# Patient Record
Sex: FEMALE | Race: BLACK OR AFRICAN AMERICAN | Employment: OTHER | URBAN - METROPOLITAN AREA
[De-identification: names, ages, dates, MRNs, and addresses within clinical notes are randomized per-mention and may not be internally consistent; named-entity substitution may affect disease eponyms.]

---

## 2017-09-18 PROBLEM — E11.42 DIABETIC PERIPHERAL NEUROPATHY (HCC): Status: ACTIVE | Noted: 2017-09-18

## 2018-04-30 ENCOUNTER — ANESTHESIA EVENT (OUTPATIENT)
Dept: OPERATING ROOM | Age: 63
End: 2018-04-30
Payer: MEDICARE

## 2018-05-01 ENCOUNTER — HOSPITAL ENCOUNTER (OUTPATIENT)
Age: 63
Setting detail: OUTPATIENT SURGERY
Discharge: HOME | End: 2018-05-01
Attending: PHYSICAL MEDICINE & REHABILITATION | Admitting: PHYSICAL MEDICINE & REHABILITATION
Payer: MEDICARE

## 2018-05-01 ENCOUNTER — HOSPITAL ENCOUNTER (OUTPATIENT)
Dept: OPERATING ROOM | Age: 63
Discharge: HOME | End: 2018-05-01
Payer: MEDICARE

## 2018-05-01 ENCOUNTER — ANESTHESIA (OUTPATIENT)
Dept: OPERATING ROOM | Age: 63
End: 2018-05-01
Payer: MEDICARE

## 2018-05-01 VITALS
OXYGEN SATURATION: 97 % | HEART RATE: 75 BPM | HEIGHT: 69 IN | SYSTOLIC BLOOD PRESSURE: 135 MMHG | WEIGHT: 293 LBS | TEMPERATURE: 98.6 F | RESPIRATION RATE: 16 BRPM | BODY MASS INDEX: 43.4 KG/M2 | DIASTOLIC BLOOD PRESSURE: 71 MMHG

## 2018-05-01 VITALS
RESPIRATION RATE: 11 BRPM | OXYGEN SATURATION: 98 % | DIASTOLIC BLOOD PRESSURE: 81 MMHG | SYSTOLIC BLOOD PRESSURE: 133 MMHG

## 2018-05-01 DIAGNOSIS — M51.36 DDD (DEGENERATIVE DISC DISEASE), LUMBAR: ICD-10-CM

## 2018-05-01 PROBLEM — M48.061 NEURAL FORAMINAL STENOSIS OF LUMBAR SPINE: Chronic | Status: ACTIVE | Noted: 2018-05-01

## 2018-05-01 LAB — GLUCOSE BLD-MCNC: 167 MG/DL

## 2018-05-01 PROCEDURE — 7100000011 HC PHASE II RECOVERY - ADDTL 15 MIN: Performed by: PHYSICAL MEDICINE & REHABILITATION

## 2018-05-01 PROCEDURE — 7100000010 HC PHASE II RECOVERY - FIRST 15 MIN: Performed by: PHYSICAL MEDICINE & REHABILITATION

## 2018-05-01 PROCEDURE — 3600000005 HC SURGERY LEVEL 5 BASE: Performed by: PHYSICAL MEDICINE & REHABILITATION

## 2018-05-01 PROCEDURE — 82962 GLUCOSE BLOOD TEST: CPT | Performed by: PHYSICAL MEDICINE & REHABILITATION

## 2018-05-01 PROCEDURE — 2580000003 HC RX 258: Performed by: PHYSICAL MEDICINE & REHABILITATION

## 2018-05-01 PROCEDURE — 3700000000 HC ANESTHESIA ATTENDED CARE: Performed by: PHYSICAL MEDICINE & REHABILITATION

## 2018-05-01 PROCEDURE — 3209999900 FLUORO FOR SURGICAL PROCEDURES

## 2018-05-01 PROCEDURE — 2500000003 HC RX 250 WO HCPCS: Performed by: PHYSICAL MEDICINE & REHABILITATION

## 2018-05-01 PROCEDURE — 6360000002 HC RX W HCPCS: Performed by: NURSE ANESTHETIST, CERTIFIED REGISTERED

## 2018-05-01 PROCEDURE — 6360000002 HC RX W HCPCS: Performed by: PHYSICAL MEDICINE & REHABILITATION

## 2018-05-01 PROCEDURE — 2500000003 HC RX 250 WO HCPCS: Performed by: NURSE ANESTHETIST, CERTIFIED REGISTERED

## 2018-05-01 PROCEDURE — 6360000004 HC RX CONTRAST MEDICATION: Performed by: PHYSICAL MEDICINE & REHABILITATION

## 2018-05-01 PROCEDURE — 2580000003 HC RX 258: Performed by: ANESTHESIOLOGY

## 2018-05-01 RX ORDER — ALFENTANIL HYDROCHLORIDE 500 UG/ML
INJECTION INTRAVENOUS PRN
Status: DISCONTINUED | OUTPATIENT
Start: 2018-05-01 | End: 2018-05-01 | Stop reason: SDUPTHER

## 2018-05-01 RX ORDER — HYDRALAZINE HYDROCHLORIDE 20 MG/ML
5 INJECTION INTRAMUSCULAR; INTRAVENOUS EVERY 10 MIN PRN
Status: DISCONTINUED | OUTPATIENT
Start: 2018-05-01 | End: 2018-05-01 | Stop reason: HOSPADM

## 2018-05-01 RX ORDER — LABETALOL HYDROCHLORIDE 5 MG/ML
5 INJECTION, SOLUTION INTRAVENOUS EVERY 10 MIN PRN
Status: DISCONTINUED | OUTPATIENT
Start: 2018-05-01 | End: 2018-05-01 | Stop reason: HOSPADM

## 2018-05-01 RX ORDER — LIDOCAINE HYDROCHLORIDE 10 MG/ML
INJECTION, SOLUTION EPIDURAL; INFILTRATION; INTRACAUDAL; PERINEURAL PRN
Status: DISCONTINUED | OUTPATIENT
Start: 2018-05-01 | End: 2018-05-01 | Stop reason: HOSPADM

## 2018-05-01 RX ORDER — PROPOFOL 10 MG/ML
INJECTION, EMULSION INTRAVENOUS PRN
Status: DISCONTINUED | OUTPATIENT
Start: 2018-05-01 | End: 2018-05-01 | Stop reason: SDUPTHER

## 2018-05-01 RX ORDER — HYDROCODONE BITARTRATE AND ACETAMINOPHEN 5; 325 MG/1; MG/1
2 TABLET ORAL PRN
Status: DISCONTINUED | OUTPATIENT
Start: 2018-05-01 | End: 2018-05-01 | Stop reason: HOSPADM

## 2018-05-01 RX ORDER — MEPERIDINE HYDROCHLORIDE 25 MG/ML
12.5 INJECTION INTRAMUSCULAR; INTRAVENOUS; SUBCUTANEOUS EVERY 5 MIN PRN
Status: DISCONTINUED | OUTPATIENT
Start: 2018-05-01 | End: 2018-05-01 | Stop reason: HOSPADM

## 2018-05-01 RX ORDER — HYDROCODONE BITARTRATE AND ACETAMINOPHEN 5; 325 MG/1; MG/1
1 TABLET ORAL PRN
Status: DISCONTINUED | OUTPATIENT
Start: 2018-05-01 | End: 2018-05-01 | Stop reason: HOSPADM

## 2018-05-01 RX ORDER — FENTANYL CITRATE 50 UG/ML
50 INJECTION, SOLUTION INTRAMUSCULAR; INTRAVENOUS EVERY 5 MIN PRN
Status: DISCONTINUED | OUTPATIENT
Start: 2018-05-01 | End: 2018-05-01 | Stop reason: HOSPADM

## 2018-05-01 RX ORDER — MORPHINE SULFATE 2 MG/ML
1 INJECTION, SOLUTION INTRAMUSCULAR; INTRAVENOUS EVERY 5 MIN PRN
Status: DISCONTINUED | OUTPATIENT
Start: 2018-05-01 | End: 2018-05-01 | Stop reason: HOSPADM

## 2018-05-01 RX ORDER — SODIUM CHLORIDE, SODIUM LACTATE, POTASSIUM CHLORIDE, CALCIUM CHLORIDE 600; 310; 30; 20 MG/100ML; MG/100ML; MG/100ML; MG/100ML
INJECTION, SOLUTION INTRAVENOUS CONTINUOUS
Status: DISCONTINUED | OUTPATIENT
Start: 2018-05-01 | End: 2018-05-01 | Stop reason: HOSPADM

## 2018-05-01 RX ORDER — DIPHENHYDRAMINE HYDROCHLORIDE 50 MG/ML
12.5 INJECTION INTRAMUSCULAR; INTRAVENOUS
Status: DISCONTINUED | OUTPATIENT
Start: 2018-05-01 | End: 2018-05-01 | Stop reason: HOSPADM

## 2018-05-01 RX ORDER — LIDOCAINE HYDROCHLORIDE 20 MG/ML
INJECTION, SOLUTION EPIDURAL; INFILTRATION; INTRACAUDAL; PERINEURAL PRN
Status: DISCONTINUED | OUTPATIENT
Start: 2018-05-01 | End: 2018-05-01 | Stop reason: SDUPTHER

## 2018-05-01 RX ORDER — PROMETHAZINE HYDROCHLORIDE 25 MG/ML
25 INJECTION, SOLUTION INTRAMUSCULAR; INTRAVENOUS
Status: DISCONTINUED | OUTPATIENT
Start: 2018-05-01 | End: 2018-05-01 | Stop reason: HOSPADM

## 2018-05-01 RX ORDER — MIDAZOLAM HYDROCHLORIDE 1 MG/ML
INJECTION INTRAMUSCULAR; INTRAVENOUS PRN
Status: DISCONTINUED | OUTPATIENT
Start: 2018-05-01 | End: 2018-05-01 | Stop reason: SDUPTHER

## 2018-05-01 RX ADMIN — LIDOCAINE HYDROCHLORIDE 50 MG: 20 INJECTION, SOLUTION EPIDURAL; INFILTRATION; INTRACAUDAL; PERINEURAL at 10:17

## 2018-05-01 RX ADMIN — MIDAZOLAM HYDROCHLORIDE 2 MG: 1 INJECTION INTRAMUSCULAR; INTRAVENOUS at 10:17

## 2018-05-01 RX ADMIN — ALFENTANIL HYDROCHLORIDE 250 MCG: 500 INJECTION INTRAVENOUS at 10:17

## 2018-05-01 RX ADMIN — PROPOFOL 20 MG: 10 INJECTION, EMULSION INTRAVENOUS at 10:17

## 2018-05-01 RX ADMIN — ALFENTANIL HYDROCHLORIDE 250 MCG: 500 INJECTION INTRAVENOUS at 10:24

## 2018-05-01 RX ADMIN — SODIUM CHLORIDE, POTASSIUM CHLORIDE, SODIUM LACTATE AND CALCIUM CHLORIDE: 600; 310; 30; 20 INJECTION, SOLUTION INTRAVENOUS at 09:30

## 2018-05-01 RX ADMIN — PROPOFOL 10 MG: 10 INJECTION, EMULSION INTRAVENOUS at 10:23

## 2018-05-01 RX ADMIN — ALFENTANIL HYDROCHLORIDE 250 MCG: 500 INJECTION INTRAVENOUS at 10:20

## 2018-05-01 RX ADMIN — PROPOFOL 10 MG: 10 INJECTION, EMULSION INTRAVENOUS at 10:24

## 2018-05-01 ASSESSMENT — PULMONARY FUNCTION TESTS
PIF_VALUE: 0

## 2018-05-01 ASSESSMENT — PAIN SCALES - GENERAL
PAINLEVEL_OUTOF10: 0

## 2018-05-01 ASSESSMENT — PAIN - FUNCTIONAL ASSESSMENT: PAIN_FUNCTIONAL_ASSESSMENT: 0-10

## 2018-05-02 ENCOUNTER — HOSPITAL ENCOUNTER (OUTPATIENT)
Age: 63
Discharge: HOME | End: 2018-05-02
Payer: MEDICARE

## 2018-05-02 LAB
ANION GAP SERPL CALCULATED.3IONS-SCNC: 13 MMOL/L (ref 7–16)
BASOPHILS ABSOLUTE: 0.03 E9/L (ref 0–0.2)
BASOPHILS RELATIVE PERCENT: 0.2 % (ref 0–2)
BUN BLDV-MCNC: 18 MG/DL (ref 8–23)
CALCIUM SERPL-MCNC: 9.6 MG/DL (ref 8.6–10.2)
CHLORIDE BLD-SCNC: 99 MMOL/L (ref 98–107)
CHOLESTEROL, TOTAL: 125 MG/DL (ref 0–199)
CO2: 29 MMOL/L (ref 22–29)
CREAT SERPL-MCNC: 1.2 MG/DL (ref 0.5–1)
EOSINOPHILS ABSOLUTE: 0.04 E9/L (ref 0.05–0.5)
EOSINOPHILS RELATIVE PERCENT: 0.3 % (ref 0–6)
GFR AFRICAN AMERICAN: 55
GFR NON-AFRICAN AMERICAN: 55 ML/MIN/1.73
GLUCOSE BLD-MCNC: 146 MG/DL (ref 74–109)
HCT VFR BLD CALC: 37.4 % (ref 34–48)
HDLC SERPL-MCNC: 38 MG/DL
HEMOGLOBIN: 11.6 G/DL (ref 11.5–15.5)
IMMATURE GRANULOCYTES #: 0.09 E9/L
IMMATURE GRANULOCYTES %: 0.6 % (ref 0–5)
LDL CHOLESTEROL CALCULATED: 72 MG/DL (ref 0–99)
LYMPHOCYTES ABSOLUTE: 2.79 E9/L (ref 1.5–4)
LYMPHOCYTES RELATIVE PERCENT: 17.8 % (ref 20–42)
MCH RBC QN AUTO: 26.5 PG (ref 26–35)
MCHC RBC AUTO-ENTMCNC: 31 % (ref 32–34.5)
MCV RBC AUTO: 85.4 FL (ref 80–99.9)
MONOCYTES ABSOLUTE: 1.35 E9/L (ref 0.1–0.95)
MONOCYTES RELATIVE PERCENT: 8.6 % (ref 2–12)
NEUTROPHILS ABSOLUTE: 11.35 E9/L (ref 1.8–7.3)
NEUTROPHILS RELATIVE PERCENT: 72.5 % (ref 43–80)
PARATHYROID HORMONE INTACT: 82 PG/ML (ref 15–65)
PDW BLD-RTO: 14.2 FL (ref 11.5–15)
PHOSPHORUS: 1.7 MG/DL (ref 2.5–4.5)
PLATELET # BLD: 357 E9/L (ref 130–450)
PMV BLD AUTO: 10.6 FL (ref 7–12)
POTASSIUM SERPL-SCNC: 3.4 MMOL/L (ref 3.5–5)
RBC # BLD: 4.38 E12/L (ref 3.5–5.5)
SODIUM BLD-SCNC: 141 MMOL/L (ref 132–146)
TRIGL SERPL-MCNC: 75 MG/DL (ref 0–149)
VITAMIN D 25-HYDROXY: 32 NG/ML (ref 30–100)
VLDLC SERPL CALC-MCNC: 15 MG/DL
WBC # BLD: 15.7 E9/L (ref 4.5–11.5)

## 2018-05-02 PROCEDURE — 85025 COMPLETE CBC W/AUTO DIFF WBC: CPT

## 2018-05-02 PROCEDURE — 80048 BASIC METABOLIC PNL TOTAL CA: CPT

## 2018-05-02 PROCEDURE — 84100 ASSAY OF PHOSPHORUS: CPT

## 2018-05-02 PROCEDURE — 83970 ASSAY OF PARATHORMONE: CPT

## 2018-05-02 PROCEDURE — 80061 LIPID PANEL: CPT

## 2018-05-02 PROCEDURE — 82306 VITAMIN D 25 HYDROXY: CPT

## 2018-05-02 PROCEDURE — 36415 COLL VENOUS BLD VENIPUNCTURE: CPT

## 2018-05-07 DIAGNOSIS — M47.26 OSTEOARTHRITIS OF SPINE WITH RADICULOPATHY, LUMBAR REGION: ICD-10-CM

## 2018-05-07 DIAGNOSIS — E11.42 DIABETIC POLYNEUROPATHY ASSOCIATED WITH TYPE 2 DIABETES MELLITUS (HCC): ICD-10-CM

## 2018-05-07 RX ORDER — GLUCOSAMINE HCL/CHONDROITIN SU 500-400 MG
CAPSULE ORAL
Qty: 200 STRIP | Refills: 3 | Status: SHIPPED | OUTPATIENT
Start: 2018-05-07 | End: 2018-11-16

## 2018-05-07 RX ORDER — CLONIDINE HYDROCHLORIDE 0.1 MG/1
0.1 TABLET ORAL 3 TIMES DAILY
Qty: 270 TABLET | Refills: 1 | Status: SHIPPED | OUTPATIENT
Start: 2018-05-07 | End: 2018-08-31 | Stop reason: SDUPTHER

## 2018-05-07 RX ORDER — GABAPENTIN 600 MG/1
600 TABLET ORAL 3 TIMES DAILY
Qty: 90 TABLET | Refills: 3 | Status: SHIPPED | OUTPATIENT
Start: 2018-05-07 | End: 2018-08-31 | Stop reason: SDUPTHER

## 2018-05-10 ENCOUNTER — HOSPITAL ENCOUNTER (OUTPATIENT)
Dept: GENERAL RADIOLOGY | Age: 63
Discharge: HOME | End: 2018-05-12
Payer: MEDICARE

## 2018-05-10 ENCOUNTER — HOSPITAL ENCOUNTER (OUTPATIENT)
Age: 63
Discharge: HOME | End: 2018-05-12
Payer: MEDICARE

## 2018-05-10 ENCOUNTER — OFFICE VISIT (OUTPATIENT)
Dept: FAMILY MEDICINE CLINIC | Age: 63
End: 2018-05-10
Payer: MEDICARE

## 2018-05-10 VITALS
TEMPERATURE: 98.2 F | DIASTOLIC BLOOD PRESSURE: 100 MMHG | OXYGEN SATURATION: 95 % | HEART RATE: 80 BPM | WEIGHT: 293 LBS | BODY MASS INDEX: 43.4 KG/M2 | SYSTOLIC BLOOD PRESSURE: 140 MMHG | HEIGHT: 69 IN | RESPIRATION RATE: 16 BRPM

## 2018-05-10 DIAGNOSIS — M25.512 ACUTE PAIN OF LEFT SHOULDER: ICD-10-CM

## 2018-05-10 DIAGNOSIS — K21.9 GASTROESOPHAGEAL REFLUX DISEASE, ESOPHAGITIS PRESENCE NOT SPECIFIED: ICD-10-CM

## 2018-05-10 DIAGNOSIS — I10 ESSENTIAL HYPERTENSION: Chronic | ICD-10-CM

## 2018-05-10 DIAGNOSIS — M47.26 OSTEOARTHRITIS OF SPINE WITH RADICULOPATHY, LUMBAR REGION: ICD-10-CM

## 2018-05-10 DIAGNOSIS — Z12.11 COLON CANCER SCREENING: ICD-10-CM

## 2018-05-10 LAB
ALBUMIN SERPL-MCNC: 3.8 G/DL (ref 3.5–5.2)
ALP BLD-CCNC: 119 U/L (ref 35–104)
ALT SERPL-CCNC: 15 U/L (ref 0–32)
ANION GAP SERPL CALCULATED.3IONS-SCNC: 14 MMOL/L (ref 7–16)
AST SERPL-CCNC: 17 U/L (ref 0–31)
BASOPHILS ABSOLUTE: 0.05 E9/L (ref 0–0.2)
BASOPHILS RELATIVE PERCENT: 0.4 % (ref 0–2)
BILIRUB SERPL-MCNC: 0.5 MG/DL (ref 0–1.2)
BUN BLDV-MCNC: 15 MG/DL (ref 8–23)
CALCIUM SERPL-MCNC: 9.6 MG/DL (ref 8.6–10.2)
CHLORIDE BLD-SCNC: 104 MMOL/L (ref 98–107)
CO2: 27 MMOL/L (ref 22–29)
CREAT SERPL-MCNC: 1.1 MG/DL (ref 0.5–1)
EOSINOPHILS ABSOLUTE: 0.27 E9/L (ref 0.05–0.5)
EOSINOPHILS RELATIVE PERCENT: 2.3 % (ref 0–6)
GFR AFRICAN AMERICAN: >60
GFR NON-AFRICAN AMERICAN: >60 ML/MIN/1.73
GLUCOSE BLD-MCNC: 145 MG/DL (ref 74–109)
HBA1C MFR BLD: 7.2 %
HCT VFR BLD CALC: 40.6 % (ref 34–48)
HEMOGLOBIN: 12 G/DL (ref 11.5–15.5)
IMMATURE GRANULOCYTES #: 0.03 E9/L
IMMATURE GRANULOCYTES %: 0.3 % (ref 0–5)
LYMPHOCYTES ABSOLUTE: 2.28 E9/L (ref 1.5–4)
LYMPHOCYTES RELATIVE PERCENT: 19.6 % (ref 20–42)
MCH RBC QN AUTO: 25.8 PG (ref 26–35)
MCHC RBC AUTO-ENTMCNC: 29.6 % (ref 32–34.5)
MCV RBC AUTO: 87.3 FL (ref 80–99.9)
MONOCYTES ABSOLUTE: 0.74 E9/L (ref 0.1–0.95)
MONOCYTES RELATIVE PERCENT: 6.4 % (ref 2–12)
NEUTROPHILS ABSOLUTE: 8.26 E9/L (ref 1.8–7.3)
NEUTROPHILS RELATIVE PERCENT: 71 % (ref 43–80)
PDW BLD-RTO: 14.5 FL (ref 11.5–15)
PLATELET # BLD: 370 E9/L (ref 130–450)
PMV BLD AUTO: 11 FL (ref 7–12)
POTASSIUM SERPL-SCNC: 3.4 MMOL/L (ref 3.5–5)
RBC # BLD: 4.65 E12/L (ref 3.5–5.5)
SODIUM BLD-SCNC: 145 MMOL/L (ref 132–146)
TOTAL PROTEIN: 7.1 G/DL (ref 6.4–8.3)
WBC # BLD: 11.6 E9/L (ref 4.5–11.5)

## 2018-05-10 PROCEDURE — 36415 COLL VENOUS BLD VENIPUNCTURE: CPT

## 2018-05-10 PROCEDURE — 83036 HEMOGLOBIN GLYCOSYLATED A1C: CPT | Performed by: FAMILY MEDICINE

## 2018-05-10 PROCEDURE — 80053 COMPREHEN METABOLIC PANEL: CPT

## 2018-05-10 PROCEDURE — 85025 COMPLETE CBC W/AUTO DIFF WBC: CPT

## 2018-05-10 PROCEDURE — 36415 COLL VENOUS BLD VENIPUNCTURE: CPT | Performed by: FAMILY MEDICINE

## 2018-05-10 PROCEDURE — 3017F COLORECTAL CA SCREEN DOC REV: CPT | Performed by: FAMILY MEDICINE

## 2018-05-10 PROCEDURE — 1036F TOBACCO NON-USER: CPT | Performed by: FAMILY MEDICINE

## 2018-05-10 PROCEDURE — G8417 CALC BMI ABV UP PARAM F/U: HCPCS | Performed by: FAMILY MEDICINE

## 2018-05-10 PROCEDURE — 3045F PR MOST RECENT HEMOGLOBIN A1C LEVEL 7.0-9.0%: CPT | Performed by: FAMILY MEDICINE

## 2018-05-10 PROCEDURE — 99214 OFFICE O/P EST MOD 30 MIN: CPT | Performed by: FAMILY MEDICINE

## 2018-05-10 PROCEDURE — 2022F DILAT RTA XM EVC RTNOPTHY: CPT | Performed by: FAMILY MEDICINE

## 2018-05-10 PROCEDURE — 73030 X-RAY EXAM OF SHOULDER: CPT

## 2018-05-10 PROCEDURE — G8427 DOCREV CUR MEDS BY ELIG CLIN: HCPCS | Performed by: FAMILY MEDICINE

## 2018-05-10 PROCEDURE — 99212 OFFICE O/P EST SF 10 MIN: CPT | Performed by: FAMILY MEDICINE

## 2018-05-10 RX ORDER — CELECOXIB 200 MG/1
200 CAPSULE ORAL DAILY
Qty: 90 CAPSULE | Refills: 1 | Status: SHIPPED | OUTPATIENT
Start: 2018-05-10 | End: 2018-08-31 | Stop reason: SDUPTHER

## 2018-05-10 RX ORDER — OMEPRAZOLE 40 MG/1
40 CAPSULE, DELAYED RELEASE ORAL
Qty: 90 CAPSULE | Refills: 3 | Status: SHIPPED | OUTPATIENT
Start: 2018-05-10 | End: 2018-08-31 | Stop reason: SDUPTHER

## 2018-05-10 RX ORDER — TIZANIDINE 4 MG/1
4 TABLET ORAL EVERY 6 HOURS PRN
Qty: 20 TABLET | Refills: 0 | Status: SHIPPED | OUTPATIENT
Start: 2018-05-10 | End: 2018-05-30 | Stop reason: SDUPTHER

## 2018-05-10 RX ORDER — BUSPIRONE HYDROCHLORIDE 5 MG/1
5 TABLET ORAL 3 TIMES DAILY
Qty: 270 TABLET | Refills: 1 | Status: SHIPPED | OUTPATIENT
Start: 2018-05-10 | End: 2018-08-31 | Stop reason: SDUPTHER

## 2018-05-10 RX ORDER — VALSARTAN AND HYDROCHLOROTHIAZIDE 160; 25 MG/1; MG/1
1 TABLET ORAL DAILY
Qty: 90 TABLET | Refills: 1 | Status: SHIPPED | OUTPATIENT
Start: 2018-05-10 | End: 2018-08-31 | Stop reason: SDUPTHER

## 2018-05-10 RX ORDER — DULOXETIN HYDROCHLORIDE 60 MG/1
60 CAPSULE, DELAYED RELEASE ORAL DAILY
Qty: 90 CAPSULE | Refills: 1 | Status: SHIPPED | OUTPATIENT
Start: 2018-05-10 | End: 2018-08-31 | Stop reason: SDUPTHER

## 2018-05-10 RX ORDER — METFORMIN HYDROCHLORIDE 500 MG/1
500 TABLET, EXTENDED RELEASE ORAL
Qty: 90 TABLET | Refills: 1 | Status: SHIPPED | OUTPATIENT
Start: 2018-05-10 | End: 2018-08-31 | Stop reason: SDUPTHER

## 2018-05-10 RX ORDER — ATORVASTATIN CALCIUM 80 MG/1
80 TABLET, FILM COATED ORAL NIGHTLY
Qty: 90 TABLET | Refills: 1 | Status: SHIPPED | OUTPATIENT
Start: 2018-05-10 | End: 2018-08-31 | Stop reason: SDUPTHER

## 2018-05-11 ASSESSMENT — ENCOUNTER SYMPTOMS
SHORTNESS OF BREATH: 0
EYE REDNESS: 0
BLURRED VISION: 0
BACK PAIN: 1
CONSTIPATION: 0
EYE PAIN: 0
DIARRHEA: 0
SORE THROAT: 0
SPUTUM PRODUCTION: 0
BLOOD IN STOOL: 0
NAUSEA: 0
VOMITING: 0
COUGH: 0
ABDOMINAL PAIN: 0

## 2018-05-30 ENCOUNTER — OFFICE VISIT (OUTPATIENT)
Dept: FAMILY MEDICINE CLINIC | Age: 63
End: 2018-05-30
Payer: MEDICARE

## 2018-05-30 VITALS
DIASTOLIC BLOOD PRESSURE: 87 MMHG | WEIGHT: 293 LBS | HEART RATE: 86 BPM | BODY MASS INDEX: 43.4 KG/M2 | RESPIRATION RATE: 16 BRPM | HEIGHT: 69 IN | SYSTOLIC BLOOD PRESSURE: 155 MMHG | TEMPERATURE: 98 F | OXYGEN SATURATION: 98 %

## 2018-05-30 DIAGNOSIS — M75.82 ROTATOR CUFF TENDONITIS, LEFT: Primary | ICD-10-CM

## 2018-05-30 PROCEDURE — 20610 DRAIN/INJ JOINT/BURSA W/O US: CPT | Performed by: FAMILY MEDICINE

## 2018-05-30 PROCEDURE — 6360000002 HC RX W HCPCS

## 2018-05-30 PROCEDURE — 99212 OFFICE O/P EST SF 10 MIN: CPT | Performed by: FAMILY MEDICINE

## 2018-05-30 PROCEDURE — 2500000003 HC RX 250 WO HCPCS

## 2018-05-30 RX ORDER — TRIAMCINOLONE ACETONIDE 40 MG/ML
40 INJECTION, SUSPENSION INTRA-ARTICULAR; INTRAMUSCULAR ONCE
Status: COMPLETED | OUTPATIENT
Start: 2018-05-30 | End: 2018-05-30

## 2018-05-30 RX ORDER — TIZANIDINE 4 MG/1
4 TABLET ORAL EVERY 6 HOURS PRN
Qty: 20 TABLET | Refills: 0 | Status: SHIPPED | OUTPATIENT
Start: 2018-05-30 | End: 2018-07-02 | Stop reason: SDUPTHER

## 2018-05-30 RX ORDER — LIDOCAINE HYDROCHLORIDE 10 MG/ML
9 INJECTION, SOLUTION INFILTRATION; PERINEURAL ONCE
Status: COMPLETED | OUTPATIENT
Start: 2018-05-30 | End: 2018-05-30

## 2018-05-30 RX ADMIN — LIDOCAINE HYDROCHLORIDE 9 ML: 10 INJECTION, SOLUTION INFILTRATION; PERINEURAL at 14:10

## 2018-05-30 RX ADMIN — TRIAMCINOLONE ACETONIDE 40 MG: 40 INJECTION, SUSPENSION INTRA-ARTICULAR; INTRAMUSCULAR at 14:10

## 2018-05-30 ASSESSMENT — ENCOUNTER SYMPTOMS
COUGH: 0
NAUSEA: 0
VOMITING: 0
ABDOMINAL PAIN: 0
SHORTNESS OF BREATH: 0

## 2018-06-09 DIAGNOSIS — R05.9 COUGH: ICD-10-CM

## 2018-06-09 DIAGNOSIS — R09.89 CHEST CONGESTION: Primary | ICD-10-CM

## 2018-06-09 RX ORDER — GUAIFENESIN 600 MG/1
1200 TABLET, EXTENDED RELEASE ORAL 2 TIMES DAILY PRN
Qty: 28 TABLET | Refills: 0 | Status: SHIPPED | OUTPATIENT
Start: 2018-06-09 | End: 2018-06-16

## 2018-06-11 ENCOUNTER — TELEPHONE (OUTPATIENT)
Dept: FAMILY MEDICINE CLINIC | Age: 63
End: 2018-06-11

## 2018-06-11 RX ORDER — GUAIFENESIN DEXTROMETHORPHAN HYDROBROMIDE ORAL SOLUTION 10; 100 MG/5ML; MG/5ML
10 SOLUTION ORAL EVERY 4 HOURS PRN
Qty: 1 BOTTLE | Refills: 1 | Status: SHIPPED | OUTPATIENT
Start: 2018-06-11 | End: 2018-06-13 | Stop reason: SDUPTHER

## 2018-06-13 ENCOUNTER — OFFICE VISIT (OUTPATIENT)
Dept: FAMILY MEDICINE CLINIC | Age: 63
End: 2018-06-13
Payer: MEDICARE

## 2018-06-13 ENCOUNTER — HOSPITAL ENCOUNTER (OUTPATIENT)
Dept: GENERAL RADIOLOGY | Age: 63
Discharge: HOME | End: 2018-06-15
Payer: MEDICARE

## 2018-06-13 ENCOUNTER — HOSPITAL ENCOUNTER (OUTPATIENT)
Age: 63
Discharge: HOME | End: 2018-06-15
Payer: MEDICARE

## 2018-06-13 VITALS
TEMPERATURE: 97.7 F | HEIGHT: 68 IN | OXYGEN SATURATION: 96 % | WEIGHT: 293 LBS | DIASTOLIC BLOOD PRESSURE: 62 MMHG | BODY MASS INDEX: 44.41 KG/M2 | SYSTOLIC BLOOD PRESSURE: 149 MMHG | RESPIRATION RATE: 20 BRPM

## 2018-06-13 DIAGNOSIS — J40 BRONCHITIS: Primary | ICD-10-CM

## 2018-06-13 DIAGNOSIS — R06.2 WHEEZING: ICD-10-CM

## 2018-06-13 PROCEDURE — 99213 OFFICE O/P EST LOW 20 MIN: CPT | Performed by: NURSE PRACTITIONER

## 2018-06-13 PROCEDURE — G8417 CALC BMI ABV UP PARAM F/U: HCPCS | Performed by: NURSE PRACTITIONER

## 2018-06-13 PROCEDURE — G8427 DOCREV CUR MEDS BY ELIG CLIN: HCPCS | Performed by: NURSE PRACTITIONER

## 2018-06-13 PROCEDURE — 99212 OFFICE O/P EST SF 10 MIN: CPT | Performed by: NURSE PRACTITIONER

## 2018-06-13 PROCEDURE — 71046 X-RAY EXAM CHEST 2 VIEWS: CPT

## 2018-06-13 PROCEDURE — 3017F COLORECTAL CA SCREEN DOC REV: CPT | Performed by: NURSE PRACTITIONER

## 2018-06-13 PROCEDURE — 1036F TOBACCO NON-USER: CPT | Performed by: NURSE PRACTITIONER

## 2018-06-13 PROCEDURE — 94640 AIRWAY INHALATION TREATMENT: CPT | Performed by: NURSE PRACTITIONER

## 2018-06-13 RX ORDER — IPRATROPIUM BROMIDE AND ALBUTEROL SULFATE 2.5; .5 MG/3ML; MG/3ML
1 SOLUTION RESPIRATORY (INHALATION) ONCE
Status: COMPLETED | OUTPATIENT
Start: 2018-06-13 | End: 2018-06-13

## 2018-06-13 RX ORDER — ALBUTEROL SULFATE 90 UG/1
2 AEROSOL, METERED RESPIRATORY (INHALATION) EVERY 6 HOURS PRN
Qty: 1 INHALER | Refills: 3 | Status: SHIPPED | OUTPATIENT
Start: 2018-06-13 | End: 2018-08-31

## 2018-06-13 RX ORDER — GUAIFENESIN DEXTROMETHORPHAN HYDROBROMIDE ORAL SOLUTION 10; 100 MG/5ML; MG/5ML
10 SOLUTION ORAL EVERY 4 HOURS PRN
Qty: 1 BOTTLE | Refills: 1 | Status: SHIPPED | OUTPATIENT
Start: 2018-06-13 | End: 2018-08-31

## 2018-06-13 RX ORDER — DOXYCYCLINE 100 MG/1
100 CAPSULE ORAL 2 TIMES DAILY
Qty: 20 CAPSULE | Refills: 0 | Status: SHIPPED | OUTPATIENT
Start: 2018-06-13 | End: 2018-08-31

## 2018-06-13 RX ADMIN — IPRATROPIUM BROMIDE AND ALBUTEROL SULFATE 1 AMPULE: 2.5; .5 SOLUTION RESPIRATORY (INHALATION) at 14:08

## 2018-06-13 ASSESSMENT — ENCOUNTER SYMPTOMS
SORE THROAT: 1
VOMITING: 0
NAUSEA: 0
WHEEZING: 1
SHORTNESS OF BREATH: 1
ORTHOPNEA: 0
HEMOPTYSIS: 0
SPUTUM PRODUCTION: 0
COUGH: 1

## 2018-06-14 ENCOUNTER — TELEPHONE (OUTPATIENT)
Dept: FAMILY MEDICINE CLINIC | Age: 63
End: 2018-06-14

## 2018-06-20 ENCOUNTER — OFFICE VISIT (OUTPATIENT)
Dept: FAMILY MEDICINE CLINIC | Age: 63
End: 2018-06-20
Payer: MEDICARE

## 2018-06-20 VITALS
HEIGHT: 67 IN | BODY MASS INDEX: 45.99 KG/M2 | RESPIRATION RATE: 18 BRPM | TEMPERATURE: 97.8 F | HEART RATE: 69 BPM | SYSTOLIC BLOOD PRESSURE: 158 MMHG | WEIGHT: 293 LBS | DIASTOLIC BLOOD PRESSURE: 90 MMHG

## 2018-06-20 DIAGNOSIS — F41.9 ANXIETY: ICD-10-CM

## 2018-06-20 PROCEDURE — 99212 OFFICE O/P EST SF 10 MIN: CPT | Performed by: FAMILY MEDICINE

## 2018-06-20 PROCEDURE — 3017F COLORECTAL CA SCREEN DOC REV: CPT | Performed by: FAMILY MEDICINE

## 2018-06-20 PROCEDURE — 1036F TOBACCO NON-USER: CPT | Performed by: FAMILY MEDICINE

## 2018-06-20 PROCEDURE — G8427 DOCREV CUR MEDS BY ELIG CLIN: HCPCS | Performed by: FAMILY MEDICINE

## 2018-06-20 PROCEDURE — G8417 CALC BMI ABV UP PARAM F/U: HCPCS | Performed by: FAMILY MEDICINE

## 2018-06-20 PROCEDURE — 99213 OFFICE O/P EST LOW 20 MIN: CPT | Performed by: FAMILY MEDICINE

## 2018-06-24 ASSESSMENT — ENCOUNTER SYMPTOMS
SPUTUM PRODUCTION: 1
SINUS PAIN: 0
WHEEZING: 0
HEMOPTYSIS: 0
VOMITING: 0
SHORTNESS OF BREATH: 0
DIARRHEA: 0
SORE THROAT: 0
ABDOMINAL PAIN: 0
NAUSEA: 0
COUGH: 1
CONSTIPATION: 0

## 2018-07-02 DIAGNOSIS — M75.82 ROTATOR CUFF TENDONITIS, LEFT: ICD-10-CM

## 2018-07-03 RX ORDER — TIZANIDINE 4 MG/1
4 TABLET ORAL EVERY 6 HOURS PRN
Qty: 30 TABLET | Refills: 1 | Status: SHIPPED | OUTPATIENT
Start: 2018-07-03 | End: 2018-08-31 | Stop reason: SDUPTHER

## 2018-08-31 ENCOUNTER — HOSPITAL ENCOUNTER (OUTPATIENT)
Age: 63
Discharge: HOME | End: 2018-09-02
Payer: MEDICARE

## 2018-08-31 ENCOUNTER — OFFICE VISIT (OUTPATIENT)
Dept: FAMILY MEDICINE CLINIC | Age: 63
End: 2018-08-31
Payer: MEDICARE

## 2018-08-31 VITALS
RESPIRATION RATE: 12 BRPM | WEIGHT: 293 LBS | OXYGEN SATURATION: 96 % | HEART RATE: 64 BPM | DIASTOLIC BLOOD PRESSURE: 94 MMHG | TEMPERATURE: 98 F | BODY MASS INDEX: 45.99 KG/M2 | HEIGHT: 67 IN | SYSTOLIC BLOOD PRESSURE: 150 MMHG

## 2018-08-31 DIAGNOSIS — E11.42 DIABETIC POLYNEUROPATHY ASSOCIATED WITH TYPE 2 DIABETES MELLITUS (HCC): ICD-10-CM

## 2018-08-31 DIAGNOSIS — F43.21 GRIEF REACTION: ICD-10-CM

## 2018-08-31 DIAGNOSIS — K21.9 GASTROESOPHAGEAL REFLUX DISEASE, ESOPHAGITIS PRESENCE NOT SPECIFIED: ICD-10-CM

## 2018-08-31 DIAGNOSIS — M47.26 OSTEOARTHRITIS OF SPINE WITH RADICULOPATHY, LUMBAR REGION: ICD-10-CM

## 2018-08-31 DIAGNOSIS — M75.82 ROTATOR CUFF TENDONITIS, LEFT: ICD-10-CM

## 2018-08-31 DIAGNOSIS — I10 ESSENTIAL HYPERTENSION: Chronic | ICD-10-CM

## 2018-08-31 LAB
ANION GAP SERPL CALCULATED.3IONS-SCNC: 14 MMOL/L (ref 7–16)
BASOPHILS ABSOLUTE: 0.05 E9/L (ref 0–0.2)
BASOPHILS RELATIVE PERCENT: 0.4 % (ref 0–2)
BUN BLDV-MCNC: 17 MG/DL (ref 8–23)
CALCIUM SERPL-MCNC: 10.3 MG/DL (ref 8.6–10.2)
CHLORIDE BLD-SCNC: 102 MMOL/L (ref 98–107)
CO2: 30 MMOL/L (ref 22–29)
CREAT SERPL-MCNC: 1.2 MG/DL (ref 0.5–1)
EOSINOPHILS ABSOLUTE: 0.23 E9/L (ref 0.05–0.5)
EOSINOPHILS RELATIVE PERCENT: 1.9 % (ref 0–6)
GFR AFRICAN AMERICAN: 55
GFR NON-AFRICAN AMERICAN: 55 ML/MIN/1.73
GLUCOSE BLD-MCNC: 105 MG/DL (ref 74–109)
HBA1C MFR BLD: 7 %
HCT VFR BLD CALC: 42.6 % (ref 34–48)
HEMOGLOBIN: 12.6 G/DL (ref 11.5–15.5)
IMMATURE GRANULOCYTES #: 0.05 E9/L
IMMATURE GRANULOCYTES %: 0.4 % (ref 0–5)
LYMPHOCYTES ABSOLUTE: 1.9 E9/L (ref 1.5–4)
LYMPHOCYTES RELATIVE PERCENT: 15.8 % (ref 20–42)
MCH RBC QN AUTO: 26.2 PG (ref 26–35)
MCHC RBC AUTO-ENTMCNC: 29.6 % (ref 32–34.5)
MCV RBC AUTO: 88.6 FL (ref 80–99.9)
MONOCYTES ABSOLUTE: 0.91 E9/L (ref 0.1–0.95)
MONOCYTES RELATIVE PERCENT: 7.6 % (ref 2–12)
NEUTROPHILS ABSOLUTE: 8.88 E9/L (ref 1.8–7.3)
NEUTROPHILS RELATIVE PERCENT: 73.9 % (ref 43–80)
PDW BLD-RTO: 14.4 FL (ref 11.5–15)
PLATELET # BLD: 397 E9/L (ref 130–450)
PMV BLD AUTO: 11 FL (ref 7–12)
POTASSIUM SERPL-SCNC: 3.7 MMOL/L (ref 3.5–5)
RBC # BLD: 4.81 E12/L (ref 3.5–5.5)
SODIUM BLD-SCNC: 146 MMOL/L (ref 132–146)
WBC # BLD: 12 E9/L (ref 4.5–11.5)

## 2018-08-31 PROCEDURE — 36415 COLL VENOUS BLD VENIPUNCTURE: CPT

## 2018-08-31 PROCEDURE — 99212 OFFICE O/P EST SF 10 MIN: CPT | Performed by: FAMILY MEDICINE

## 2018-08-31 PROCEDURE — 1036F TOBACCO NON-USER: CPT | Performed by: FAMILY MEDICINE

## 2018-08-31 PROCEDURE — 2022F DILAT RTA XM EVC RTNOPTHY: CPT | Performed by: FAMILY MEDICINE

## 2018-08-31 PROCEDURE — 80048 BASIC METABOLIC PNL TOTAL CA: CPT

## 2018-08-31 PROCEDURE — G8417 CALC BMI ABV UP PARAM F/U: HCPCS | Performed by: FAMILY MEDICINE

## 2018-08-31 PROCEDURE — 3045F PR MOST RECENT HEMOGLOBIN A1C LEVEL 7.0-9.0%: CPT | Performed by: FAMILY MEDICINE

## 2018-08-31 PROCEDURE — 83036 HEMOGLOBIN GLYCOSYLATED A1C: CPT | Performed by: FAMILY MEDICINE

## 2018-08-31 PROCEDURE — 99214 OFFICE O/P EST MOD 30 MIN: CPT | Performed by: FAMILY MEDICINE

## 2018-08-31 PROCEDURE — 3017F COLORECTAL CA SCREEN DOC REV: CPT | Performed by: FAMILY MEDICINE

## 2018-08-31 PROCEDURE — G8428 CUR MEDS NOT DOCUMENT: HCPCS | Performed by: FAMILY MEDICINE

## 2018-08-31 PROCEDURE — 85025 COMPLETE CBC W/AUTO DIFF WBC: CPT

## 2018-08-31 PROCEDURE — 36415 COLL VENOUS BLD VENIPUNCTURE: CPT | Performed by: FAMILY MEDICINE

## 2018-08-31 RX ORDER — BUSPIRONE HYDROCHLORIDE 5 MG/1
5 TABLET ORAL 3 TIMES DAILY
Qty: 270 TABLET | Refills: 1 | Status: SHIPPED | OUTPATIENT
Start: 2018-08-31 | End: 2018-11-16

## 2018-08-31 RX ORDER — OMEPRAZOLE 40 MG/1
40 CAPSULE, DELAYED RELEASE ORAL
Qty: 90 CAPSULE | Refills: 3 | Status: SHIPPED | OUTPATIENT
Start: 2018-08-31 | End: 2018-11-16

## 2018-08-31 RX ORDER — CELECOXIB 200 MG/1
200 CAPSULE ORAL DAILY
Qty: 90 CAPSULE | Refills: 1 | Status: SHIPPED | OUTPATIENT
Start: 2018-08-31 | End: 2018-11-16

## 2018-08-31 RX ORDER — METFORMIN HYDROCHLORIDE 500 MG/1
500 TABLET, EXTENDED RELEASE ORAL
Qty: 90 TABLET | Refills: 1 | Status: SHIPPED | OUTPATIENT
Start: 2018-08-31 | End: 2018-11-16

## 2018-08-31 RX ORDER — GABAPENTIN 600 MG/1
600 TABLET ORAL 3 TIMES DAILY
Qty: 90 TABLET | Refills: 3 | Status: SHIPPED | OUTPATIENT
Start: 2018-08-31 | End: 2018-11-16

## 2018-08-31 RX ORDER — TIZANIDINE 4 MG/1
4 TABLET ORAL EVERY 6 HOURS PRN
Qty: 30 TABLET | Refills: 1 | Status: SHIPPED | OUTPATIENT
Start: 2018-08-31 | End: 2018-11-16

## 2018-08-31 RX ORDER — ATORVASTATIN CALCIUM 80 MG/1
80 TABLET, FILM COATED ORAL NIGHTLY
Qty: 90 TABLET | Refills: 1 | Status: SHIPPED | OUTPATIENT
Start: 2018-08-31 | End: 2018-11-16

## 2018-08-31 RX ORDER — VALSARTAN AND HYDROCHLOROTHIAZIDE 160; 25 MG/1; MG/1
1 TABLET ORAL DAILY
Qty: 90 TABLET | Refills: 1 | Status: SHIPPED | OUTPATIENT
Start: 2018-08-31 | End: 2018-11-16

## 2018-08-31 RX ORDER — CLONIDINE HYDROCHLORIDE 0.1 MG/1
0.1 TABLET ORAL 3 TIMES DAILY
Qty: 270 TABLET | Refills: 1 | Status: SHIPPED | OUTPATIENT
Start: 2018-08-31 | End: 2018-11-16

## 2018-08-31 RX ORDER — DULOXETIN HYDROCHLORIDE 60 MG/1
60 CAPSULE, DELAYED RELEASE ORAL DAILY
Qty: 90 CAPSULE | Refills: 1 | Status: SHIPPED | OUTPATIENT
Start: 2018-08-31 | End: 2018-11-16

## 2018-08-31 NOTE — PATIENT INSTRUCTIONS
Patient Education        Counting Carbohydrates: Care Instructions  Your Care Instructions    You don't have to eat special foods when you have diabetes. You just have to be careful to eat healthy foods. Carbohydrates (carbs) raise blood sugar higher and quicker than any other nutrient. Carbs are found in desserts, breads and cereals, and fruit. They're also in starchy vegetables. These include potatoes, corn, and grains such as rice and pasta. Carbs are also in milk and yogurt. The more carbs you eat at one time, the higher your blood sugar will rise. Spreading carbs all through the day helps keep your blood sugar levels within your target range. Counting carbs is one of the best ways to keep your blood sugar under control. If you use insulin, counting carbs helps you match the right amount of insulin to the number of grams of carbs in a meal. Then you can change your diet and insulin dose as needed. Testing your blood sugar several times a day can help you learn how carbs affect your blood sugar. A registered dietitian or certified diabetes educator can help you plan meals and snacks. Follow-up care is a key part of your treatment and safety. Be sure to make and go to all appointments, and call your doctor if you are having problems. It's also a good idea to know your test results and keep a list of the medicines you take. How can you care for yourself at home? Know your daily amount of carbohydrates  Your daily amount depends on several things, such as your weight, how active you are, which diabetes medicines you take, and what your goals are for your blood sugar levels. A registered dietitian or certified diabetes educator can help you plan how many carbs to include in each meal and snack. For most adults, a guideline for the daily amount of carbs is:  · 45 to 60 grams at each meal. That's about the same as 3 to 4 carbohydrate servings. · 15 to 20 grams at each snack.  That's about the same as 1 alcohol, talk to your doctor. It may not be recommended when you are taking certain diabetes medicines. Where can you learn more? Go to https://chpepiceweb.[email or web address] org and sign in to your Vinspi account. Enter V126 in the [employer] box to learn more about \"Counting Carbohydrates: Care Instructions. \"     If you do not have an account, please click on the \"Sign Up Now\" link. Current as of: December 7, 2017  Content Version: 11.7  © 3176-4616 [employer]. Care instructions adapted under license by [facility] ([facility]). If you have questions about a medical condition or this instruction, always ask your healthcare professional. [address] any warranty or liability for your use of this information.

## 2018-08-31 NOTE — PROGRESS NOTES
Allergies    Family History   Problem Relation Age of Onset    Lung Cancer Mother 71        mesothelioma metastatized to the brain    Diabetes Mother     High Cholesterol Mother     High Blood Pressure Mother     Arthritis Mother     Cancer Mother     Diabetes Father     Arthritis Father     High Blood Pressure Sister     Arthritis Sister     High Cholesterol Sister     High Blood Pressure Brother     Substance Abuse Brother     Breast Cancer Paternal Aunt     Cancer Paternal Aunt     High Blood Pressure Paternal Aunt     Cancer Maternal Grandmother [de-identified]        colorectal cancer    High Blood Pressure Maternal Grandmother     Heart Failure Maternal Grandmother     Arthritis Maternal Grandmother     High Cholesterol Maternal Grandmother     High Blood Pressure Paternal Grandmother     Stroke Paternal Grandmother     Cancer Maternal Aunt     High Blood Pressure Maternal Aunt     High Cholesterol Maternal Aunt     Diabetes Maternal Uncle     High Blood Pressure Maternal Uncle     High Cholesterol Maternal Uncle     Substance Abuse Maternal Uncle     High Blood Pressure Paternal Uncle        Past Surgical History:   Procedure Laterality Date    ECHO COMPLETE  3/19/2012         EYE MUSCLE SURGERY      as a child    HYSTERECTOMY      with salpingoophorectomy    NERVE BLOCK Right 05/01/2018    lumbar transforaminal nerve block #1 with sedation    CA KALIA NOSE/CLEFT LIP/TIP Right 5/1/2018    RIGHT LUMBAR TRANSFORAMINAL NERVE BLOCK #1 L3-4 L4-5 WITH IV SEDATIN performed by Antelmo Montero DO at [facility]         Social History   Substance Use Topics    Smoking status: Never Smoker    Smokeless tobacco: Never Used    Alcohol use 1.2 oz/week     1 Glasses of wine, 1 Cans of beer per week      Comment: 1 time per month       Review of Systems  Review of Systems   Constitutional: Negative for chills, fever and malaise/fatigue.    HENT: Negative for congestion, ear pain and sore throat. Eyes: Negative for blurred vision, pain and redness. Respiratory: Negative for cough, sputum production and shortness of breath. Cardiovascular: Negative for chest pain, palpitations and leg swelling. Gastrointestinal: Negative for abdominal pain, blood in stool, constipation, diarrhea, nausea and vomiting. Genitourinary: Negative for dysuria, frequency and hematuria. Musculoskeletal: Positive for back pain. Negative for joint pain and myalgias. Skin: Negative for itching and rash. Neurological: Negative for dizziness, sensory change and headaches. Endo/Heme/Allergies: Negative for environmental allergies. Does not bruise/bleed easily. Psychiatric/Behavioral: Negative for depression. The patient is not nervous/anxious and does not have insomnia. Physical Exam:  VS:  Blood pressure (!) 150/94, pulse 64, temperature 98 °F (36.7 °C), resp. rate 12, height 5' 7\" (1.702 m), weight (!) 339 lb (153.8 kg), SpO2 96 %, not currently breastfeeding. Physical Exam   Constitutional: She appears well-developed and well-nourished. HENT:   Head: Normocephalic and atraumatic. Cardiovascular: Normal rate and regular rhythm. Exam reveals no gallop and no friction rub. No murmur heard. Pulmonary/Chest: Breath sounds normal. She has no wheezes. She has no rhonchi. She has no rales. Abdominal: Soft. Bowel sounds are normal. She exhibits no mass. There is no tenderness. Musculoskeletal: She exhibits no edema. Skin: No cyanosis. Nails show no clubbing. Assessment/Plan:  1. Uncontrolled type 2 diabetes mellitus with diabetic polyneuropathy, with long-term current use of insulin ([facility])  a1c today is 7.0, improved from previous of 7.2. Continue current regimen. Referred back to dm ed for nutritional  for weight loss. She plans to increase her exercise as well. - POCT glycosylated hemoglobin (Hb A1C)  - metFORMIN (GLUCOPHAGE XR) 500 MG extended release tablet;  Take 1

## 2018-08-31 NOTE — LETTER
[facility]  [address]  Phone: [phone]  Fax: [phone]    Liv Rutledge MD        August 31, 2018     Patient: Maxine Pena   YOB: 1955   Date of Visit: 8/31/2018       To Whom It May Concern: It is my medical opinion that Maxine Pena requires a disability parking placard for the following reasons:  She cannot walk 200 feet without stopping to rest.  She has limited walking ability due to an orthopedic condition. Duration of need: 1 year    If you have any questions or concerns, please don't hesitate to call.     Sincerely,        Liv Rutledge MD

## 2018-09-02 ASSESSMENT — ENCOUNTER SYMPTOMS
EYE PAIN: 0
BACK PAIN: 1
SHORTNESS OF BREATH: 0
BLURRED VISION: 0
COUGH: 0
SORE THROAT: 0
CONSTIPATION: 0
BLOOD IN STOOL: 0
ABDOMINAL PAIN: 0
NAUSEA: 0
VOMITING: 0
DIARRHEA: 0
EYE REDNESS: 0
SPUTUM PRODUCTION: 0

## 2018-09-10 ENCOUNTER — TELEPHONE (OUTPATIENT)
Dept: FAMILY MEDICINE CLINIC | Age: 63
End: 2018-09-10

## 2018-11-13 DIAGNOSIS — Z79.4 TYPE 2 DIABETES MELLITUS WITH BOTH EYES AFFECTED BY MILD NONPROLIFERATIVE RETINOPATHY WITHOUT MACULAR EDEMA, WITH LONG-TERM CURRENT USE OF INSULIN (HCC): ICD-10-CM

## 2018-11-13 DIAGNOSIS — E11.3293 TYPE 2 DIABETES MELLITUS WITH BOTH EYES AFFECTED BY MILD NONPROLIFERATIVE RETINOPATHY WITHOUT MACULAR EDEMA, WITH LONG-TERM CURRENT USE OF INSULIN (HCC): ICD-10-CM

## 2018-11-16 ENCOUNTER — OFFICE VISIT (OUTPATIENT)
Dept: FAMILY MEDICINE CLINIC | Age: 63
End: 2018-11-16
Payer: MEDICARE

## 2018-11-16 ENCOUNTER — HOSPITAL ENCOUNTER (OUTPATIENT)
Age: 63
Discharge: HOME | End: 2018-11-18
Payer: MEDICARE

## 2018-11-16 VITALS
RESPIRATION RATE: 16 BRPM | DIASTOLIC BLOOD PRESSURE: 78 MMHG | BODY MASS INDEX: 43.4 KG/M2 | HEART RATE: 86 BPM | SYSTOLIC BLOOD PRESSURE: 152 MMHG | OXYGEN SATURATION: 98 % | TEMPERATURE: 98.2 F | WEIGHT: 293 LBS | HEIGHT: 69 IN

## 2018-11-16 DIAGNOSIS — M10.9 ACUTE GOUT INVOLVING TOE OF RIGHT FOOT, UNSPECIFIED CAUSE: ICD-10-CM

## 2018-11-16 DIAGNOSIS — M10.9 ACUTE GOUT INVOLVING TOE OF RIGHT FOOT, UNSPECIFIED CAUSE: Primary | ICD-10-CM

## 2018-11-16 DIAGNOSIS — I10 ESSENTIAL HYPERTENSION: Chronic | ICD-10-CM

## 2018-11-16 DIAGNOSIS — Z12.31 ENCOUNTER FOR SCREENING MAMMOGRAM FOR MALIGNANT NEOPLASM OF BREAST: ICD-10-CM

## 2018-11-16 DIAGNOSIS — Z76.0 MEDICATION REFILL: ICD-10-CM

## 2018-11-16 DIAGNOSIS — Z23 NEED FOR VACCINATION: ICD-10-CM

## 2018-11-16 LAB — URIC ACID, SERUM: 10.2 MG/DL (ref 2.4–5.7)

## 2018-11-16 PROCEDURE — G8417 CALC BMI ABV UP PARAM F/U: HCPCS | Performed by: FAMILY MEDICINE

## 2018-11-16 PROCEDURE — G8482 FLU IMMUNIZE ORDER/ADMIN: HCPCS | Performed by: FAMILY MEDICINE

## 2018-11-16 PROCEDURE — 3017F COLORECTAL CA SCREEN DOC REV: CPT | Performed by: FAMILY MEDICINE

## 2018-11-16 PROCEDURE — 99213 OFFICE O/P EST LOW 20 MIN: CPT | Performed by: FAMILY MEDICINE

## 2018-11-16 PROCEDURE — 99212 OFFICE O/P EST SF 10 MIN: CPT | Performed by: FAMILY MEDICINE

## 2018-11-16 PROCEDURE — 90686 IIV4 VACC NO PRSV 0.5 ML IM: CPT

## 2018-11-16 PROCEDURE — 36415 COLL VENOUS BLD VENIPUNCTURE: CPT | Performed by: FAMILY MEDICINE

## 2018-11-16 PROCEDURE — 1036F TOBACCO NON-USER: CPT | Performed by: FAMILY MEDICINE

## 2018-11-16 PROCEDURE — 84550 ASSAY OF BLOOD/URIC ACID: CPT

## 2018-11-16 PROCEDURE — G0008 ADMIN INFLUENZA VIRUS VAC: HCPCS

## 2018-11-16 PROCEDURE — G8427 DOCREV CUR MEDS BY ELIG CLIN: HCPCS | Performed by: FAMILY MEDICINE

## 2018-11-16 PROCEDURE — 6360000002 HC RX W HCPCS

## 2018-11-16 RX ORDER — DULOXETIN HYDROCHLORIDE 60 MG/1
60 CAPSULE, DELAYED RELEASE ORAL DAILY
Qty: 90 CAPSULE | Refills: 1 | Status: SHIPPED | OUTPATIENT
Start: 2018-11-16 | End: 2018-12-07 | Stop reason: SDUPTHER

## 2018-11-16 RX ORDER — TIZANIDINE 4 MG/1
4 TABLET ORAL EVERY 6 HOURS PRN
Qty: 30 TABLET | Refills: 1 | Status: SHIPPED | OUTPATIENT
Start: 2018-11-16 | End: 2018-12-07 | Stop reason: SDUPTHER

## 2018-11-16 RX ORDER — MELATONIN
2 DAILY
Qty: 180 TABLET | Refills: 1 | Status: SHIPPED | OUTPATIENT
Start: 2018-11-16 | End: 2018-12-07 | Stop reason: SDUPTHER

## 2018-11-16 RX ORDER — COLCHICINE 0.6 MG/1
TABLET ORAL
Qty: 3 TABLET | Refills: 1 | Status: SHIPPED | OUTPATIENT
Start: 2018-11-16 | End: 2018-11-19 | Stop reason: SDUPTHER

## 2018-11-16 RX ORDER — VALSARTAN AND HYDROCHLOROTHIAZIDE 160; 25 MG/1; MG/1
1 TABLET ORAL DAILY
Qty: 90 TABLET | Refills: 1 | Status: SHIPPED | OUTPATIENT
Start: 2018-11-16 | End: 2018-12-07 | Stop reason: ALTCHOICE

## 2018-11-16 RX ORDER — GABAPENTIN 600 MG/1
600 TABLET ORAL 3 TIMES DAILY
Qty: 270 TABLET | Refills: 3
Start: 2018-11-16 | End: 2019-01-18 | Stop reason: SDUPTHER

## 2018-11-16 RX ORDER — POTASSIUM CHLORIDE 1.5 G/1.77G
20 POWDER, FOR SOLUTION ORAL DAILY
Qty: 30 EACH | Refills: 3 | Status: SHIPPED | OUTPATIENT
Start: 2018-11-16 | End: 2019-12-04 | Stop reason: SDUPTHER

## 2018-11-16 RX ORDER — METFORMIN HYDROCHLORIDE 500 MG/1
500 TABLET, EXTENDED RELEASE ORAL
Qty: 90 TABLET | Refills: 1 | Status: SHIPPED | OUTPATIENT
Start: 2018-11-16 | End: 2019-12-04 | Stop reason: SDUPTHER

## 2018-11-16 RX ORDER — BUSPIRONE HYDROCHLORIDE 5 MG/1
5 TABLET ORAL 3 TIMES DAILY
Qty: 270 TABLET | Refills: 1 | Status: SHIPPED | OUTPATIENT
Start: 2018-11-16 | End: 2018-12-07 | Stop reason: SDUPTHER

## 2018-11-16 RX ORDER — ATORVASTATIN CALCIUM 80 MG/1
80 TABLET, FILM COATED ORAL NIGHTLY
Qty: 90 TABLET | Refills: 1 | Status: SHIPPED | OUTPATIENT
Start: 2018-11-16 | End: 2018-12-07 | Stop reason: SDUPTHER

## 2018-11-16 RX ORDER — GLUCOSAM/CHON-MSM1/C/MANG/BOSW 500-416.6
TABLET ORAL
Qty: 500 EACH | Refills: 3 | Status: SHIPPED | OUTPATIENT
Start: 2018-11-16 | End: 2019-12-04 | Stop reason: SDUPTHER

## 2018-11-16 RX ORDER — OMEPRAZOLE 40 MG/1
40 CAPSULE, DELAYED RELEASE ORAL
Qty: 90 CAPSULE | Refills: 1
Start: 2018-11-16 | End: 2018-12-07 | Stop reason: SDUPTHER

## 2018-11-16 RX ORDER — CLONIDINE HYDROCHLORIDE 0.1 MG/1
0.1 TABLET ORAL 3 TIMES DAILY
Qty: 270 TABLET | Refills: 1 | Status: SHIPPED | OUTPATIENT
Start: 2018-11-16 | End: 2018-12-07 | Stop reason: SDUPTHER

## 2018-11-16 RX ORDER — GLUCOSAMINE HCL/CHONDROITIN SU 500-400 MG
CAPSULE ORAL
Qty: 200 STRIP | Refills: 3 | Status: SHIPPED
Start: 2018-11-16 | End: 2020-07-30 | Stop reason: SDUPTHER

## 2018-11-16 RX ORDER — FUROSEMIDE 20 MG/1
TABLET ORAL
Qty: 90 TABLET | Refills: 1 | Status: SHIPPED | OUTPATIENT
Start: 2018-11-16 | End: 2018-12-07 | Stop reason: SDUPTHER

## 2018-11-16 RX ORDER — CELECOXIB 200 MG/1
200 CAPSULE ORAL DAILY
Qty: 90 CAPSULE | Refills: 1 | Status: SHIPPED | OUTPATIENT
Start: 2018-11-16 | End: 2018-12-07 | Stop reason: SDUPTHER

## 2018-11-16 RX ORDER — METOPROLOL TARTRATE 100 MG/1
100 TABLET ORAL 2 TIMES DAILY
Qty: 180 TABLET | Refills: 1 | Status: SHIPPED | OUTPATIENT
Start: 2018-11-16 | End: 2018-12-07 | Stop reason: SDUPTHER

## 2018-11-16 NOTE — PROGRESS NOTES
colchicine (COLCRYS) 0.6 MG tablet   2. Essential hypertension  metoprolol (LOPRESSOR) 100 MG tablet   3. Medication refill  gabapentin (NEURONTIN) 600 MG tablet    omeprazole (PRILOSEC) 40 MG delayed release capsule    tiZANidine (ZANAFLEX) 4 MG tablet    cloNIDine (CATAPRES) 0.1 MG tablet    busPIRone (BUSPAR) 5 MG tablet    DULoxetine (CYMBALTA) 60 MG extended release capsule    celecoxib (CELEBREX) 200 MG capsule    valsartan-hydrochlorothiazide (DIOVAN-HCT) 160-25 MG per tablet    metFORMIN (GLUCOPHAGE XR) 500 MG extended release tablet    atorvastatin (LIPITOR) 80 MG tablet    insulin glargine (LANTUS SOLOSTAR) 100 UNIT/ML injection pen    insulin aspart (NOVOLOG FLEXPEN) 100 UNIT/ML injection pen    TRUEPLUS LANCETS 33G MISC    blood glucose monitor strips    Insulin Pen Needle (B-D UF III MINI PEN NEEDLES) 31G X 5 MM MISC    Cholecalciferol (VITAMIN D3) 1000 units TABS    metoprolol (LOPRESSOR) 100 MG tablet    furosemide (LASIX) 20 MG tablet    potassium chloride (KLOR-CON) 20 MEQ packet   4. Encounter for screening mammogram for malignant neoplasm of breast  KAVIN DIGITAL SCREEN W CAD BILATERAL   5. Need for vaccination  INFLUENZA, QUADV, 3 YRS AND OLDER, IM, PF, PREFILL SYR OR SDV, 0.5ML (FLUZONE QUADV, PF)       1. Gout - colchicine as above. Uric acid. Call if symptoms worsen or fail to improve. 2. Medication refill  3. HM: pt declined due to insurance issues; agreed to mammogram rx today. RTO: Return in about 1 month (around 12/16/2018) for Per PCP.     Electronically signed by Agatha Carbone MD on 11/16/2018 at 4:00 PM  This case was discussed with (s) Ruperto Nissen    Future Appointments  Date Time Provider Eloise Oshea   12/7/2018 11:00 AM MD Gabo Hamilton [facility]

## 2018-11-19 ENCOUNTER — TELEPHONE (OUTPATIENT)
Dept: FAMILY MEDICINE CLINIC | Age: 63
End: 2018-11-19

## 2018-11-19 DIAGNOSIS — M10.9 ACUTE GOUT INVOLVING TOE OF RIGHT FOOT, UNSPECIFIED CAUSE: ICD-10-CM

## 2018-11-19 NOTE — TELEPHONE ENCOUNTER
Dr. Patricia Gupta, please advise. Spoke with patient she used the prescription and the refill you sent already 11/16/18 so has had a total of 6 tablets since Friday.

## 2018-11-20 RX ORDER — COLCHICINE 0.6 MG/1
TABLET ORAL
Qty: 3 TABLET | Refills: 1 | Status: SHIPPED | OUTPATIENT
Start: 2018-11-20 | End: 2018-11-21 | Stop reason: SDUPTHER

## 2018-11-21 RX ORDER — COLCHICINE 0.6 MG/1
TABLET ORAL
Qty: 3 TABLET | Refills: 1 | Status: SHIPPED
Start: 2018-11-21 | End: 2020-08-28 | Stop reason: ALTCHOICE

## 2018-12-07 ENCOUNTER — HOSPITAL ENCOUNTER (OUTPATIENT)
Age: 63
Discharge: HOME | End: 2018-12-09
Payer: MEDICARE

## 2018-12-07 ENCOUNTER — OFFICE VISIT (OUTPATIENT)
Dept: FAMILY MEDICINE CLINIC | Age: 63
End: 2018-12-07
Payer: MEDICARE

## 2018-12-07 VITALS
TEMPERATURE: 98 F | HEART RATE: 75 BPM | HEIGHT: 69 IN | SYSTOLIC BLOOD PRESSURE: 130 MMHG | OXYGEN SATURATION: 96 % | DIASTOLIC BLOOD PRESSURE: 77 MMHG | BODY MASS INDEX: 43.4 KG/M2 | WEIGHT: 293 LBS

## 2018-12-07 DIAGNOSIS — E11.65 UNCONTROLLED TYPE 2 DIABETES MELLITUS WITH HYPERGLYCEMIA (HCC): Chronic | ICD-10-CM

## 2018-12-07 DIAGNOSIS — M75.81 RIGHT ROTATOR CUFF TENDONITIS: ICD-10-CM

## 2018-12-07 DIAGNOSIS — M10.9 GOUT INVOLVING TOE OF RIGHT FOOT, UNSPECIFIED CAUSE, UNSPECIFIED CHRONICITY: ICD-10-CM

## 2018-12-07 DIAGNOSIS — E11.65 UNCONTROLLED TYPE 2 DIABETES MELLITUS WITH HYPERGLYCEMIA (HCC): Primary | Chronic | ICD-10-CM

## 2018-12-07 DIAGNOSIS — I10 ESSENTIAL HYPERTENSION: Chronic | ICD-10-CM

## 2018-12-07 LAB
ALBUMIN SERPL-MCNC: 4 G/DL (ref 3.5–5.2)
ALP BLD-CCNC: 131 U/L (ref 35–104)
ALT SERPL-CCNC: 17 U/L (ref 0–32)
ANION GAP SERPL CALCULATED.3IONS-SCNC: 14 MMOL/L (ref 7–16)
AST SERPL-CCNC: 17 U/L (ref 0–31)
BASOPHILS ABSOLUTE: 0.06 E9/L (ref 0–0.2)
BASOPHILS RELATIVE PERCENT: 0.5 % (ref 0–2)
BILIRUB SERPL-MCNC: 0.5 MG/DL (ref 0–1.2)
BUN BLDV-MCNC: 24 MG/DL (ref 8–23)
CALCIUM SERPL-MCNC: 9.9 MG/DL (ref 8.6–10.2)
CHLORIDE BLD-SCNC: 103 MMOL/L (ref 98–107)
CO2: 28 MMOL/L (ref 22–29)
CREAT SERPL-MCNC: 1.2 MG/DL (ref 0.5–1)
EOSINOPHILS ABSOLUTE: 0.26 E9/L (ref 0.05–0.5)
EOSINOPHILS RELATIVE PERCENT: 2.3 % (ref 0–6)
GFR AFRICAN AMERICAN: 55
GFR NON-AFRICAN AMERICAN: 55 ML/MIN/1.73
GLUCOSE BLD-MCNC: 164 MG/DL (ref 74–99)
HBA1C MFR BLD: 7.2 %
HCT VFR BLD CALC: 39.2 % (ref 34–48)
HEMOGLOBIN: 11.9 G/DL (ref 11.5–15.5)
IMMATURE GRANULOCYTES #: 0.06 E9/L
IMMATURE GRANULOCYTES %: 0.5 % (ref 0–5)
LYMPHOCYTES ABSOLUTE: 1.77 E9/L (ref 1.5–4)
LYMPHOCYTES RELATIVE PERCENT: 15.7 % (ref 20–42)
MCH RBC QN AUTO: 26.4 PG (ref 26–35)
MCHC RBC AUTO-ENTMCNC: 30.4 % (ref 32–34.5)
MCV RBC AUTO: 87.1 FL (ref 80–99.9)
MONOCYTES ABSOLUTE: 0.82 E9/L (ref 0.1–0.95)
MONOCYTES RELATIVE PERCENT: 7.3 % (ref 2–12)
NEUTROPHILS ABSOLUTE: 8.31 E9/L (ref 1.8–7.3)
NEUTROPHILS RELATIVE PERCENT: 73.7 % (ref 43–80)
PDW BLD-RTO: 14.5 FL (ref 11.5–15)
PLATELET # BLD: 389 E9/L (ref 130–450)
PMV BLD AUTO: 11 FL (ref 7–12)
POTASSIUM SERPL-SCNC: 3.6 MMOL/L (ref 3.5–5)
RBC # BLD: 4.5 E12/L (ref 3.5–5.5)
SODIUM BLD-SCNC: 145 MMOL/L (ref 132–146)
TOTAL PROTEIN: 7.3 G/DL (ref 6.4–8.3)
WBC # BLD: 11.3 E9/L (ref 4.5–11.5)

## 2018-12-07 PROCEDURE — 36415 COLL VENOUS BLD VENIPUNCTURE: CPT | Performed by: FAMILY MEDICINE

## 2018-12-07 PROCEDURE — 1036F TOBACCO NON-USER: CPT | Performed by: FAMILY MEDICINE

## 2018-12-07 PROCEDURE — 99214 OFFICE O/P EST MOD 30 MIN: CPT | Performed by: FAMILY MEDICINE

## 2018-12-07 PROCEDURE — G8427 DOCREV CUR MEDS BY ELIG CLIN: HCPCS | Performed by: FAMILY MEDICINE

## 2018-12-07 PROCEDURE — 2022F DILAT RTA XM EVC RTNOPTHY: CPT | Performed by: FAMILY MEDICINE

## 2018-12-07 PROCEDURE — G8417 CALC BMI ABV UP PARAM F/U: HCPCS | Performed by: FAMILY MEDICINE

## 2018-12-07 PROCEDURE — G8482 FLU IMMUNIZE ORDER/ADMIN: HCPCS | Performed by: FAMILY MEDICINE

## 2018-12-07 PROCEDURE — 3017F COLORECTAL CA SCREEN DOC REV: CPT | Performed by: FAMILY MEDICINE

## 2018-12-07 PROCEDURE — 80053 COMPREHEN METABOLIC PANEL: CPT

## 2018-12-07 PROCEDURE — 99212 OFFICE O/P EST SF 10 MIN: CPT | Performed by: FAMILY MEDICINE

## 2018-12-07 PROCEDURE — 83036 HEMOGLOBIN GLYCOSYLATED A1C: CPT | Performed by: FAMILY MEDICINE

## 2018-12-07 PROCEDURE — 3045F PR MOST RECENT HEMOGLOBIN A1C LEVEL 7.0-9.0%: CPT | Performed by: FAMILY MEDICINE

## 2018-12-07 PROCEDURE — 85025 COMPLETE CBC W/AUTO DIFF WBC: CPT

## 2018-12-07 RX ORDER — DULOXETIN HYDROCHLORIDE 60 MG/1
60 CAPSULE, DELAYED RELEASE ORAL DAILY
Qty: 90 CAPSULE | Refills: 1 | Status: SHIPPED | OUTPATIENT
Start: 2018-12-07 | End: 2019-06-13 | Stop reason: SDUPTHER

## 2018-12-07 RX ORDER — LOSARTAN POTASSIUM AND HYDROCHLOROTHIAZIDE 25; 100 MG/1; MG/1
1 TABLET ORAL DAILY
Qty: 90 TABLET | Refills: 1 | Status: SHIPPED | OUTPATIENT
Start: 2018-12-07 | End: 2019-12-04 | Stop reason: CLARIF

## 2018-12-07 RX ORDER — OMEPRAZOLE 40 MG/1
40 CAPSULE, DELAYED RELEASE ORAL
Qty: 90 CAPSULE | Refills: 1 | Status: SHIPPED | OUTPATIENT
Start: 2018-12-07 | End: 2019-09-12 | Stop reason: SDUPTHER

## 2018-12-07 RX ORDER — BUSPIRONE HYDROCHLORIDE 5 MG/1
5 TABLET ORAL 3 TIMES DAILY
Qty: 270 TABLET | Refills: 1 | Status: SHIPPED | OUTPATIENT
Start: 2018-12-07 | End: 2019-08-14 | Stop reason: SDUPTHER

## 2018-12-07 RX ORDER — CLONIDINE HYDROCHLORIDE 0.1 MG/1
0.1 TABLET ORAL 3 TIMES DAILY
Qty: 270 TABLET | Refills: 1 | Status: SHIPPED | OUTPATIENT
Start: 2018-12-07 | End: 2019-09-12 | Stop reason: SDUPTHER

## 2018-12-07 RX ORDER — ATORVASTATIN CALCIUM 80 MG/1
80 TABLET, FILM COATED ORAL NIGHTLY
Qty: 90 TABLET | Refills: 1 | Status: SHIPPED | OUTPATIENT
Start: 2018-12-07 | End: 2019-07-15 | Stop reason: SDUPTHER

## 2018-12-07 RX ORDER — METFORMIN HYDROCHLORIDE 500 MG/1
500 TABLET, EXTENDED RELEASE ORAL
Qty: 90 TABLET | Refills: 1 | Status: CANCELLED | OUTPATIENT
Start: 2018-12-07

## 2018-12-07 RX ORDER — VALSARTAN AND HYDROCHLOROTHIAZIDE 160; 25 MG/1; MG/1
1 TABLET ORAL DAILY
Qty: 90 TABLET | Refills: 1 | Status: CANCELLED | OUTPATIENT
Start: 2018-12-07

## 2018-12-07 RX ORDER — FUROSEMIDE 20 MG/1
TABLET ORAL
Qty: 90 TABLET | Refills: 1 | Status: SHIPPED | OUTPATIENT
Start: 2018-12-07 | End: 2019-08-14 | Stop reason: SDUPTHER

## 2018-12-07 RX ORDER — COLCHICINE 0.6 MG/1
TABLET ORAL
Qty: 3 TABLET | Refills: 1 | Status: CANCELLED | OUTPATIENT
Start: 2018-12-07

## 2018-12-07 RX ORDER — MELATONIN
2 DAILY
Qty: 180 TABLET | Refills: 1 | Status: SHIPPED
Start: 2018-12-07 | End: 2021-06-04 | Stop reason: SDUPTHER

## 2018-12-07 RX ORDER — ALLOPURINOL 100 MG/1
100 TABLET ORAL DAILY
Qty: 90 TABLET | Refills: 1 | Status: SHIPPED | OUTPATIENT
Start: 2018-12-07 | End: 2019-05-09 | Stop reason: SDUPTHER

## 2018-12-07 RX ORDER — MEDICAL SUPPLY, MISCELLANEOUS
EACH MISCELLANEOUS
Qty: 1 EACH | Refills: 0 | Status: SHIPPED | OUTPATIENT
Start: 2018-12-07 | End: 2019-12-04

## 2018-12-07 RX ORDER — CELECOXIB 200 MG/1
200 CAPSULE ORAL DAILY
Qty: 90 CAPSULE | Refills: 1 | Status: SHIPPED | OUTPATIENT
Start: 2018-12-07 | End: 2019-06-13 | Stop reason: SDUPTHER

## 2018-12-07 RX ORDER — METOPROLOL TARTRATE 100 MG/1
100 TABLET ORAL 2 TIMES DAILY
Qty: 180 TABLET | Refills: 1 | Status: SHIPPED | OUTPATIENT
Start: 2018-12-07 | End: 2019-08-14 | Stop reason: SDUPTHER

## 2018-12-07 RX ORDER — TIZANIDINE 4 MG/1
4 TABLET ORAL EVERY 6 HOURS PRN
Qty: 30 TABLET | Refills: 1 | Status: SHIPPED | OUTPATIENT
Start: 2018-12-07 | End: 2019-02-14 | Stop reason: SDUPTHER

## 2018-12-07 ASSESSMENT — PATIENT HEALTH QUESTIONNAIRE - PHQ9
2. FEELING DOWN, DEPRESSED OR HOPELESS: 1
SUM OF ALL RESPONSES TO PHQ9 QUESTIONS 1 & 2: 2
SUM OF ALL RESPONSES TO PHQ QUESTIONS 1-9: 2
1. LITTLE INTEREST OR PLEASURE IN DOING THINGS: 1
SUM OF ALL RESPONSES TO PHQ QUESTIONS 1-9: 2

## 2018-12-07 NOTE — PATIENT INSTRUCTIONS
your side. 3. Hold one end of the elastic band in the hand of the painful arm. 4. Slowly rotate your forearm toward your body until it touches your belly. Slowly move it back to where you started. 5. Keep your elbow and upper arm firmly tucked against the towel roll or at your side. 6. Repeat 8 to 12 times. External rotator strengthening exercise    1. Start by tying a piece of elastic exercise material to a doorknob. You can use surgical tubing or Thera-Band. (You may also hold one end of the band in each hand.)  2. Stand or sit with your shoulder relaxed and your elbow bent 90 degrees. Your upper arm should rest comfortably against your side. Squeeze a rolled towel between your elbow and your body for comfort. This will help keep your arm at your side. 3. Hold one end of the elastic band with the hand of the painful arm. 4. Start with your forearm across your belly. Slowly rotate the forearm out away from your body. Keep your elbow and upper arm tucked against the towel roll or the side of your body until you begin to feel tightness in your shoulder. Slowly move your arm back to where you started. 5. Repeat 8 to 12 times. Follow-up care is a key part of your treatment and safety. Be sure to make and go to all appointments, and call your doctor if you are having problems. It's also a good idea to know your test results and keep a list of the medicines you take. Where can you learn more? Go to [email or web address].[email or web address] org and sign in to your Narvar account. Enter Gerry Peck in the [facility] box to learn more about \"Rotator Cuff: Exercises. \"     If you do not have an account, please click on the \"Sign Up Now\" link. Current as of: November 29, 2017  Content Version: 11.8  © 4404-5678 [employer], Incorporated. Care instructions adapted under license by [facility] ([facility]).  If you have questions about a medical condition or this instruction, always ask your healthcare professional. body move backward as you push. Hold for about 6 seconds. Relax for a few seconds. Repeat 8 to 12 times. 4. Push backward (extend): Stand with your back flat against a wall. Your upper arm should be against the wall, with your elbow bent 90 degrees (your hand straight ahead). Push your elbow gently back against the wall with about 25% to 50% of your strength. Don't let your body move forward as you push. Hold for about 6 seconds. Relax for a few seconds. Repeat 8 to 12 times. Scapular exercise: Wall push-ups    7. Stand facing a wall, about 12 inches to 18 inches away. 8. Place your hands on the wall at shoulder height. 9. Slowly bend your elbows and bring your face to the wall. Keep your back and hips straight. 10. Push back to where you started. 11. Repeat 8 to 12 times. 12. When you can do this exercise against a wall comfortably, you can try it against a counter. You can then slowly progress to the end of a couch, then to a sturdy chair, and finally to the floor. Scapular exercise: Retraction    5. Put the band around a solid object at about waist level. (A bedpost will work well.) Each hand should hold an end of the band. 6. With your elbows at your sides and bent to 90 degrees, pull the band back. Your shoulder blades should move toward each other. Then move your arms back where you started. 7. Repeat 8 to 12 times. 8. If you have good range of motion in your shoulders, try this exercise with your arms lifted out to the sides. Keep your elbows at a 90-degree angle. Raise the elastic band up to about shoulder level. Pull the band back to move your shoulder blades toward each other. Then move your arms back where you started. Internal rotator strengthening exercise    7. Start by tying a piece of elastic exercise material to a doorknob. You can use surgical tubing or Thera-Band. 8. Stand or sit with your shoulder relaxed and your elbow bent 90 degrees.  Your upper arm should rest comfortably

## 2018-12-07 NOTE — PROGRESS NOTES
Hypercholesterolemia 10/28/2010    Hypertension 10/28/2010    Kidney stones     Neurofibroma of trunk     irritating mole on the back. biopsy done 10/21/09    Obesity     Osteoarthritis     PAD (peripheral artery disease) (HCC)     Type II or unspecified type diabetes mellitus without mention of complication, not stated as uncontrolled     Vitamin D insufficiency        Current Outpatient Prescriptions on File Prior to Visit   Medication Sig Dispense Refill    colchicine (COLCRYS) 0.6 MG tablet Take 2 pills at first sign of flare, then 1 tab 1 hr later 3 tablet 1    gabapentin (NEURONTIN) 600 MG tablet Take 1 tablet by mouth 3 times daily for 120 days. . 270 tablet 3    metFORMIN (GLUCOPHAGE XR) 500 MG extended release tablet Take 1 tablet by mouth daily (with breakfast) 90 tablet 1    insulin glargine (LANTUS SOLOSTAR) 100 UNIT/ML injection pen Inject 55 Units into the skin 2 times daily 35 pen 3    blood glucose monitor strips Test up to 5 times daily. Please provide brand covered by insurance 200 strip 3    potassium chloride (KLOR-CON) 20 MEQ packet Take 20 mEq by mouth daily 30 each 3    TRUEPLUS LANCETS 33G MISC TEST UP TO 5 TIMES DAILY. 500 each 3    Insulin Pen Needle (B-D UF III MINI PEN NEEDLES) 31G X 5 MM MISC Use to inject insulin 5 times daily 500 each 3    [DISCONTINUED] insulin lispro (HUMALOG KWIKPEN) 100 UNIT/ML injection Inject 10 Units into the skin 3 times daily (before meals). 3 Pen 6     No current facility-administered medications on file prior to visit.         No Known Allergies    Family History   Problem Relation Age of Onset    Lung Cancer Mother 71        mesothelioma metastatized to the brain    Diabetes Mother     High Cholesterol Mother     High Blood Pressure Mother     Arthritis Mother     Cancer Mother     Diabetes Father     Arthritis Father     High Blood Pressure Sister     Arthritis Sister     High Cholesterol Sister     High Blood Pressure Brother rash.   Allergic/Immunologic: Negative for environmental allergies. Neurological: Negative for dizziness and headaches. Hematological: Does not bruise/bleed easily. Psychiatric/Behavioral: The patient is not nervous/anxious. rt shoulder pain    Physical Exam:  VS:  Blood pressure 130/77, pulse 75, temperature 98 °F (36.7 °C), temperature source Oral, height 5' 9\" (1.753 m), weight (!) 340 lb (154.2 kg), SpO2 96 %, not currently breastfeeding. Physical Exam   Constitutional: She appears well-developed and well-nourished. HENT:   Head: Normocephalic and atraumatic. Cardiovascular: Normal rate and regular rhythm. Exam reveals no gallop and no friction rub. No murmur heard. Pulmonary/Chest: Breath sounds normal. She has no wheezes. She has no rhonchi. She has no rales. Abdominal: Soft. Bowel sounds are normal. She exhibits no mass. There is no tenderness. Musculoskeletal: She exhibits no edema. Skin: No cyanosis. Nails show no clubbing. mild rt foot swelling, no erythema; ttp  Rt shoulder; deltoid ttp, restricted int/ext rom; + neers and empty can signs,ms 4/5 on the right, left 5/5 ms    Uric acid 10.2    Assessment/Plan:  1. Uncontrolled type 2 diabetes mellitus with hyperglycemia (HCC)  a1c 7.0 to 7.2, continue current regimen  - atorvastatin (LIPITOR) 80 MG tablet; Take 1 tablet by mouth nightly  Dispense: 90 tablet; Refill: 1  - insulin aspart (NOVOLOG FLEXPEN) 100 UNIT/ML injection pen; 20 units tid w/ SSI TID AC: 150-200 2 units; 201-250 4; 251-300 6; 301-350 8; 351-400 10; 401-450 12; 451-500 14; 501+ 16 units  Dispense: 35 pen; Refill: 1  - CBC Auto Differential; Future  - Comprehensive Metabolic Panel; Future  - POCT glycosylated hemoglobin (Hb A1C)    2. Essential hypertension  Controlled. Script for bp cuff issued as change in bp medication from valsartan-hctz to losartan-hctz combination due to recall  - cloNIDine (CATAPRES) 0.1 MG tablet;  Take 1 tablet by mouth 3 times daily

## 2018-12-09 ASSESSMENT — ENCOUNTER SYMPTOMS
DIARRHEA: 0
NAUSEA: 0
SHORTNESS OF BREATH: 0
CONSTIPATION: 0
EYE PAIN: 0
VOMITING: 0
EYE REDNESS: 0
ABDOMINAL PAIN: 0
COUGH: 0
BLOOD IN STOOL: 0
SORE THROAT: 0

## 2018-12-10 ENCOUNTER — HOSPITAL ENCOUNTER (OUTPATIENT)
Dept: GENERAL RADIOLOGY | Age: 63
Discharge: HOME | End: 2018-12-12
Payer: MEDICARE

## 2018-12-10 DIAGNOSIS — Z12.31 ENCOUNTER FOR SCREENING MAMMOGRAM FOR MALIGNANT NEOPLASM OF BREAST: ICD-10-CM

## 2018-12-10 PROCEDURE — 77063 BREAST TOMOSYNTHESIS BI: CPT

## 2018-12-12 ENCOUNTER — TELEPHONE (OUTPATIENT)
Dept: FAMILY MEDICINE CLINIC | Age: 63
End: 2018-12-12

## 2019-01-08 ENCOUNTER — HOSPITAL ENCOUNTER (OUTPATIENT)
Age: 64
Discharge: HOME | End: 2019-01-08
Payer: MEDICARE

## 2019-01-08 LAB
ALBUMIN SERPL-MCNC: 4 G/DL (ref 3.5–5.2)
ALP BLD-CCNC: 133 U/L (ref 35–104)
ALT SERPL-CCNC: 10 U/L (ref 0–32)
ANION GAP SERPL CALCULATED.3IONS-SCNC: 14 MMOL/L (ref 7–16)
AST SERPL-CCNC: 13 U/L (ref 0–31)
BASOPHILS ABSOLUTE: 0.05 E9/L (ref 0–0.2)
BASOPHILS RELATIVE PERCENT: 0.5 % (ref 0–2)
BILIRUB SERPL-MCNC: 0.8 MG/DL (ref 0–1.2)
BUN BLDV-MCNC: 10 MG/DL (ref 8–23)
CALCIUM SERPL-MCNC: 9.4 MG/DL (ref 8.6–10.2)
CHLORIDE BLD-SCNC: 104 MMOL/L (ref 98–107)
CO2: 29 MMOL/L (ref 22–29)
CREAT SERPL-MCNC: 1.1 MG/DL (ref 0.5–1)
EOSINOPHILS ABSOLUTE: 0.2 E9/L (ref 0.05–0.5)
EOSINOPHILS RELATIVE PERCENT: 1.8 % (ref 0–6)
GFR AFRICAN AMERICAN: >60
GFR NON-AFRICAN AMERICAN: >60 ML/MIN/1.73
GLUCOSE BLD-MCNC: 129 MG/DL (ref 74–99)
HBA1C MFR BLD: 6.9 % (ref 4–5.6)
HCT VFR BLD CALC: 39.2 % (ref 34–48)
HEMOGLOBIN: 12 G/DL (ref 11.5–15.5)
IMMATURE GRANULOCYTES #: 0.05 E9/L
IMMATURE GRANULOCYTES %: 0.5 % (ref 0–5)
LYMPHOCYTES ABSOLUTE: 1.92 E9/L (ref 1.5–4)
LYMPHOCYTES RELATIVE PERCENT: 17.7 % (ref 20–42)
MCH RBC QN AUTO: 26.3 PG (ref 26–35)
MCHC RBC AUTO-ENTMCNC: 30.6 % (ref 32–34.5)
MCV RBC AUTO: 85.8 FL (ref 80–99.9)
MONOCYTES ABSOLUTE: 0.9 E9/L (ref 0.1–0.95)
MONOCYTES RELATIVE PERCENT: 8.3 % (ref 2–12)
NEUTROPHILS ABSOLUTE: 7.71 E9/L (ref 1.8–7.3)
NEUTROPHILS RELATIVE PERCENT: 71.2 % (ref 43–80)
PDW BLD-RTO: 14.5 FL (ref 11.5–15)
PHOSPHORUS: 2.6 MG/DL (ref 2.5–4.5)
PLATELET # BLD: 334 E9/L (ref 130–450)
PMV BLD AUTO: 10.6 FL (ref 7–12)
POTASSIUM SERPL-SCNC: 3.2 MMOL/L (ref 3.5–5)
RBC # BLD: 4.57 E12/L (ref 3.5–5.5)
SODIUM BLD-SCNC: 147 MMOL/L (ref 132–146)
TOTAL PROTEIN: 7 G/DL (ref 6.4–8.3)
WBC # BLD: 10.8 E9/L (ref 4.5–11.5)

## 2019-01-08 PROCEDURE — 80053 COMPREHEN METABOLIC PANEL: CPT

## 2019-01-08 PROCEDURE — 85025 COMPLETE CBC W/AUTO DIFF WBC: CPT

## 2019-01-08 PROCEDURE — 36415 COLL VENOUS BLD VENIPUNCTURE: CPT

## 2019-01-08 PROCEDURE — 84100 ASSAY OF PHOSPHORUS: CPT

## 2019-01-08 PROCEDURE — 83036 HEMOGLOBIN GLYCOSYLATED A1C: CPT

## 2019-01-18 DIAGNOSIS — Z76.0 MEDICATION REFILL: ICD-10-CM

## 2019-01-18 RX ORDER — GABAPENTIN 600 MG/1
600 TABLET ORAL 3 TIMES DAILY
Qty: 90 TABLET | Refills: 2 | Status: SHIPPED | OUTPATIENT
Start: 2019-01-18 | End: 2019-04-29 | Stop reason: SDUPTHER

## 2019-02-15 RX ORDER — TIZANIDINE 4 MG/1
4 TABLET ORAL EVERY 6 HOURS PRN
Qty: 30 TABLET | Refills: 1 | Status: SHIPPED | OUTPATIENT
Start: 2019-02-15 | End: 2019-04-18 | Stop reason: SDUPTHER

## 2019-04-19 RX ORDER — TIZANIDINE 4 MG/1
4 TABLET ORAL EVERY 6 HOURS PRN
Qty: 30 TABLET | Refills: 1 | Status: SHIPPED | OUTPATIENT
Start: 2019-04-19 | End: 2019-07-15 | Stop reason: SDUPTHER

## 2019-04-29 DIAGNOSIS — Z76.0 MEDICATION REFILL: ICD-10-CM

## 2019-04-29 RX ORDER — GABAPENTIN 600 MG/1
600 TABLET ORAL 3 TIMES DAILY
Qty: 90 TABLET | Refills: 2 | Status: SHIPPED | OUTPATIENT
Start: 2019-04-29 | End: 2019-08-01 | Stop reason: SDUPTHER

## 2019-05-09 DIAGNOSIS — M10.9 GOUT INVOLVING TOE OF RIGHT FOOT, UNSPECIFIED CAUSE, UNSPECIFIED CHRONICITY: ICD-10-CM

## 2019-05-13 RX ORDER — ALLOPURINOL 100 MG/1
TABLET ORAL
Qty: 90 TABLET | Refills: 1 | Status: SHIPPED | OUTPATIENT
Start: 2019-05-13 | End: 2019-08-23

## 2019-06-19 ENCOUNTER — TELEPHONE (OUTPATIENT)
Dept: ADMINISTRATIVE | Age: 64
End: 2019-06-19

## 2019-07-12 ENCOUNTER — HOSPITAL ENCOUNTER (OUTPATIENT)
Age: 64
Discharge: HOME | End: 2019-07-12
Payer: MEDICARE

## 2019-07-12 LAB
ALBUMIN SERPL-MCNC: 3.9 G/DL (ref 3.5–5.2)
ALP BLD-CCNC: 141 U/L (ref 35–104)
ALT SERPL-CCNC: 18 U/L (ref 0–32)
ANION GAP SERPL CALCULATED.3IONS-SCNC: 17 MMOL/L (ref 7–16)
AST SERPL-CCNC: 22 U/L (ref 0–31)
BASOPHILS ABSOLUTE: 0.04 E9/L (ref 0–0.2)
BASOPHILS RELATIVE PERCENT: 0.4 % (ref 0–2)
BILIRUB SERPL-MCNC: 0.6 MG/DL (ref 0–1.2)
BUN BLDV-MCNC: 13 MG/DL (ref 8–23)
CALCIUM SERPL-MCNC: 9.9 MG/DL (ref 8.6–10.2)
CHLORIDE BLD-SCNC: 104 MMOL/L (ref 98–107)
CHOLESTEROL, TOTAL: 172 MG/DL (ref 0–199)
CO2: 25 MMOL/L (ref 22–29)
CREAT SERPL-MCNC: 1.2 MG/DL (ref 0.5–1)
EOSINOPHILS ABSOLUTE: 0.26 E9/L (ref 0.05–0.5)
EOSINOPHILS RELATIVE PERCENT: 2.6 % (ref 0–6)
GFR AFRICAN AMERICAN: 55
GFR NON-AFRICAN AMERICAN: 55 ML/MIN/1.73
GLUCOSE BLD-MCNC: 154 MG/DL (ref 74–99)
HBA1C MFR BLD: 7 % (ref 4–5.6)
HCT VFR BLD CALC: 38.5 % (ref 34–48)
HDLC SERPL-MCNC: 41 MG/DL
HEMOGLOBIN: 11.7 G/DL (ref 11.5–15.5)
IMMATURE GRANULOCYTES #: 0.02 E9/L
IMMATURE GRANULOCYTES %: 0.2 % (ref 0–5)
LDL CHOLESTEROL CALCULATED: 110 MG/DL (ref 0–99)
LYMPHOCYTES ABSOLUTE: 2.07 E9/L (ref 1.5–4)
LYMPHOCYTES RELATIVE PERCENT: 20.8 % (ref 20–42)
MCH RBC QN AUTO: 26.9 PG (ref 26–35)
MCHC RBC AUTO-ENTMCNC: 30.4 % (ref 32–34.5)
MCV RBC AUTO: 88.5 FL (ref 80–99.9)
MONOCYTES ABSOLUTE: 0.78 E9/L (ref 0.1–0.95)
MONOCYTES RELATIVE PERCENT: 7.8 % (ref 2–12)
NEUTROPHILS ABSOLUTE: 6.8 E9/L (ref 1.8–7.3)
NEUTROPHILS RELATIVE PERCENT: 68.2 % (ref 43–80)
PARATHYROID HORMONE INTACT: 65 PG/ML (ref 15–65)
PDW BLD-RTO: 14 FL (ref 11.5–15)
PHOSPHORUS: 2.4 MG/DL (ref 2.5–4.5)
PLATELET # BLD: 339 E9/L (ref 130–450)
PMV BLD AUTO: 10.9 FL (ref 7–12)
POTASSIUM SERPL-SCNC: 3.3 MMOL/L (ref 3.5–5)
RBC # BLD: 4.35 E12/L (ref 3.5–5.5)
SODIUM BLD-SCNC: 146 MMOL/L (ref 132–146)
TOTAL PROTEIN: 7.2 G/DL (ref 6.4–8.3)
TRIGL SERPL-MCNC: 105 MG/DL (ref 0–149)
VITAMIN D 25-HYDROXY: 32 NG/ML (ref 30–100)
VLDLC SERPL CALC-MCNC: 21 MG/DL
WBC # BLD: 10 E9/L (ref 4.5–11.5)

## 2019-07-12 PROCEDURE — 83970 ASSAY OF PARATHORMONE: CPT

## 2019-07-12 PROCEDURE — 82306 VITAMIN D 25 HYDROXY: CPT

## 2019-07-12 PROCEDURE — 80061 LIPID PANEL: CPT

## 2019-07-12 PROCEDURE — 85025 COMPLETE CBC W/AUTO DIFF WBC: CPT

## 2019-07-12 PROCEDURE — 83036 HEMOGLOBIN GLYCOSYLATED A1C: CPT

## 2019-07-12 PROCEDURE — 36415 COLL VENOUS BLD VENIPUNCTURE: CPT

## 2019-07-12 PROCEDURE — 80053 COMPREHEN METABOLIC PANEL: CPT

## 2019-07-12 PROCEDURE — 84100 ASSAY OF PHOSPHORUS: CPT

## 2019-07-15 DIAGNOSIS — E11.65 UNCONTROLLED TYPE 2 DIABETES MELLITUS WITH HYPERGLYCEMIA (HCC): Chronic | ICD-10-CM

## 2019-07-15 RX ORDER — ATORVASTATIN CALCIUM 80 MG/1
80 TABLET, FILM COATED ORAL NIGHTLY
Qty: 90 TABLET | Refills: 0 | Status: SHIPPED | OUTPATIENT
Start: 2019-07-15 | End: 2019-12-04 | Stop reason: SDUPTHER

## 2019-07-15 RX ORDER — TIZANIDINE 4 MG/1
4 TABLET ORAL EVERY 6 HOURS PRN
Qty: 90 TABLET | Refills: 0 | Status: SHIPPED | OUTPATIENT
Start: 2019-07-15 | End: 2019-08-02 | Stop reason: SDUPTHER

## 2019-08-01 DIAGNOSIS — Z76.0 MEDICATION REFILL: ICD-10-CM

## 2019-08-01 RX ORDER — GABAPENTIN 600 MG/1
600 TABLET ORAL 3 TIMES DAILY
Qty: 90 TABLET | Refills: 2 | Status: SHIPPED | OUTPATIENT
Start: 2019-08-01 | End: 2019-09-24 | Stop reason: SDUPTHER

## 2019-08-06 RX ORDER — TIZANIDINE 4 MG/1
4 TABLET ORAL EVERY 6 HOURS PRN
Qty: 90 TABLET | Refills: 0 | Status: SHIPPED | OUTPATIENT
Start: 2019-08-06 | End: 2019-11-22 | Stop reason: SDUPTHER

## 2019-08-14 DIAGNOSIS — I10 ESSENTIAL HYPERTENSION: Chronic | ICD-10-CM

## 2019-08-14 RX ORDER — BUSPIRONE HYDROCHLORIDE 5 MG/1
5 TABLET ORAL 3 TIMES DAILY
Qty: 270 TABLET | Refills: 1 | Status: SHIPPED | OUTPATIENT
Start: 2019-08-14 | End: 2019-12-04 | Stop reason: SDUPTHER

## 2019-08-14 RX ORDER — FUROSEMIDE 20 MG/1
TABLET ORAL
Qty: 90 TABLET | Refills: 1 | Status: SHIPPED | OUTPATIENT
Start: 2019-08-14 | End: 2019-11-04 | Stop reason: SDUPTHER

## 2019-08-14 RX ORDER — METOPROLOL TARTRATE 100 MG/1
100 TABLET ORAL 2 TIMES DAILY
Qty: 180 TABLET | Refills: 1 | Status: SHIPPED | OUTPATIENT
Start: 2019-08-14 | End: 2019-12-04 | Stop reason: SDUPTHER

## 2019-08-23 ENCOUNTER — HOSPITAL ENCOUNTER (OUTPATIENT)
Age: 64
Discharge: HOME | End: 2019-08-25
Payer: MEDICARE

## 2019-08-23 ENCOUNTER — OFFICE VISIT (OUTPATIENT)
Dept: FAMILY MEDICINE CLINIC | Age: 64
End: 2019-08-23
Payer: MEDICARE

## 2019-08-23 VITALS
WEIGHT: 293 LBS | RESPIRATION RATE: 20 BRPM | DIASTOLIC BLOOD PRESSURE: 83 MMHG | TEMPERATURE: 98.1 F | HEART RATE: 82 BPM | OXYGEN SATURATION: 97 % | SYSTOLIC BLOOD PRESSURE: 139 MMHG | HEIGHT: 69 IN | BODY MASS INDEX: 43.4 KG/M2

## 2019-08-23 DIAGNOSIS — Z12.11 SCREENING FOR COLORECTAL CANCER: ICD-10-CM

## 2019-08-23 DIAGNOSIS — E11.42 DIABETIC PERIPHERAL NEUROPATHY (HCC): ICD-10-CM

## 2019-08-23 DIAGNOSIS — I10 ESSENTIAL HYPERTENSION: Chronic | ICD-10-CM

## 2019-08-23 DIAGNOSIS — E66.01 MORBID OBESITY (HCC): ICD-10-CM

## 2019-08-23 DIAGNOSIS — M75.81 RIGHT ROTATOR CUFF TENDONITIS: ICD-10-CM

## 2019-08-23 DIAGNOSIS — M47.26 OSTEOARTHRITIS OF SPINE WITH RADICULOPATHY, LUMBAR REGION: ICD-10-CM

## 2019-08-23 DIAGNOSIS — Z12.12 SCREENING FOR COLORECTAL CANCER: ICD-10-CM

## 2019-08-23 DIAGNOSIS — E11.65 UNCONTROLLED TYPE 2 DIABETES MELLITUS WITH HYPERGLYCEMIA (HCC): Primary | Chronic | ICD-10-CM

## 2019-08-23 DIAGNOSIS — M10.9 GOUT INVOLVING TOE OF RIGHT FOOT, UNSPECIFIED CAUSE, UNSPECIFIED CHRONICITY: ICD-10-CM

## 2019-08-23 DIAGNOSIS — M48.061 NEURAL FORAMINAL STENOSIS OF LUMBAR SPINE: Chronic | ICD-10-CM

## 2019-08-23 DIAGNOSIS — M51.36 BULGING LUMBAR DISC: ICD-10-CM

## 2019-08-23 LAB
ANION GAP SERPL CALCULATED.3IONS-SCNC: 18 MMOL/L (ref 7–16)
BUN BLDV-MCNC: 19 MG/DL (ref 8–23)
CALCIUM SERPL-MCNC: 9.9 MG/DL (ref 8.6–10.2)
CHLORIDE BLD-SCNC: 103 MMOL/L (ref 98–107)
CHP ED QC CHECK: YES
CO2: 25 MMOL/L (ref 22–29)
CREAT SERPL-MCNC: 1.3 MG/DL (ref 0.5–1)
GFR AFRICAN AMERICAN: 50
GFR NON-AFRICAN AMERICAN: 50 ML/MIN/1.73
GLUCOSE BLD-MCNC: 107 MG/DL (ref 74–99)
GLUCOSE BLD-MCNC: 115 MG/DL
POTASSIUM SERPL-SCNC: 3.8 MMOL/L (ref 3.5–5)
SODIUM BLD-SCNC: 146 MMOL/L (ref 132–146)
URIC ACID, SERUM: 8.5 MG/DL (ref 2.4–5.7)

## 2019-08-23 PROCEDURE — 1036F TOBACCO NON-USER: CPT | Performed by: FAMILY MEDICINE

## 2019-08-23 PROCEDURE — 80048 BASIC METABOLIC PNL TOTAL CA: CPT

## 2019-08-23 PROCEDURE — 84550 ASSAY OF BLOOD/URIC ACID: CPT

## 2019-08-23 PROCEDURE — 3017F COLORECTAL CA SCREEN DOC REV: CPT | Performed by: FAMILY MEDICINE

## 2019-08-23 PROCEDURE — 99214 OFFICE O/P EST MOD 30 MIN: CPT | Performed by: FAMILY MEDICINE

## 2019-08-23 PROCEDURE — G8427 DOCREV CUR MEDS BY ELIG CLIN: HCPCS | Performed by: FAMILY MEDICINE

## 2019-08-23 PROCEDURE — G8417 CALC BMI ABV UP PARAM F/U: HCPCS | Performed by: FAMILY MEDICINE

## 2019-08-23 PROCEDURE — 99212 OFFICE O/P EST SF 10 MIN: CPT | Performed by: FAMILY MEDICINE

## 2019-08-23 PROCEDURE — 36415 COLL VENOUS BLD VENIPUNCTURE: CPT

## 2019-08-23 PROCEDURE — 2022F DILAT RTA XM EVC RTNOPTHY: CPT | Performed by: FAMILY MEDICINE

## 2019-08-23 PROCEDURE — 36415 COLL VENOUS BLD VENIPUNCTURE: CPT | Performed by: FAMILY MEDICINE

## 2019-08-23 PROCEDURE — 3045F PR MOST RECENT HEMOGLOBIN A1C LEVEL 7.0-9.0%: CPT | Performed by: FAMILY MEDICINE

## 2019-08-23 PROCEDURE — 82962 GLUCOSE BLOOD TEST: CPT | Performed by: FAMILY MEDICINE

## 2019-08-23 RX ORDER — ALLOPURINOL 100 MG/1
TABLET ORAL
Qty: 180 TABLET | Refills: 1 | Status: SHIPPED | OUTPATIENT
Start: 2019-08-23 | End: 2019-12-04 | Stop reason: SDUPTHER

## 2019-08-23 RX ORDER — ALLOPURINOL 100 MG/1
TABLET ORAL
Qty: 90 TABLET | Refills: 1 | Status: CANCELLED | OUTPATIENT
Start: 2019-08-23

## 2019-08-23 ASSESSMENT — ENCOUNTER SYMPTOMS
DIARRHEA: 0
CONSTIPATION: 0
ABDOMINAL PAIN: 0
EYE REDNESS: 0
COUGH: 0
EYE PAIN: 0
SHORTNESS OF BREATH: 0
NAUSEA: 0
VOMITING: 0
SORE THROAT: 0
BLOOD IN STOOL: 0

## 2019-08-23 NOTE — PROGRESS NOTES
Chronic back pain     Chronic kidney disease, stage 3 ([facility] [address]     Depression 10/28/2010    [facility], [facility] 10/28/2010    with microalbuminuria    Diverticulosis     GERD (gastroesophageal reflux disease) 10/28/2010    Herniated intervertebral disk     neck and lower back    Hypercholesterolemia 10/28/2010    Hypertension 10/28/2010    Kidney stones     Neurofibroma of trunk     irritating mole on the back. biopsy done 10/21/09    Obesity     Osteoarthritis     PAD (peripheral artery disease) ([facility])     Type II or unspecified type diabetes mellitus without mention of complication, not stated as uncontrolled     Vitamin D insufficiency        Current Outpatient Medications on File Prior to Visit   Medication Sig Dispense Refill    busPIRone (BUSPAR) 5 MG tablet Take 1 tablet by mouth 3 times daily 270 tablet 1    metoprolol (LOPRESSOR) 100 MG tablet Take 1 tablet by mouth 2 times daily Prescribed by Dr. Ani Childers 180 tablet 1    furosemide (LASIX) 20 MG tablet 20 mg tab. Alternating dose 20 mg and 40 mg daily per Dr. Ani Childers. 90 tablet 1    tiZANidine (ZANAFLEX) 4 MG tablet TAKE 1 TABLET BY MOUTH EVERY 6 HOURS AS NEEDED (MUSCLE SPASM) 90 tablet 0    gabapentin (NEURONTIN) 600 MG tablet Take 1 tablet by mouth 3 times daily for 90 days.  90 tablet 2    atorvastatin (LIPITOR) 80 MG tablet Take 1 tablet by mouth nightly 90 tablet 0    celecoxib (CELEBREX) 200 MG capsule TAKE 1 CAPSULE EVERY DAY 90 capsule 1    DULoxetine (CYMBALTA) 60 MG extended release capsule TAKE 1 CAPSULE EVERY DAY 90 capsule 1    omeprazole (PRILOSEC) 40 MG delayed release capsule Take 1 capsule by mouth daily (with breakfast) 90 capsule 1    cloNIDine (CATAPRES) 0.1 MG tablet Take 1 tablet by mouth 3 times daily 270 tablet 1    insulin aspart (NOVOLOG FLEXPEN) 100 UNIT/ML injection pen 20 units tid w/ SSI TID AC: 150-200 2 units; 201-250 4; 251-300 6; 301-350 8; 351-400 10; 401-450 12; 451-500 14; 501+ 16 units 35 pen 1    Cholecalciferol (VITAMIN D3) 1000 units TABS Take 2 tablets by mouth daily , prescribed by renal 180 tablet 1    losartan-hydrochlorothiazide (HYZAAR) 100-25 MG per tablet Take 1 tablet by mouth daily 90 tablet 1    Blood Pressure Monitoring (B-D ASSURE BPM/AUTO ARM CUFF) MISC Please dispense extra large if available, use prn 1 each 0    metFORMIN (GLUCOPHAGE XR) 500 MG extended release tablet Take 1 tablet by mouth daily (with breakfast) 90 tablet 1    insulin glargine (LANTUS SOLOSTAR) 100 UNIT/ML injection pen Inject 55 Units into the skin 2 times daily 35 pen 3    TRUEPLUS LANCETS 33G MISC TEST UP TO 5 TIMES DAILY. 500 each 3    blood glucose monitor strips Test up to 5 times daily. Please provide brand covered by insurance 200 strip 3    Insulin Pen Needle (B-D UF III MINI PEN NEEDLES) 31G X 5 MM MISC Use to inject insulin 5 times daily 500 each 3    potassium chloride (KLOR-CON) 20 MEQ packet Take 20 mEq by mouth daily 30 each 3    colchicine (COLCRYS) 0.6 MG tablet Take 2 pills at first sign of flare, then 1 tab 1 hr later (Patient not taking: Reported on 8/23/2019) 3 tablet 1    [DISCONTINUED] insulin lispro (HUMALOG KWIKPEN) 100 UNIT/ML injection Inject 10 Units into the skin 3 times daily (before meals). 3 Pen 6     No current facility-administered medications on file prior to visit.         No Known Allergies    Family History   Problem Relation Age of Onset    Lung Cancer Mother 71        mesothelioma metastatized to the brain    Diabetes Mother     High Cholesterol Mother     High Blood Pressure Mother     Arthritis Mother     Cancer Mother     Diabetes Father     Arthritis Father     High Blood Pressure Sister     Arthritis Sister     High Cholesterol Sister     High Blood Pressure Brother     Substance Abuse Brother     Breast Cancer Paternal Aunt     Cancer Paternal Aunt     High Blood Pressure Paternal Aunt     Cancer Maternal Grandmother [de-identified] colorectal cancer    High Blood Pressure Maternal Grandmother     Heart Failure Maternal Grandmother     Arthritis Maternal Grandmother     High Cholesterol Maternal Grandmother     High Blood Pressure Paternal Grandmother     Stroke Paternal Grandmother     Cancer Maternal Aunt     High Blood Pressure Maternal Aunt     High Cholesterol Maternal Aunt     Diabetes Maternal Uncle     High Blood Pressure Maternal Uncle     High Cholesterol Maternal Uncle     Substance Abuse Maternal Uncle     High Blood Pressure Paternal Uncle        Past Surgical History:   Procedure Laterality Date    ECHO COMPLETE  3/19/2012         EYE MUSCLE SURGERY      as a child    HYSTERECTOMY      with salpingoophorectomy    HYSTERECTOMY, TOTAL ABDOMINAL Bilateral     cervix removed per patient    NERVE BLOCK Right 05/01/2018    lumbar transforaminal nerve block #1 with sedation    DE KALIA NOSE/CLEFT LIP/TIP Right 5/1/2018    RIGHT LUMBAR TRANSFORAMINAL NERVE BLOCK #1 L3-4 L4-5 WITH IV SEDATIN performed by Sharad Cervantes DO at [address] History     Tobacco Use    Smoking status: Never Smoker    Smokeless tobacco: Never Used   Substance Use Topics    Alcohol use: Yes     Alcohol/week: 2.0 standard drinks     Types: 1 Glasses of wine, 1 Cans of beer per week     Comment: 1 time per month    Drug use: No       Review of Systems  Review of Systems   Constitutional: Negative for chills and fever. HENT: Negative for congestion, ear pain and sore throat. Eyes: Negative for pain and redness. Respiratory: Negative for cough and shortness of breath. Cardiovascular: Negative for chest pain, palpitations and leg swelling. Gastrointestinal: Negative for abdominal pain, blood in stool, constipation, diarrhea, nausea and vomiting. Genitourinary: Negative for dysuria, frequency and hematuria. Musculoskeletal: Negative for myalgias. Skin: Negative for rash.

## 2019-08-23 NOTE — PATIENT INSTRUCTIONS
toenail.     · You have blue or black areas, which can mean bruising or blood flow problems.     · You have peeling skin or tiny blisters between your toes or cracking or oozing of the skin.     · You have a fever for more than 24 hours and a foot sore.     · You have new numbness or tingling in your feet that does not go away after you move your feet or change positions.     · You have unexplained or unusual swelling of the foot or ankle.    Watch closely for changes in your health, and be sure to contact your doctor if:    · You cannot do proper foot care. Where can you learn more? Go to https://Treatfulpepiceweb.DailyPath. org and sign in to your Intercom account. Enter A739 in the [employer] box to learn more about \"Diabetes Foot Health: Care Instructions. \"     If you do not have an account, please click on the \"Sign Up Now\" link. Current as of: July 25, 2018  Content Version: 12.1  © 8448-3828 Healthwise, Incorporated. Care instructions adapted under license by [facility] ([facility]). If you have questions about a medical condition or this instruction, always ask your healthcare professional. [place] [address] any warranty or liability for your use of this information.

## 2019-09-12 DIAGNOSIS — I10 ESSENTIAL HYPERTENSION: Chronic | ICD-10-CM

## 2019-09-13 RX ORDER — OMEPRAZOLE 40 MG/1
CAPSULE, DELAYED RELEASE ORAL
Qty: 90 CAPSULE | Refills: 3 | Status: SHIPPED | OUTPATIENT
Start: 2019-09-13 | End: 2019-12-04 | Stop reason: SDUPTHER

## 2019-09-13 RX ORDER — CLONIDINE HYDROCHLORIDE 0.1 MG/1
0.1 TABLET ORAL 3 TIMES DAILY
Qty: 270 TABLET | Refills: 1 | Status: SHIPPED | OUTPATIENT
Start: 2019-09-13 | End: 2019-12-04 | Stop reason: SDUPTHER

## 2019-09-24 DIAGNOSIS — Z76.0 MEDICATION REFILL: ICD-10-CM

## 2019-09-24 RX ORDER — GABAPENTIN 600 MG/1
600 TABLET ORAL 3 TIMES DAILY
Qty: 270 TABLET | Refills: 0 | Status: SHIPPED | OUTPATIENT
Start: 2019-09-24 | End: 2019-12-04 | Stop reason: SDUPTHER

## 2019-11-22 RX ORDER — TIZANIDINE 4 MG/1
4 TABLET ORAL EVERY 6 HOURS PRN
Qty: 90 TABLET | Refills: 0 | Status: SHIPPED | OUTPATIENT
Start: 2019-11-22 | End: 2019-12-04 | Stop reason: SDUPTHER

## 2019-12-04 ENCOUNTER — HOSPITAL ENCOUNTER (OUTPATIENT)
Age: 64
Discharge: HOME | End: 2019-12-06
Payer: MEDICARE

## 2019-12-04 ENCOUNTER — OFFICE VISIT (OUTPATIENT)
Dept: FAMILY MEDICINE CLINIC | Age: 64
End: 2019-12-04
Payer: MEDICARE

## 2019-12-04 VITALS
HEIGHT: 69 IN | HEART RATE: 66 BPM | RESPIRATION RATE: 18 BRPM | OXYGEN SATURATION: 100 % | WEIGHT: 293 LBS | DIASTOLIC BLOOD PRESSURE: 67 MMHG | BODY MASS INDEX: 43.4 KG/M2 | SYSTOLIC BLOOD PRESSURE: 141 MMHG | TEMPERATURE: 98.2 F

## 2019-12-04 DIAGNOSIS — E11.42 DIABETIC PERIPHERAL NEUROPATHY (HCC): ICD-10-CM

## 2019-12-04 DIAGNOSIS — F41.9 ANXIETY: ICD-10-CM

## 2019-12-04 DIAGNOSIS — M10.9 GOUT INVOLVING TOE OF RIGHT FOOT, UNSPECIFIED CAUSE, UNSPECIFIED CHRONICITY: ICD-10-CM

## 2019-12-04 DIAGNOSIS — Z23 NEED FOR INFLUENZA VACCINATION: ICD-10-CM

## 2019-12-04 DIAGNOSIS — S03.00XA DISLOCATION OF TEMPOROMANDIBULAR JOINT, INITIAL ENCOUNTER: ICD-10-CM

## 2019-12-04 DIAGNOSIS — E11.65 UNCONTROLLED TYPE 2 DIABETES MELLITUS WITH HYPERGLYCEMIA (HCC): Primary | Chronic | ICD-10-CM

## 2019-12-04 DIAGNOSIS — Z12.11 SCREENING FOR COLON CANCER: ICD-10-CM

## 2019-12-04 DIAGNOSIS — M75.81 RIGHT ROTATOR CUFF TENDONITIS: ICD-10-CM

## 2019-12-04 DIAGNOSIS — I10 ESSENTIAL HYPERTENSION: Chronic | ICD-10-CM

## 2019-12-04 DIAGNOSIS — M48.061 NEURAL FORAMINAL STENOSIS OF LUMBAR SPINE: Chronic | ICD-10-CM

## 2019-12-04 DIAGNOSIS — Z76.0 MEDICATION REFILL: ICD-10-CM

## 2019-12-04 LAB
ALBUMIN SERPL-MCNC: 3.9 G/DL (ref 3.5–5.2)
ALP BLD-CCNC: 144 U/L (ref 35–104)
ALT SERPL-CCNC: 15 U/L (ref 0–32)
ANION GAP SERPL CALCULATED.3IONS-SCNC: 11 MMOL/L (ref 7–16)
AST SERPL-CCNC: 17 U/L (ref 0–31)
BILIRUB SERPL-MCNC: 0.6 MG/DL (ref 0–1.2)
BUN BLDV-MCNC: 18 MG/DL (ref 8–23)
CALCIUM SERPL-MCNC: 10.1 MG/DL (ref 8.6–10.2)
CHLORIDE BLD-SCNC: 107 MMOL/L (ref 98–107)
CO2: 27 MMOL/L (ref 22–29)
CREAT SERPL-MCNC: 1.2 MG/DL (ref 0.5–1)
GFR AFRICAN AMERICAN: 55
GFR NON-AFRICAN AMERICAN: 55 ML/MIN/1.73
GLUCOSE BLD-MCNC: 68 MG/DL (ref 74–99)
HBA1C MFR BLD: 6.4 %
POTASSIUM SERPL-SCNC: 4.3 MMOL/L (ref 3.5–5)
SODIUM BLD-SCNC: 145 MMOL/L (ref 132–146)
TOTAL PROTEIN: 7.2 G/DL (ref 6.4–8.3)
URIC ACID, SERUM: 5.9 MG/DL (ref 2.4–5.7)

## 2019-12-04 PROCEDURE — G8482 FLU IMMUNIZE ORDER/ADMIN: HCPCS | Performed by: FAMILY MEDICINE

## 2019-12-04 PROCEDURE — 6360000002 HC RX W HCPCS

## 2019-12-04 PROCEDURE — 1036F TOBACCO NON-USER: CPT | Performed by: FAMILY MEDICINE

## 2019-12-04 PROCEDURE — G8417 CALC BMI ABV UP PARAM F/U: HCPCS | Performed by: FAMILY MEDICINE

## 2019-12-04 PROCEDURE — G0008 ADMIN INFLUENZA VIRUS VAC: HCPCS

## 2019-12-04 PROCEDURE — G8427 DOCREV CUR MEDS BY ELIG CLIN: HCPCS | Performed by: FAMILY MEDICINE

## 2019-12-04 PROCEDURE — 99212 OFFICE O/P EST SF 10 MIN: CPT | Performed by: FAMILY MEDICINE

## 2019-12-04 PROCEDURE — 99214 OFFICE O/P EST MOD 30 MIN: CPT | Performed by: FAMILY MEDICINE

## 2019-12-04 PROCEDURE — 83036 HEMOGLOBIN GLYCOSYLATED A1C: CPT | Performed by: FAMILY MEDICINE

## 2019-12-04 PROCEDURE — 36415 COLL VENOUS BLD VENIPUNCTURE: CPT | Performed by: FAMILY MEDICINE

## 2019-12-04 PROCEDURE — 3044F HG A1C LEVEL LT 7.0%: CPT | Performed by: FAMILY MEDICINE

## 2019-12-04 PROCEDURE — 84550 ASSAY OF BLOOD/URIC ACID: CPT

## 2019-12-04 PROCEDURE — 80053 COMPREHEN METABOLIC PANEL: CPT

## 2019-12-04 PROCEDURE — 2022F DILAT RTA XM EVC RTNOPTHY: CPT | Performed by: FAMILY MEDICINE

## 2019-12-04 PROCEDURE — 3017F COLORECTAL CA SCREEN DOC REV: CPT | Performed by: FAMILY MEDICINE

## 2019-12-04 PROCEDURE — 36415 COLL VENOUS BLD VENIPUNCTURE: CPT

## 2019-12-04 PROCEDURE — 90686 IIV4 VACC NO PRSV 0.5 ML IM: CPT

## 2019-12-04 RX ORDER — VALSARTAN AND HYDROCHLOROTHIAZIDE 160; 25 MG/1; MG/1
160 TABLET ORAL DAILY
COMMUNITY
Start: 2019-10-29 | End: 2021-06-04 | Stop reason: SDUPTHER

## 2019-12-04 RX ORDER — GABAPENTIN 600 MG/1
600 TABLET ORAL 3 TIMES DAILY
Qty: 270 TABLET | Refills: 0 | Status: SHIPPED
Start: 2019-12-04 | End: 2020-03-02 | Stop reason: SDUPTHER

## 2019-12-04 RX ORDER — BUSPIRONE HYDROCHLORIDE 5 MG/1
5 TABLET ORAL 3 TIMES DAILY
Qty: 270 TABLET | Refills: 1 | Status: SHIPPED
Start: 2019-12-04 | End: 2020-07-28 | Stop reason: SDUPTHER

## 2019-12-04 RX ORDER — TIZANIDINE 4 MG/1
4 TABLET ORAL EVERY 6 HOURS PRN
Qty: 90 TABLET | Refills: 0 | Status: SHIPPED | OUTPATIENT
Start: 2019-12-04 | End: 2019-12-31

## 2019-12-04 RX ORDER — GLUCOSAM/CHON-MSM1/C/MANG/BOSW 500-416.6
TABLET ORAL
Qty: 500 EACH | Refills: 3 | Status: SHIPPED
Start: 2019-12-04 | End: 2021-02-03

## 2019-12-04 RX ORDER — ATORVASTATIN CALCIUM 80 MG/1
80 TABLET, FILM COATED ORAL NIGHTLY
Qty: 90 TABLET | Refills: 1 | Status: SHIPPED
Start: 2019-12-04 | End: 2020-06-02 | Stop reason: SDUPTHER

## 2019-12-04 RX ORDER — DULOXETIN HYDROCHLORIDE 60 MG/1
CAPSULE, DELAYED RELEASE ORAL
Qty: 90 CAPSULE | Refills: 1 | Status: SHIPPED
Start: 2019-12-04 | End: 2020-08-28 | Stop reason: SDUPTHER

## 2019-12-04 RX ORDER — METOPROLOL TARTRATE 100 MG/1
100 TABLET ORAL 2 TIMES DAILY
Qty: 180 TABLET | Refills: 1 | Status: SHIPPED
Start: 2019-12-04 | End: 2020-07-28 | Stop reason: SDUPTHER

## 2019-12-04 RX ORDER — CELECOXIB 200 MG/1
CAPSULE ORAL
Qty: 90 CAPSULE | Refills: 1 | Status: SHIPPED
Start: 2019-12-04 | End: 2020-06-02 | Stop reason: SDUPTHER

## 2019-12-04 RX ORDER — POTASSIUM CHLORIDE 1.5 G/1.77G
20 POWDER, FOR SOLUTION ORAL DAILY
Qty: 90 EACH | Refills: 1 | Status: SHIPPED | OUTPATIENT
Start: 2019-12-04 | End: 2019-12-31 | Stop reason: CLARIF

## 2019-12-04 RX ORDER — OMEPRAZOLE 40 MG/1
CAPSULE, DELAYED RELEASE ORAL
Qty: 90 CAPSULE | Refills: 1 | Status: SHIPPED
Start: 2019-12-04 | End: 2020-06-02 | Stop reason: SDUPTHER

## 2019-12-04 RX ORDER — METFORMIN HYDROCHLORIDE 500 MG/1
500 TABLET, EXTENDED RELEASE ORAL
Qty: 90 TABLET | Refills: 1 | Status: SHIPPED
Start: 2019-12-04 | End: 2020-08-28 | Stop reason: SDUPTHER

## 2019-12-04 RX ORDER — ALLOPURINOL 100 MG/1
TABLET ORAL
Qty: 180 TABLET | Refills: 1 | Status: SHIPPED
Start: 2019-12-04 | End: 2020-07-28 | Stop reason: SDUPTHER

## 2019-12-04 RX ORDER — CLONIDINE HYDROCHLORIDE 0.1 MG/1
0.1 TABLET ORAL 3 TIMES DAILY
Qty: 270 TABLET | Refills: 1 | Status: SHIPPED
Start: 2019-12-04 | End: 2020-03-02 | Stop reason: SDUPTHER

## 2019-12-05 ASSESSMENT — ENCOUNTER SYMPTOMS
BACK PAIN: 1
DIARRHEA: 0
SORE THROAT: 0
NAUSEA: 0
ABDOMINAL PAIN: 0
VOMITING: 0
COUGH: 0
EYE PAIN: 0
CONSTIPATION: 0
BLOOD IN STOOL: 0
SHORTNESS OF BREATH: 0
EYE REDNESS: 0

## 2019-12-31 ENCOUNTER — TELEPHONE (OUTPATIENT)
Dept: FAMILY MEDICINE CLINIC | Age: 64
End: 2019-12-31

## 2019-12-31 DIAGNOSIS — M48.061 NEURAL FORAMINAL STENOSIS OF LUMBAR SPINE: Chronic | ICD-10-CM

## 2019-12-31 RX ORDER — POTASSIUM CHLORIDE 750 MG/1
10 TABLET, EXTENDED RELEASE ORAL 2 TIMES DAILY
Qty: 180 TABLET | Refills: 1 | Status: SHIPPED
Start: 2019-12-31 | End: 2020-07-07 | Stop reason: SDUPTHER

## 2019-12-31 RX ORDER — TIZANIDINE 4 MG/1
4 TABLET ORAL EVERY 6 HOURS PRN
Qty: 90 TABLET | Refills: 0 | Status: SHIPPED
Start: 2019-12-31 | End: 2020-07-07 | Stop reason: SDUPTHER

## 2019-12-31 NOTE — TELEPHONE ENCOUNTER
[employer]'s covered (formulary) drugs are potassium chloride ER tablet,extended release, Klor-Con 10 tablet,extended release AND potassium chloride oral liquid. If a covered (formulary) drug is prescribed, a review by [facility] is not required. Please change to above choices or give supporting info to justify prior auth for the packets vs the pills or above liquid. Nikolas Saul

## 2020-03-02 RX ORDER — FUROSEMIDE 20 MG/1
TABLET ORAL
Qty: 135 TABLET | Refills: 0 | Status: SHIPPED
Start: 2020-03-02 | End: 2020-06-02 | Stop reason: SDUPTHER

## 2020-03-02 RX ORDER — GABAPENTIN 600 MG/1
600 TABLET ORAL 3 TIMES DAILY
Qty: 270 TABLET | Refills: 0 | Status: SHIPPED
Start: 2020-03-02 | End: 2020-06-02 | Stop reason: SDUPTHER

## 2020-03-02 RX ORDER — CLONIDINE HYDROCHLORIDE 0.1 MG/1
0.1 TABLET ORAL 3 TIMES DAILY
Qty: 270 TABLET | Refills: 0 | Status: SHIPPED
Start: 2020-03-02 | End: 2020-07-07 | Stop reason: SDUPTHER

## 2020-03-02 NOTE — TELEPHONE ENCOUNTER
Last Appointment:  12/4/2019  Future Appointments   Date Time Provider Eloise Oshea   3/4/2020  1:40 PM [facility]

## 2020-06-02 RX ORDER — GABAPENTIN 600 MG/1
600 TABLET ORAL 3 TIMES DAILY
Qty: 270 TABLET | Refills: 0 | Status: SHIPPED
Start: 2020-06-02 | End: 2020-08-28 | Stop reason: SDUPTHER

## 2020-06-02 RX ORDER — ATORVASTATIN CALCIUM 80 MG/1
80 TABLET, FILM COATED ORAL NIGHTLY
Qty: 90 TABLET | Refills: 0 | Status: SHIPPED
Start: 2020-06-02 | End: 2020-08-28 | Stop reason: SDUPTHER

## 2020-06-02 RX ORDER — OMEPRAZOLE 40 MG/1
CAPSULE, DELAYED RELEASE ORAL
Qty: 90 CAPSULE | Refills: 0 | Status: SHIPPED
Start: 2020-06-02 | End: 2020-08-28 | Stop reason: SDUPTHER

## 2020-06-02 RX ORDER — CELECOXIB 200 MG/1
CAPSULE ORAL
Qty: 90 CAPSULE | Refills: 0 | Status: SHIPPED
Start: 2020-06-02 | End: 2020-08-28 | Stop reason: SDUPTHER

## 2020-06-02 RX ORDER — FUROSEMIDE 20 MG/1
TABLET ORAL
Qty: 135 TABLET | Refills: 0 | Status: SHIPPED
Start: 2020-06-02 | End: 2020-08-28 | Stop reason: SDUPTHER

## 2020-06-22 ENCOUNTER — HOSPITAL ENCOUNTER (OUTPATIENT)
Age: 65
Discharge: HOME | End: 2020-06-22
Payer: MEDICARE

## 2020-06-22 LAB
ALBUMIN SERPL-MCNC: 4.2 G/DL (ref 3.5–5.2)
ALP BLD-CCNC: 161 U/L (ref 35–104)
ALT SERPL-CCNC: 16 U/L (ref 0–32)
ANION GAP SERPL CALCULATED.3IONS-SCNC: 11 MMOL/L (ref 7–16)
AST SERPL-CCNC: 18 U/L (ref 0–31)
BASOPHILS ABSOLUTE: 0.06 E9/L (ref 0–0.2)
BASOPHILS RELATIVE PERCENT: 0.6 % (ref 0–2)
BILIRUB SERPL-MCNC: 0.5 MG/DL (ref 0–1.2)
BUN BLDV-MCNC: 21 MG/DL (ref 8–23)
CALCIUM SERPL-MCNC: 9.3 MG/DL (ref 8.6–10.2)
CHLORIDE BLD-SCNC: 104 MMOL/L (ref 98–107)
CHOLESTEROL, TOTAL: 122 MG/DL (ref 0–199)
CO2: 28 MMOL/L (ref 22–29)
CREAT SERPL-MCNC: 1.1 MG/DL (ref 0.5–1)
EOSINOPHILS ABSOLUTE: 0.3 E9/L (ref 0.05–0.5)
EOSINOPHILS RELATIVE PERCENT: 3.2 % (ref 0–6)
GFR AFRICAN AMERICAN: >60
GFR NON-AFRICAN AMERICAN: >60 ML/MIN/1.73
GLUCOSE BLD-MCNC: 203 MG/DL (ref 74–99)
HBA1C MFR BLD: 7.4 % (ref 4–5.6)
HCT VFR BLD CALC: 40 % (ref 34–48)
HDLC SERPL-MCNC: 38 MG/DL
HEMOGLOBIN: 12 G/DL (ref 11.5–15.5)
IMMATURE GRANULOCYTES #: 0.05 E9/L
IMMATURE GRANULOCYTES %: 0.5 % (ref 0–5)
LDL CHOLESTEROL CALCULATED: 68 MG/DL (ref 0–99)
LYMPHOCYTES ABSOLUTE: 1.87 E9/L (ref 1.5–4)
LYMPHOCYTES RELATIVE PERCENT: 19.7 % (ref 20–42)
MCH RBC QN AUTO: 27 PG (ref 26–35)
MCHC RBC AUTO-ENTMCNC: 30 % (ref 32–34.5)
MCV RBC AUTO: 90.1 FL (ref 80–99.9)
MONOCYTES ABSOLUTE: 0.78 E9/L (ref 0.1–0.95)
MONOCYTES RELATIVE PERCENT: 8.2 % (ref 2–12)
NEUTROPHILS ABSOLUTE: 6.42 E9/L (ref 1.8–7.3)
NEUTROPHILS RELATIVE PERCENT: 67.8 % (ref 43–80)
PARATHYROID HORMONE INTACT: 110 PG/ML (ref 15–65)
PDW BLD-RTO: 14.4 FL (ref 11.5–15)
PHOSPHORUS: 1.6 MG/DL (ref 2.5–4.5)
PLATELET # BLD: 338 E9/L (ref 130–450)
PMV BLD AUTO: 10.9 FL (ref 7–12)
POTASSIUM SERPL-SCNC: 4 MMOL/L (ref 3.5–5)
RBC # BLD: 4.44 E12/L (ref 3.5–5.5)
SODIUM BLD-SCNC: 143 MMOL/L (ref 132–146)
TOTAL PROTEIN: 7 G/DL (ref 6.4–8.3)
TRIGL SERPL-MCNC: 81 MG/DL (ref 0–149)
VITAMIN D 25-HYDROXY: 35 NG/ML (ref 30–100)
VLDLC SERPL CALC-MCNC: 16 MG/DL
WBC # BLD: 9.5 E9/L (ref 4.5–11.5)

## 2020-06-22 PROCEDURE — 84100 ASSAY OF PHOSPHORUS: CPT

## 2020-06-22 PROCEDURE — 85025 COMPLETE CBC W/AUTO DIFF WBC: CPT

## 2020-06-22 PROCEDURE — 80053 COMPREHEN METABOLIC PANEL: CPT

## 2020-06-22 PROCEDURE — 80061 LIPID PANEL: CPT

## 2020-06-22 PROCEDURE — 83036 HEMOGLOBIN GLYCOSYLATED A1C: CPT

## 2020-06-22 PROCEDURE — 83970 ASSAY OF PARATHORMONE: CPT

## 2020-06-22 PROCEDURE — 36415 COLL VENOUS BLD VENIPUNCTURE: CPT

## 2020-06-22 PROCEDURE — 82306 VITAMIN D 25 HYDROXY: CPT

## 2020-06-26 RX ORDER — PEN NEEDLE, DIABETIC 31 GX5/16"
NEEDLE, DISPOSABLE MISCELLANEOUS
Qty: 500 EACH | Refills: 3 | Status: SHIPPED
Start: 2020-06-26 | End: 2021-01-08 | Stop reason: SDUPTHER

## 2020-06-26 RX ORDER — INSULIN GLARGINE 100 [IU]/ML
55 INJECTION, SOLUTION SUBCUTANEOUS 2 TIMES DAILY
Qty: 35 PEN | Refills: 0 | Status: SHIPPED
Start: 2020-06-26 | End: 2020-08-28 | Stop reason: SDUPTHER

## 2020-07-07 RX ORDER — CLONIDINE HYDROCHLORIDE 0.1 MG/1
0.1 TABLET ORAL 3 TIMES DAILY
Qty: 270 TABLET | Refills: 0 | Status: SHIPPED
Start: 2020-07-07 | End: 2020-08-28 | Stop reason: SDUPTHER

## 2020-07-07 RX ORDER — POTASSIUM CHLORIDE 750 MG/1
10 TABLET, EXTENDED RELEASE ORAL 2 TIMES DAILY
Qty: 180 TABLET | Refills: 1 | Status: SHIPPED
Start: 2020-07-07 | End: 2020-08-28 | Stop reason: SDUPTHER

## 2020-07-07 RX ORDER — TIZANIDINE 4 MG/1
4 TABLET ORAL EVERY 6 HOURS PRN
Qty: 90 TABLET | Refills: 0 | Status: SHIPPED
Start: 2020-07-07 | End: 2020-08-28 | Stop reason: SDUPTHER

## 2020-07-27 NOTE — TELEPHONE ENCOUNTER
Last Appointment:  12/4/2019  Future Appointments   Date Time Provider Eloise Oshea   8/28/2020 10:40 AM [facility]

## 2020-07-28 RX ORDER — ALLOPURINOL 100 MG/1
TABLET ORAL
Qty: 180 TABLET | Refills: 0 | Status: SHIPPED
Start: 2020-07-28 | End: 2020-08-28 | Stop reason: SDUPTHER

## 2020-07-28 RX ORDER — METOPROLOL TARTRATE 100 MG/1
100 TABLET ORAL 2 TIMES DAILY
Qty: 180 TABLET | Refills: 0 | Status: SHIPPED
Start: 2020-07-28 | End: 2020-08-28 | Stop reason: SDUPTHER

## 2020-07-28 RX ORDER — BUSPIRONE HYDROCHLORIDE 5 MG/1
5 TABLET ORAL 3 TIMES DAILY
Qty: 270 TABLET | Refills: 0 | Status: SHIPPED
Start: 2020-07-28 | End: 2020-08-28 | Stop reason: SDUPTHER

## 2020-07-30 RX ORDER — GLUCOSAMINE HCL/CHONDROITIN SU 500-400 MG
CAPSULE ORAL
Qty: 200 STRIP | Refills: 3 | Status: SHIPPED
Start: 2020-07-30 | End: 2021-02-03

## 2020-08-28 ENCOUNTER — OFFICE VISIT (OUTPATIENT)
Dept: FAMILY MEDICINE CLINIC | Age: 65
End: 2020-08-28
Payer: MEDICARE

## 2020-08-28 VITALS
OXYGEN SATURATION: 100 % | TEMPERATURE: 97 F | DIASTOLIC BLOOD PRESSURE: 80 MMHG | WEIGHT: 293 LBS | HEART RATE: 78 BPM | SYSTOLIC BLOOD PRESSURE: 130 MMHG | HEIGHT: 69 IN | BODY MASS INDEX: 43.4 KG/M2

## 2020-08-28 PROCEDURE — 99214 OFFICE O/P EST MOD 30 MIN: CPT | Performed by: FAMILY MEDICINE

## 2020-08-28 PROCEDURE — 99212 OFFICE O/P EST SF 10 MIN: CPT | Performed by: FAMILY MEDICINE

## 2020-08-28 PROCEDURE — 3017F COLORECTAL CA SCREEN DOC REV: CPT | Performed by: FAMILY MEDICINE

## 2020-08-28 PROCEDURE — 3051F HG A1C>EQUAL 7.0%<8.0%: CPT | Performed by: FAMILY MEDICINE

## 2020-08-28 PROCEDURE — 2022F DILAT RTA XM EVC RTNOPTHY: CPT | Performed by: FAMILY MEDICINE

## 2020-08-28 PROCEDURE — G8427 DOCREV CUR MEDS BY ELIG CLIN: HCPCS | Performed by: FAMILY MEDICINE

## 2020-08-28 PROCEDURE — 1036F TOBACCO NON-USER: CPT | Performed by: FAMILY MEDICINE

## 2020-08-28 PROCEDURE — G8417 CALC BMI ABV UP PARAM F/U: HCPCS | Performed by: FAMILY MEDICINE

## 2020-08-28 RX ORDER — ATORVASTATIN CALCIUM 80 MG/1
80 TABLET, FILM COATED ORAL NIGHTLY
Qty: 90 TABLET | Refills: 1 | Status: SHIPPED
Start: 2020-08-28 | End: 2020-11-06 | Stop reason: SDUPTHER

## 2020-08-28 RX ORDER — INSULIN GLARGINE 100 [IU]/ML
55 INJECTION, SOLUTION SUBCUTANEOUS 2 TIMES DAILY
Qty: 35 PEN | Refills: 1 | Status: SHIPPED
Start: 2020-08-28 | End: 2020-11-06 | Stop reason: SDUPTHER

## 2020-08-28 RX ORDER — GABAPENTIN 600 MG/1
600 TABLET ORAL 3 TIMES DAILY
Qty: 270 TABLET | Refills: 1 | Status: SHIPPED
Start: 2020-08-28 | End: 2020-11-06 | Stop reason: SDUPTHER

## 2020-08-28 RX ORDER — CLONIDINE HYDROCHLORIDE 0.1 MG/1
0.1 TABLET ORAL 3 TIMES DAILY
Qty: 270 TABLET | Refills: 1 | Status: SHIPPED
Start: 2020-08-28 | End: 2020-11-06 | Stop reason: SDUPTHER

## 2020-08-28 RX ORDER — ALLOPURINOL 100 MG/1
TABLET ORAL
Qty: 180 TABLET | Refills: 1 | Status: SHIPPED
Start: 2020-08-28 | End: 2020-11-06 | Stop reason: SDUPTHER

## 2020-08-28 RX ORDER — INSULIN ASPART 100 [IU]/ML
INJECTION, SOLUTION INTRAVENOUS; SUBCUTANEOUS
Qty: 35 PEN | Refills: 1 | Status: SHIPPED
Start: 2020-08-28 | End: 2021-05-06

## 2020-08-28 RX ORDER — TIZANIDINE 4 MG/1
4 TABLET ORAL EVERY 6 HOURS PRN
Qty: 90 TABLET | Refills: 1 | Status: SHIPPED
Start: 2020-08-28 | End: 2020-11-06 | Stop reason: SDUPTHER

## 2020-08-28 RX ORDER — METOPROLOL TARTRATE 100 MG/1
100 TABLET ORAL 2 TIMES DAILY
Qty: 180 TABLET | Refills: 1 | Status: SHIPPED
Start: 2020-08-28 | End: 2020-11-06 | Stop reason: SDUPTHER

## 2020-08-28 RX ORDER — CELECOXIB 200 MG/1
CAPSULE ORAL
Qty: 90 CAPSULE | Refills: 1 | Status: SHIPPED
Start: 2020-08-28 | End: 2020-11-06 | Stop reason: SDUPTHER

## 2020-08-28 RX ORDER — METFORMIN HYDROCHLORIDE 500 MG/1
500 TABLET, EXTENDED RELEASE ORAL
Qty: 90 TABLET | Refills: 1 | Status: SHIPPED
Start: 2020-08-28 | End: 2020-11-06 | Stop reason: SDUPTHER

## 2020-08-28 RX ORDER — OMEPRAZOLE 40 MG/1
CAPSULE, DELAYED RELEASE ORAL
Qty: 90 CAPSULE | Refills: 1 | Status: SHIPPED
Start: 2020-08-28 | End: 2020-11-06 | Stop reason: SDUPTHER

## 2020-08-28 RX ORDER — POTASSIUM CHLORIDE 750 MG/1
10 TABLET, EXTENDED RELEASE ORAL 2 TIMES DAILY
Qty: 180 TABLET | Refills: 1 | Status: SHIPPED
Start: 2020-08-28 | End: 2020-11-06 | Stop reason: SDUPTHER

## 2020-08-28 RX ORDER — BUSPIRONE HYDROCHLORIDE 5 MG/1
5 TABLET ORAL 3 TIMES DAILY
Qty: 270 TABLET | Refills: 1 | Status: SHIPPED
Start: 2020-08-28 | End: 2020-11-06 | Stop reason: SDUPTHER

## 2020-08-28 RX ORDER — DULOXETIN HYDROCHLORIDE 60 MG/1
CAPSULE, DELAYED RELEASE ORAL
Qty: 90 CAPSULE | Refills: 1 | Status: SHIPPED
Start: 2020-08-28 | End: 2020-11-06 | Stop reason: SDUPTHER

## 2020-08-28 RX ORDER — FUROSEMIDE 20 MG/1
TABLET ORAL
Qty: 135 TABLET | Refills: 1 | Status: SHIPPED
Start: 2020-08-28 | End: 2020-11-06 | Stop reason: SDUPTHER

## 2020-08-28 ASSESSMENT — ENCOUNTER SYMPTOMS
VOMITING: 0
NAUSEA: 0
EYE PAIN: 0
COUGH: 0
SHORTNESS OF BREATH: 0
BLOOD IN STOOL: 0
SORE THROAT: 0
EYE REDNESS: 0
CONSTIPATION: 0
ABDOMINAL PAIN: 0
DIARRHEA: 0

## 2020-08-28 NOTE — PATIENT INSTRUCTIONS
Patient Education        Colon Cancer Screening: Care Instructions  Your Care Instructions     Colorectal cancer occurs in the colon or rectum. That's the lower part of your digestive system. It is the second-leading cause of cancer deaths in the [place]. It often starts with small growths called polyps in the colon or rectum. Polyps are usually found with screening tests. Depending on the type of test, any polyps found may be removed during the tests. Colorectal cancer usually does not cause symptoms at first. But regular tests can help find it early, before it spreads and becomes harder to treat. Your risk for colorectal cancer gets higher as you get older. Some experts say that adults should start regular screening at age 48 and stop at age 76. Others say to start before age 48 or continue after age 76. Talk with your doctor about your risk and when to start and stop screening. You may have one of several tests. Follow-up care is a key part of your treatment and safety. Be sure to make and go to all appointments, and call your doctor if you are having problems. It's also a good idea to know your test results and keep a list of the medicines you take. What are the main screening tests for colon cancer? The screening tests are:  Stool tests. These include the guaiac fecal occult blood test (gFOBT), the fecal immunochemical test (FIT), and the combined fecal immunochemical test and stool DNA test (FIT-DNA). These tests check stool samples for signs of cancer. If your test is positive, you will need to have a colonoscopy. Sigmoidoscopy. This test lets your doctor look at the lining of your rectum and the lowest part of your colon. Your doctor uses a lighted tube called a sigmoidoscope. This test can't find cancers or polyps in the upper part of your colon. In some cases, polyps that are found can be removed.  But if your doctor finds polyps, you will need to have a colonoscopy to check the upper part of your colon. Colonoscopy. This test lets your doctor look at the lining of your rectum and your entire colon. The doctor uses a thin, flexible tool called a colonoscope. It can also be used to remove polyps or get a tissue sample (biopsy). A less common test is CT colonography (CTC). It's also called virtual colonoscopy. Who should be screened for colorectal cancer? Your risk for colorectal cancer gets higher as you get older. Some experts say that adults should start regular screening at age 48 and stop at age 76. Others say to start before age 48 or continue after age 76. Talk with your doctor about your risk and when to start and stop screening. How often you need screening depends on the type of test you get:  Stool tests. Every 1 or 2 years for FIT or gFOBT. Every 3 years for sDNA, also called FIT-DNA. Tests that look inside the colon. Every 5 or 10 years for sigmoidoscopy. Every 5 years for CT colonography (virtual colonoscopy). Every 10 years for colonoscopy. Experts agree that people at higher risk may need to be tested sooner. This includes people who have a strong family history of colon cancer. Talk to your doctor about which test is best for you and when to be tested. When should you call for help? Watch closely for changes in your health, and be sure to contact your doctor if:  · You have any changes in your bowel habits. · You have any problems. Where can you learn more? Go to https://American DG EnergypeashishewBaxano.Cadee. org and sign in to your Youneeq account. Enter [phone] in the [facility] box to learn more about \"Colon Cancer Screening: Care Instructions. \"     If you do not have an account, please click on the \"Sign Up Now\" link. Current as of: August 22, 2019               Content Version: 12.5  © 9466-9857 Healthwise, Incorporated. Care instructions adapted under license by [facility] ([facility]).  If you have questions about a medical condition or this instruction, always

## 2020-08-28 NOTE — PROGRESS NOTES
[facility]  FAMILY MEDICINE RESIDENCY PROGRAM   OFFICE PROGRESS NOTE  DATEOF VISIT : 20    Patient : Caroline Houston   Sex : female   Age : 63 y.o.  : 1955           Chief Complaint :   Chief Complaint   Patient presents with    Diabetes     F/U    Check-Up    Other     Placard       HPI:   63 y.o. female comes in today for follow up of dm2, htn, ckd3, and chronic back pain. Pt reports bg have been good. No concerns. Continues to follow with Dr. Horacio Mills for her kidneys. Asking for handicapped placard reissuance due to chronic back pain. Continues with pain. She has now noticed clicking in her spine. Was unable to return to pain mgmt. The one injection helped. Not interested in surgery. Willing to do aquatherapy at this time. Last MRI was in 2017. Pain is getting worse and radiates down the left leg. Denies weakness or lobb. Insurance has changed to [facility] [employer] so she is hopeful that she can afford specialists now as well as aquatherapy. 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 Her grandson is 7 months old.        Patient Active Problem List   Diagnosis    Diabetes mellitus type 2, uncontrolled ([facility])    Hypertension    Hypercholesterolemia    GERD (gastroesophageal reflux disease)    Depression    Enlarged liver    Heart murmur    Chronic lower back pain    Facet joint disease    DJD (degenerative joint disease), lumbar    Bulging lumbar disc    Back pain    Anxiety    Bilateral leg edema    Microalbuminuria    Vitamin D insufficiency    Chronic kidney disease, stage 3, mod decreased GFR (HCC)    Senile nuclear cataract    Fatty liver    Diabetic peripheral neuropathy (HCC)    Neural foraminal stenosis of lumbar spine    Type 2 diabetes mellitus with both eyes affected by mild nonproliferative retinopathy without macular edema, with long-term current use of insulin (HCC)       Past Medical History:   Diagnosis Date    Anxiety     Carpal tunnel syndrome on right     Chronic back pain     Chronic kidney disease, [facility] 3 ([facility]     Depression 10/28/2010    Diabetes mellitus type 2, uncontrolled ([facility] 10/28/2010    with microalbuminuria    Diverticulosis     GERD (gastroesophageal reflux disease) 10/28/2010    Herniated intervertebral disk     neck and lower back    Hypercholesterolemia 10/28/2010    Hypertension 10/28/2010    Kidney stones     Neurofibroma of trunk     irritating mole on the back. biopsy done 10/21/09    Obesity     Osteoarthritis     PAD [facility]peripheral artery disease) ([facility])     Type II or unspecified type diabetes mellitus without mention of complication, not stated as uncontrolled     Vitamin D insufficiency        Current Outpatient Medications on File Prior to Visit   Medication Sig Dispense Refill    blood glucose monitor strips Test up to 5 times daily. Please provide brand covered by insurance 200 strip 3    Insulin Pen Needle (B-D UF III MINI PEN NEEDLES) 31G X 5 MM MISC Use to inject insulin 5 times daily 500 each 3    TRUEPLUS LANCETS 33G MISC TEST UP TO 5 TIMES DAILY. 500 each 3    valsartan-hydrochlorothiazide (DIOVAN-HCT) 160-25 MG per tablet Take 160 tablets by mouth daily 160-25 po daily per Dr. Donovan Rob Cholecalciferol (VITAMIN D3) 1000 units TABS Take 2 tablets by mouth daily , prescribed by renal 180 tablet 1    [DISCONTINUED] insulin lispro (HUMALOG KWIKPEN) 100 UNIT/ML injection Inject 10 Units into the skin 3 times daily (before meals). 3 Pen 6     No current facility-administered medications on file prior to visit.         No Known Allergies    Family History   Problem Relation Age of Onset    Lung Cancer Mother 71        mesothelioma metastatized to the brain    Diabetes Mother     High Cholesterol Mother     High Blood Pressure Mother     Arthritis Mother     Cancer Mother     Diabetes Father     Arthritis Father     High Blood Pressure Sister     Arthritis Sister     High Cholesterol Sister     High Blood Pressure Brother     Substance Abuse Brother     Breast Cancer Paternal Aunt     Cancer Paternal Aunt     High Blood Pressure Paternal Aunt     Cancer Maternal Grandmother [de-identified]        colorectal cancer    High Blood Pressure Maternal Grandmother     Heart Failure Maternal Grandmother     Arthritis Maternal Grandmother     High Cholesterol Maternal Grandmother     High Blood Pressure Paternal Grandmother     Stroke Paternal Grandmother     Cancer Maternal Aunt     High Blood Pressure Maternal Aunt     High Cholesterol Maternal Aunt     Diabetes Maternal Uncle     High Blood Pressure Maternal Uncle     High Cholesterol Maternal Uncle     Substance Abuse Maternal Uncle     High Blood Pressure Paternal Uncle        Past Surgical History:   Procedure Laterality Date    ECHO COMPLETE  3/19/2012         EYE MUSCLE SURGERY      as a child    HYSTERECTOMY      with salpingoophorectomy    HYSTERECTOMY, TOTAL ABDOMINAL Bilateral     cervix removed per patient    NERVE BLOCK Right 05/01/2018    lumbar transforaminal nerve block #1 with sedation    PA KALIA NOSE/CLEFT LIP/TIP Right 5/1/2018    RIGHT LUMBAR TRANSFORAMINAL NERVE BLOCK #1 L3-4 L4-5 WITH IV SEDATIN performed by Samuel Martinez DO at [facility]         Social History     Tobacco Use    Smoking status: Never Smoker    Smokeless tobacco: Never Used   Substance Use Topics    Alcohol use: Yes     Alcohol/week: 2.0 standard drinks     Types: 1 Glasses of wine, 1 Cans of beer per week     Comment: 1 time per month    Drug use: No       Review of Systems  Review of Systems   Constitutional: Negative for chills and fever. HENT: Negative for congestion, ear pain and sore throat. Eyes: Negative for pain and redness. Respiratory: Negative for cough and shortness of breath. Cardiovascular: Negative for chest pain, palpitations and leg swelling.    Gastrointestinal: Negative for abdominal pain, blood in stool, constipation, diarrhea, nausea and vomiting. Genitourinary: Negative for dysuria, frequency and hematuria. Musculoskeletal: Positive for arthralgias, back pain and gait problem. Negative for myalgias. Skin: Negative for rash. Allergic/Immunologic: Negative for environmental allergies. Neurological: Negative for dizziness and headaches. Hematological: Does not bruise/bleed easily. Psychiatric/Behavioral: The patient is not nervous/anxious. Physical Exam:  VS:  Blood pressure 130/80, pulse 78, temperature 97 °F (36.1 °C), temperature source Temporal, height 5' 9\" (1.753 m), weight (!) 320 lb (145.2 kg), SpO2 100 %, not currently breastfeeding. Physical Exam  Constitutional:       Appearance: She is well-developed. HENT:      Head: Normocephalic and atraumatic. Cardiovascular:      Rate and Rhythm: Normal rate and regular rhythm. Heart sounds: No murmur. No friction rub. No gallop. Pulmonary:      Breath sounds: Normal breath sounds. No wheezing, rhonchi or rales. Abdominal:      General: Bowel sounds are normal.      Palpations: Abdomen is soft. There is no mass. Tenderness: There is no abdominal tenderness. Skin:     Nails: There is no clubbing. back: ttp over the l-s spine, no paraspinal ttp, neg slr, ms 4+/5 b/l, nl sensation, dtrs symmetric diminished  Sitting in wheelchair    Assessment/Plan:  1. Uncontrolled type 2 diabetes mellitus with hyperglycemia ([facility]  a1c 6/22 was 7.4, up from 6.4. Continue same regimen. Encouraged increasing physical activity. - atorvastatin (LIPITOR) 80 MG tablet; Take 1 tablet by mouth nightly  Dispense: 90 tablet; Refill: 1  - insulin glargine (LANTUS SOLOSTAR) 100 UNIT/ML injection pen; Inject 55 Units into the skin 2 times daily  Dispense: 35 pen; Refill: 1  - gabapentin (NEURONTIN) 600 MG tablet; Take 1 tablet by mouth 3 times daily for 90 days. Dispense: 270 tablet;  Refill: 1  - insulin aspart (NOVOLOG FLEXPEN) 100 UNIT/ML injection pen; 20 units tid w/ SSI TID AC: 150-200 2 units; 201-250 4; 251-300 6; 301-350 8; 351-400 10; 401-450 12; 451-500 14; 501+ 16 units  Dispense: 35 pen; Refill: 1  - metFORMIN (GLUCOPHAGE XR) 500 MG extended release tablet; Take 1 tablet by mouth daily (with breakfast)  Dispense: 90 tablet; Refill: 1    2. Diabetic peripheral neuropathy (HCC)  Continue, stable  - DULoxetine (CYMBALTA) 60 MG extended release capsule; TAKE 1 CAPSULE EVERY DAY  Dispense: 90 capsule; Refill: 1    3. Essential hypertension  Controlled, continue current regimen  - metoprolol (LOPRESSOR) 100 MG tablet; Take 1 tablet by mouth 2 times daily Prescribed by Dr. Ron Payment: 180 tablet; Refill: 1  - cloNIDine (CATAPRES) 0.1 MG tablet; Take 1 tablet by mouth 3 times daily  Dispense: 270 tablet; Refill: 1    4. Osteoarthritis of spine with radiculopathy, lumbar region  - [address] [place] [address]. Physical [facility] Rehabilitation, [address], [place]    5. Neural foraminal stenosis of lumbar spine  - Caroline Graham DO. [facility] Medicine [facility] Rehabilitation, [facility]  - tiZANidine (ZANAFLEX) 4 MG tablet; Take 1 tablet by mouth every 6 hours as needed (muscle spasm)  Dispense: 90 tablet; Refill: 1    6. Spinal stenosis of lumbar region, unspecified whether neurogenic claudication present  rereferral  - Caroline Graham, . Physical Medicine and Rehabilitation, [address], [place], [facility]    7. Right rotator cuff tendonitis  Stable , rf  - celecoxib (CELEBREX) 200 MG capsule; TAKE 1 CAPSULE EVERY DAY  Dispense: 90 capsule; Refill: 1    8. Anxiety  rf  - busPIRone (BUSPAR) 5 MG tablet; Take 1 tablet by mouth 3 times daily  Dispense: 270 tablet; Refill: 1    9.  Screening for colorectal cancer  - POCT Fit Test; Future    Handicapped placard issued    Additional plan and future

## 2020-08-28 NOTE — LETTER
[facility]  [address]  Phone: [phone]  Fax: [phone]    Glory Snider MD        August 28, 2020     Patient: Mick Dahl   YOB: 1955   Date of Visit: 8/28/2020       To Whom It May Concern: It is my medical opinion that Mick Dahl requires a disability parking placard for the following reasons:  She cannot walk without assistance from another person or the use of an assistance device (cane, crutch, prosthetic device, wheelchair, etc.). Duration of need: 5 years    If you have any questions or concerns, please don't hesitate to call.     Sincerely,        Glory Snider MD

## 2020-08-29 PROBLEM — M48.061 SPINAL STENOSIS OF LUMBAR REGION: Status: ACTIVE | Noted: 2020-08-29

## 2020-08-29 ASSESSMENT — ENCOUNTER SYMPTOMS: BACK PAIN: 1

## 2020-10-14 ENCOUNTER — NURSE ONLY (OUTPATIENT)
Dept: FAMILY MEDICINE CLINIC | Age: 65
End: 2020-10-14
Payer: MEDICARE

## 2020-10-14 PROCEDURE — G0008 ADMIN INFLUENZA VIRUS VAC: HCPCS

## 2020-10-14 PROCEDURE — 99211 OFF/OP EST MAY X REQ PHY/QHP: CPT | Performed by: COUNSELOR

## 2020-10-14 PROCEDURE — 6360000002 HC RX W HCPCS

## 2020-10-14 PROCEDURE — 90686 IIV4 VACC NO PRSV 0.5 ML IM: CPT

## 2020-10-16 ENCOUNTER — HOSPITAL ENCOUNTER (OUTPATIENT)
Age: 65
Discharge: HOME | End: 2020-10-16
Payer: MEDICARE

## 2020-10-16 LAB
ANION GAP SERPL CALCULATED.3IONS-SCNC: 11 MMOL/L (ref 7–16)
BASOPHILS ABSOLUTE: 0.05 E9/L (ref 0–0.2)
BASOPHILS RELATIVE PERCENT: 0.5 % (ref 0–2)
BUN BLDV-MCNC: 21 MG/DL (ref 8–23)
CALCIUM SERPL-MCNC: 10 MG/DL (ref 8.6–10.2)
CHLORIDE BLD-SCNC: 103 MMOL/L (ref 98–107)
CO2: 28 MMOL/L (ref 22–29)
CREAT SERPL-MCNC: 1.4 MG/DL (ref 0.5–1)
EOSINOPHILS ABSOLUTE: 0.22 E9/L (ref 0.05–0.5)
EOSINOPHILS RELATIVE PERCENT: 2.1 % (ref 0–6)
GFR AFRICAN AMERICAN: 46
GFR NON-AFRICAN AMERICAN: 46 ML/MIN/1.73
GLUCOSE BLD-MCNC: 182 MG/DL (ref 74–99)
HCT VFR BLD CALC: 42.1 % (ref 34–48)
HEMOGLOBIN: 12.5 G/DL (ref 11.5–15.5)
IMMATURE GRANULOCYTES #: 0.03 E9/L
IMMATURE GRANULOCYTES %: 0.3 % (ref 0–5)
LYMPHOCYTES ABSOLUTE: 2.12 E9/L (ref 1.5–4)
LYMPHOCYTES RELATIVE PERCENT: 20.7 % (ref 20–42)
MCH RBC QN AUTO: 27.2 PG (ref 26–35)
MCHC RBC AUTO-ENTMCNC: 29.7 % (ref 32–34.5)
MCV RBC AUTO: 91.7 FL (ref 80–99.9)
MONOCYTES ABSOLUTE: 0.82 E9/L (ref 0.1–0.95)
MONOCYTES RELATIVE PERCENT: 8 % (ref 2–12)
NEUTROPHILS ABSOLUTE: 7 E9/L (ref 1.8–7.3)
NEUTROPHILS RELATIVE PERCENT: 68.4 % (ref 43–80)
PDW BLD-RTO: 14.2 FL (ref 11.5–15)
PHOSPHORUS: 2.7 MG/DL (ref 2.5–4.5)
PLATELET # BLD: 301 E9/L (ref 130–450)
PMV BLD AUTO: 11.2 FL (ref 7–12)
POTASSIUM SERPL-SCNC: 4.1 MMOL/L (ref 3.5–5)
RBC # BLD: 4.59 E12/L (ref 3.5–5.5)
SODIUM BLD-SCNC: 142 MMOL/L (ref 132–146)
WBC # BLD: 10.2 E9/L (ref 4.5–11.5)

## 2020-10-16 PROCEDURE — 80048 BASIC METABOLIC PNL TOTAL CA: CPT

## 2020-10-16 PROCEDURE — 84100 ASSAY OF PHOSPHORUS: CPT

## 2020-10-16 PROCEDURE — 85025 COMPLETE CBC W/AUTO DIFF WBC: CPT

## 2020-10-16 PROCEDURE — 36415 COLL VENOUS BLD VENIPUNCTURE: CPT

## 2020-11-06 ENCOUNTER — OFFICE VISIT (OUTPATIENT)
Dept: FAMILY MEDICINE CLINIC | Age: 65
End: 2020-11-06
Payer: MEDICARE

## 2020-11-06 VITALS
OXYGEN SATURATION: 98 % | BODY MASS INDEX: 43.4 KG/M2 | TEMPERATURE: 97.5 F | WEIGHT: 293 LBS | SYSTOLIC BLOOD PRESSURE: 160 MMHG | HEIGHT: 69 IN | DIASTOLIC BLOOD PRESSURE: 80 MMHG | HEART RATE: 77 BPM | RESPIRATION RATE: 20 BRPM

## 2020-11-06 LAB — HBA1C MFR BLD: 7.8 %

## 2020-11-06 PROCEDURE — G8482 FLU IMMUNIZE ORDER/ADMIN: HCPCS | Performed by: FAMILY MEDICINE

## 2020-11-06 PROCEDURE — 3017F COLORECTAL CA SCREEN DOC REV: CPT | Performed by: FAMILY MEDICINE

## 2020-11-06 PROCEDURE — 1036F TOBACCO NON-USER: CPT | Performed by: FAMILY MEDICINE

## 2020-11-06 PROCEDURE — 3051F HG A1C>EQUAL 7.0%<8.0%: CPT | Performed by: FAMILY MEDICINE

## 2020-11-06 PROCEDURE — 2022F DILAT RTA XM EVC RTNOPTHY: CPT | Performed by: FAMILY MEDICINE

## 2020-11-06 PROCEDURE — 99214 OFFICE O/P EST MOD 30 MIN: CPT | Performed by: FAMILY MEDICINE

## 2020-11-06 PROCEDURE — 99212 OFFICE O/P EST SF 10 MIN: CPT | Performed by: FAMILY MEDICINE

## 2020-11-06 PROCEDURE — 83036 HEMOGLOBIN GLYCOSYLATED A1C: CPT | Performed by: FAMILY MEDICINE

## 2020-11-06 PROCEDURE — G8427 DOCREV CUR MEDS BY ELIG CLIN: HCPCS | Performed by: FAMILY MEDICINE

## 2020-11-06 PROCEDURE — G8417 CALC BMI ABV UP PARAM F/U: HCPCS | Performed by: FAMILY MEDICINE

## 2020-11-06 RX ORDER — METOPROLOL TARTRATE 100 MG/1
100 TABLET ORAL 2 TIMES DAILY
Qty: 180 TABLET | Refills: 1 | Status: SHIPPED
Start: 2020-11-06 | End: 2021-05-21

## 2020-11-06 RX ORDER — POTASSIUM CHLORIDE 750 MG/1
10 TABLET, EXTENDED RELEASE ORAL 2 TIMES DAILY
Qty: 180 TABLET | Refills: 1 | Status: SHIPPED
Start: 2020-11-06 | End: 2021-05-06

## 2020-11-06 RX ORDER — ZOSTER VACCINE RECOMBINANT, ADJUVANTED 50 MCG/0.5
0.5 KIT INTRAMUSCULAR SEE ADMIN INSTRUCTIONS
Qty: 0.5 ML | Refills: 0 | Status: SHIPPED | OUTPATIENT
Start: 2020-11-06 | End: 2021-02-24 | Stop reason: SDUPTHER

## 2020-11-06 RX ORDER — TIZANIDINE 4 MG/1
4 TABLET ORAL EVERY 6 HOURS PRN
Qty: 90 TABLET | Refills: 1 | Status: SHIPPED
Start: 2020-11-06 | End: 2021-05-14 | Stop reason: SDUPTHER

## 2020-11-06 RX ORDER — OMEPRAZOLE 40 MG/1
CAPSULE, DELAYED RELEASE ORAL
Qty: 90 CAPSULE | Refills: 1 | Status: SHIPPED
Start: 2020-11-06 | End: 2021-05-06

## 2020-11-06 RX ORDER — BUSPIRONE HYDROCHLORIDE 5 MG/1
5 TABLET ORAL 3 TIMES DAILY
Qty: 270 TABLET | Refills: 1 | Status: SHIPPED
Start: 2020-11-06 | End: 2021-05-21

## 2020-11-06 RX ORDER — GABAPENTIN 600 MG/1
600 TABLET ORAL 3 TIMES DAILY
Qty: 270 TABLET | Refills: 1 | Status: SHIPPED
Start: 2020-11-06 | End: 2021-02-24 | Stop reason: SDUPTHER

## 2020-11-06 RX ORDER — DULOXETIN HYDROCHLORIDE 60 MG/1
CAPSULE, DELAYED RELEASE ORAL
Qty: 90 CAPSULE | Refills: 1 | Status: SHIPPED
Start: 2020-11-06 | End: 2021-05-06

## 2020-11-06 RX ORDER — INSULIN GLARGINE 100 [IU]/ML
55 INJECTION, SOLUTION SUBCUTANEOUS 2 TIMES DAILY
Qty: 35 PEN | Refills: 1 | Status: SHIPPED
Start: 2020-11-06 | End: 2021-05-06

## 2020-11-06 RX ORDER — ATORVASTATIN CALCIUM 80 MG/1
80 TABLET, FILM COATED ORAL NIGHTLY
Qty: 90 TABLET | Refills: 1 | Status: SHIPPED
Start: 2020-11-06 | End: 2021-05-06

## 2020-11-06 RX ORDER — CLONIDINE HYDROCHLORIDE 0.1 MG/1
0.1 TABLET ORAL 3 TIMES DAILY
Qty: 270 TABLET | Refills: 1 | Status: SHIPPED
Start: 2020-11-06 | End: 2021-05-06

## 2020-11-06 RX ORDER — CELECOXIB 200 MG/1
CAPSULE ORAL
Qty: 90 CAPSULE | Refills: 1 | Status: SHIPPED
Start: 2020-11-06 | End: 2021-05-06

## 2020-11-06 RX ORDER — METFORMIN HYDROCHLORIDE 500 MG/1
500 TABLET, EXTENDED RELEASE ORAL
Qty: 90 TABLET | Refills: 1 | Status: SHIPPED
Start: 2020-11-06 | End: 2021-06-04 | Stop reason: SDUPTHER

## 2020-11-06 RX ORDER — ALLOPURINOL 100 MG/1
TABLET ORAL
Qty: 180 TABLET | Refills: 1 | Status: SHIPPED
Start: 2020-11-06 | End: 2021-05-21

## 2020-11-06 RX ORDER — FUROSEMIDE 20 MG/1
TABLET ORAL
Qty: 135 TABLET | Refills: 1 | Status: SHIPPED
Start: 2020-11-06 | End: 2021-06-04 | Stop reason: SDUPTHER

## 2020-11-06 ASSESSMENT — ENCOUNTER SYMPTOMS
COUGH: 0
ABDOMINAL PAIN: 0
DIARRHEA: 0
SORE THROAT: 0
NAUSEA: 0
CONSTIPATION: 0
SHORTNESS OF BREATH: 0
BLOOD IN STOOL: 0
VOMITING: 0
EYE PAIN: 0
EYE REDNESS: 0

## 2020-11-06 ASSESSMENT — PATIENT HEALTH QUESTIONNAIRE - PHQ9
SUM OF ALL RESPONSES TO PHQ QUESTIONS 1-9: 0
SUM OF ALL RESPONSES TO PHQ QUESTIONS 1-9: 0
1. LITTLE INTEREST OR PLEASURE IN DOING THINGS: 0
SUM OF ALL RESPONSES TO PHQ QUESTIONS 1-9: 0
SUM OF ALL RESPONSES TO PHQ9 QUESTIONS 1 & 2: 0
2. FEELING DOWN, DEPRESSED OR HOPELESS: 0

## 2020-11-06 NOTE — PATIENT INSTRUCTIONS
Patient Education        Learning About High Blood Pressure  What is high blood pressure? Blood pressure is a measure of how hard the blood pushes against the walls of your arteries. It's normal for blood pressure to go up and down throughout the day. But if it stays up, you have high blood pressure. Another name for high blood pressure is hypertension. Two numbers tell you your blood pressure. The first number is the systolic pressure (top number). It shows how hard the blood pushes when your heart is pumping. The second number is the diastolic pressure (bottom number). It shows how hard the blood pushes between heartbeats, when your heart is relaxed and filling with blood. Your doctor will give you a goal for your blood pressure based on your health and your age. High blood pressure (hypertension) means that the top number stays high, or the bottom number stays high, or both. High blood pressure increases the risk of stroke, heart attack, and other problems. What happens when you have high blood pressure? · Blood flows through your arteries with too much force. Over time, this can damage the heart and the walls of your arteries. But you can't feel it. High blood pressure usually doesn't cause symptoms. · High blood pressure makes your heart work harder. And that can lead to heart failure, which means your heart doesn't pump as much blood as your body needs. · Fat and calcium start to build up in your arteries. This buildup is called hardening of the arteries. It can cause many problems including a heart attack and stroke. · Arteries also carry blood and oxygen to organs like your eyes, kidneys, and brain. If high blood pressure damages those arteries, it can lead to vision loss, kidney disease, stroke, and a higher risk of dementia. How can you prevent high blood pressure? · Stay at a healthy weight. · Try to limit how much sodium you eat to less than 2,300 milligrams (mg) a day.  If you limit your Healthwise, Incorporated. Care instructions adapted under license by [facility] ([facility]). If you have questions about a medical condition or this instruction, always ask your healthcare professional. [address] any warranty or liability for your use of this information.

## 2020-11-08 ASSESSMENT — ENCOUNTER SYMPTOMS: BACK PAIN: 1

## 2021-01-08 DIAGNOSIS — Z76.0 MEDICATION REFILL: ICD-10-CM

## 2021-01-08 RX ORDER — PEN NEEDLE, DIABETIC 31 GX5/16"
NEEDLE, DISPOSABLE MISCELLANEOUS
Qty: 500 EACH | Refills: 3 | Status: SHIPPED
Start: 2021-01-08 | End: 2022-08-02 | Stop reason: SDUPTHER

## 2021-01-08 NOTE — TELEPHONE ENCOUNTER
Last Appointment:  11/6/2020  Future Appointments   Date Time Provider Eloise Oshea   1/15/2021 10:00 AM [facility]

## 2021-02-24 ENCOUNTER — OFFICE VISIT (OUTPATIENT)
Dept: FAMILY MEDICINE CLINIC | Age: 66
End: 2021-02-24
Payer: MEDICARE

## 2021-02-24 VITALS
RESPIRATION RATE: 16 BRPM | TEMPERATURE: 96.7 F | SYSTOLIC BLOOD PRESSURE: 138 MMHG | DIASTOLIC BLOOD PRESSURE: 63 MMHG | HEIGHT: 69 IN | OXYGEN SATURATION: 97 % | HEART RATE: 77 BPM | BODY MASS INDEX: 48.58 KG/M2

## 2021-02-24 DIAGNOSIS — E66.01 MORBID OBESITY (HCC): ICD-10-CM

## 2021-02-24 DIAGNOSIS — Z23 NEED FOR SHINGLES VACCINE: ICD-10-CM

## 2021-02-24 DIAGNOSIS — Z12.31 ENCOUNTER FOR SCREENING MAMMOGRAM FOR BREAST CANCER: ICD-10-CM

## 2021-02-24 DIAGNOSIS — Z00.00 ROUTINE GENERAL MEDICAL EXAMINATION AT A HEALTH CARE FACILITY: Primary | ICD-10-CM

## 2021-02-24 DIAGNOSIS — Z71.89 ACP (ADVANCE CARE PLANNING): ICD-10-CM

## 2021-02-24 DIAGNOSIS — M48.061 NEURAL FORAMINAL STENOSIS OF LUMBAR SPINE: ICD-10-CM

## 2021-02-24 DIAGNOSIS — E11.65 UNCONTROLLED TYPE 2 DIABETES MELLITUS WITH HYPERGLYCEMIA (HCC): ICD-10-CM

## 2021-02-24 PROCEDURE — G8482 FLU IMMUNIZE ORDER/ADMIN: HCPCS | Performed by: FAMILY MEDICINE

## 2021-02-24 PROCEDURE — 99213 OFFICE O/P EST LOW 20 MIN: CPT | Performed by: FAMILY MEDICINE

## 2021-02-24 PROCEDURE — G0447 BEHAVIOR COUNSEL OBESITY 15M: HCPCS | Performed by: FAMILY MEDICINE

## 2021-02-24 PROCEDURE — 3017F COLORECTAL CA SCREEN DOC REV: CPT | Performed by: FAMILY MEDICINE

## 2021-02-24 PROCEDURE — 99497 ADVNCD CARE PLAN 30 MIN: CPT | Performed by: FAMILY MEDICINE

## 2021-02-24 PROCEDURE — 3046F HEMOGLOBIN A1C LEVEL >9.0%: CPT | Performed by: FAMILY MEDICINE

## 2021-02-24 PROCEDURE — G0438 PPPS, INITIAL VISIT: HCPCS | Performed by: FAMILY MEDICINE

## 2021-02-24 RX ORDER — GABAPENTIN 800 MG/1
800 TABLET ORAL 3 TIMES DAILY
Qty: 270 TABLET | Refills: 1 | Status: SHIPPED
Start: 2021-02-24 | End: 2021-06-04 | Stop reason: SDUPTHER

## 2021-02-24 RX ORDER — ZOSTER VACCINE RECOMBINANT, ADJUVANTED 50 MCG/0.5
0.5 KIT INTRAMUSCULAR SEE ADMIN INSTRUCTIONS
Qty: 0.5 ML | Refills: 0 | Status: SHIPPED | OUTPATIENT
Start: 2021-02-24 | End: 2021-08-23

## 2021-02-24 ASSESSMENT — PATIENT HEALTH QUESTIONNAIRE - PHQ9
SUM OF ALL RESPONSES TO PHQ QUESTIONS 1-9: 2
SUM OF ALL RESPONSES TO PHQ QUESTIONS 1-9: 4
SUM OF ALL RESPONSES TO PHQ9 QUESTIONS 1 & 2: 4
SUM OF ALL RESPONSES TO PHQ9 QUESTIONS 1 & 2: 2
1. LITTLE INTEREST OR PLEASURE IN DOING THINGS: 1
SUM OF ALL RESPONSES TO PHQ QUESTIONS 1-9: 2
SUM OF ALL RESPONSES TO PHQ QUESTIONS 1-9: 2
SUM OF ALL RESPONSES TO PHQ QUESTIONS 1-9: 4
6. FEELING BAD ABOUT YOURSELF - OR THAT YOU ARE A FAILURE OR HAVE LET YOURSELF OR YOUR FAMILY DOWN: 0
2. FEELING DOWN, DEPRESSED OR HOPELESS: 1
2. FEELING DOWN, DEPRESSED OR HOPELESS: 2
3. TROUBLE FALLING OR STAYING ASLEEP: 0
1. LITTLE INTEREST OR PLEASURE IN DOING THINGS: 2
9. THOUGHTS THAT YOU WOULD BE BETTER OFF DEAD, OR OF HURTING YOURSELF: 0
7. TROUBLE CONCENTRATING ON THINGS, SUCH AS READING THE NEWSPAPER OR WATCHING TELEVISION: 0
SUM OF ALL RESPONSES TO PHQ QUESTIONS 1-9: 4
5. POOR APPETITE OR OVEREATING: 0

## 2021-02-24 ASSESSMENT — LIFESTYLE VARIABLES
AUDIT-C TOTAL SCORE: 1
AUDIT TOTAL SCORE: 1
HOW OFTEN DURING THE LAST YEAR HAVE YOU HAD A FEELING OF GUILT OR REMORSE AFTER DRINKING: 0
HAVE YOU OR SOMEONE ELSE BEEN INJURED AS A RESULT OF YOUR DRINKING: 0
HOW OFTEN DURING THE LAST YEAR HAVE YOU FOUND THAT YOU WERE NOT ABLE TO STOP DRINKING ONCE YOU HAD STARTED: 0
HOW OFTEN DURING THE LAST YEAR HAVE YOU NEEDED AN ALCOHOLIC DRINK FIRST THING IN THE MORNING TO GET YOURSELF GOING AFTER A NIGHT OF HEAVY DRINKING: 0
HOW OFTEN DURING THE LAST YEAR HAVE YOU FAILED TO DO WHAT WAS NORMALLY EXPECTED FROM YOU BECAUSE OF DRINKING: 0
HOW OFTEN DURING THE LAST YEAR HAVE YOU BEEN UNABLE TO REMEMBER WHAT HAPPENED THE NIGHT BEFORE BECAUSE YOU HAD BEEN DRINKING: 0

## 2021-02-24 NOTE — PATIENT INSTRUCTIONS
Advance Directives: Care Instructions  Overview  An advance directive is a legal way to state your wishes at the end of your life. It tells your family and your doctor what to do if you can't say what you want. There are two main types of advance directives. You can change them any time your wishes change. Living will. This form tells your family and your doctor your wishes about life support and other treatment. The form is also called a declaration. Medical power of . This form lets you name a person to make treatment decisions for you when you can't speak for yourself. This person is called a health care agent (health care proxy, health care surrogate). The form is also called a durable power of  for health care. If you do not have an advance directive, decisions about your medical care may be made by a family member, or by a doctor or a  who doesn't know you. It may help to think of an advance directive as a gift to the people who care for you. If you have one, they won't have to make tough decisions by themselves. Follow-up care is a key part of your treatment and safety. Be sure to make and go to all appointments, and call your doctor if you are having problems. It's also a good idea to know your test results and keep a list of the medicines you take. What should you include in an advance directive? Many states have a unique advance directive form. (It may ask you to address specific issues.) Or you might use a universal form that's approved by many states. If your form doesn't tell you what to address, it may be hard to know what to include in your advance directive. Use the questions below to help you get started. · Who do you want to make decisions about your medical care if you are not able to? · What life-support measures do you want if you have a serious illness that gets worse over time or can't be cured? · What are you most afraid of that might happen? (Maybe you're afraid of having pain, losing your independence, or being kept alive by machines.)  · Where would you prefer to die? (Your home? A hospital? A nursing home?)  · Do you want to donate your organs when you die? · Do you want certain Latter-day practices performed before you die? When should you call for help? Be sure to contact your doctor if you have any questions. Where can you learn more? Go to https://Medifocuspelisaeb.[email or web address] org and sign in to your [employer] account. Enter R264 in the Netology box to learn more about \"Advance Directives: Care Instructions. \"     If you do not have an account, please click on the \"Sign Up Now\" link. Current as of: December 9, 2019               Content Version: 12.6  © 7328-8506 [employer]. Care instructions adapted under license by [facility] ([facility]). If you have questions about a medical condition or this instruction, always ask your healthcare professional. [address] any warranty or liability for your use of this information. Learning About Healthy Weight  What is a healthy weight? A healthy weight is the weight at which you feel good about yourself and have energy for work and play. It's also one that lowers your risk for health problems. What can you do to stay at a healthy weight? It can be hard to stay at a healthy weight, especially when fast food, vending-machine snacks, and processed foods are so easy to find. And with your busy lifestyle, activity may be low on your list of things to do. But staying at a healthy weight may be easier than you think.   Here are some dos and don'ts for staying at a healthy weight:  Do eat healthy foods The kinds of foods you eat have a big impact on both your weight and your health. Reaching and staying at a healthy weight is not about going on a diet. It's about making healthier food choices every day and changing your diet for good. Healthy eating means eating a variety of foods so that you get all the nutrients you need. Your body needs protein, carbohydrate, and fats for energy. They keep your heart beating, your brain active, and your muscles working. On most days, try to eat from each food group. This means eating a variety of:  · Whole grains, such as whole wheat breads and pastas. · Fruits and vegetables. · Dairy products, such as low-fat milk, yogurt, and cheese. · Lean proteins, such as all types of fish, chicken without the skin, and beans. Don't have too much or too little of one thing. All foods, if eaten in moderation, can be part of healthy eating. Even sweets can be okay. If your favorite foods are high in fat, salt, sugar, or calories, limit how often you eat them. Eat smaller servings, or look for healthy substitutes. Do watch what you eat  Many people eat more than their bodies need. Part of staying at a healthy weight means learning how much food you really need from day to day and not eating more than that. Even with healthy foods, eating too much can make you gain weight. Having a well-balanced diet means that you eat enough, but not too much, and that your food gives you the nutrients you need to stay healthy. So listen to your body. Eat when you're hungry. Stop when you feel satisfied. It's a good idea to have healthy snacks ready for when you get hungry. Keep healthy snacks with you at work, in your car, and at home. If you have a healthy snack easily available, you'll be less likely to pick a candy bar or bag of chips from a vending machine instead. Some healthy snacks you might want to keep on hand are fruit, low-fat yogurt, string cheese, low-fat microwave popcorn, raisins and other dried fruit, nuts, whole wheat crackers, pretzels, carrots, celery sticks, and broccoli. Do some physical activity   A big part of reaching and staying at a healthy weight is being active. When you're active, you burn calories. This makes it easier to reach and stay at a healthy weight. When you're active on a regular basis, your body burns more calories, even when you're at rest. Being active helps you lose fat and build lean muscle. Try to be active for at least 1 hour every day. This may sound like a lot, but it's okay to be active in smaller blocks of time that add up to 1 hour a day. Any activity that makes your heart beat faster and keeps it there for a while counts. A brisk walk, run, or swim will get your heart beating faster. So will climbing stairs, shooting baskets, or cycling. Even some household chores like vacuuming and mowing the lawn will get your heart rate up. Pick activities that you enjoyones that make your heart beat faster, your muscles stronger, and your muscles and joints more flexible. If you find more than one thing you like doing, do them all. You don't have to do the same thing every day. Don't diet  Diets don't work. Diets are temporary. Because you give up so much when you diet, you may be hungry and think about food all the time. And after you stop dieting, you also may overeat to make up for what you missed. Most people who diet end up gaining back the pounds they lostand more. Remember that healthy bodies come in lots of shapes and sizes. Everyone can get healthier by eating better and being more active. Where can you learn more? Go to https://keshav.Gada [email or web address] org and sign in to your SLIC games account. Enter 716 [id number] in the [employer] box to learn more about \"Learning About Healthy Weight. \" If you do not have an account, please click on the \"Sign Up Now\" link. Current as of: December 11, 2019               Content Version: 12.6  © 0802-3125 [employer]. Care instructions adapted under license by [facility] ([facility]). If you have questions about a medical condition or this instruction, always ask your healthcare professional. [address] any warranty or liability for your use of this information. Personalized Preventive Plan for Jeremy Smiley - 2/24/2021  Medicare offers a range of preventive health benefits. Some of the tests and screenings are paid in full while other may be subject to a deductible, co-insurance, and/or copay. Some of these benefits include a comprehensive review of your medical history including lifestyle, illnesses that may run in your family, and various assessments and screenings as appropriate. After reviewing your medical record and screening and assessments performed today your provider may have ordered immunizations, labs, imaging, and/or referrals for you. A list of these orders (if applicable) as well as your Preventive Care list are included within your After Visit Summary for your review. Other Preventive Recommendations:    · A preventive eye exam performed by an eye specialist is recommended every 1-2 years to screen for glaucoma; cataracts, macular degeneration, and other eye disorders. · A preventive dental visit is recommended every 6 months. · Try to get at least 150 minutes of exercise per week or 10,000 steps per day on a pedometer . · Order or download the FREE \"Exercise & Physical Activity: Your Everyday Guide\" from [employer]. Call 3-274.359.3276 or search [employer] online. · You need 5073-0094 mg of calcium and 4691-6573 IU of vitamin D per day. It is possible to meet your calcium requirement with diet alone, but a vitamin D supplement is usually necessary to meet this goal.  · When exposed to the sun, use a sunscreen that protects against both UVA and UVB radiation with an SPF of 30 or greater. Reapply every 2 to 3 hours or after sweating, drying off with a towel, or swimming. · Always wear a seat belt when traveling in a car. Always wear a helmet when riding a bicycle or motorcycle.

## 2021-02-24 NOTE — PROGRESS NOTES
Medicare Annual Wellness Visit  Name: Larissa Reeves Date: 2021   MRN: [id number] Sex: Female   Age: 63 y.o. Ethnicity: Non-/Non    : 1955 Race: Trevor Rae is here for [employer]ient    Screenings for behavioral, psychosocial and functional/safety risks, and cognitive dysfunction are all negative except as indicated below. These results, as well as other patient data from the [address] form, are documented in Flowsheets linked to this Encounter. No Known Allergies      Prior to Visit Medications    Medication Sig Taking? Authorizing Provider   zoster recombinant adjuvanted vaccine [facility]) 50 MCG/0.5ML SUSR injection Inject 0.5 mLs into the muscle See Admin Instructions 1 dose now and repeat in 2-6 months Yes Rachid Kim MD   gabapentin (NEURONTIN) 800 MG tablet Take 1 tablet by mouth 3 times daily for 180 days.  Yes Rachid Kim MD   omeprazole (PRILOSEC) 40 MG delayed release capsule TAKE 1 CAPSULE EVERY DAY WITH BREAKFAST Yes Rachid Kim MD   atorvastatin (LIPITOR) 80 MG tablet Take 1 tablet by mouth nightly Yes Rachid Kim MD   celecoxib (CELEBREX) 200 MG capsule TAKE 1 Adrianna Kirk Yes Rachid Kim MD   DULoxetine (CYMBALTA) 60 MG extended release capsule TAKE 1 Adrianna Kirk Yes Rachid Kim MD   furosemide (LASIX) 20 MG tablet TAKE 1 TABLET (20MG) EVERY OTHER DAY ALTERNATING DOSE WITH 2 TABLETS (40MG) Karla Wesley Yes Rachid Kim MD   allopurinol (ZYLOPRIM) 100 MG tablet TAKE 1 TABLET 2x per day Yes Rachid Kim MD   busPIRone (BUSPAR) 5 MG tablet Take 1 tablet by mouth 3 times daily Yes Rachid Kim MD   metoprolol (LOPRESSOR) 100 MG tablet Take 1 tablet by mouth 2 times daily Prescribed by Dr. Tu Weldon Yes Rachid Kim MD   potassium chloride (KLOR-CON M) 10 MEQ extended release tablet Take 1 tablet by mouth 2 times daily Yes Rachid Kim MD tiZANidine (ZANAFLEX) 4 MG tablet Take 1 tablet by mouth every 6 hours as needed (muscle spasm) Yes Radames Moritz, MD   cloNIDine (CATAPRES) 0.1 MG tablet Take 1 tablet by mouth 3 times daily Yes Radames Moritz, MD   insulin glargine (LANTUS SOLOSTAR) 100 UNIT/ML injection pen Inject 55 Units into the skin 2 times daily Yes Radames Moritz, MD   metFORMIN (GLUCOPHAGE XR) 500 MG extended release tablet Take 1 tablet by mouth daily (with breakfast) Yes Radames Moritz, MD   insulin aspart (NOVOLOG FLEXPEN) 100 UNIT/ML injection pen 20 units tid w/ SSI TID AC: 150-200 2 units; 201-250 4; 251-300 6; 301-350 8; 351-400 10; 401-450 12; 451-500 14; 501+ 16 units Yes Radames Moritz, MD   valsartan-hydrochlorothiazide (DIOVAN-HCT) 160-25 MG per tablet Take 160 tablets by mouth daily 160-25 po daily per Dr. Scooter Zaldivar MD   Cholecalciferol (VITAMIN D3) 1000 units TABS Take 2 tablets by mouth daily , prescribed by renal Yes Radames Moritz, MD   blood glucose test strips (TRUE METRIX BLOOD GLUCOSE TEST) strip TEST BLOOD SUGAR UP TO 5 TIMES DAILY  Radames Moritz, MD   TRUEplus Lancets 33G MISC USE  TO  TEST  UP  TO FIVE TIMES DAILY  Radames Moritz, MD   Insulin Pen Needle (B-D UF III MINI PEN NEEDLES) 31G X 5 MM MISC Use to inject insulin 5 times daily  Radames Moritz, MD   insulin lispro (HUMALOG KWIKPEN) 100 UNIT/ML injection Inject 10 Units into the skin 3 times daily (before meals).   Angus George MD         Past Medical History:   Diagnosis Date    Anxiety     Carpal tunnel syndrome on right     Chronic back pain     Chronic kidney disease, stage 3     Depression 10/28/2010    Diabetes mellitus type 2, uncontrolled ([address] 10/28/2010    with microalbuminuria    Diverticulosis     GERD (gastroesophageal reflux disease) 10/28/2010    Herniated intervertebral disk     neck and lower back    Hypercholesterolemia 10/28/2010    Hypertension 10/28/2010  Kidney stones     Neurofibroma of trunk     irritating mole on the back.  biopsy done 10/21/09    Obesity     Osteoarthritis     PAD (peripheral artery disease) (HCC)     Type II or unspecified type diabetes mellitus without mention of complication, not stated as uncontrolled     Vitamin D insufficiency        Past Surgical History:   Procedure Laterality Date    ECHO COMPLETE  3/19/2012         EYE MUSCLE SURGERY      as a child    HYSTERECTOMY      with salpingoophorectomy    HYSTERECTOMY, TOTAL ABDOMINAL Bilateral 2003    cervix removed per patient    NERVE BLOCK Right 05/01/2018    lumbar transforaminal nerve block #1 with sedation    DE KALIA NOSE/CLEFT LIP/TIP Right 5/1/2018    RIGHT LUMBAR TRANSFORAMINAL NERVE BLOCK #1 L3-4 L4-5 WITH IV SEDATIN performed by Suma Gilmore DO at [facility]           Family History   Problem Relation Age of Onset    Lung Cancer Mother 71        mesothelioma metastatized to the brain    Diabetes Mother     High Cholesterol Mother     High Blood Pressure Mother     Arthritis Mother     Cancer Mother     Diabetes Father     Arthritis Father     High Blood Pressure Sister     Arthritis Sister     High Cholesterol Sister     High Blood Pressure Brother     Substance Abuse Brother     Breast Cancer Paternal Aunt     Cancer Paternal Aunt     High Blood Pressure Paternal Aunt     Cancer Maternal Grandmother [de-identified]        colorectal cancer    High Blood Pressure Maternal Grandmother     Heart Failure Maternal Grandmother     Arthritis Maternal Grandmother     High Cholesterol Maternal Grandmother     High Blood Pressure Paternal Grandmother     Stroke Paternal Grandmother     Cancer Maternal Aunt     High Blood Pressure Maternal Aunt     High Cholesterol Maternal Aunt     Diabetes Maternal Uncle     High Blood Pressure Maternal Uncle     High Cholesterol Maternal Uncle     Substance Abuse Maternal Uncle  High Blood Pressure Paternal Uncle Jignesh (Including outside providers/suppliers regularly involved in providing care):   Patient Care Team:  Too Luciano MD as PCP - Raphael Helton MD as PCP - [facility] Empaneled Provider  Glory Ayala MD as Consulting Physician (Neurosurgery)    Wt Readings from Last 3 Encounters:   11/06/20 (!) 329 lb (149.2 kg)   08/28/20 (!) 320 lb (145.2 kg)   12/04/19 (!) 320 lb (145.2 kg)     Vitals:    02/24/21 1525   BP: 138/63   Site: Right Upper Arm   Position: Sitting   Cuff Size: Medium Adult   Pulse: 77   Resp: 16   Temp: 96.7 °F (35.9 °C)   TempSrc: Cerebral   SpO2: 97%   Height: 5' 9\" (1.753 m)     Body mass index is 48.58 kg/m². Based upon direct observation of the patient, evaluation of cognition reveals recent and remote memory intact. Patient's complete Health Risk Assessment and screening values have been reviewed and are found in Flowsheets. The following problems were reviewed today and where indicated follow up appointments were made and/or referrals ordered. Positive Risk Factor Screenings with Interventions:     Fall Risk:  2 or more falls in past year?: (!) yes  Fall with injury in past year?: no  Fall Risk Interventions:    · Home safety tips provided          General Health and ACP:  General  In general, how would you say your health is?: Fair  In the past 7 days, have you experienced any of the following?  New or Increased Pain, New or Increased Fatigue, Loneliness, Social Isolation, Stress or Anger?: (!) New or Increased Pain, Loneliness  Do you get the social and emotional support that you need?: Yes  Do you have a Living Will?: (!) No  Advance Directives     Power of  Living Will ACP-Advance Directive ACP-Power of     Not on File Filed on 10/06/13 Filed Not on File      General Health Risk Interventions: · Pain issues: provided referral to aquatherapy and PMR again to the patient by mail. , aquatic therapy referral ordered  · Fatigue: likely due to social isolation from Sharla  · Loneliness: Patient has family including her grandson that she misses. She had both covid vaccines at [facility].  She is feels less anxious about going out to see family now. · Social isolation: related to Sharla. PHQ-9 was 4. She does not desire to change her depression or anxiety medications. She plans to increase physical activity and pursue socially distanced ways of interacting with family. · No Living Will: Advance Care Planning addressed with patient today and Provided paper copies of advanced directive forms to the patient to complete. Her son, Francisco Bey, would be her decision-maker if she needs one. Health Habits/Nutrition:  Health Habits/Nutrition  Do you exercise for at least 20 minutes 2-3 times per week?: (!) No  Have you lost any weight without trying in the past 3 months?: No  Do you eat only one meal per day?: (!) Yes  Have you seen the dentist within the past year?: (!) No     Health Habits/Nutrition Interventions:  · Inadequate physical activity:  patient agrees to increase physical activity by attending aquatherapy  · Nutritional issues:  discussed healthy ways of eating and types of food. She has been eating intermittently due to her back pain associated with standing related to food preparation. · Dental exam overdue:  patient encouraged to make appointment with his/her dentist, Mentioned the [facility] [place] [id number] [facility].  , dental referral provided     Safety:  Safety  Do you have working smoke detectors?: Yes  Have all throw rugs been removed or fastened?: Yes  Do you have non-slip mats or surfaces in all bathtubs/showers?: (!) No  Do all of your stairways have a railing or banister?: Yes  Are your doorways, halls and stairs free of clutter?: Yes Do you always fasten your seatbelt when you are in a car?: Yes  Safety Interventions:  · Home safety tips provided     Personalized Preventive Plan   Current Health Maintenance Status  Immunization History   Administered Date(s) Administered    COVID-19, Forest Peterson, 30mcg/0.3ml Dose 01/14/2021, 02/04/2021    Influenza 11/16/2011    Influenza Virus Vaccine 10/27/2010, 10/10/2013, 11/24/2015    Influenza, MDCK Quadv, IM, PF (Flucelvax 4 yrs and older) 09/28/2017    Influenza, Quadv, IM, (6 mo and older Fluzone, Flulaval, Fluarix and 3 yrs and older Afluria) 11/03/2016    Influenza, Quadv, IM, PF (6 mo and older Fluzone, Flulaval, Fluarix, and 3 yrs and older Afluria) 11/16/2018, 12/04/2019, 10/14/2020    Pneumococcal Polysaccharide ([id number]) 11/16/2011    Tdap (Boostrix, Adacel) 12/06/2011        Health Maintenance   Topic Date Due    Shingles Vaccine (1 of 2) 08/10/2007    Colon Cancer Screen FIT/FOBT  03/16/2018    Annual Wellness Visit (AWV)  06/19/2019    Breast cancer screen  12/10/2020    Lipid screen  06/22/2021    Potassium monitoring  10/16/2021    Creatinine monitoring  10/16/2021    Diabetic foot exam  11/06/2021    A1C test (Diabetic or Prediabetic)  11/06/2021    Diabetic retinal exam  11/20/2021    DTaP/Tdap/Td vaccine (2 - Td) 12/06/2021    Flu vaccine  Completed    Pneumococcal 0-64 years Vaccine  Completed    COVID-19 Vaccine  Completed    Hepatitis C screen  Completed    HIV screen  Completed    Hepatitis A vaccine  Aged Out    Hib vaccine  Aged Out    Meningococcal (ACWY) vaccine  Aged Out     Recommendations for [employer] Due: see orders and patient instructions/AVS.  . Recommended screening schedule for the next 5-10 years is provided to the patient in written form: see Patient Instructions/AVS.    Lisa Sparks was seen today for medicare awv.     Diagnoses and all orders for this visit:    Routine general medical examination at a health care facility As above + mammogram ordered    Cardiovascular Disease Risk Counseling: Assessed the patient's risk to develop cardiovascular disease and reviewed main risk factors. Reviewed steps to reduce disease risk including:   · Quitting tobacco use, reducing amount smoked, or not starting the habit  · Making healthy food choices  · Being physically active and gradualy increasing activity levels   · Reduce weight and determine a healthy BMI goal  · Monitor blood pressure and treat if higher than 140/90 mmHg  · Maintain blood total cholesterol levels under 5 mmol/l or 190 mg/dl  · Maintain LDL cholesterol levels under 3.0 mmol/l or 115 mg/dl   · Control blood glucose levels  · Consider taking aspirin (75 mg daily), once blood pressure is controlled   Provided a follow up plan. Time spent (minutes): 5 minutes      Uncontrolled type 2 diabetes mellitus with hyperglycemia (HCC)  -     gabapentin (NEURONTIN) 800 MG tablet; Take 1 tablet by mouth 3 times daily for 180 days. Neural foraminal stenosis of lumbar spine  Gabapentin dose increased  -     gabapentin (NEURONTIN) 800 MG tablet; Take 1 tablet by mouth 3 times daily for 180 days. Morbid obesity ([facility] [address]  -     [place] Behavior  obesity 15m []    Obesity Counseling: Assessed behavioral health risks and factors affecting choice of behavior. Suggested weight control approaches, including dietary changes behavioral modification and follow up plan. Provided educational and support documentation.   Time spent (minutes): 3 minutes      Body mass index (BMI) 45.0-49.9, adult ([facility] [address]   -     [place] Behavior  obesity 15m []    ACP (advance care planning)  -     [place] ADVANCED CARE PLAN FACE TO [address] [facility], [address] [[id number]] Advanced Care Planning: Discussed the patients choices for care and treatment in case of a health event that adversely affects decision-making abilities. Also discussed the patients long-term treatment options. Reviewed with the patient the [address] [place] [address] of [address] [place] [address] Declaration forms  Reviewed the process of designating a competent adult as an Agent (or -in-fact) that could take make health care decisions for the patient if incompetent. Patient was asked to complete the declaration forms, either acknowledge the forms by a public notary or an eligible witness and provide a signed copy to the practice office. Time spent (minutes): 5    Encounter for screening mammogram for breast cancer  -     [facility] Digital Screen Bilateral [[id number]]; Future    Need for shingles vaccine  -     zoster recombinant adjuvanted vaccine [facility]) 50 MCG/0.5ML SUSR injection; Inject 0.5 mLs into the muscle See Admin Instructions 1 dose now and repeat in 2-6 months  Paper script provided      rto in 8 weeks for back pain, prn for left ear cerumen irrigation.      Ibeth Hernandez MD

## 2021-05-14 DIAGNOSIS — M48.061 NEURAL FORAMINAL STENOSIS OF LUMBAR SPINE: ICD-10-CM

## 2021-05-14 RX ORDER — TIZANIDINE 4 MG/1
4 TABLET ORAL EVERY 6 HOURS PRN
Qty: 90 TABLET | Refills: 0 | Status: SHIPPED
Start: 2021-05-14 | End: 2021-06-01

## 2021-05-31 DIAGNOSIS — M48.061 NEURAL FORAMINAL STENOSIS OF LUMBAR SPINE: ICD-10-CM

## 2021-06-01 RX ORDER — TIZANIDINE 4 MG/1
TABLET ORAL
Qty: 90 TABLET | Refills: 0 | Status: SHIPPED
Start: 2021-06-01 | End: 2021-06-21

## 2021-06-01 NOTE — TELEPHONE ENCOUNTER
Last Appointment:  2/24/2021  Future Appointments   Date Time Provider Eloise Oshea   6/4/2021  9:40 AM [facility]

## 2021-06-03 PROBLEM — F33.41 MAJOR DEPRESSIVE DISORDER, RECURRENT, IN PARTIAL REMISSION (HCC): Status: ACTIVE | Noted: 2021-06-03

## 2021-06-04 ENCOUNTER — OFFICE VISIT (OUTPATIENT)
Dept: FAMILY MEDICINE CLINIC | Age: 66
End: 2021-06-04
Payer: MEDICARE

## 2021-06-04 VITALS
DIASTOLIC BLOOD PRESSURE: 80 MMHG | HEIGHT: 69 IN | OXYGEN SATURATION: 98 % | RESPIRATION RATE: 16 BRPM | SYSTOLIC BLOOD PRESSURE: 126 MMHG | TEMPERATURE: 97.7 F | BODY MASS INDEX: 48.58 KG/M2 | HEART RATE: 84 BPM

## 2021-06-04 DIAGNOSIS — M75.81 TENDONITIS OF BOTH ROTATOR CUFFS: ICD-10-CM

## 2021-06-04 DIAGNOSIS — M48.061 NEURAL FORAMINAL STENOSIS OF LUMBAR SPINE: ICD-10-CM

## 2021-06-04 DIAGNOSIS — F33.41 MAJOR DEPRESSIVE DISORDER, RECURRENT, IN PARTIAL REMISSION (HCC): ICD-10-CM

## 2021-06-04 DIAGNOSIS — I10 ESSENTIAL HYPERTENSION: ICD-10-CM

## 2021-06-04 DIAGNOSIS — M48.061 SPINAL STENOSIS OF LUMBAR REGION, UNSPECIFIED WHETHER NEUROGENIC CLAUDICATION PRESENT: ICD-10-CM

## 2021-06-04 DIAGNOSIS — M75.82 TENDONITIS OF BOTH ROTATOR CUFFS: ICD-10-CM

## 2021-06-04 DIAGNOSIS — E11.65 UNCONTROLLED TYPE 2 DIABETES MELLITUS WITH HYPERGLYCEMIA (HCC): Primary | ICD-10-CM

## 2021-06-04 DIAGNOSIS — M47.26 OSTEOARTHRITIS OF SPINE WITH RADICULOPATHY, LUMBAR REGION: ICD-10-CM

## 2021-06-04 DIAGNOSIS — Z12.12 SCREENING FOR COLORECTAL CANCER: ICD-10-CM

## 2021-06-04 DIAGNOSIS — Z12.11 SCREENING FOR COLORECTAL CANCER: ICD-10-CM

## 2021-06-04 LAB
ALBUMIN SERPL-MCNC: 4 G/DL (ref 3.5–5.2)
ALP BLD-CCNC: 143 U/L (ref 35–104)
ALT SERPL-CCNC: 19 U/L (ref 0–32)
ANION GAP SERPL CALCULATED.3IONS-SCNC: 8 MMOL/L (ref 7–16)
AST SERPL-CCNC: 21 U/L (ref 0–31)
BASOPHILS ABSOLUTE: 0.06 E9/L (ref 0–0.2)
BASOPHILS RELATIVE PERCENT: 0.5 % (ref 0–2)
BILIRUB SERPL-MCNC: 0.4 MG/DL (ref 0–1.2)
BUN BLDV-MCNC: 25 MG/DL (ref 6–23)
CALCIUM SERPL-MCNC: 10.2 MG/DL (ref 8.6–10.2)
CHLORIDE BLD-SCNC: 108 MMOL/L (ref 98–107)
CHOLESTEROL, TOTAL: 132 MG/DL (ref 0–199)
CO2: 29 MMOL/L (ref 22–29)
CREAT SERPL-MCNC: 1.5 MG/DL (ref 0.5–1)
EOSINOPHILS ABSOLUTE: 0.21 E9/L (ref 0.05–0.5)
EOSINOPHILS RELATIVE PERCENT: 1.8 % (ref 0–6)
GFR AFRICAN AMERICAN: 42
GFR NON-AFRICAN AMERICAN: 42 ML/MIN/1.73
GLUCOSE BLD-MCNC: 145 MG/DL (ref 74–99)
HBA1C MFR BLD: 7.5 %
HCT VFR BLD CALC: 41.3 % (ref 34–48)
HDLC SERPL-MCNC: 37 MG/DL
HEMOGLOBIN: 12.2 G/DL (ref 11.5–15.5)
IMMATURE GRANULOCYTES #: 0.04 E9/L
IMMATURE GRANULOCYTES %: 0.3 % (ref 0–5)
LDL CHOLESTEROL CALCULATED: 73 MG/DL (ref 0–99)
LYMPHOCYTES ABSOLUTE: 1.84 E9/L (ref 1.5–4)
LYMPHOCYTES RELATIVE PERCENT: 15.9 % (ref 20–42)
MCH RBC QN AUTO: 27.2 PG (ref 26–35)
MCHC RBC AUTO-ENTMCNC: 29.5 % (ref 32–34.5)
MCV RBC AUTO: 92 FL (ref 80–99.9)
MONOCYTES ABSOLUTE: 0.98 E9/L (ref 0.1–0.95)
MONOCYTES RELATIVE PERCENT: 8.5 % (ref 2–12)
NEUTROPHILS ABSOLUTE: 8.41 E9/L (ref 1.8–7.3)
NEUTROPHILS RELATIVE PERCENT: 73 % (ref 43–80)
PDW BLD-RTO: 14.2 FL (ref 11.5–15)
PLATELET # BLD: 364 E9/L (ref 130–450)
PMV BLD AUTO: 11.3 FL (ref 7–12)
POTASSIUM SERPL-SCNC: 4.4 MMOL/L (ref 3.5–5)
RBC # BLD: 4.49 E12/L (ref 3.5–5.5)
SODIUM BLD-SCNC: 145 MMOL/L (ref 132–146)
TOTAL PROTEIN: 7.4 G/DL (ref 6.4–8.3)
TRIGL SERPL-MCNC: 112 MG/DL (ref 0–149)
VLDLC SERPL CALC-MCNC: 22 MG/DL
WBC # BLD: 11.5 E9/L (ref 4.5–11.5)

## 2021-06-04 PROCEDURE — 99212 OFFICE O/P EST SF 10 MIN: CPT | Performed by: FAMILY MEDICINE

## 2021-06-04 PROCEDURE — G8427 DOCREV CUR MEDS BY ELIG CLIN: HCPCS | Performed by: FAMILY MEDICINE

## 2021-06-04 PROCEDURE — 2022F DILAT RTA XM EVC RTNOPTHY: CPT | Performed by: FAMILY MEDICINE

## 2021-06-04 PROCEDURE — G8417 CALC BMI ABV UP PARAM F/U: HCPCS | Performed by: FAMILY MEDICINE

## 2021-06-04 PROCEDURE — 83036 HEMOGLOBIN GLYCOSYLATED A1C: CPT | Performed by: FAMILY MEDICINE

## 2021-06-04 PROCEDURE — 3051F HG A1C>EQUAL 7.0%<8.0%: CPT | Performed by: FAMILY MEDICINE

## 2021-06-04 PROCEDURE — 1036F TOBACCO NON-USER: CPT | Performed by: FAMILY MEDICINE

## 2021-06-04 PROCEDURE — 36415 COLL VENOUS BLD VENIPUNCTURE: CPT | Performed by: FAMILY MEDICINE

## 2021-06-04 PROCEDURE — 99214 OFFICE O/P EST MOD 30 MIN: CPT | Performed by: FAMILY MEDICINE

## 2021-06-04 PROCEDURE — 3017F COLORECTAL CA SCREEN DOC REV: CPT | Performed by: FAMILY MEDICINE

## 2021-06-04 RX ORDER — MELATONIN
2 DAILY
Qty: 180 TABLET | Refills: 1 | Status: SHIPPED
Start: 2021-06-04 | End: 2021-11-09

## 2021-06-04 RX ORDER — FUROSEMIDE 20 MG/1
TABLET ORAL
Qty: 90 TABLET | Refills: 1 | Status: SHIPPED
Start: 2021-06-04 | End: 2021-09-09

## 2021-06-04 RX ORDER — GABAPENTIN 800 MG/1
800 TABLET ORAL 3 TIMES DAILY
Qty: 270 TABLET | Refills: 1 | Status: SHIPPED
Start: 2021-06-04 | End: 2021-09-17 | Stop reason: ALTCHOICE

## 2021-06-04 RX ORDER — METFORMIN HYDROCHLORIDE 500 MG/1
500 TABLET, EXTENDED RELEASE ORAL
Qty: 90 TABLET | Refills: 1 | Status: SHIPPED
Start: 2021-06-04 | End: 2021-12-10 | Stop reason: SDUPTHER

## 2021-06-04 RX ORDER — VALSARTAN AND HYDROCHLOROTHIAZIDE 160; 25 MG/1; MG/1
160 TABLET ORAL DAILY
Qty: 90 TABLET | Refills: 1 | Status: SHIPPED
Start: 2021-06-04 | End: 2021-12-10 | Stop reason: SDUPTHER

## 2021-06-04 ASSESSMENT — ENCOUNTER SYMPTOMS
EYE REDNESS: 0
COUGH: 0
BLOOD IN STOOL: 0
NAUSEA: 0
VOMITING: 0
ABDOMINAL PAIN: 0
SHORTNESS OF BREATH: 0
SORE THROAT: 0
BACK PAIN: 1
EYE PAIN: 0
CONSTIPATION: 0
DIARRHEA: 0

## 2021-06-04 NOTE — PROGRESS NOTES
[address] [place] [address]  FAMILY MEDICINE RESIDENCY PROGRAM   OFFICE PROGRESS NOTE  DATEOF VISIT : 21    Patient : Lily Mckenzie    : female   Age : 63 y.o.  : 1955           Chief Complaint :   Chief Complaint   Patient presents with    Diabetes     f/u    Arm Pain     bilateral       HPI:   63 y.o. female comes in today for diabetes mellitus type 2 follow up and complaint of bilateral arm/shoulder pain. Patient thinks bg have been ok. She     Admits her mood has intermittently been down. She is doing ok now and is able to see her grandson. Buspar helps her anxiety. Her back continues to bother her. Says she could not stand long enough to get on the scale today. That is why she was not weighed today. Still with radicular pain b/l. Denies red flags. Ready to start aquatherapy. Chronic arm pain and shoulder pain in the past 3 months. Has trouble lifting her arms but is not dropping objects. Achy. Tylenol helps a little. Ready to obtain the colonoscopy. Has mammogram scheduled next week. Had both covid vaccines.     Patient Active Problem List   Diagnosis    Diabetes mellitus type 2, uncontrolled ([address]    Hypertension    Hypercholesterolemia    GERD (gastroesophageal reflux disease)    Depression    Enlarged liver    Heart murmur    Chronic lower back pain    Facet joint disease    DJD (degenerative joint disease), lumbar    Bulging lumbar disc    Back pain    Anxiety    Bilateral leg edema    Microalbuminuria    Vitamin D insufficiency    Chronic kidney disease, stage 3, mod decreased GFR    Senile nuclear cataract    Fatty liver    Diabetic peripheral neuropathy (HCC)    Neural foraminal stenosis of lumbar spine    Type 2 diabetes mellitus with both eyes affected by mild nonproliferative retinopathy without macular edema, with long-term current use of insulin (HCC)    Spinal stenosis of lumbar region    Major depressive disorder, recurrent, in partial remission [facility])       Past Medical History:   Diagnosis Date    Anxiety     Carpal tunnel syndrome on right     Chronic back pain     Chronic kidney disease, stage 3     Depression 10/28/2010    Diabetes mellitus type 2, uncontrolled ([address] 10/28/2010    with microalbuminuria    Diverticulosis     GERD (gastroesophageal reflux disease) 10/28/2010    Herniated intervertebral disk     neck and lower back    Hypercholesterolemia 10/28/2010    Hypertension 10/28/2010    Kidney stones     Neurofibroma of trunk     irritating mole on the back. biopsy done 10/21/09    Obesity     Osteoarthritis     PAD (peripheral artery disease) ([facility])     Type II or unspecified type diabetes mellitus without mention of complication, not stated as uncontrolled     Vitamin D insufficiency        Current Outpatient Medications on File Prior to Visit   Medication Sig Dispense Refill    tiZANidine (ZANAFLEX) 4 MG tablet TAKE 1 TABLET BY MOUTH EVERY 6 HOURS AS NEEDED FOR MUSCLE SPASM 90 tablet 0    busPIRone (BUSPAR) 5 MG tablet TAKE 1 TABLET BY MOUTH 3 TIMES DAILY 270 tablet 0    metoprolol (LOPRESSOR) 100 MG tablet TAKE 1 TABLET BY MOUTH 2 TIMES DAILY  180 tablet 1    allopurinol (ZYLOPRIM) 100 MG tablet TAKE 1 TABLET TWICE DAILY 180 tablet 1    insulin aspart (NOVOLOG FLEXPEN) 100 UNIT/ML injection pen USE AS DIRECTED BY YOUR PRESCRIBER.  COMPLETE DIRECTIONS ARE INCLUDED IN A LETTER WITH YOUR ORIGINAL ORDER 100 mL 2    DULoxetine (CYMBALTA) 60 MG extended release capsule TAKE 1 CAPSULE EVERY DAY 90 capsule 1    omeprazole (PRILOSEC) 40 MG delayed release capsule TAKE 1 CAPSULE EVERY DAY WITH BREAKFAST 90 capsule 1    potassium chloride (KLOR-CON M) 10 MEQ extended release tablet TAKE 1 TABLET TWICE DAILY 180 tablet 1    atorvastatin (LIPITOR) 80 MG tablet TAKE 1 TABLET EVERY NIGHT 90 tablet 1    cloNIDine (CATAPRES) 0.1 MG tablet TAKE 1 TABLET BY MOUTH 3 TIMES DAILY 270 tablet 1    celecoxib (CELEBREX) 200 MG capsule TAKE 1 CAPSULE EVERY DAY 90 capsule 1    LANTUS SOLOSTAR 100 UNIT/ML injection pen INJECT 55 UNITS INTO THE SKIN 2 TIMES DAILY 36 pen 1    zoster recombinant adjuvanted vaccine (SHINGRIX) 50 MCG/0.5ML SUSR injection Inject 0.5 mLs into the muscle See Admin Instructions 1 dose now and repeat in 2-6 months 0.5 mL 0    blood glucose test strips (TRUE METRIX BLOOD GLUCOSE TEST) strip TEST BLOOD SUGAR UP TO 5 TIMES DAILY 500 strip 3    TRUEplus Lancets 33G MISC USE  TO  TEST  UP  TO FIVE TIMES DAILY 500 each 3    Insulin Pen Needle (B-D UF III MINI PEN NEEDLES) 31G X 5 MM MISC Use to inject insulin 5 times daily 500 each 3    [DISCONTINUED] insulin lispro (HUMALOG KWIKPEN) 100 UNIT/ML injection Inject 10 Units into the skin 3 times daily (before meals). 3 Pen 6     No current facility-administered medications on file prior to visit.        No Known Allergies    Family History   Problem Relation Age of Onset    Lung Cancer Mother 71        mesothelioma metastatized to the brain    Diabetes Mother     High Cholesterol Mother     High Blood Pressure Mother     Arthritis Mother     Cancer Mother     Diabetes Father     Arthritis Father     High Blood Pressure Sister     Arthritis Sister     High Cholesterol Sister     High Blood Pressure Brother     Substance Abuse Brother     Breast Cancer Paternal Aunt     Cancer Paternal Aunt     High Blood Pressure Paternal Aunt     Cancer Maternal Grandmother [de-identified]        colorectal cancer    High Blood Pressure Maternal Grandmother     Heart Failure Maternal Grandmother     Arthritis Maternal Grandmother     High Cholesterol Maternal Grandmother     High Blood Pressure Paternal Grandmother     Stroke Paternal Grandmother     Cancer Maternal Aunt     High Blood Pressure Maternal Aunt     High Cholesterol Maternal Aunt     Diabetes Maternal Uncle     High Blood Pressure Maternal Uncle     High Cholesterol Maternal Uncle     rate 16, height 5' 9\" (1.753 m), SpO2 98 %, not currently breastfeeding. Physical Exam  Constitutional:       Appearance: She is well-developed. HENT:      Head: Normocephalic and atraumatic. Cardiovascular:      Rate and Rhythm: Normal rate and regular rhythm. Heart sounds: No murmur heard. No friction rub. No gallop. Pulmonary:      Breath sounds: Normal breath sounds. No wheezing, rhonchi or rales. Abdominal:      General: Bowel sounds are normal.      Palpations: Abdomen is soft. There is no mass. Tenderness: There is no abdominal tenderness. Skin:     Nails: There is no clubbing. back: ttp over ls spine and paraspinal muscles bilaterally; neg slr, decreased sensation over the right leg, ms 5/5  Shoulders: ttp over her deltoids, decreased active rom and unable to attempt internal/external rotation, + neers/hair and empty can signs b/l     Assessment/Plan:  1. Uncontrolled type 2 diabetes mellitus with hyperglycemia (HCC)  a1c down to 7.5 from 7.8 today, congratulated the patient  - gabapentin (NEURONTIN) 800 MG tablet; Take 1 tablet by mouth 3 times daily for 180 days. Dispense: 270 tablet; Refill: 1  - metFORMIN (GLUCOPHAGE XR) 500 MG extended release tablet; Take 1 tablet by mouth daily (with breakfast)  Dispense: 90 tablet; Refill: 1  - POCT glycosylated hemoglobin (Hb A1C)  -  DIABETES FOOT EXAM    2. Major depressive disorder, recurrent, in partial remission (HCC)  Stable. Continue cymbalta. Patient states Buddhist is supportive    3. Neural foraminal stenosis of lumbar spine  Refill. Patient plans to schedule with Dr. Brandt Seen of PMR.   - gabapentin (NEURONTIN) 800 MG tablet; Take 1 tablet by mouth 3 times daily for 180 days. Dispense: 270 tablet; Refill: 1  - [facility] - Physical Therapy, [address]    4.  Osteoarthritis of spine with radiculopathy, lumbar region  [address] [place] [address] - Physical Therapy, [address]    5. Spinal stenosis of lumbar region, unspecified whether neurogenic claudication present  aquatherapy  - [facility] - Physical Therapy, [address]    6. Essential hypertension  controlled  - CBC WITH AUTO DIFFERENTIAL; Future  - Comprehensive Metabolic Panel; Future  - Lipid Panel; Future    7. Tendonitis of both rotator cuffs  Pt declines cortisone injection. PT.   - XR SHOULDER RIGHT (MIN 2 VIEWS); Future  - [facility] - Physical Therapy, [address]    8. Screening for colorectal cancer  If patient cannot obtain cscope, cologuard ordered  - Cologuard (For External Results Only); Future  - Michael Berman MD, General Surgery, ' [facility]      Additional plan and future considerations:      Return in about 3 months (around 9/4/2021) for Diabetes.     Signed by : Magdy Maier MD

## 2021-06-08 ENCOUNTER — TELEPHONE (OUTPATIENT)
Dept: SURGERY | Age: 66
End: 2021-06-08

## 2021-06-08 NOTE — TELEPHONE ENCOUNTER
MA made first attempt to contact patient to schedule appointment for colonoscopy consult based on referral by Dr Bird Rios. Patient did not answer so MA left message requesting return call to schedule appointment.      Electronically signed by Jaime Dumas on 6/8/21 at 2:07 PM EDT

## 2021-06-09 ENCOUNTER — HOSPITAL ENCOUNTER (OUTPATIENT)
Dept: GENERAL RADIOLOGY | Age: 66
Discharge: HOME | End: 2021-06-11
Payer: MEDICARE

## 2021-06-09 DIAGNOSIS — Z12.31 ENCOUNTER FOR SCREENING MAMMOGRAM FOR BREAST CANCER: ICD-10-CM

## 2021-06-09 PROCEDURE — 77063 BREAST TOMOSYNTHESIS BI: CPT

## 2021-06-16 ENCOUNTER — HOSPITAL ENCOUNTER (OUTPATIENT)
Dept: PHYSICAL THERAPY | Age: 66
Setting detail: THERAPIES SERIES
Discharge: HOME | End: 2021-06-16
Payer: MEDICARE

## 2021-06-16 PROCEDURE — 97161 PT EVAL LOW COMPLEX 20 MIN: CPT

## 2021-06-16 NOTE — PROGRESS NOTES
Physical Therapy  Initial Assessment  Date: 2021  Patient Name: Chava Rios  MRN: [id number]  : 1955          Restrictions       Subjective   General  Chart Reviewed: Yes  Patient assessed for rehabilitation services?: Yes  Additional Pertinent Hx: Patient presents to PT to assess and treat chronic back pain. Symptoms have been present for many years No specific BIPIN  Family / Caregiver Present: No  Referring Practitioner: Dr Aaron King  Referral Date : 21  Diagnosis: Back Pain/Stenosis  Follows Commands: Within Functional Limits  PT Visit Information  Onset Date: 21  PT Insurance Information: [employer]  Subjective  Subjective: Pain rated Constant Pain noted mainly with sustained standing and walking  Pain Screening  Patient Currently in Pain: Yes  Vital Signs  Patient Currently in Pain: Yes    Vision/Hearing  Vision  Vision: Within Functional Limits  Hearing  Hearing: Within functional limits    Orientation  Orientation  Overall Orientation Status: Within Functional Limits    Social/Functional History  Social/Functional History  Lives With: Alone  Type of Home: [address] [place] [address] Drive: One level  Home Equipment: Cane;4 wheeled walker  Homemaking Responsibilities: Yes  Active : Yes  Mode of Transportation: Car  Occupation: On disability    Objective     Observation/Palpation  Posture: Poor  Palpation: Pain with palpation mid and lower lumbar region and LS region Paraspinal tone moderate  Observation: Posture: Flexed trunk and flexed hips bilateral Patient is not able to achieve fully erect trunk in a standing position.     AROM RLE (degrees)  RLE General AROM: Limited hip mobility all ranges limited  AROM LLE (degrees)  LLE General AROM: Limited Hip Mobility All ranges limited  Spine  Lumbar: Flexion limited 30% Extension limited 70%    Strength RLE  Comment: 4-/5  Strength LLE  Comment: 4-/5  Strength Other  Other: trunk/core 3- to 3/5  Tone RLE  RLE Tone: Normotonic  Tone LLE  LLE Tone: Normotonic  Motor Control  Gross Motor?: WFL  Additional Measures  Flexibility: General deficits of flexibility noted        Transfers  Sit to Stand: Modified independent  Stand to sit: Modified independent       Ambulation  Ambulation?: Yes  Ambulation 1  Surface: level tile  Device: Rolling Walker  Assistance: Independent  Quality of Gait: PAtient maintains trunk flexion when ambulating  Gait Deviations: Slow Ana Laura;Decreased head and trunk rotation  Stairs/Curb  Stairs?: No                            Assessment   Conditions Requiring Skilled Therapeutic Intervention  Body structures, Functions, Activity limitations: Decreased functional mobility ; Increased pain;Decreased posture;Decreased ROM; Decreased strength;Decreased endurance  Prognosis: Fair  Decision Making: Low Complexity  REQUIRES PT FOLLOW UP: Yes         Plan   Plan  Times per week: 2  Plan weeks: 5  Current Treatment Recommendations: Strengthening, ROM, Endurance Training, Functional Mobility Training, Aquatics    G-Code       OutComes Score                                                  AM-PAC Score             Goals          Therapy Time   Individual Concurrent Group Co-treatment   Time In 1100         Time Out 1135         Minutes 35         Timed Code Treatment Minutes: 27 Minutes       Gayla Granados, PT

## 2021-06-16 NOTE — FLOWSHEET NOTE
[facility]  Phone: [phone] Fax: [phone]     Physical Therapy  Out Patient Initial Evaluation    Date:  2021    Patient Name:  Akiko Ferrari   :  1955 MRN: [id number]    DIAGNOSIS:  Back Pain Stenosis   EVALUATION DATE:  2021  REFERRING PHYSICIAN:  Dr Gisele Marie:  2021    PROBLEMS FOUND DURING EVALUATION  · Pain Affects the mid and lower lumbar and lumbo-sacral region Pain rated Constant Pain aggravated by sustained standing and walking  · Postural deficits  · Limited ROM/Mobility trunk and proximal LE's  · Deficits of strength trunk/core and LE's     SHORT TERM GOALS  · Patient will be able to engage in consistent and progressive active exercise    LONG TERM GOALS  · Patient will be able to sustain functional ADL's and functional postures for consistently longer time frames while reporting consistently reduced intensity of lower back pain   · Postural awareness Fair+ or better  · AROM: Trunk extension to neutral or better Bilateral hips Fair or better  · Strength trunk/core 3+/5 LE's 4-/5  · Patient will be able to maintain and self direct an appropriate follow up independent exercise program     PATIENT GOALS  · Improve tolerance for ADL's and ambulation     REHAB POTENTIAL:  Fair    FREQUENCY/DURATION:  2 times per week 5-6 weeks     PLAN OF CARE:  Aquatic based active exercise       Thank you for the opportunity to work with your patient. If you have questions or comments, please feel free to contact me by phone or fax.       Electronically Signed by Rosa Alejandro PT  [id number]        ___________________________________  2021    Physician     Date

## 2021-06-18 DIAGNOSIS — M48.061 NEURAL FORAMINAL STENOSIS OF LUMBAR SPINE: ICD-10-CM

## 2021-06-21 RX ORDER — TIZANIDINE 4 MG/1
TABLET ORAL
Qty: 90 TABLET | Refills: 0 | Status: SHIPPED
Start: 2021-06-21 | End: 2021-09-08

## 2021-06-29 ENCOUNTER — HOSPITAL ENCOUNTER (OUTPATIENT)
Dept: PHYSICAL THERAPY | Age: 66
Setting detail: THERAPIES SERIES
Discharge: HOME | End: 2021-06-29
Payer: MEDICARE

## 2021-06-29 PROCEDURE — 97113 AQUATIC THERAPY/EXERCISES: CPT

## 2021-06-29 NOTE — PROGRESS NOTES
Patient limited by pain [] Patient limited by other medical complications  [] Other:     Prognosis: [] Good [x] Fair  [] Poor    Patient Requires Follow-up: [x] Yes  [] No    Plan:   [x] Continue per plan of care [] Alter current plan (see comments)  [] Plan of care initiated [] Hold pending MD visit [] Discharge    Treatment Charges: Mins Units   Initial Evaluation     Re-Evaluation     Ther Exercise         TE     Aquatic Treatment 60 4   Ther Activities        TA     Gait Training          GT     Neuro Re-education NR     Modalities     Non-Billable Service Time     Other     Total Time/Units 60 4           Electronically signed by:  Jerrod Etienne PTA [id number]

## 2021-07-02 ENCOUNTER — TELEPHONE (OUTPATIENT)
Dept: FAMILY MEDICINE CLINIC | Age: 66
End: 2021-07-02

## 2021-07-02 NOTE — TELEPHONE ENCOUNTER
THe patient called requesting her ears be cleaned   She states she got over the counter ear drops from [address] but it is not effective  She states she was advised that she does not need seen to get her ears cleaned out, but I wanted to clarify before we scheduled   Thanks

## 2021-07-06 ENCOUNTER — HOSPITAL ENCOUNTER (OUTPATIENT)
Dept: PHYSICAL THERAPY | Age: 66
Setting detail: THERAPIES SERIES
Discharge: HOME | End: 2021-07-06
Payer: MEDICARE

## 2021-07-06 PROCEDURE — 97113 AQUATIC THERAPY/EXERCISES: CPT

## 2021-07-06 NOTE — PROGRESS NOTES
[facility]  Phone: [phone] Fax: [phone]        Physical Therapy Aquatic Flow Sheet   Date:  2021    Patient Name:  Eloisa Reyes    :  1955  Restrictions/Precautions:    Diagnosis:   Back Pain/Stenosis  Treatment Diagnosis:  Back Pain/Stenosis  Insurance/Certification information:  [employer]  Referring Physician:  Dr Joselin Dallas of care signed (Y/N):    Visit# / total visits:  2/10  Pain level: 10/10   Electronically signed by:  Gwendolyn Magana PTA  Time In:1400  Time Out:1500    Subjective:Subjective: Pain rated Constant Pain noted mainly with sustained standing and walking  Pain Screening      Key  B= Belt DB= Dumbells T= Theratube   H= Hydrotone N= Noodles W= Weights   P= Paddles S= Speedo equipment K= Kickboard     Exercises/Activities   Warm-up/Amb  Minutes  Exercises  Minutes    Slow forward   5  HR/TR  5    Slow sideways  5  Marches  5    Slow backwards  5  Mini-squats  5    Medium forward  5  Forward backward kick  5    Medium sideways    Hip abduction  5    Small shuffle    Hamstring curls  5    Jog    Knee extension      Braiding    Pelvic tilts      Bicycling  5  Scap squeezes      Marching  5  Shoulder flex/ext      Functional    Shoulder abd/add      Step    Shoulder H. abd/add      Lifting    Shoulder IR/ER      Hand to opp knee    Rowing      Push down squat    Bilateral pull down      UE PNF    Push/pull      LE PNF    Push downs      Wall push ups    Arm circles      SLS    Elbow flex/ext          Chin tuck      Stretching    UT shrugs/rolls      Gastroc/Soleus    Rocking horse      Hamstring          SKTC    Other      Piriformis          Hip flexor          Ladder pull          Pec stretch          Post deltoid           Timed Code Treatment Minutes:  60    Total Treatment Minutes:  60    Treatment/Activity Tolerance:  [] Patient tolerated treatment well [x] Patient limited by fatique  [x] Patient limited by pain [] Patient limited by other medical complications  [] Other:     Prognosis: [] Good [x] Fair  [] Poor    Patient Requires Follow-up: [x] Yes  [] No    Plan:   [x] Continue per plan of care [] Alter current plan (see comments)  [] Plan of care initiated [] Hold pending MD visit [] Discharge    Treatment Charges: Mins Units   Initial Evaluation     Re-Evaluation     Ther Exercise         TE     Aquatic Treatment 60 4   Ther Activities        TA     Gait Training          GT     Neuro Re-education NR     Modalities     Non-Billable Service Time     Other     Total Time/Units 60 4         Electronically signed by:  Delano Stein PTA [id number]

## 2021-07-07 ENCOUNTER — OFFICE VISIT (OUTPATIENT)
Dept: FAMILY MEDICINE CLINIC | Age: 66
End: 2021-07-07
Payer: MEDICARE

## 2021-07-07 VITALS
OXYGEN SATURATION: 96 % | DIASTOLIC BLOOD PRESSURE: 80 MMHG | RESPIRATION RATE: 16 BRPM | TEMPERATURE: 96.9 F | HEART RATE: 73 BPM | SYSTOLIC BLOOD PRESSURE: 125 MMHG

## 2021-07-07 DIAGNOSIS — H61.22 HEARING LOSS OF LEFT EAR DUE TO CERUMEN IMPACTION: Primary | ICD-10-CM

## 2021-07-07 PROCEDURE — 99212 OFFICE O/P EST SF 10 MIN: CPT | Performed by: FAMILY MEDICINE

## 2021-07-07 PROCEDURE — 3017F COLORECTAL CA SCREEN DOC REV: CPT | Performed by: FAMILY MEDICINE

## 2021-07-07 PROCEDURE — 99213 OFFICE O/P EST LOW 20 MIN: CPT | Performed by: FAMILY MEDICINE

## 2021-07-07 PROCEDURE — 1036F TOBACCO NON-USER: CPT | Performed by: FAMILY MEDICINE

## 2021-07-07 PROCEDURE — G8427 DOCREV CUR MEDS BY ELIG CLIN: HCPCS | Performed by: FAMILY MEDICINE

## 2021-07-07 PROCEDURE — G8417 CALC BMI ABV UP PARAM F/U: HCPCS | Performed by: FAMILY MEDICINE

## 2021-07-07 ASSESSMENT — ENCOUNTER SYMPTOMS
VOMITING: 0
COUGH: 0
NAUSEA: 0
SHORTNESS OF BREATH: 0
ABDOMINAL PAIN: 0

## 2021-07-12 ENCOUNTER — HOSPITAL ENCOUNTER (OUTPATIENT)
Dept: PHYSICAL THERAPY | Age: 66
Setting detail: THERAPIES SERIES
Discharge: HOME | End: 2021-07-12
Payer: MEDICARE

## 2021-07-12 PROCEDURE — 97113 AQUATIC THERAPY/EXERCISES: CPT

## 2021-07-12 NOTE — PROGRESS NOTES
[facility]  Phone: [phone] Fax: [phone]        Physical Therapy Aquatic Flow Sheet   Date:  2021    Patient Name:  Kyaw Samano    :  1955  Restrictions/Precautions:    Diagnosis:   Back Pain/Stenosis  Treatment Diagnosis:  Back Pain/Stenosis  Insurance/Certification information:  [employer]  Referring Physician:  Dr Rah Coronel of care signed (Y/N):    Visit# / total visits:  4/10  Pain level: 10/10   Electronically signed by:  Christian Garvey PTA  Time In:1400  Time Out:1500    Subjective:Subjective: Pain rated Constant Pain noted mainly with sustained standing and walking  Pain Screening      Key  B= Belt DB= Dumbells T= Theratube   H= Hydrotone N= Noodles W= Weights   P= Paddles S= Speedo equipment K= Kickboard     Exercises/Activities   Warm-up/Amb  Minutes  Exercises  Minutes    Slow forward   5  HR/TR  5    Slow sideways  5  Marches  5    Slow backwards  5  Mini-squats  5    Medium forward  5  Forward backward kick  5    Medium sideways    Hip abduction  5    Small shuffle    Hamstring curls  5    Jog    Knee extension      Braiding    Pelvic tilts      Bicycling  5  Scap squeezes      Marching  5  Shoulder flex/ext      Functional    Shoulder abd/add      Step    Shoulder H. abd/add      Lifting    Shoulder IR/ER      Hand to opp knee    Rowing      Push down squat    Bilateral pull down      UE PNF    Push/pull      LE PNF    Push downs      Wall push ups    Arm circles      SLS    Elbow flex/ext          Chin tuck      Stretching    UT shrugs/rolls      Gastroc/Soleus    Rocking horse      Hamstring          SKTC    Other      Piriformis          Hip flexor          Ladder pull          Pec stretch          Post deltoid           Timed Code Treatment Minutes:  60    Total Treatment Minutes:  60    Treatment/Activity Tolerance:  [] Patient tolerated treatment well [x] Patient limited by fatique  [x] Patient limited by pain [] Patient limited by other medical complications  [] Other:     Prognosis: [] Good [x] Fair  [] Poor    Patient Requires Follow-up: [x] Yes  [] No    Plan:   [x] Continue per plan of care [] Alter current plan (see comments)  [] Plan of care initiated [] Hold pending MD visit [] Discharge    Treatment Charges: Mins Units   Initial Evaluation     Re-Evaluation     Ther Exercise         TE     Aquatic Treatment 60 4   Ther Activities        TA     Gait Training          GT     Neuro Re-education NR     Modalities     Non-Billable Service Time     Other     Total Time/Units 60 4         Electronically signed by:  Caron Lockhart PTA [id number]

## 2021-07-14 ENCOUNTER — HOSPITAL ENCOUNTER (OUTPATIENT)
Dept: PHYSICAL THERAPY | Age: 66
Setting detail: THERAPIES SERIES
Discharge: HOME | End: 2021-07-14
Payer: MEDICARE

## 2021-07-14 PROCEDURE — 97113 AQUATIC THERAPY/EXERCISES: CPT

## 2021-07-14 NOTE — PROGRESS NOTES
[facility]  Phone: [phone] Fax: [phone]        Physical Therapy Aquatic Flow Sheet   Date:  2021    Patient Name:  Kyaw Samano    :  1955  Restrictions/Precautions:    Diagnosis:   Back Pain/Stenosis  Treatment Diagnosis:  Back Pain/Stenosis  Insurance/Certification information:  [employer]  Referring Physician:  Dr Rah Coronel of care signed (Y/N):    Visit# / total visits:  5.10  Pain level: 10/10   Electronically signed by:  Christian Garvey PTA  Time In:1400  Time Out:1500    Subjective:Pt attends 2 of 2 aquatic therapy visits. Pt struggles daily with walking and standing due to 10/10 LBP.     Key  B= Belt DB= Dumbells T= Theratube   H= Hydrotone N= Noodles W= Weights   P= Paddles S= Speedo equipment K= Kickboard     Exercises/Activities   Warm-up/Amb  Minutes  Exercises  Minutes    Slow forward   5  HR/TR  5    Slow sideways  5  Marches  5    Slow backwards  5  Mini-squats  5    Medium forward  5  Forward backward kick  5    Medium sideways    Hip abduction  5    Small shuffle    Hamstring curls  5    Jog    Knee extension      Braiding    Pelvic tilts      Bicycling  5  Scap squeezes      Marching  5  Shoulder flex/ext      Functional    Shoulder abd/add      Step    Shoulder H. abd/add      Lifting    Shoulder IR/ER      Hand to opp knee    Rowing      Push down squat    Bilateral pull down      UE PNF    Push/pull      LE PNF    Push downs      Wall push ups    Arm circles      SLS    Elbow flex/ext          Chin tuck      Stretching    UT shrugs/rolls      Gastroc/Soleus    Rocking horse      Hamstring          SKTC    Other      Piriformis          Hip flexor          Ladder pull          Pec stretch          Post deltoid           Timed Code Treatment Minutes:  60    Total Treatment Minutes:  60    Treatment/Activity Tolerance:  [] Patient tolerated treatment well [x] Patient limited by fatique  [x] Patient limited by pain [] Patient limited by other medical complications  [] Other:     Prognosis: [] Good [x] Fair  [] Poor    Patient Requires Follow-up: [x] Yes  [] No    Plan:   [x] Continue per plan of care [] Alter current plan (see comments)  [] Plan of care initiated [] Hold pending MD visit [] Discharge    Treatment Charges: Mins Units   Initial Evaluation     Re-Evaluation     Ther Exercise         TE     Aquatic Treatment 60 4   Ther Activities        TA     Gait Training          GT     Neuro Re-education NR     Modalities     Non-Billable Service Time     Other     Total Time/Units 60 4         Electronically signed by:  Tone Carrasco PTA [id number]

## 2021-07-19 ENCOUNTER — HOSPITAL ENCOUNTER (OUTPATIENT)
Dept: PHYSICAL THERAPY | Age: 66
Setting detail: THERAPIES SERIES
Discharge: HOME | End: 2021-07-19
Payer: MEDICARE

## 2021-07-19 PROCEDURE — 97113 AQUATIC THERAPY/EXERCISES: CPT

## 2021-07-19 NOTE — PROGRESS NOTES
[facility]  Phone: [phone] Fax: [phone]        Physical Therapy Aquatic Flow Sheet   Date:  2021    Patient Name:  Erick Henao    :  1955  Restrictions/Precautions:    Diagnosis:   Back Pain/Stenosis  Treatment Diagnosis:  Back Pain/Stenosis  Insurance/Certification information:  [employer]  Referring Physician:  Dr Conrad Lazaro of care signed (Y/N):    Visit# / total visits:  6.10  Pain level: 10/10     Electronically signed by:  Rick Pearl PTA  Time In:1330  Time Out:1430        Key  B= Belt DB= Dumbells T= Theratube   H= Hydrotone N= Noodles W= Weights   P= Paddles S= Speedo equipment K= Kickboard     Exercises/Activities   Warm-up/Amb  Minutes  Exercises  Minutes    Slow forward   5  HR/TR  5    Slow sideways  5  Marches  5    Slow backwards  5  Mini-squats  5    Medium forward  5  Forward backward kick  5    Medium sideways    Hip abduction  5    Small shuffle    Hamstring curls  5    Jog    Knee extension      Braiding    Pelvic tilts      Bicycling  5  Scap squeezes      Marching  5  Shoulder flex/ext      Functional    Shoulder abd/add      Step    Shoulder H. abd/add      Lifting    Shoulder IR/ER      Hand to opp knee    Rowing      Push down squat    Bilateral pull down      UE PNF    Push/pull      LE PNF    Push downs      Wall push ups    Arm circles      SLS    Elbow flex/ext          Chin tuck      Stretching    UT shrugs/rolls      Gastroc/Soleus    Rocking horse      Hamstring          SKTC    Other      Piriformis          Hip flexor          Ladder pull          Pec stretch          Post deltoid           Timed Code Treatment Minutes:  60    Total Treatment Minutes:  60    Treatment/Activity Tolerance:  [x] Patient tolerated treatment well [] Patient limited by fatique  [] Patient limited by pain [] Patient limited by other medical complications  [x] Other: Pt's balance, confidence and strength improving in pool.   Prognosis: [] Good [x] Fair  [] Poor    Patient Requires Follow-up: [x] Yes  [] No    Plan:   [x] Continue per plan of care [] Alter current plan (see comments)  [] Plan of care initiated [] Hold pending MD visit [] Discharge    Treatment Charges: Mins Units   Initial Evaluation     Re-Evaluation     Ther Exercise         TE     Aquatic Treatment 60 4   Ther Activities        TA     Gait Training          GT     Neuro Re-education NR     Modalities     Non-Billable Service Time     Other     Total Time/Units 60 4         Electronically signed by:  France Huntley PTA [id number]

## 2021-07-21 ENCOUNTER — HOSPITAL ENCOUNTER (OUTPATIENT)
Dept: PHYSICAL THERAPY | Age: 66
Setting detail: THERAPIES SERIES
Discharge: HOME | End: 2021-07-21
Payer: MEDICARE

## 2021-07-21 PROCEDURE — 97113 AQUATIC THERAPY/EXERCISES: CPT

## 2021-07-21 NOTE — PROGRESS NOTES
[facility]  Phone: [phone] Fax: [phone]        Physical Therapy Aquatic Flow Sheet   Date:  2021    Patient Name:  Megha Hernández    :  1955  Restrictions/Precautions:    Diagnosis:   Back Pain/Stenosis  Treatment Diagnosis:  Back Pain/Stenosis  Insurance/Certification information:  [employer]  Referring Physician:  Dr Pagan Cover of care signed (Y/N):    Visit# / total visits:  7.10  Pain level: 10/10     Electronically signed by:  Brenden Sol PTA  Time RQ:[id number]  Time CWU:    Key  B= Belt DB= Dumbells T= Theratube   H= Hydrotone N= Noodles W= Weights   P= Paddles S= Speedo equipment K= Kickboard     Exercises/Activities   Warm-up/Amb  Minutes  Exercises  Minutes    Slow forward   5  HR/TR  5    Slow sideways  5  Marches  5    Slow backwards  5  Mini-squats  5    Medium forward  5  Forward backward kick  5    Medium sideways    Hip abduction  5    Small shuffle    Hamstring curls  5    Jog    Knee extension      Braiding    Pelvic tilts      Bicycling  5  Scap squeezes      Marching  5  Shoulder flex/ext      Functional    Shoulder abd/add      Step    Shoulder H. abd/add      Lifting    Shoulder IR/ER      Hand to opp knee    Rowing      Push down squat    Bilateral pull down      UE PNF    Push/pull      LE PNF    Push downs      Wall push ups    Arm circles      SLS    Elbow flex/ext          Chin tuck      Stretching    UT shrugs/rolls      Gastroc/Soleus    Rocking horse      Hamstring          SKTC    Other      Piriformis          Hip flexor          Ladder pull          Pec stretch          Post deltoid           Timed Code Treatment Minutes:  60    Total Treatment Minutes:  60    Treatment/Activity Tolerance:  [x] Patient tolerated treatment well [] Patient limited by fatique  [] Patient limited by pain [] Patient limited by other medical complications  [x] Other: Pt's balance, confidence and strength improving in pool.     PROBLEMS FOUND DURING EVALUATION  · Pain Affects the mid and lower lumbar and lumbo-sacral region Pain rated Constant Pain aggravated by sustained standing and walking  · Postural deficits  · Limited ROM/Mobility trunk and proximal LE's  · Deficits of strength trunk/core and LE's      Prognosis: [] Good [x] Fair  [] Poor    Patient Requires Follow-up: [x] Yes  [] No    Plan:2 times per week 5-6 weeks    GOALS  · Patient will be able to sustain functional ADL's and functional postures for consistently longer time frames while reporting consistently reduced intensity of lower back pain   · Postural awareness Fair+ while IN POOL-need to increase uprigjt postural alignment against GRAVITY-Pt receptive  · AROM: Trunk extension to neutral (in pool)   · Bilateral hips Fair+  · Strength trunk/core 3+/5 LE's 4-/5  · Patient will be able to maintain and self direct an appropriate follow up independent exercise program      PATIENT GOALS  Improve tolerance for ADL's and ambulation [x] Continue per plan of care [] Alter current plan (see comments)  [] Plan of care initiated [] Hold pending MD visit [] Discharge    Treatment Charges: Mins Units   Initial Evaluation     Re-Evaluation     Ther Exercise         TE     Aquatic Treatment 60 4   Ther Activities        TA     Gait Training          GT     Neuro Re-education NR     Modalities     Non-Billable Service Time     Other     Total Time/Units 60 4         Electronically signed by:  Bi Mosquera PTA [id number]

## 2021-07-26 ENCOUNTER — OFFICE VISIT (OUTPATIENT)
Dept: SURGERY | Age: 66
End: 2021-07-26
Payer: MEDICARE

## 2021-07-26 ENCOUNTER — HOSPITAL ENCOUNTER (OUTPATIENT)
Dept: PHYSICAL THERAPY | Age: 66
Setting detail: THERAPIES SERIES
Discharge: HOME | End: 2021-07-26
Payer: MEDICARE

## 2021-07-26 VITALS
OXYGEN SATURATION: 99 % | TEMPERATURE: 98.6 F | DIASTOLIC BLOOD PRESSURE: 77 MMHG | BODY MASS INDEX: 43.4 KG/M2 | RESPIRATION RATE: 14 BRPM | HEIGHT: 69 IN | HEART RATE: 77 BPM | WEIGHT: 293 LBS | SYSTOLIC BLOOD PRESSURE: 156 MMHG

## 2021-07-26 DIAGNOSIS — Z12.11 ENCOUNTER FOR SCREENING COLONOSCOPY: ICD-10-CM

## 2021-07-26 PROCEDURE — 99999 PR OFFICE/OUTPT VISIT,PROCEDURE ONLY: CPT | Performed by: SURGERY

## 2021-07-26 PROCEDURE — 97113 AQUATIC THERAPY/EXERCISES: CPT

## 2021-07-26 PROCEDURE — 99202 OFFICE O/P NEW SF 15 MIN: CPT | Performed by: SURGERY

## 2021-07-26 RX ORDER — POLYETHYLENE GLYCOL 3350, SODIUM CHLORIDE, POTASSIUM CHLORIDE, SODIUM BICARBONATE, AND SODIUM SULFATE 240; 5.84; 2.98; 6.72; 22.72 G/4L; G/4L; G/4L; G/4L; G/4L
4000 POWDER, FOR SOLUTION ORAL ONCE
Qty: 4000 ML | Refills: 0 | Status: SHIPPED | OUTPATIENT
Start: 2021-07-26 | End: 2021-07-26

## 2021-07-26 ASSESSMENT — ENCOUNTER SYMPTOMS
EYES NEGATIVE: 1
GASTROINTESTINAL NEGATIVE: 1
CONSTIPATION: 0
NAUSEA: 0
ABDOMINAL PAIN: 0
SHORTNESS OF BREATH: 0
COUGH: 0
ANAL BLEEDING: 0
ALLERGIC/IMMUNOLOGIC NEGATIVE: 1
ABDOMINAL DISTENTION: 0
BACK PAIN: 0
VOMITING: 0
DIARRHEA: 0
RESPIRATORY NEGATIVE: 1
BLOOD IN STOOL: 0

## 2021-07-26 NOTE — PATIENT INSTRUCTIONS
Instructions for Clear liquid diet  Definition  A clear liquid diet consists of clear liquids, such as water, broth and plain gelatin, that are easily digested and leave no undigested residue in your intestinal tract. Your doctor may prescribe a clear liquid diet before certain medical procedures or if you have certain digestive problems. Because a clear liquid diet can't provide you with adequate calories and nutrients, it shouldn't be continued for more than a few days. Purpose  A clear liquid diet is often used before tests, procedures or surgeries that require no food in your stomach or intestines, such as before colonoscopy. It may also be recommended as a short-term diet if you have certain digestive problems, such as nausea, vomiting or diarrhea, or after certain types of surgery. Diet details  A clear liquid diet helps maintain adequate hydration, provides some important electrolytes, such as sodium and potassium, and gives some energy at a time when a full diet isn't possible or recommended.    The following foods are allowed in a clear liquid diet:    Plain water    Fruit juices without pulp, such as apple juice, orange, white grape or white  cranberry    Strained lemonade    Clear, fat-free broth (Chicken)   Clear sodas (NO DARK SODA)   Plain gelatin    Honey    Ice pops without bits of fruit or fruit pulp    Tea or coffee without milk or cream    *Nothing RED or PURPLE  * If you SEE THROUGH IT then you can have it    A typical menu on the clear liquid diet may look like this:     Breakfast:  1 glass fruit juice  1 cup coffee or tea (without dairy products)  1 cup broth  1 bowl gelatin     Snack:  1 glass fruit juice  1 bowl gelatin     Lunch:  1 glass fruit juice  1 glass water  1 cup broth  1 bowl gelatin     Snack:  1 ice pop (without fruit pulp)  1 cup coffee or tea (without dairy products) or a soft drink     Dinner:  1 cup juice or water  1 cup broth  1 bowl gelatin  1 cup coffee or tea     Results  Although the clear liquid diet may not be very exciting, it does fulfill its purpose. It's designed to keep your stomach and intestines clear, limit strain to your digestive system, but keep your body hydrated as you prepare for or recover from a medical procedure. Risks  Because a clear liquid diet can't provide you with adequate calories and nutrients, it shouldn't be used for more than a few days. Only use the clear liquid diet as directed by your doctor. If your doctor prescribes a clear liquid diet before a medical test, be sure to follow the diet instructions exactly. If you don't follow the diet exactly, you risk an inaccurate test and may have to reschedule the procedure for another time. The importance of proper hydration  A colonoscopy prep causes the body to lose a significant amount of fluid and can result in sickness due to dehydration. It's important that you prepare your body by drinking extra clear liquids before the prep. Stay hydrated by drinking all required clear liquids during the prep. Replenish your system by drinking clear liquids after returning home from your colonoscopy. [facility]  Golytely or Nulytely  COLON PREP FOR COLONOSCOPY OR COLON SURGERY    It is very important that you follow all of the instructions listed on this sheet carefully (they may be slightly different than the directions on the product that you purchase at the pharmacy) to ensure that you colon is adequately cleaned out or you risk of complications could be increased. 2 Days or More Before Endoscopy:   Obtain Golytely, Colyte or Nulytely, depending on the prescription the surgeon gave you, from the pharmacy.  Mix Golytely, Colyte or Nulytely according to instructions and refrigerate.  Do not eat corn, tomatoes, peas or watermelon 3 to 5 days before procedure.    If you are on INSULIN or OTHER DIABETIC MEDICATIONS, then check with your primary care physician as to how to adjust your medication while on clear liquid diet and when nothing by mouth. 1 Day Before the Endoscopy:   No solid food  only clear liquids (soup, jello, or juice that you can see through with no solid food) for breakfast, lunch and supper. ·  DO NOT drink or eat anything that is red or purple as it will turn the inside of the colon red and look like blood.  Begin drinking the Golytely, Colyte or Nulytely early in the afternoon (between 1pm and 4pm  the earlier the better). Drink and 8oz glass of this solution every 10 minutes until you have drank the entire gallon. It is best to drink the whole glass rapidly rather than sipping small amounts continuously.  Bowel movements should occur about one hour after the first glass of Golytely, Colyte, or Nulytely. They will continue periodically for approximately 1-2 hours after you finish drinking the last glass. By this time the stool should be liquid and clear.  Feelings of bloating and/or nausea are common after the first few glasses because of the large volume of fluid ingested. This is temporary, however, and will improve once bowel movements begin.  If you have nausea or vomiting, notify your physician. Day of Endoscopy:   Nothing to eat or drink after midnight the night before the endoscopy. However, if you are taking any blood pressure medications or heart medications, you should take them with a sip of water. Any questions or concerns call [facility] at [phone]. Patient Information and Instructions for Colonoscopy         Definition of Colonoscopy   A colonoscopy is the visual exam of the rectum and colon (large intestine). The exam is done with a tool called a colonoscope. The colonoscope is a flexible tube with a tiny camera on the end. This instrument allows the doctor to view the inside of your rectum and colon. Sigmoidoscopy is a shorter scope that views only the last one third of the colon.      Reasons for Colonoscopy   It is used to examine, diagnose, and treat problems in your large intestine. The procedure is most often done for the following reasons: To determine the cause of abdominal pain, rectal bleeding, or a change in bowel habits   To detect and treat colon cancer or colon polyps   To obtain tissue samples for testing   To stop intestinal bleeding   Monitor response to treatment if you have inflammatory bowel disease     Possible Complications   Complications are rare, but no procedure is completely free of risk. If you are planning to have a colonoscopy, your doctor will review a list of possible complications, which may include:   Bleeding   Reaction to the sedation causing drop in your blood pressure or problems breathing  Perforation or puncture of the bowel     Factors that may increase the risk of complications include:   Pre-existing heart or kidney condition   Treatment with certain medicines, including aspirin and other drugs with anticoagulant or blood-thinning properties   Prior abdominal surgery or radiation treatments   Active colitis , diverticulitis , or other acute bowel disease   Previous treatment with radiation therapy     Be sure to discuss these risks with your doctor before the procedure. What to Expect   Prior to Procedure   Your doctor will likely do the following:   Physical exam   Health history   Review of medicines   Test your stool for hidden blood (called \"occult blood\")     Your colon must be completely clean before the procedure. Any stool left in the intestine will block the view. This preparation may start several days before the procedure. Follow your doctor's instructions. Leading up to your procedure:   Talk to your doctor about your medicines.  You may be asked to stop taking some medicines up to one week before the procedure, like:   Anti-inflammatory drugs (e.g., aspirin )   Blood thinners like clopidogrel (Plavix) or warfarin (Coumadin)   Iron supplements or vitamins containing iron   The day or days before your procedure, go on a clear liquid diet (clear broth, clear juice, clear jello) with no red coloring  Do not eat or drink anything after midnight. Wear comfortable clothing. If you have diabetes, ask your doctor if you need to adjust your diabetes medicine on the day prior to your procedure and the day of your procedure. Arrange for a ride home after the procedure. Anesthesia   You will receive intravenous sedation medicine for the procedure so you will not feel anything during the procedure. Description of the Procedure   You will lie on your left side with knees bent and drawn up toward your chest. The colonoscope will be slowly inserted through the rectum and into the bowel. The colonoscope will inject air into the colon. A small attached video camera will allow the doctor to view the colon's lining on a screen. The doctor will continue guiding the tool through the bowel and assess the lining. A tissue sample or polyps may be removed during the procedure. How Long Will It Take? Usually it takes about 30 to 45 minutes     Will It Hurt? Most people do not feel anything during the procedure and will not remember the procedure. After the procedure, gas pains and cramping are common. These pains should go away with the passing of gas. Post-procedure Care   If any tissue was removed: It will be sent to a lab to be examined. It may take 1-2 weeks for results. The doctor will usually give an initial report after the scope is removed. Other tests may be recommended. A small amount of bleeding may occur during the first few days after the procedure. When you return home after the procedure, be sure to follow your doctor's instructions, which may include:   Resume medicines as instructed by your doctor. Resume normal diet, unless directed otherwise by your doctor. The sedative will make you drowsy.  Avoid driving, operating machinery, or making important decisions for the rest of the day. Rest for the remainder of the day. After arriving home, contact your doctor if any of the following occurs:   Bleeding from your rectum, notify your doctor if you pass a teaspoonful of blood or more. Black, tarry stools   Severe abdominal pain   Hard, swollen abdomen   Signs of infection, including fever or chills   Inability to pass gas or stool   Coughing, shortness of breath, chest pain, severe nausea or vomiting     In case of an emergency, CALL 911 .

## 2021-07-26 NOTE — PROGRESS NOTES
[facility]  Phone: [phone] Fax: [phone]        Physical Therapy Aquatic Flow Sheet   Date:  2021    Patient Name:  Dennys Rutledge    :  1955  Restrictions/Precautions:    Diagnosis:   Back Pain/Stenosis  Treatment Diagnosis:  Back Pain/Stenosis  Insurance/Certification information:  [employer]  Referring Physician:  Dr Yvette Lewis of care signed (Y/N):    Visit# / total visits:  8/10  Pain level: 10/10     Electronically signed by:  Bubba Munoz PTA  Time In:1330  Time Out:1430    Key  B= Belt DB= Dumbells T= Theratube   H= Hydrotone N= Noodles W= Weights   P= Paddles S= Speedo equipment K= Kickboard     Exercises/Activities   Warm-up/Amb  Minutes  Exercises  Minutes    Slow forward   5  HR/TR  5    Slow sideways  5  Marches  5    Slow backwards  5  Mini-squats  5    Medium forward  5  Forward backward kick  5    Medium sideways    Hip abduction  5    Small shuffle    Hamstring curls  5    Jog    Knee extension      Braiding    Pelvic tilts      Bicycling  5  Scap squeezes      Marching  5  Shoulder flex/ext      Functional    Shoulder abd/add      Step    Shoulder H. abd/add      Lifting    Shoulder IR/ER      Hand to opp knee    Rowing      Push down squat    Bilateral pull down      UE PNF    Push/pull      LE PNF    Push downs      Wall push ups    Arm circles      SLS    Elbow flex/ext          Chin tuck      Stretching    UT shrugs/rolls      Gastroc/Soleus    Rocking horse      Hamstring          SKTC    Other      Piriformis          Hip flexor          Ladder pull          Pec stretch          Post deltoid           Timed Code Treatment Minutes:  60    Total Treatment Minutes:  60    Treatment/Activity Tolerance:  [x] Patient tolerated treatment well [] Patient limited by fatique  [] Patient limited by pain [] Patient limited by other medical complications  [x] Other: Pt's balance, confidence and strength improving in pool.     PROBLEMS FOUND DURING EVALUATION  · Pain Affects the mid and lower lumbar and lumbo-sacral region Pain rated Constant Pain aggravated by sustained standing and walking  · Postural deficits  · Limited ROM/Mobility trunk and proximal LE's  · Deficits of strength trunk/core and LE's      Prognosis: [] Good [x] Fair  [] Poor    Patient Requires Follow-up: [x] Yes  [] No    Plan:2 times per week 5-6 weeks    GOALS  · Patient will be able to sustain functional ADL's and functional postures for consistently longer time frames while reporting consistently reduced intensity of lower back pain   · Postural awareness Fair+ while IN POOL-need to increase uprigjt postural alignment against GRAVITY-Pt receptive  · AROM: Trunk extension to neutral (in pool)   · Bilateral hips Fair+  · Strength trunk/core 3+/5 LE's 4-/5  · Patient will be able to maintain and self direct an appropriate follow up independent exercise program      PATIENT GOALS  Improve tolerance for ADL's and ambulation [x] Continue per plan of care [] Alter current plan (see comments)  [] Plan of care initiated [] Hold pending MD visit [] Discharge    Treatment Charges: Mins Units   Initial Evaluation     Re-Evaluation     Ther Exercise         TE     Aquatic Treatment 60 4   Ther Activities        TA     Gait Training          GT     Neuro Re-education NR     Modalities     Non-Billable Service Time     Other     Total Time/Units 60 4         Electronically signed by:  Juanjose Rae PTA [id number]

## 2021-07-26 NOTE — PROGRESS NOTES
[facility]  HISTORY & PHYSICAL  ATTENDING NOTE    Chief Complaint   Patient presents with    Consultation     Colonoscopy consult(ref by ). Pts grandmother had colon cancer.  Colonoscopy     Pt lastcolonoscopy was 2005 done at [address] Constipation     Pt denies    Diarrhea     Pt denies    Rectal Bleeding     Pt denies    Weight Loss     Pt denies    Abdominal Pain     Pt denies    Nausea & Vomiting     Pt denies         HPI:  63 y.o. female denies weight loss, diarrhea, constipation, melena, hematochezia, N/V, abdominal pain. she denies family history of colon cancer, Crohn's disease or colitis. Her last colonoscopy was in 2005. She states she had diverticulosis at the time. She think she has had some diverticulitis attacks was never, to the ER been hospitalized form. She notes that when she eats seeds and nuts that she has some abdominal pain. Past Medical History:   Diagnosis Date    Anxiety     Carpal tunnel syndrome on right     Chronic back pain     Chronic kidney disease, stage 3 ([address]     Depression 10/28/2010    Diabetes mellitus type 2, uncontrolled ([address] 10/28/2010    with microalbuminuria    Diverticulosis     GERD (gastroesophageal reflux disease) 10/28/2010    Herniated intervertebral disk     neck and lower back    Hypercholesterolemia 10/28/2010    Hypertension 10/28/2010    Kidney stones     Neurofibroma of trunk     irritating mole on the back.  biopsy done 10/21/09    Obesity     Osteoarthritis     PAD (peripheral artery disease) (HCC)     Type II or unspecified type diabetes mellitus without mention of complication, not stated as uncontrolled     Vitamin D insufficiency        Past Surgical History:   Procedure Laterality Date    ECHO COMPLETE  3/19/2012         EYE MUSCLE SURGERY      as a child    HYSTERECTOMY      with salpingoophorectomy    HYSTERECTOMY, TOTAL ABDOMINAL Bilateral 2003    cervix removed per patient    NERVE BLOCK Right 2018    lumbar transforaminal nerve block #1 with sedation    NM KALIA NOSE/CLEFT LIP/TIP Right 2018    RIGHT LUMBAR TRANSFORAMINAL NERVE BLOCK #1 L3-4 L4-5 WITH IV SEDATIN performed by Alba Nava DO at [facility]         Family History   Problem Relation Age of Onset    Lung Cancer Mother 71        mesothelioma metastatized to the brain    Diabetes Mother     High Cholesterol Mother     High Blood Pressure Mother     Arthritis Mother     Cancer Mother         lung to brain    Diabetes Father     Arthritis Father     High Blood Pressure Sister     Arthritis Sister     High Cholesterol Sister     High Blood Pressure Brother     Substance Abuse Brother     Breast Cancer Paternal Aunt          at 80y    High Blood Pressure Paternal Aunt     Cancer Maternal Grandmother [de-identified]        colorectal cancer    High Blood Pressure Maternal Grandmother     Heart Failure Maternal Grandmother     Arthritis Maternal Grandmother     High Cholesterol Maternal Grandmother     High Blood Pressure Paternal Grandmother     Stroke Paternal Grandmother     High Blood Pressure Maternal Aunt     High Cholesterol Maternal Aunt     Cancer Maternal Aunt         mesothelioma    Diabetes Maternal Uncle     High Blood Pressure Maternal Uncle     High Cholesterol Maternal Uncle     Substance Abuse Maternal Uncle     High Blood Pressure Paternal Uncle        No Known Allergies    Social History     Tobacco Use    Smoking status: Never Smoker    Smokeless tobacco: Never Used   Substance Use Topics    Alcohol use:  Yes     Alcohol/week: 2.0 standard drinks     Types: 1 Glasses of wine, 1 Cans of beer per week     Comment: 1 time per month    Drug use: No       Current Outpatient Medications   Medication Sig Dispense Refill    polyethylene glycol-electrolytes (COLYTE) 240 g SOLR solution Take 4,000 mLs by mouth once for 1 dose 4000 mL 0    tiZANidine (ZANAFLEX) 4 MG tablet TAKE 1 TABLET BY MOUTH EVERY 6 HOURS AS NEEDED FOR MUSCLE SPASM 90 tablet 0    furosemide (LASIX) 20 MG tablet TAKE 1 TABLET (20MG) EVERY OTHER DAY ALTERNATING DOSE WITH 2 TABLETS (40MG) EVERY OTHER DAY 90 tablet 1    gabapentin (NEURONTIN) 800 MG tablet Take 1 tablet by mouth 3 times daily for 180 days. 270 tablet 1    metFORMIN (GLUCOPHAGE XR) 500 MG extended release tablet Take 1 tablet by mouth daily (with breakfast) 90 tablet 1    valsartan-hydroCHLOROthiazide (DIOVAN-HCT) 160-25 MG per tablet Take 160 tablets by mouth daily 160-25 po daily per Dr. Renea Grace 90 tablet 1    vitamin D3 (CHOLECALCIFEROL) 25 MCG (1000 UT) TABS tablet Take 2 tablets by mouth daily , prescribed by renal 180 tablet 1    busPIRone (BUSPAR) 5 MG tablet TAKE 1 TABLET BY MOUTH 3 TIMES DAILY 270 tablet 0    metoprolol (LOPRESSOR) 100 MG tablet TAKE 1 TABLET BY MOUTH 2 TIMES DAILY  180 tablet 1    allopurinol (ZYLOPRIM) 100 MG tablet TAKE 1 TABLET TWICE DAILY 180 tablet 1    insulin aspart (NOVOLOG FLEXPEN) 100 UNIT/ML injection pen USE AS DIRECTED BY YOUR PRESCRIBER.  COMPLETE DIRECTIONS ARE INCLUDED IN A LETTER WITH YOUR ORIGINAL ORDER 100 mL 2    DULoxetine (CYMBALTA) 60 MG extended release capsule TAKE 1 CAPSULE EVERY DAY 90 capsule 1    omeprazole (PRILOSEC) 40 MG delayed release capsule TAKE 1 CAPSULE EVERY DAY WITH BREAKFAST 90 capsule 1    potassium chloride (KLOR-CON M) 10 MEQ extended release tablet TAKE 1 TABLET TWICE DAILY 180 tablet 1    atorvastatin (LIPITOR) 80 MG tablet TAKE 1 TABLET EVERY NIGHT 90 tablet 1    cloNIDine (CATAPRES) 0.1 MG tablet TAKE 1 TABLET BY MOUTH 3 TIMES DAILY 270 tablet 1    celecoxib (CELEBREX) 200 MG capsule TAKE 1 CAPSULE EVERY DAY 90 capsule 1    LANTUS SOLOSTAR 100 UNIT/ML injection pen INJECT 55 UNITS INTO THE SKIN 2 TIMES DAILY 36 pen 1    blood glucose test strips (TRUE METRIX BLOOD GLUCOSE TEST) strip TEST BLOOD SUGAR UP TO 5 TIMES DAILY 500 strip 3    TRUEplus Lancets 33G MISC USE  TO  TEST  UP  TO FIVE TIMES DAILY 500 each 3    Insulin Pen Needle (B-D UF III MINI PEN NEEDLES) 31G X 5 MM MISC Use to inject insulin 5 times daily 500 each 3    zoster recombinant adjuvanted vaccine (SHINGRIX) 50 MCG/0.5ML SUSR injection Inject 0.5 mLs into the muscle See Admin Instructions 1 dose now and repeat in 2-6 months 0.5 mL 0     No current facility-administered medications for this visit. Review of Systems   Constitutional: Negative. Negative for activity change, appetite change and unexpected weight change. HENT: Negative. Eyes: Negative. Respiratory: Negative. Negative for cough and shortness of breath. Cardiovascular: Negative. Negative for chest pain and leg swelling. Gastrointestinal: Negative. Negative for abdominal distention, abdominal pain, anal bleeding, blood in stool, constipation, diarrhea, nausea and vomiting. Endocrine: Negative. Genitourinary: Negative. Musculoskeletal: Positive for gait problem (Ambulates with cane or walker). Negative for arthralgias, back pain, joint swelling and myalgias. Skin: Negative. Allergic/Immunologic: Negative. Neurological: Negative for dizziness, weakness and headaches. Hematological: Negative. Psychiatric/Behavioral: Negative. Negative for confusion, decreased concentration and sleep disturbance. BP (!) 156/77 (Site: Right Upper Arm, Position: Sitting, Cuff Size: Medium Adult)   Pulse 77   Temp 98.6 °F (37 °C) (Temporal)   Resp 14   Ht 5' 9\" (1.753 m)   Wt (!) 323 lb (146.5 kg)   SpO2 99%   BMI 47.70 kg/m²   Physical Exam  Constitutional:       Appearance: Normal appearance. She is obese. HENT:      Head: Normocephalic and atraumatic. Nose: Nose normal.      Mouth/Throat:      Mouth: Mucous membranes are moist.      Pharynx: Oropharynx is clear. Eyes:      Extraocular Movements: Extraocular movements intact. Pupils: Pupils are equal, round, and reactive to light. Cardiovascular:      Rate and Rhythm: Normal rate and regular rhythm. Pulses: Normal pulses. Heart sounds: Normal heart sounds. Pulmonary:      Effort: Pulmonary effort is normal.      Breath sounds: Normal breath sounds. Abdominal:      General: There is no distension. Palpations: Abdomen is soft. Tenderness: There is no abdominal tenderness. Musculoskeletal:         General: No tenderness or signs of injury. Cervical back: Normal range of motion and neck supple. Skin:     General: Skin is warm and dry. Neurological:      General: No focal deficit present. Mental Status: She is alert and oriented to person, place, and time. Psychiatric:         Mood and Affect: Mood normal.         Behavior: Behavior normal.         Thought Content: Thought content normal.         Judgment: Judgment normal.           ASSESSMENT/PLAN:  1.  age as a risk factor for colon cancer  -- plan for colonoscopy    Prep:  golytely    Colonoscopy. The patient was explained the risks/benefits/alternatives/expected outcomes of the procedure. The patient was explained the risks of the procedure, including, but not limited to, the risk of reaction to the anesthesia medicine and the risk of perforation requiring further surgery. The patient was informed that they may require biopsy or polypectomy. These procedures may increase the risk of complication. All questions were answered. The patient verbalized understanding and agreed to proceed.     Erika Vivas MD, MSc, FACS  7/26/2021  11:11 AM

## 2021-07-28 ENCOUNTER — TELEPHONE (OUTPATIENT)
Dept: SURGERY | Age: 66
End: 2021-07-28

## 2021-07-28 ENCOUNTER — HOSPITAL ENCOUNTER (OUTPATIENT)
Dept: PHYSICAL THERAPY | Age: 66
Setting detail: THERAPIES SERIES
Discharge: HOME | End: 2021-07-28
Payer: MEDICARE

## 2021-07-28 PROCEDURE — 97113 AQUATIC THERAPY/EXERCISES: CPT

## 2021-07-28 NOTE — PROGRESS NOTES
[facility]  Phone: [phone] Fax: [phone]        Physical Therapy Aquatic Flow Sheet   Date:  2021    Patient Name:  Reji Isaacs    :  1955  Restrictions/Precautions:    Diagnosis:   Back Pain/Stenosis  Treatment Diagnosis:  Back Pain/Stenosis  Insurance/Certification information:  [employer]  Referring Physician:  Dr Anthony Bill of care signed (Y/N):    Visit# / total visits:  9/10  Pain level: 10/10     Electronically signed by:  Riley Elkins PTA  Time In:1330  Time Out:1430    Key  B= Belt DB= Dumbells T= Theratube   H= Hydrotone N= Noodles W= Weights   P= Paddles S= Speedo equipment K= Kickboard     Exercises/Activities   Warm-up/Amb  Minutes  Exercises  Minutes    Slow forward   5  HR/TR  5    Slow sideways  5  Marches  5    Slow backwards  5  Mini-squats  5    Medium forward  5  Forward backward kick  5    Medium sideways    Hip abduction  5    Small shuffle    Hamstring curls  5    Jog    Knee extension      Braiding    Pelvic tilts      Bicycling  5  Scap squeezes      Marching  5  Shoulder flex/ext      Functional    Shoulder abd/add      Step    Shoulder H. abd/add      Lifting    Shoulder IR/ER      Hand to opp knee    Rowing      Push down squat    Bilateral pull down      UE PNF    Push/pull      LE PNF    Push downs      Wall push ups    Arm circles      SLS    Elbow flex/ext          Chin tuck      Stretching    UT shrugs/rolls      Gastroc/Soleus    Rocking horse      Hamstring          SKTC    Other      Piriformis          Hip flexor          Ladder pull          Pec stretch          Post deltoid           Timed Code Treatment Minutes:  60    Total Treatment Minutes:  60    Treatment/Activity Tolerance:  [x] Patient tolerated treatment well [] Patient limited by fatique  [] Patient limited by pain [] Patient limited by other medical complications  [x] Other: Pt's balance, confidence and strength improving in pool.     PROBLEMS FOUND DURING EVALUATION  · Pain Affects the mid and lower lumbar and lumbo-sacral region Pain rated Constant Pain aggravated by sustained standing and walking  · Postural deficits  · Limited ROM/Mobility trunk and proximal LE's  · Deficits of strength trunk/core and LE's      Prognosis: [] Good [x] Fair  [] Poor    Patient Requires Follow-up: [x] Yes  [] No    Plan:2 times per week 5-6 weeks    GOALS  · Patient will be able to sustain functional ADL's and functional postures for consistently longer time frames while reporting consistently reduced intensity of lower back pain   · Postural awareness Fair+ while IN POOL-need to increase uprigjt postural alignment against GRAVITY-Pt receptive  · AROM: Trunk extension to neutral (in pool)   · Bilateral hips Fair+  · Strength trunk/core 3+/5 LE's 4-/5  · Patient will be able to maintain and self direct an appropriate follow up independent exercise program      PATIENT GOALS  Improve tolerance for ADL's and ambulation [x] Continue per plan of care [] Alter current plan (see comments)  [] Plan of care initiated [] Hold pending MD visit [] Discharge    Treatment Charges: Mins Units   Initial Evaluation     Re-Evaluation     Ther Exercise         TE     Aquatic Treatment 60 4   Ther Activities        TA     Gait Training          GT     Neuro Re-education NR     Modalities     Non-Billable Service Time     Other     Total Time/Units 60 4         Electronically signed by:  France Huntley PTA [id number]

## 2021-09-03 NOTE — PROGRESS NOTES
[address] PRE-ADMISSION TESTING ENDOSCOPY INSTRUCTIONS- [facility]-phone number:[phone]    ENDOSCOPY INSTRUCTIONS:   [x] Bowel prep instructions reviewed. [x] Nothing by mouth after midnight, including gum, candy, mints, or water. Please follow your surgeons instructions if you are required to complete a bowel prep. Colonoscopy- no solid food-only clear liquids the day prior). [x] You may brush your teeth, gargle, but do NOT swallow water. [x] Do not wear makeup, lotions, powders, deodorant. Nail polish as directed by the nurse. [x] Arrange transportation with a responsible adult  to and from the hospital. If you do not have a responsible adult  to transport you, you will need to make arrangements with a medical transportation company (i.e. [employer]. A Uber/taxi/bus is not appropriate unless you are accompanied by a responsible adult ). Arrange for someone to be with you for the remainder of the day and for 24 hours after your procedure due to having had anesthesia. Who will be your  for transportation?__friend________________   Who will be staying with you for 24 hrs after your procedure?_____friend_____________    PARKING INSTRUCTIONS:   [x] Arrival Time:__0830, wear mask  · [x] Parking lot  \"I\" OR 1 is located on [facility] [place], [facility] (the corner of [facility]). To enter, press the button and the gate will lift. A free token will be provided to exit the lot. One car per patient is allowed to park in this lot. All other cars are to park on [address] [place] [address] either in the parking garage or the handicap lot. [] To reach the [facility] lobby from [address] [place] [address], upon entering the hospital, take elevator B to the 3rd floor. EDUCATION INSTRUCTIONS:  [x] Bring a complete list of your medications, please write the last time you took the medicine, give this list to the nurse.   [x] Take the following medications the morning of surgery with 1-2 ounces of water: see list  [x] Stop herbal supplements and vitamins 5 days before your surgery. [x] DO NOT take any diabetic medicine the morning of surgery. Follow instructions for insulin the day before surgery. [x] If you are diabetic and your blood sugar is low or you feel symptomatic, you may drink 1-2 ounces of apple juice or take a glucose tablet. The morning of your procedure, you may call the pre-op area if you have concerns about your blood sugar [phone]. [] Use your inhalers the morning of surgery. Bring your emergency inhaler with you day of surgery. [x] Follow physician instructions regarding any blood thinners you may be taking. WHAT TO EXPECT:  [x] The day of your procedure you will be greeted and checked in by the [employer].  In addition, you will be registered in the [facility] by a Patient Access Representative. Please bring your photo ID and insurance card. A nurse will greet you in accordance to the time you are needed in the pre-op area to prepare you for surgery. Please do not be discouraged if you are not greeted in the order you arrive as there are many variables that are involved in patient preparation. Your patience is greatly appreciated as you wait for your nurse. Please bring in items such as: books, magazines, newspapers, electronics, or any other items  to occupy your time in the waiting area. [x]  Delays may occur. Staff will make a sincere effort to keep you informed of delays. If any delays occur with your procedure, we apologize ahead of time for your inconvenience as we recognize the value of your time.

## 2021-09-07 ENCOUNTER — PREP FOR PROCEDURE (OUTPATIENT)
Dept: SURGERY | Age: 66
End: 2021-09-07

## 2021-09-07 DIAGNOSIS — M48.061 NEURAL FORAMINAL STENOSIS OF LUMBAR SPINE: ICD-10-CM

## 2021-09-07 DIAGNOSIS — I10 ESSENTIAL HYPERTENSION: ICD-10-CM

## 2021-09-07 RX ORDER — SODIUM CHLORIDE 0.9 % (FLUSH) 0.9 %
5-40 SYRINGE (ML) INJECTION EVERY 12 HOURS SCHEDULED
Status: CANCELLED | OUTPATIENT
Start: 2021-09-07

## 2021-09-07 RX ORDER — SODIUM CHLORIDE 9 MG/ML
25 INJECTION, SOLUTION INTRAVENOUS PRN
Status: CANCELLED | OUTPATIENT
Start: 2021-09-07

## 2021-09-07 RX ORDER — SODIUM CHLORIDE 0.9 % (FLUSH) 0.9 %
5-40 SYRINGE (ML) INJECTION PRN
Status: CANCELLED | OUTPATIENT
Start: 2021-09-07

## 2021-09-07 RX ORDER — SODIUM CHLORIDE 9 MG/ML
INJECTION, SOLUTION INTRAVENOUS CONTINUOUS
Status: CANCELLED | OUTPATIENT
Start: 2021-09-07

## 2021-09-07 NOTE — H&P (VIEW-ONLY)
[facility]  HISTORY & PHYSICAL  ATTENDING NOTE          Chief Complaint   Patient presents with    Consultation       Colonoscopy consult(ref by ). Pts grandmother had colon cancer.  Colonoscopy       Pt lastcolonoscopy was 2005 done at [address] Constipation       Pt denies    Diarrhea       Pt denies    Rectal Bleeding       Pt denies    Weight Loss       Pt denies    Abdominal Pain       Pt denies    Nausea & Vomiting       Pt denies            HPI:  63 y.o. female denies weight loss, diarrhea, constipation, melena, hematochezia, N/V, abdominal pain. she denies family history of colon cancer, Crohn's disease or colitis. Her last colonoscopy was in 2005. She states she had diverticulosis at the time. She think she has had some diverticulitis attacks was never, to the ER been hospitalized form. She notes that when she eats seeds and nuts that she has some abdominal pain.     Past Medical History   Past Medical History:   Diagnosis Date    Anxiety      Carpal tunnel syndrome on right      Chronic back pain      Chronic kidney disease, stage 3 ([address]      Depression 10/28/2010    Diabetes mellitus type 2, uncontrolled ([address] 10/28/2010     with microalbuminuria    Diverticulosis      GERD (gastroesophageal reflux disease) 10/28/2010    Herniated intervertebral disk       neck and lower back    Hypercholesterolemia 10/28/2010    Hypertension 10/28/2010    Kidney stones      Neurofibroma of trunk       irritating mole on the back.  biopsy done 10/21/09    Obesity      Osteoarthritis      PAD (peripheral artery disease) (HCC)      Type II or unspecified type diabetes mellitus without mention of complication, not stated as uncontrolled      Vitamin D insufficiency              Past Surgical History         Past Surgical History:   Procedure Laterality Date    ECHO COMPLETE   3/19/2012          EYE MUSCLE SURGERY         as a child   [address] [place] [address] HYSTERECTOMY         with salpingoophorectomy    HYSTERECTOMY, TOTAL ABDOMINAL Bilateral 2003     cervix removed per patient    NERVE BLOCK Right 2018     lumbar transforaminal nerve block #1 with sedation    CT KALIA NOSE/CLEFT LIP/TIP Right 2018     RIGHT LUMBAR TRANSFORAMINAL NERVE BLOCK #1 L3-4 L4-5 WITH IV SEDATIN performed by Courtney Infante DO at [address] [place] [address]     Family History         Family History   Problem Relation Age of Onset    Lung Cancer Mother 71         mesothelioma metastatized to the brain    Diabetes Mother      High Cholesterol Mother      High Blood Pressure Mother      Arthritis Mother      Cancer Mother           lung to brain    Diabetes Father      Arthritis Father      High Blood Pressure Sister      Arthritis Sister      High Cholesterol Sister      High Blood Pressure Brother      Substance Abuse Brother      Breast Cancer Paternal Aunt            at 80y    High Blood Pressure Paternal Aunt      Cancer Maternal Grandmother [de-identified]         colorectal cancer    High Blood Pressure Maternal Grandmother      Heart Failure Maternal Grandmother      Arthritis Maternal Grandmother      High Cholesterol Maternal Grandmother      High Blood Pressure Paternal Grandmother      Stroke Paternal Grandmother      High Blood Pressure Maternal Aunt      High Cholesterol Maternal Aunt      Cancer Maternal Aunt           mesothelioma    Diabetes Maternal Uncle      High Blood Pressure Maternal Uncle      High Cholesterol Maternal Uncle      Substance Abuse Maternal Uncle      High Blood Pressure Paternal Uncle              No Known Allergies     Social History            Tobacco Use    Smoking status: Never Smoker    Smokeless tobacco: Never Used   Substance Use Topics    Alcohol use: Yes       Alcohol/week: 2.0 standard drinks       Types: 1 Glasses of wine, 1 Cans of beer per week       Comment: 1 time per month    Drug use:  No       Current Facility-Administered Medications          Current Outpatient Medications   Medication Sig Dispense Refill    polyethylene glycol-electrolytes (COLYTE) 240 g SOLR solution Take 4,000 mLs by mouth once for 1 dose 4000 mL 0    tiZANidine (ZANAFLEX) 4 MG tablet TAKE 1 TABLET BY MOUTH EVERY 6 HOURS AS NEEDED FOR MUSCLE SPASM 90 tablet 0    furosemide (LASIX) 20 MG tablet TAKE 1 TABLET (20MG) EVERY OTHER DAY ALTERNATING DOSE WITH 2 TABLETS (40MG) EVERY OTHER DAY 90 tablet 1    gabapentin (NEURONTIN) 800 MG tablet Take 1 tablet by mouth 3 times daily for 180 days. 270 tablet 1    metFORMIN (GLUCOPHAGE XR) 500 MG extended release tablet Take 1 tablet by mouth daily (with breakfast) 90 tablet 1    valsartan-hydroCHLOROthiazide (DIOVAN-HCT) 160-25 MG per tablet Take 160 tablets by mouth daily 160-25 po daily per Dr. Rakesh Meyer 90 tablet 1    vitamin D3 (CHOLECALCIFEROL) 25 MCG (1000 UT) TABS tablet Take 2 tablets by mouth daily , prescribed by renal 180 tablet 1    busPIRone (BUSPAR) 5 MG tablet TAKE 1 TABLET BY MOUTH 3 TIMES DAILY 270 tablet 0    metoprolol (LOPRESSOR) 100 MG tablet TAKE 1 TABLET BY MOUTH 2 TIMES DAILY  180 tablet 1    allopurinol (ZYLOPRIM) 100 MG tablet TAKE 1 TABLET TWICE DAILY 180 tablet 1    insulin aspart (NOVOLOG FLEXPEN) 100 UNIT/ML injection pen USE AS DIRECTED BY YOUR PRESCRIBER.  COMPLETE DIRECTIONS ARE INCLUDED IN A LETTER WITH YOUR ORIGINAL ORDER 100 mL 2    DULoxetine (CYMBALTA) 60 MG extended release capsule TAKE 1 CAPSULE EVERY DAY 90 capsule 1    omeprazole (PRILOSEC) 40 MG delayed release capsule TAKE 1 CAPSULE EVERY DAY WITH BREAKFAST 90 capsule 1    potassium chloride (KLOR-CON M) 10 MEQ extended release tablet TAKE 1 TABLET TWICE DAILY 180 tablet 1    atorvastatin (LIPITOR) 80 MG tablet TAKE 1 TABLET EVERY NIGHT 90 tablet 1    cloNIDine (CATAPRES) 0.1 MG tablet TAKE 1 TABLET BY MOUTH 3 TIMES DAILY 270 tablet 1    celecoxib (CELEBREX) 200 MG capsule TAKE 1 CAPSULE EVERY DAY 90 capsule 1    LANTUS SOLOSTAR 100 UNIT/ML injection pen INJECT 55 UNITS INTO THE SKIN 2 TIMES DAILY 36 pen 1    blood glucose test strips (TRUE METRIX BLOOD GLUCOSE TEST) strip TEST BLOOD SUGAR UP TO 5 TIMES DAILY 500 strip 3    TRUEplus Lancets 33G MISC USE  TO  TEST  UP  TO FIVE TIMES DAILY 500 each 3    Insulin Pen Needle (B-D UF III MINI PEN NEEDLES) 31G X 5 MM MISC Use to inject insulin 5 times daily 500 each 3    zoster recombinant adjuvanted vaccine (SHINGRIX) 50 MCG/0.5ML SUSR injection Inject 0.5 mLs into the muscle See Admin Instructions 1 dose now and repeat in 2-6 months 0.5 mL 0      No current facility-administered medications for this visit.            Review of Systems   Constitutional: Negative. Negative for activity change, appetite change and unexpected weight change. HENT: Negative. Eyes: Negative. Respiratory: Negative. Negative for cough and shortness of breath. Cardiovascular: Negative. Negative for chest pain and leg swelling. Gastrointestinal: Negative. Negative for abdominal distention, abdominal pain, anal bleeding, blood in stool, constipation, diarrhea, nausea and vomiting. Endocrine: Negative. Genitourinary: Negative. Musculoskeletal: Positive for gait problem (Ambulates with cane or walker). Negative for arthralgias, back pain, joint swelling and myalgias. Skin: Negative. Allergic/Immunologic: Negative. Neurological: Negative for dizziness, weakness and headaches. Hematological: Negative. Psychiatric/Behavioral: Negative. Negative for confusion, decreased concentration and sleep disturbance.         BP (!) 156/77 (Site: Right Upper Arm, Position: Sitting, Cuff Size: Medium Adult)   Pulse 77   Temp 98.6 °F (37 °C) (Temporal)   Resp 14   Ht 5' 9\" (1.753 m)   Wt (!) 323 lb (146.5 kg)   SpO2 99%   BMI 47.70 kg/m²   Physical Exam  Constitutional:       Appearance: Normal appearance. She is obese.    HENT:      Head: Normocephalic and atraumatic. Nose: Nose normal.      Mouth/Throat:      Mouth: Mucous membranes are moist.      Pharynx: Oropharynx is clear. Eyes:      Extraocular Movements: Extraocular movements intact. Pupils: Pupils are equal, round, and reactive to light. Cardiovascular:      Rate and Rhythm: Normal rate and regular rhythm. Pulses: Normal pulses. Heart sounds: Normal heart sounds. Pulmonary:      Effort: Pulmonary effort is normal.      Breath sounds: Normal breath sounds. Abdominal:      General: There is no distension. Palpations: Abdomen is soft. Tenderness: There is no abdominal tenderness. Musculoskeletal:         General: No tenderness or signs of injury. Cervical back: Normal range of motion and neck supple. Skin:     General: Skin is warm and dry. Neurological:      General: No focal deficit present. Mental Status: She is alert and oriented to person, place, and time. Psychiatric:         Mood and Affect: Mood normal.         Behavior: Behavior normal.         Thought Content: Thought content normal.         Judgment: Judgment normal.               ASSESSMENT/PLAN:  1.  age as a risk factor for colon cancer  -- plan for colonoscopy     Prep:  golytely     Colonoscopy. The patient was explained the risks/benefits/alternatives/expected outcomes of the procedure. The patient was explained the risks of the procedure, including, but not limited to, the risk of reaction to the anesthesia medicine and the risk of perforation requiring further surgery. The patient was informed that they may require biopsy or polypectomy. These procedures may increase the risk of complication. All questions were answered.   The patient verbalized understanding and agreed to proceed.  Paige Foley MD, MSc, FACS  7/26/2021  11:11 AM

## 2021-09-07 NOTE — H&P
[facility]  HISTORY & PHYSICAL  ATTENDING NOTE          Chief Complaint   Patient presents with    Consultation       Colonoscopy consult(ref by ). Pts grandmother had colon cancer.  Colonoscopy       Pt lastcolonoscopy was 2005 done at [address] Constipation       Pt denies    Diarrhea       Pt denies    Rectal Bleeding       Pt denies    Weight Loss       Pt denies    Abdominal Pain       Pt denies    Nausea & Vomiting       Pt denies            HPI:  63 y.o. female denies weight loss, diarrhea, constipation, melena, hematochezia, N/V, abdominal pain. she denies family history of colon cancer, Crohn's disease or colitis. Her last colonoscopy was in 2005. She states she had diverticulosis at the time. She think she has had some diverticulitis attacks was never, to the ER been hospitalized form. She notes that when she eats seeds and nuts that she has some abdominal pain.     Past Medical History   Past Medical History:   Diagnosis Date    Anxiety      Carpal tunnel syndrome on right      Chronic back pain      Chronic kidney disease, stage 3 ([address]      Depression 10/28/2010    Diabetes mellitus type 2, uncontrolled ([address] 10/28/2010     with microalbuminuria    Diverticulosis      GERD (gastroesophageal reflux disease) 10/28/2010    Herniated intervertebral disk       neck and lower back    Hypercholesterolemia 10/28/2010    Hypertension 10/28/2010    Kidney stones      Neurofibroma of trunk       irritating mole on the back.  biopsy done 10/21/09    Obesity      Osteoarthritis      PAD (peripheral artery disease) (HCC)      Type II or unspecified type diabetes mellitus without mention of complication, not stated as uncontrolled      Vitamin D insufficiency              Past Surgical History         Past Surgical History:   Procedure Laterality Date    ECHO COMPLETE   3/19/2012          EYE MUSCLE SURGERY         as a child   [place] HYSTERECTOMY         with salpingoophorectomy    HYSTERECTOMY, TOTAL ABDOMINAL Bilateral 2003     cervix removed per patient    NERVE BLOCK Right 2018     lumbar transforaminal nerve block #1 with sedation    [place] KALIA NOSE/CLEFT LIP/TIP Right 2018     RIGHT LUMBAR TRANSFORAMINAL NERVE BLOCK #1 L3-4 L4-5 WITH IV SEDATIN performed by Kiko Forbes DO at [address] [place] [address]     Family History         Family History   Problem Relation Age of Onset    Lung Cancer Mother 71         mesothelioma metastatized to the brain    Diabetes Mother      High Cholesterol Mother      High Blood Pressure Mother      Arthritis Mother      Cancer Mother           lung to brain    Diabetes Father      Arthritis Father      High Blood Pressure Sister      Arthritis Sister      High Cholesterol Sister      High Blood Pressure Brother      Substance Abuse Brother      Breast Cancer Paternal Aunt            at 80y    High Blood Pressure Paternal Aunt      Cancer Maternal Grandmother [de-identified]         colorectal cancer    High Blood Pressure Maternal Grandmother      Heart Failure Maternal Grandmother      Arthritis Maternal Grandmother      High Cholesterol Maternal Grandmother      High Blood Pressure Paternal Grandmother      Stroke Paternal Grandmother      High Blood Pressure Maternal Aunt      High Cholesterol Maternal Aunt      Cancer Maternal Aunt           mesothelioma    Diabetes Maternal Uncle      High Blood Pressure Maternal Uncle      High Cholesterol Maternal Uncle      Substance Abuse Maternal Uncle      High Blood Pressure Paternal Uncle              No Known Allergies     Social History            Tobacco Use    Smoking status: Never Smoker    Smokeless tobacco: Never Used   Substance Use Topics    Alcohol use: Yes       Alcohol/week: 2.0 standard drinks       Types: 1 Glasses of wine, 1 Cans of beer per week       Comment: 1 time per month    Drug use:  No       Current Facility-Administered Medications          Current Outpatient Medications   Medication Sig Dispense Refill    polyethylene glycol-electrolytes (COLYTE) 240 g SOLR solution Take 4,000 mLs by mouth once for 1 dose 4000 mL 0    tiZANidine (ZANAFLEX) 4 MG tablet TAKE 1 TABLET BY MOUTH EVERY 6 HOURS AS NEEDED FOR MUSCLE SPASM 90 tablet 0    furosemide (LASIX) 20 MG tablet TAKE 1 TABLET (20MG) EVERY OTHER DAY ALTERNATING DOSE WITH 2 TABLETS (40MG) EVERY OTHER DAY 90 tablet 1    gabapentin (NEURONTIN) 800 MG tablet Take 1 tablet by mouth 3 times daily for 180 days. 270 tablet 1    metFORMIN (GLUCOPHAGE XR) 500 MG extended release tablet Take 1 tablet by mouth daily (with breakfast) 90 tablet 1    valsartan-hydroCHLOROthiazide (DIOVAN-HCT) 160-25 MG per tablet Take 160 tablets by mouth daily 160-25 po daily per Dr. Regina Vasquez 90 tablet 1    vitamin D3 (CHOLECALCIFEROL) 25 MCG (1000 UT) TABS tablet Take 2 tablets by mouth daily , prescribed by renal 180 tablet 1    busPIRone (BUSPAR) 5 MG tablet TAKE 1 TABLET BY MOUTH 3 TIMES DAILY 270 tablet 0    metoprolol (LOPRESSOR) 100 MG tablet TAKE 1 TABLET BY MOUTH 2 TIMES DAILY  180 tablet 1    allopurinol (ZYLOPRIM) 100 MG tablet TAKE 1 TABLET TWICE DAILY 180 tablet 1    insulin aspart (NOVOLOG FLEXPEN) 100 UNIT/ML injection pen USE AS DIRECTED BY YOUR PRESCRIBER.  COMPLETE DIRECTIONS ARE INCLUDED IN A LETTER WITH YOUR ORIGINAL ORDER 100 mL 2    DULoxetine (CYMBALTA) 60 MG extended release capsule TAKE 1 CAPSULE EVERY DAY 90 capsule 1    omeprazole (PRILOSEC) 40 MG delayed release capsule TAKE 1 CAPSULE EVERY DAY WITH BREAKFAST 90 capsule 1    potassium chloride (KLOR-CON M) 10 MEQ extended release tablet TAKE 1 TABLET TWICE DAILY 180 tablet 1    atorvastatin (LIPITOR) 80 MG tablet TAKE 1 TABLET EVERY NIGHT 90 tablet 1    cloNIDine (CATAPRES) 0.1 MG tablet TAKE 1 TABLET BY MOUTH 3 TIMES DAILY 270 tablet 1    celecoxib (CELEBREX) 200 MG capsule TAKE 1 CAPSULE EVERY DAY 90 capsule 1    LANTUS SOLOSTAR 100 UNIT/ML injection pen INJECT 55 UNITS INTO THE SKIN 2 TIMES DAILY 36 pen 1    blood glucose test strips (TRUE METRIX BLOOD GLUCOSE TEST) strip TEST BLOOD SUGAR UP TO 5 TIMES DAILY 500 strip 3    TRUEplus Lancets 33G MISC USE  TO  TEST  UP  TO FIVE TIMES DAILY 500 each 3    Insulin Pen Needle (B-D UF III MINI PEN NEEDLES) 31G X 5 MM MISC Use to inject insulin 5 times daily 500 each 3    zoster recombinant adjuvanted vaccine (SHINGRIX) 50 MCG/0.5ML SUSR injection Inject 0.5 mLs into the muscle See Admin Instructions 1 dose now and repeat in 2-6 months 0.5 mL 0      No current facility-administered medications for this visit.            Review of Systems   Constitutional: Negative. Negative for activity change, appetite change and unexpected weight change. HENT: Negative. Eyes: Negative. Respiratory: Negative. Negative for cough and shortness of breath. Cardiovascular: Negative. Negative for chest pain and leg swelling. Gastrointestinal: Negative. Negative for abdominal distention, abdominal pain, anal bleeding, blood in stool, constipation, diarrhea, nausea and vomiting. Endocrine: Negative. Genitourinary: Negative. Musculoskeletal: Positive for gait problem (Ambulates with cane or walker). Negative for arthralgias, back pain, joint swelling and myalgias. Skin: Negative. Allergic/Immunologic: Negative. Neurological: Negative for dizziness, weakness and headaches. Hematological: Negative. Psychiatric/Behavioral: Negative. Negative for confusion, decreased concentration and sleep disturbance.         BP (!) 156/77 (Site: Right Upper Arm, Position: Sitting, Cuff Size: Medium Adult)   Pulse 77   Temp 98.6 °F (37 °C) (Temporal)   Resp 14   Ht 5' 9\" (1.753 m)   Wt (!) 323 lb (146.5 kg)   SpO2 99%   BMI 47.70 kg/m²   Physical Exam  Constitutional:       Appearance: Normal appearance. She is obese.    HENT:      Head: Normocephalic and atraumatic. Nose: Nose normal.      Mouth/Throat:      Mouth: Mucous membranes are moist.      Pharynx: Oropharynx is clear. Eyes:      Extraocular Movements: Extraocular movements intact. Pupils: Pupils are equal, round, and reactive to light. Cardiovascular:      Rate and Rhythm: Normal rate and regular rhythm. Pulses: Normal pulses. Heart sounds: Normal heart sounds. Pulmonary:      Effort: Pulmonary effort is normal.      Breath sounds: Normal breath sounds. Abdominal:      General: There is no distension. Palpations: Abdomen is soft. Tenderness: There is no abdominal tenderness. Musculoskeletal:         General: No tenderness or signs of injury. Cervical back: Normal range of motion and neck supple. Skin:     General: Skin is warm and dry. Neurological:      General: No focal deficit present. Mental Status: She is alert and oriented to person, place, and time. Psychiatric:         Mood and Affect: Mood normal.         Behavior: Behavior normal.         Thought Content: Thought content normal.         Judgment: Judgment normal.               ASSESSMENT/PLAN:  1.  age as a risk factor for colon cancer  -- plan for colonoscopy     Prep:  golytely     Colonoscopy. The patient was explained the risks/benefits/alternatives/expected outcomes of the procedure. The patient was explained the risks of the procedure, including, but not limited to, the risk of reaction to the anesthesia medicine and the risk of perforation requiring further surgery. The patient was informed that they may require biopsy or polypectomy. These procedures may increase the risk of complication. All questions were answered.   The patient verbalized understanding and agreed to proceed.  Kelly Dumont MD, MSc, FACS  7/26/2021  11:11 AM

## 2021-09-08 ENCOUNTER — ANESTHESIA (OUTPATIENT)
Dept: ENDOSCOPY | Age: 66
End: 2021-09-08
Payer: MEDICARE

## 2021-09-08 ENCOUNTER — ANESTHESIA EVENT (OUTPATIENT)
Dept: ENDOSCOPY | Age: 66
End: 2021-09-08
Payer: MEDICARE

## 2021-09-08 ENCOUNTER — HOSPITAL ENCOUNTER (OUTPATIENT)
Age: 66
Setting detail: OUTPATIENT SURGERY
Discharge: HOME | End: 2021-09-08
Attending: SURGERY | Admitting: SURGERY
Payer: MEDICARE

## 2021-09-08 VITALS
RESPIRATION RATE: 20 BRPM | SYSTOLIC BLOOD PRESSURE: 93 MMHG | OXYGEN SATURATION: 100 % | DIASTOLIC BLOOD PRESSURE: 52 MMHG

## 2021-09-08 VITALS
TEMPERATURE: 97.3 F | OXYGEN SATURATION: 98 % | BODY MASS INDEX: 43.4 KG/M2 | HEART RATE: 71 BPM | HEIGHT: 69 IN | SYSTOLIC BLOOD PRESSURE: 150 MMHG | RESPIRATION RATE: 18 BRPM | WEIGHT: 293 LBS | DIASTOLIC BLOOD PRESSURE: 71 MMHG

## 2021-09-08 DIAGNOSIS — Z01.812 PRE-OPERATIVE LABORATORY EXAMINATION: Primary | ICD-10-CM

## 2021-09-08 LAB — METER GLUCOSE: 102 MG/DL (ref 74–99)

## 2021-09-08 PROCEDURE — 45385 COLONOSCOPY W/LESION REMOVAL: CPT | Performed by: SURGERY

## 2021-09-08 PROCEDURE — 7100000010 HC PHASE II RECOVERY - FIRST 15 MIN: Performed by: SURGERY

## 2021-09-08 PROCEDURE — 2709999900 HC NON-CHARGEABLE SUPPLY: Performed by: SURGERY

## 2021-09-08 PROCEDURE — 88305 TISSUE EXAM BY PATHOLOGIST: CPT

## 2021-09-08 PROCEDURE — 3700000001 HC ADD 15 MINUTES (ANESTHESIA): Performed by: SURGERY

## 2021-09-08 PROCEDURE — 2580000003 HC RX 258: Performed by: SURGERY

## 2021-09-08 PROCEDURE — 3609010600 HC COLONOSCOPY POLYPECTOMY SNARE/COLD BIOPSY: Performed by: SURGERY

## 2021-09-08 PROCEDURE — 7100000011 HC PHASE II RECOVERY - ADDTL 15 MIN: Performed by: SURGERY

## 2021-09-08 PROCEDURE — 6360000002 HC RX W HCPCS: Performed by: NURSE ANESTHETIST, CERTIFIED REGISTERED

## 2021-09-08 PROCEDURE — 3700000000 HC ANESTHESIA ATTENDED CARE: Performed by: SURGERY

## 2021-09-08 PROCEDURE — 82962 GLUCOSE BLOOD TEST: CPT

## 2021-09-08 RX ORDER — ACETAMINOPHEN 500 MG
500 TABLET ORAL EVERY 6 HOURS PRN
COMMUNITY

## 2021-09-08 RX ORDER — LIDOCAINE HYDROCHLORIDE 20 MG/ML
INJECTION, SOLUTION INTRAVENOUS PRN
Status: DISCONTINUED | OUTPATIENT
Start: 2021-09-08 | End: 2021-09-08 | Stop reason: SDUPTHER

## 2021-09-08 RX ORDER — SODIUM CHLORIDE 9 MG/ML
25 INJECTION, SOLUTION INTRAVENOUS PRN
Status: DISCONTINUED | OUTPATIENT
Start: 2021-09-08 | End: 2021-09-08 | Stop reason: HOSPADM

## 2021-09-08 RX ORDER — METOPROLOL TARTRATE 100 MG/1
TABLET ORAL
Qty: 180 TABLET | Refills: 1 | Status: SHIPPED
Start: 2021-09-08 | End: 2022-04-01 | Stop reason: SDUPTHER

## 2021-09-08 RX ORDER — SODIUM CHLORIDE 0.9 % (FLUSH) 0.9 %
5-40 SYRINGE (ML) INJECTION PRN
Status: DISCONTINUED | OUTPATIENT
Start: 2021-09-08 | End: 2021-09-08 | Stop reason: HOSPADM

## 2021-09-08 RX ORDER — TIZANIDINE 4 MG/1
TABLET ORAL
Qty: 90 TABLET | Refills: 0 | Status: SHIPPED
Start: 2021-09-08 | End: 2021-11-09

## 2021-09-08 RX ORDER — PROPOFOL 10 MG/ML
INJECTION, EMULSION INTRAVENOUS PRN
Status: DISCONTINUED | OUTPATIENT
Start: 2021-09-08 | End: 2021-09-08 | Stop reason: SDUPTHER

## 2021-09-08 RX ORDER — SODIUM CHLORIDE 9 MG/ML
INJECTION, SOLUTION INTRAVENOUS CONTINUOUS
Status: DISCONTINUED | OUTPATIENT
Start: 2021-09-08 | End: 2021-09-08 | Stop reason: HOSPADM

## 2021-09-08 RX ORDER — SODIUM CHLORIDE 0.9 % (FLUSH) 0.9 %
5-40 SYRINGE (ML) INJECTION EVERY 12 HOURS SCHEDULED
Status: DISCONTINUED | OUTPATIENT
Start: 2021-09-08 | End: 2021-09-08 | Stop reason: HOSPADM

## 2021-09-08 RX ADMIN — SODIUM CHLORIDE: 9 INJECTION, SOLUTION INTRAVENOUS at 09:57

## 2021-09-08 RX ADMIN — SODIUM CHLORIDE: 9 INJECTION, SOLUTION INTRAVENOUS at 09:49

## 2021-09-08 RX ADMIN — LIDOCAINE HYDROCHLORIDE 20 MG: 20 INJECTION, SOLUTION INTRAVENOUS at 10:03

## 2021-09-08 RX ADMIN — PROPOFOL 30 MG: 10 INJECTION, EMULSION INTRAVENOUS at 10:12

## 2021-09-08 RX ADMIN — PROPOFOL 70 MG: 10 INJECTION, EMULSION INTRAVENOUS at 10:08

## 2021-09-08 RX ADMIN — PROPOFOL 100 MG: 10 INJECTION, EMULSION INTRAVENOUS at 10:03

## 2021-09-08 RX ADMIN — PROPOFOL 70 MG: 10 INJECTION, EMULSION INTRAVENOUS at 10:14

## 2021-09-08 RX ADMIN — PROPOFOL 30 MG: 10 INJECTION, EMULSION INTRAVENOUS at 10:06

## 2021-09-08 ASSESSMENT — PAIN - FUNCTIONAL ASSESSMENT: PAIN_FUNCTIONAL_ASSESSMENT: 0-10

## 2021-09-08 ASSESSMENT — PAIN SCALES - GENERAL: PAINLEVEL_OUTOF10: 0

## 2021-09-08 NOTE — PROGRESS NOTES
Dr. Leena Barfield at the bedside.  Discharge instructions given and pt verbalized understanding of discharge instructions

## 2021-09-08 NOTE — OP NOTE
[facility]  PROCEDURE NOTE    DATE OF PROCEDURE: 9/8/2021    SURGEON: Shay Lala MD    ASSISTANT: None    PREOPERATIVE DIAGNOSIS: Low risk colorectal cancer screening    POSTOPERATIVE DIAGNOSIS: Severe pandiverticulosis, internal hemorrhoids grade 1, large external skin tags, thickened sigmoid colon, colonic spasms, descending colon polyp 5 mm    OPERATION: Total colonoscopy with cold snare polypectomy    ANESTHESIA: Local monitored anesthesia. ESTIMATED BLOOD LOSS (ml): 2cc    COMPLICATIONS: None    SPECIMENS:  Was Obtained: descending colon polyp    HISTORY: The patient is a 61y.o. year old female with history of above preop diagnosis. I recommended colonoscopy with possible biopsy or polypectomy and I explained the risk, benefits, expected outcome, and alternatives to the procedure. Risks included but are not limited to bleeding, infection, respiratory distress, hypotension, and perforation of the colon. The patient understands and is in agreement. PROCEDURE: The patient was given IV conscious sedation per anesthesia. The patient was given supplemental oxygen by nasal cannula. The colonoscope was inserted per rectum and advanced under direct vision to the cecum without difficulty. The prep was good so exam was adequate. [place] bowel prep scale: 3+3+1 = 7    FINDINGS:  Cecum/Ascending colon: Large, many diverticuli; appendiceal orifice identified, ileocecal valve identified unable to intubate the terminal ileum, unable to retroflex    Transverse colon: Large, many diverticuli    Descending/Sigmoid colon: 5 mm polyp removed with cold snare at 60 cm of the descending colon; large many diverticuli; large external mass likely blood vessel concern for aneurysm    Rectum/Anus: examined in normal and retroflexed positions and was grade 1 internal hemorrhoids, very large external skin tags    The colon was decompressed and the scope was removed.   The withdraw time was approximately 10 minutes. The patient tolerated the procedure well. ASSESSMENT/PLAN:   1. Review chart for any previous CAT scans will need CAT scan or ultrasound to see if she is got any abnormality to her blood vessels  2. Diverticulosis --Avoid constipation drink plenty of fluids stool softeners if needed  3. Hemorrhoids over-the-counter meds as needed avoid constipation drink plenty fluids stool softeners if needed  4.  Recommend follow up colonoscopy in 10 years depending on pathology    Electronically signed by Anahi Yarbrough MD on 9/8/21 at 10:25 AM EDT

## 2021-09-08 NOTE — PROGRESS NOTES
Patient admitted to [facility], placed on all appropriate monitors, all siderails up, cart locked,bed in low position.

## 2021-09-08 NOTE — ANESTHESIA PRE PROCEDURE
Department of Anesthesiology  Preprocedure Note       Name:  Janeice Lesches   Age:  61 y.o.  :  1955                                          MRN:  [id number]         Date:  2021      Surgeon: Roberto Aleman):  Leticia Tiwari MD    Procedure: Procedure(s):  COLORECTAL CANCER SCREENING, NOT HIGH RISK    Medications prior to admission:   Prior to Admission medications    Medication Sig Start Date End Date Taking? Authorizing Provider   acetaminophen (TYLENOL) 500 MG tablet Take 500 mg by mouth every 6 hours as needed for Pain   Yes Historical Provider, MD   tiZANidine (ZANAFLEX) 4 MG tablet TAKE 1 TABLET BY MOUTH EVERY 6 HOURS AS NEEDED FOR MUSCLE SPASM 21  Yes Jero Krause MD   furosemide (LASIX) 20 MG tablet TAKE 1 TABLET (20MG) EVERY OTHER DAY ALTERNATING DOSE WITH 2 TABLETS (40MG) EVERY OTHER DAY 21  Yes Jero Krause MD   gabapentin (NEURONTIN) 800 MG tablet Take 1 tablet by mouth 3 times daily for 180 days. 21 Yes Jero Krause MD   valsartan-hydroCHLOROthiazide (DIOVAN-HCT) 160-25 MG per tablet Take 160 tablets by mouth daily 160-25 po daily per Dr. Brenda Verduzco 21  Yes Jero Krause MD   vitamin D3 (CHOLECALCIFEROL) 25 MCG (1000 UT) TABS tablet Take 2 tablets by mouth daily , prescribed by renal 21  Yes Jero Krause MD   busPIRone (BUSPAR) 5 MG tablet TAKE 1 TABLET BY MOUTH 3 TIMES DAILY 21  Yes Jero Krause MD   metoprolol (LOPRESSOR) 100 MG tablet TAKE 1 TABLET BY MOUTH 2 TIMES DAILY  21  Yes Jero Krause MD   allopurinol (ZYLOPRIM) 100 MG tablet TAKE 1 TABLET TWICE DAILY 21  Yes Jero Krause MD   insulin aspart (NOVOLOG FLEXPEN) 100 UNIT/ML injection pen USE AS DIRECTED BY YOUR PRESCRIBER.  COMPLETE DIRECTIONS ARE INCLUDED IN A LETTER WITH YOUR ORIGINAL ORDER 21  Yes Jero Krause MD   DULoxetine (CYMBALTA) 60 MG extended release capsule TAKE 1 CAPSULE EVERY DAY 21  Yes Jero Kruase MD omeprazole (PRILOSEC) 40 MG delayed release capsule TAKE 1 CAPSULE EVERY DAY WITH BREAKFAST 5/6/21  Yes Charline Desai MD   potassium chloride (KLOR-CON M) 10 MEQ extended release tablet TAKE 1 TABLET TWICE DAILY 5/6/21  Yes Charline Desai MD   atorvastatin (LIPITOR) 80 MG tablet TAKE 1 TABLET EVERY NIGHT 5/6/21  Yes Charline Desai MD   cloNIDine (CATAPRES) 0.1 MG tablet TAKE 1 TABLET BY MOUTH 3 TIMES DAILY 5/6/21  Yes Charline Desai MD   celecoxib (CELEBREX) 200 MG capsule TAKE 1 CAPSULE EVERY DAY 5/6/21  Yes Charline Desai MD   LANTUS SOLOSTAR 100 UNIT/ML injection pen INJECT 55 UNITS INTO THE SKIN 2 TIMES DAILY 5/6/21  Yes Charline Desai MD   metFORMIN (GLUCOPHAGE XR) 500 MG extended release tablet Take 1 tablet by mouth daily (with breakfast) 6/4/21   Charline Desai MD   blood glucose test strips (TRUE METRIX BLOOD GLUCOSE TEST) strip TEST BLOOD SUGAR UP TO 5 TIMES DAILY 2/3/21   Charline Desai MD   TRUEplus Lancets 33G MISC USE  TO  TEST  UP  TO FIVE TIMES DAILY 2/3/21   Charline Desai MD   Insulin Pen Needle (B-D UF III MINI PEN NEEDLES) 31G X 5 MM MISC Use to inject insulin 5 times daily 1/8/21   Charline Desai MD   insulin lispro (HUMALOG KWIKPEN) 100 UNIT/ML injection Inject 10 Units into the skin 3 times daily (before meals).  5/17/12 5/17/12  Lazaro Rivera MD       Current medications:    Current Facility-Administered Medications   Medication Dose Route Frequency Provider Last Rate Last Admin    0.9 % sodium chloride infusion   IntraVENous Continuous Jorge Atkinson MD 75 mL/hr at 09/08/21 0949 New Bag at 09/08/21 0949    0.9 % sodium chloride infusion  25 mL IntraVENous PRN Jorge Atkinson MD        sodium chloride flush 0.9 % injection 5-40 mL  5-40 mL IntraVENous 2 times per day Jorge Atkinson MD        sodium chloride flush 0.9 % injection 5-40 mL  5-40 mL IntraVENous PRN Jorge Atkinson MD           Allergies:  No Known Allergies    Problem List:    Patient Active Problem List   Diagnosis Code    Diabetes mellitus type 2, uncontrolled ([address] E11.65    Hypertension I10    Hypercholesterolemia E78.00    GERD (gastroesophageal reflux disease) K21.9    Depression F32.9    Enlarged liver R16.0    Heart murmur R01.1    Chronic lower back pain M54.5, G89.29    Facet joint disease M47.819    DJD (degenerative joint disease), lumbar M47.816    Bulging lumbar disc M51.26    Back pain M54.9    Anxiety F41.9    Bilateral leg edema R60.0    Microalbuminuria R80.9    Vitamin D insufficiency E55.9    Chronic kidney disease, stage 3, mod decreased GFR (HCC) N18.30    Senile nuclear cataract H25.10    Fatty liver K76.0    Diabetic peripheral neuropathy ([facility]) E11.42    Neural foraminal stenosis of lumbar spine M48.061    Type 2 diabetes mellitus with both eyes affected by mild nonproliferative retinopathy without macular edema, with long-term current use of insulin ([address] S37.7377, Z79.4    Spinal stenosis of lumbar region M48.061    Major depressive disorder, recurrent, in partial remission ([address] F33.41       Past Medical History:        Diagnosis Date    Anxiety     Carpal tunnel syndrome on right     Chronic back pain     Chronic kidney disease, stage 3 ([address]     Depression 10/28/2010    Diabetes mellitus type 2, uncontrolled ([address] 10/28/2010    with microalbuminuria    Diverticulosis     GERD (gastroesophageal reflux disease) 10/28/2010    Herniated intervertebral disk     neck and lower back    Hypercholesterolemia 10/28/2010    Hypertension 10/28/2010    Kidney stones     Neurofibroma of trunk     irritating mole on the back.  biopsy done 10/21/09    Obesity     Osteoarthritis     PAD (peripheral artery disease) ([facility])     Type II or unspecified type diabetes mellitus without mention of complication, not stated as uncontrolled     Vitamin D insufficiency        Past Surgical History:        Procedure Laterality Date    ECHO COMPLETE  3/19/2012         EYE MUSCLE SURGERY      as a child    HYSTERECTOMY      with salpingoophorectomy    HYSTERECTOMY, TOTAL ABDOMINAL Bilateral 2003    cervix removed per patient    NERVE BLOCK Right 05/01/2018    lumbar transforaminal nerve block #1 with sedation    SD KALIA NOSE/CLEFT LIP/TIP Right 5/1/2018    RIGHT LUMBAR TRANSFORAMINAL NERVE BLOCK #1 L3-4 L4-5 WITH IV SEDATIN performed by Cyril Mancuso DO at [address] History:    Social History     Tobacco Use    Smoking status: Never Smoker    Smokeless tobacco: Never Used   Substance Use Topics    Alcohol use: Yes     Alcohol/week: 2.0 standard drinks     Types: 1 Glasses of wine, 1 Cans of beer per week     Comment: 1 time per month                                Counseling given: Not Answered      Vital Signs (Current):   Vitals:    09/08/21 0905   BP: (!) 179/85   Pulse: 78   Resp: 17   Temp: 36.1 °C (97 °F)   TempSrc: Temporal   SpO2: 97%   Weight: (!) 330 lb (149.7 kg)   Height: 5' 9\" (1.753 m)                                              BP Readings from Last 3 Encounters:   09/08/21 (!) 179/85   09/08/21 (!) 198/92   07/26/21 (!) 156/77       NPO Status: Time of last liquid consumption: 0700                        Time of last solid consumption: 1900                        Date of last liquid consumption: 09/08/21                        Date of last solid food consumption: 09/06/21    BMI:   Wt Readings from Last 3 Encounters:   09/08/21 (!) 330 lb (149.7 kg)   07/26/21 (!) 323 lb (146.5 kg)   11/06/20 (!) 329 lb (149.2 kg)     Body mass index is 48.73 kg/m².     CBC:   Lab Results   Component Value Date    WBC 11.5 06/04/2021    RBC 4.49 06/04/2021    HGB 12.2 06/04/2021    HCT 41.3 06/04/2021    MCV 92.0 06/04/2021    RDW 14.2 06/04/2021     06/04/2021       CMP:   Lab Results   Component Value Date     06/04/2021    K 4.4 06/04/2021     06/04/2021    CO2 29 06/04/2021    BUN 25 06/04/2021    CREATININE 1.5 06/04/2021    GFRAA 42 06/04/2021    LABGLOM 42 06/04/2021    GLUCOSE 145 06/04/2021    GLUCOSE 138 05/09/2012    PROT 7.4 06/04/2021    CALCIUM 10.2 06/04/2021    BILITOT 0.4 06/04/2021    ALKPHOS 143 06/04/2021    AST 21 06/04/2021    ALT 19 06/04/2021       POC Tests: No results for input(s): POCGLU, POCNA, POCK, POCCL, POCBUN, POCHEMO, POCHCT in the last 72 hours. Coags: No results found for: PROTIME, INR, APTT    HCG (If Applicable): No results found for: PREGTESTUR, PREGSERUM, HCG, HCGQUANT     ABGs: No results found for: PHART, PO2ART, YDJ1BIC, LUC1XUW, BEART, C5NABFZN     Type & Screen (If Applicable):  No results found for: LABABO, LABRH    Drug/Infectious Status (If Applicable):  No results found for: HIV, HEPCAB    COVID-19 Screening (If Applicable): No results found for: COVID19        Anesthesia Evaluation  Patient summary reviewed and Nursing notes reviewed no history of anesthetic complications:   Airway: Mallampati: III  TM distance: >3 FB   Neck ROM: full  Mouth opening: > = 3 FB Dental:          Pulmonary: breath sounds clear to auscultation            Patient did not smoke on day of surgery. Cardiovascular:    (+) hypertension: moderate,         Rhythm: regular  Rate: normal           Beta Blocker:  Dose within 24 Hrs         Neuro/Psych:   (+) neuromuscular disease:, psychiatric history: stable with treatment            GI/Hepatic/Renal:   (+) GERD: well controlled, liver disease:,           Endo/Other:    (+) DiabetesType I DM, well controlled, using insulin, . Abdominal:             Vascular: Other Findings:             Anesthesia Plan      MAC     ASA 3       Induction: intravenous. Anesthetic plan and risks discussed with patient. Use of blood products discussed with patient whom consented to blood products.      Attending anesthesiologist reviewed and agrees with Preprocedure content              Jorge Nj APRN - CRNA   9/8/2021

## 2021-09-08 NOTE — ANESTHESIA POSTPROCEDURE EVALUATION
Department of Anesthesiology  Postprocedure Note    Patient: Jamar Francis  MRN: [id number]  YOB: 1955  Date of evaluation: 9/8/2021  Time:  2:03 PM     Procedure Summary     Date: 09/08/21 Room / Location: [facility] / [facility]    Anesthesia Start: 4340 Anesthesia Stop: 8236    Procedure: COLONOSCOPY POLYPECTOMY SNARE/COLD BIOPSY (N/A ) Diagnosis: (LOW SCREENING)    Surgeons: Yaneli Lagunas MD Responsible Provider: Andrea Carrizales MD    Anesthesia Type: MAC ASA Status: 3          Anesthesia Type: MAC    Dany Phase I: Dany Score: 10    Dany Phase II: Dany Score: 10    Last vitals: Reviewed and per EMR flowsheets.        Anesthesia Post Evaluation    Patient location during evaluation: PACU  Patient participation: complete - patient participated  Level of consciousness: awake and alert  Airway patency: patent  Nausea & Vomiting: no nausea and no vomiting  Complications: no  Cardiovascular status: hemodynamically stable  Respiratory status: acceptable  Hydration status: euvolemic

## 2021-09-08 NOTE — INTERVAL H&P NOTE
Update History & Physical    The patient's History and Physical of July 26, 2021 was reviewed with the patient and I examined the patient. There was no change. The surgical site was confirmed by the patient and me. Plan: The risks, benefits, expected outcome, and alternative to the recommended procedure have been discussed with the patient. Patient understands and wants to proceed with the procedure.      Electronically signed by Mirian Middleton MD on 9/8/2021 at 9:26 AM

## 2021-09-09 RX ORDER — ALLOPURINOL 100 MG/1
TABLET ORAL
Qty: 180 TABLET | Refills: 1 | Status: SHIPPED
Start: 2021-09-09 | End: 2022-04-01 | Stop reason: SDUPTHER

## 2021-09-09 RX ORDER — FUROSEMIDE 20 MG/1
TABLET ORAL
Qty: 135 TABLET | Refills: 1 | Status: SHIPPED
Start: 2021-09-09 | End: 2021-12-10 | Stop reason: SDUPTHER

## 2021-09-09 NOTE — TELEPHONE ENCOUNTER
Last Appointment:  7/7/2021  Future Appointments   Date Time Provider Eloise Manzanaresi   9/17/2021 10:00 AM [facility]   10/11/2021  8:40 AM Landen Melendez MD [facility]

## 2021-09-13 PROBLEM — D36.10 GANGLIONEUROMA: Status: ACTIVE | Noted: 2021-09-13

## 2021-09-15 ENCOUNTER — TELEPHONE (OUTPATIENT)
Dept: FAMILY MEDICINE CLINIC | Age: 66
End: 2021-09-15

## 2021-09-15 NOTE — TELEPHONE ENCOUNTER
Coco called in and is concerned with her recent colonoscopy. She states that they told her she may have an aneurism or vessel abnormalities. She states that they told her she would need a ct scan to see what it is. She is asking if you could review the records and advise her as to what she should do from here.     Please advise    Thank you

## 2021-09-16 NOTE — TELEPHONE ENCOUNTER
I spoke with the patient yesterday regarding the enlarged blood vessels that were seen on her csope and recommendations for a cta. We will discuss more at her appointment on 9/17.

## 2021-09-17 ENCOUNTER — OFFICE VISIT (OUTPATIENT)
Dept: FAMILY MEDICINE CLINIC | Age: 66
End: 2021-09-17
Payer: MEDICARE

## 2021-09-17 VITALS
DIASTOLIC BLOOD PRESSURE: 92 MMHG | OXYGEN SATURATION: 96 % | BODY MASS INDEX: 43.4 KG/M2 | HEART RATE: 79 BPM | RESPIRATION RATE: 16 BRPM | SYSTOLIC BLOOD PRESSURE: 152 MMHG | HEIGHT: 69 IN | WEIGHT: 293 LBS | TEMPERATURE: 97.2 F

## 2021-09-17 DIAGNOSIS — I10 ESSENTIAL HYPERTENSION: ICD-10-CM

## 2021-09-17 DIAGNOSIS — E11.42 DIABETIC PERIPHERAL NEUROPATHY (HCC): ICD-10-CM

## 2021-09-17 DIAGNOSIS — R10.84 GENERALIZED ABDOMINAL PAIN: ICD-10-CM

## 2021-09-17 DIAGNOSIS — E11.65 UNCONTROLLED TYPE 2 DIABETES MELLITUS WITH HYPERGLYCEMIA (HCC): Primary | ICD-10-CM

## 2021-09-17 DIAGNOSIS — Z23 NEED FOR SHINGLES VACCINE: ICD-10-CM

## 2021-09-17 DIAGNOSIS — F41.9 ANXIETY: ICD-10-CM

## 2021-09-17 DIAGNOSIS — N18.32 STAGE 3B CHRONIC KIDNEY DISEASE (HCC): ICD-10-CM

## 2021-09-17 DIAGNOSIS — M48.061 NEURAL FORAMINAL STENOSIS OF LUMBAR SPINE: ICD-10-CM

## 2021-09-17 LAB — HBA1C MFR BLD: 7.3 %

## 2021-09-17 PROCEDURE — 83036 HEMOGLOBIN GLYCOSYLATED A1C: CPT | Performed by: FAMILY MEDICINE

## 2021-09-17 PROCEDURE — 3051F HG A1C>EQUAL 7.0%<8.0%: CPT | Performed by: FAMILY MEDICINE

## 2021-09-17 PROCEDURE — G8427 DOCREV CUR MEDS BY ELIG CLIN: HCPCS | Performed by: FAMILY MEDICINE

## 2021-09-17 PROCEDURE — 36415 COLL VENOUS BLD VENIPUNCTURE: CPT | Performed by: FAMILY MEDICINE

## 2021-09-17 PROCEDURE — 3017F COLORECTAL CA SCREEN DOC REV: CPT | Performed by: FAMILY MEDICINE

## 2021-09-17 PROCEDURE — 2022F DILAT RTA XM EVC RTNOPTHY: CPT | Performed by: FAMILY MEDICINE

## 2021-09-17 PROCEDURE — 99214 OFFICE O/P EST MOD 30 MIN: CPT | Performed by: FAMILY MEDICINE

## 2021-09-17 PROCEDURE — 99212 OFFICE O/P EST SF 10 MIN: CPT | Performed by: FAMILY MEDICINE

## 2021-09-17 PROCEDURE — 1036F TOBACCO NON-USER: CPT | Performed by: FAMILY MEDICINE

## 2021-09-17 PROCEDURE — G8417 CALC BMI ABV UP PARAM F/U: HCPCS | Performed by: FAMILY MEDICINE

## 2021-09-17 RX ORDER — BUSPIRONE HYDROCHLORIDE 5 MG/1
5 TABLET ORAL 3 TIMES DAILY
Qty: 270 TABLET | Refills: 0 | Status: SHIPPED
Start: 2021-09-17 | End: 2021-11-09

## 2021-09-17 RX ORDER — ZOSTER VACCINE RECOMBINANT, ADJUVANTED 50 MCG/0.5
0.5 KIT INTRAMUSCULAR SEE ADMIN INSTRUCTIONS
Qty: 0.5 ML | Refills: 1 | Status: SHIPPED | OUTPATIENT
Start: 2021-09-17 | End: 2022-03-16

## 2021-09-17 RX ORDER — PREGABALIN 75 MG/1
75 CAPSULE ORAL 2 TIMES DAILY
Qty: 60 CAPSULE | Refills: 1 | Status: SHIPPED
Start: 2021-09-17 | End: 2021-10-11 | Stop reason: DRUGHIGH

## 2021-09-17 RX ORDER — PREGABALIN 75 MG/1
75 CAPSULE ORAL 2 TIMES DAILY
Qty: 60 CAPSULE | Refills: 1 | Status: SHIPPED | OUTPATIENT
Start: 2021-09-17 | End: 2021-09-17 | Stop reason: SDUPTHER

## 2021-09-17 NOTE — PROGRESS NOTES
[address] [place] [address]  FAMILY MEDICINE RESIDENCY PROGRAM   OFFICE PROGRESS NOTE  DATE OF VISIT : 21    Patient : Wyatt Nixon    : female   Age : 61 y.o.  : 1955           Chief Complaint :   Chief Complaint   Patient presents with    Diabetes     f/u    Other     f# [phone] Dr Benedict lab test results       HPI:   61 y.o. female comes in today for follow up of dm2, htn, obesity and concerns of prominent blood vessels seen on recent colonoscopy. She feels worried about this today and attributes her elevated blood pressure to this. Pt reports she has continued ongoing back pain with radicular pain down to her thighs. Also peripheral neuropathy only mildly helped by gabapentin. She got some relief with aquatherapy. She has been trying to watch what she is eating to lose weight. No red flags. She has a follow up with Dr. Keri Rain for CKD in October. Colonoscopy was ok. Had a ganglioneuroma only. Has had abdominal pain.      Patient Active Problem List   Diagnosis    Diabetes mellitus type 2, uncontrolled ([address]    Hypertension    Hypercholesterolemia    GERD (gastroesophageal reflux disease)    Depression    Enlarged liver    Heart murmur    Chronic lower back pain    Facet joint disease    DJD (degenerative joint disease), lumbar    Bulging lumbar disc    Back pain    Anxiety    Bilateral leg edema    Microalbuminuria    Vitamin D insufficiency    Chronic kidney disease, stage 3, mod decreased GFR (HCC)    Senile nuclear cataract    Fatty liver    Diabetic peripheral neuropathy (HCC)    Neural foraminal stenosis of lumbar spine    Type 2 diabetes mellitus with both eyes affected by mild nonproliferative retinopathy without macular edema, with long-term current use of insulin ([address]    Spinal stenosis of lumbar region    Major depressive disorder, recurrent, in partial remission ([address]    Ganglioneuroma       Past Medical History:   Diagnosis Date  Anxiety     Carpal tunnel syndrome on right     Chronic back pain     [facility]     Depression 10/28/2010    Diabetes mellitus type 2, uncontrolled ([facility] 10/28/2010    with microalbuminuria    Diverticulosis     GERD (gastroesophageal reflux disease) 10/28/2010    Herniated intervertebral disk     neck and lower back    Hypercholesterolemia 10/28/2010    Hypertension 10/28/2010    Kidney stones     Neurofibroma of trunk     irritating mole on the back. biopsy done 10/21/09    Obesity     Osteoarthritis     PAD [facility]peripheral artery disease) ([facility])     Type II or unspecified type diabetes mellitus without mention of complication, not stated as uncontrolled     Vitamin D insufficiency        Current Outpatient Medications on File Prior to Visit   Medication Sig Dispense Refill    furosemide (LASIX) 20 MG tablet TAKE 1 TABLET EVERY OTHER DAY ALTERNATING WITH 2 TABLETS EVERY OTHER  tablet 1    allopurinol (ZYLOPRIM) 100 MG tablet TAKE 1 TABLET TWICE DAILY 180 tablet 1    tiZANidine (ZANAFLEX) 4 MG tablet TAKE 1 TABLET BY MOUTH EVERY 6 HOURS AS NEEDED FOR MUSCLE SPASM 90 tablet 0    metoprolol (LOPRESSOR) 100 MG tablet TAKE 1 TABLET TWICE DAILY 180 tablet 1    acetaminophen (TYLENOL) 500 MG tablet Take 500 mg by mouth every 6 hours as needed for Pain      metFORMIN (GLUCOPHAGE XR) 500 MG extended release tablet Take 1 tablet by mouth daily (with breakfast) 90 tablet 1    valsartan-hydroCHLOROthiazide (DIOVAN-HCT) 160-25 MG per tablet Take 160 tablets by mouth daily 160-25 po daily per Dr. Jarrod Regalado 90 tablet 1    vitamin D3 (CHOLECALCIFEROL) 25 MCG (1000 UT) TABS tablet Take 2 tablets by mouth daily , prescribed by renal 180 tablet 1    insulin aspart (NOVOLOG FLEXPEN) 100 UNIT/ML injection pen USE AS DIRECTED BY YOUR PRESCRIBER.  COMPLETE DIRECTIONS ARE INCLUDED IN A LETTER WITH YOUR ORIGINAL ORDER 100 mL 2    DULoxetine (CYMBALTA) 60 MG extended release capsule TAKE 1 CAPSULE EVERY DAY 90 capsule 1    omeprazole (PRILOSEC) 40 MG delayed release capsule TAKE 1 CAPSULE EVERY DAY WITH BREAKFAST 90 capsule 1    potassium chloride (KLOR-CON M) 10 MEQ extended release tablet TAKE 1 TABLET TWICE DAILY 180 tablet 1    atorvastatin (LIPITOR) 80 MG tablet TAKE 1 TABLET EVERY NIGHT 90 tablet 1    cloNIDine (CATAPRES) 0.1 MG tablet TAKE 1 TABLET BY MOUTH 3 TIMES DAILY 270 tablet 1    LANTUS SOLOSTAR 100 UNIT/ML injection pen INJECT 55 UNITS INTO THE SKIN 2 TIMES DAILY 36 pen 1    blood glucose test strips (TRUE METRIX BLOOD GLUCOSE TEST) strip TEST BLOOD SUGAR UP TO 5 TIMES DAILY 500 strip 3    TRUEplus Lancets 33G MISC USE  TO  TEST  UP  TO FIVE TIMES DAILY 500 each 3    Insulin Pen Needle (B-D UF III MINI PEN NEEDLES) 31G X 5 MM MISC Use to inject insulin 5 times daily 500 each 3    [DISCONTINUED] insulin lispro (HUMALOG KWIKPEN) 100 UNIT/ML injection Inject 10 Units into the skin 3 times daily (before meals). 3 Pen 6     No current facility-administered medications on file prior to visit.        No Known Allergies    Family History   Problem Relation Age of Onset    Lung Cancer Mother 71        mesothelioma metastatized to the brain    Diabetes Mother     High Cholesterol Mother     High Blood Pressure Mother     Arthritis Mother     Cancer Mother         lung to brain    Diabetes Father     Arthritis Father     High Blood Pressure Sister     Arthritis Sister     High Cholesterol Sister     High Blood Pressure Brother     Substance Abuse Brother     Breast Cancer Paternal Aunt          at 80y    High Blood Pressure Paternal Aunt     Cancer Maternal Grandmother [de-identified]        colorectal cancer    High Blood Pressure Maternal Grandmother     Heart Failure Maternal Grandmother     Arthritis Maternal Grandmother     High Cholesterol Maternal Grandmother     High Blood Pressure Paternal Grandmother     Stroke Paternal Grandmother     High Blood Pressure Maternal Aunt     High Cholesterol Maternal Aunt     Cancer Maternal Aunt         mesothelioma    Diabetes Maternal Uncle     High Blood Pressure Maternal Uncle     High Cholesterol Maternal Uncle     Substance Abuse Maternal Uncle     High Blood Pressure Paternal Uncle        Past Surgical History:   Procedure Laterality Date    COLONOSCOPY N/A 9/8/2021    COLONOSCOPY POLYPECTOMY SNARE/COLD BIOPSY performed by Casper Smith MD at [address] [facility] ECHO COMPLETE  3/19/2012         EYE MUSCLE SURGERY      as a child    HYSTERECTOMY      with salpingoophorectomy    HYSTERECTOMY, TOTAL ABDOMINAL Bilateral 2003    cervix removed per patient    NERVE BLOCK Right 05/01/2018    lumbar transforaminal nerve block #1 with sedation    TX KALIA NOSE/CLEFT LIP/TIP Right 5/1/2018    RIGHT LUMBAR TRANSFORAMINAL NERVE BLOCK #1 L3-4 L4-5 WITH IV SEDATIN performed by Hannah Harris DO at [address] History     Tobacco Use    Smoking status: Never Smoker    Smokeless tobacco: Never Used   Substance Use Topics    Alcohol use: Yes     Alcohol/week: 2.0 standard drinks     Types: 1 Glasses of wine, 1 Cans of beer per week     Comment: 1 time per month    Drug use: No       Review of Systems  Review of Systems    Physical Exam:  VS:  Blood pressure (!) 152/92, pulse 79, temperature 97.2 °F (36.2 °C), temperature source Temporal, resp. rate 16, height 5' 9\" (1.753 m), weight (!) 330 lb (149.7 kg), SpO2 96 %, not currently breastfeeding. Physical Exam  Constitutional:       Appearance: She is well-developed. HENT:      Head: Normocephalic and atraumatic. Cardiovascular:      Rate and Rhythm: Normal rate and regular rhythm. Heart sounds: No murmur heard. No friction rub. No gallop. Pulmonary:      Breath sounds: Normal breath sounds. No wheezing, rhonchi or rales. Abdominal:      General: Bowel sounds are normal.      Palpations: Abdomen is soft. There is no mass. Tenderness: There is no abdominal tenderness. Skin:     Nails: There is no clubbing. Back:  ttp over the ls spine, nl sensation and ms 5/5, dtrs 1+ b/l,  pain with elevation of the b/l legs and pain to posterior calves  Antalgic gait    Assessment/Plan:  1. Uncontrolled type 2 diabetes mellitus with hyperglycemia (HCC)  a1c down to 7.3 from 7.5, continue current regimen  - POCT glycosylated hemoglobin (Hb A1C)    2. Generalized abdominal pain  Evaluate ileal vessels for a possible aneursym  - CTA ABDOMEN PELVIS W CONTRAST; Future  - Comprehensive Metabolic Panel; Future    3. Essential hypertension  Suspect elevated is due to white coat htn today, if persisently elevated, would increase losartan    4. Stage 3b chronic kidney disease ([facility]  Labs today to be faxed to Dr. Kalyn Ch before cta. Stop mobic  - Vitamin D 25 Hydroxy; Future  - PHOSPHORUS; Future  - PTH, Intact; Future  - CBC WITH AUTO DIFFERENTIAL; Future    5. Anxiety  refill  - busPIRone (BUSPAR) 5 MG tablet; Take 1 tablet by mouth 3 times daily  Dispense: 270 tablet; Refill: 0    6. Diabetic peripheral neuropathy ([facility]  Advised patient to taper off of gabapentin 800 mg tid and change to lyrica  - pregabalin (LYRICA) 75 MG capsule; Take 1 capsule by mouth 2 times daily for 60 days. Dispense: 60 capsule; Refill: 1    7. Neural foraminal stenosis of lumbar spine  Aquatherapy; could consider [employer] in the future. She has an appointment with PMR on 10/11 for possible spinal injections. - [facility] [place]    8. Need for shingles vaccine  Script given  - zoster recombinant adjuvanted vaccine [facility]) 50 MCG/0.5ML SUSR injection; Inject 0.5 mLs into the muscle See Admin Instructions 1 dose now and repeat in 2-6 months  Dispense: 0.5 mL; Refill: 1    Consider increasing clonidine if bp remains elevated.  Some anxiety today    Additional plan and future considerations:       Return in about 3 months (around 12/17/2021) for dm and back pain.     Signed by : Killian Wheeler MD

## 2021-09-17 NOTE — PATIENT INSTRUCTIONS
Patient Education        Learning About Diabetes and Your Teeth  How does diabetes affect your teeth and gums? When you have diabetes, managing blood sugar levels and taking good care of your teeth and gums are both important. When blood sugar levels are high, there's a greater risk for:  · Gum (periodontal) disease. · Tooth decay. · Fungal infections in the mouth, like thrush. · Dry mouth, or xerostomia (say \"corrie-christos-STO-dino-uh\"). The mouth needs saliva to neutralize the acids in your mouth. These acids can lead to gum disease and tooth decay. Keeping your blood sugar levels in your target range can help prevent problems with the teeth and gums. If you have any problems with your teeth or gums, see your dentist.  How do you care for your teeth and gums when you have diabetes? · Brush your teeth twice a day. · Floss daily. Make sure to press the floss against your teeth and not your gums. · Check each day for areas where your gums might be red or painful. Be sure to let your dentist know of any sores in your mouth. · See your dentist regularly for professional cleaning of your teeth and to look for gum problems. Many dentists recommend getting checkups twice a year. Remind your dentist that you have diabetes before any work is done. · Don't smoke or use smokeless tobacco. Tobacco use with diabetes can lead to a greater risk of severe gum disease. If you need help quitting, talk to your doctor about stop-smoking programs and medicines. These can increase your chances of quitting for good. Follow-up care is a key part of your treatment and safety. Be sure to make and go to all appointments, and call your doctor if you are having problems. It's also a good idea to know your test results and keep a list of the medicines you take. Where can you learn more? Go to https://keshav.[employer]. org and sign in to your [employer] account.  Enter P621 in the Understory box to learn more about \"Learning About Diabetes and Your Teeth. \"     If you do not have an account, please click on the \"Sign Up Now\" link. Current as of: August 31, 2020               Content Version: 12.9  © 2006-2021 Healthwise, Incorporated. Care instructions adapted under license by [facility] ([facility]). If you have questions about a medical condition or this instruction, always ask your healthcare professional. [place] [address] any warranty or liability for your use of this information.

## 2021-10-06 ENCOUNTER — HOSPITAL ENCOUNTER (OUTPATIENT)
Dept: PHYSICAL THERAPY | Age: 66
Setting detail: THERAPIES SERIES
Discharge: HOME | End: 2021-10-06
Payer: MEDICARE

## 2021-10-06 NOTE — PROGRESS NOTES
[facility]  Phone: [phone] Fax: [phone]     Physical Therapy  Cancellation/No-show Note  Patient Name:  Rey Maher  :  1955   Date:  10/6/2021    For today's appointment patient:  [x]  Cancelled  []  Rescheduled appointment  []  No-show     Reason given by patient:  []  Patient ill  []  Conflicting appointment  []  No transportation    []  Conflict with work  []  No reason given  [x]  Other:     Comments: Patient cancelled PT evaluation for this date                                  Appointment not rescheduled at this time      Electronically signed by:  Linsey Peck, [facility]

## 2021-10-10 ENCOUNTER — HOSPITAL ENCOUNTER (OUTPATIENT)
Dept: CT IMAGING | Age: 66
Discharge: HOME | End: 2021-10-12
Payer: MEDICARE

## 2021-10-10 DIAGNOSIS — R10.84 GENERALIZED ABDOMINAL PAIN: ICD-10-CM

## 2021-10-10 PROCEDURE — 6360000004 HC RX CONTRAST MEDICATION: Performed by: RADIOLOGY

## 2021-10-10 PROCEDURE — 2580000003 HC RX 258: Performed by: RADIOLOGY

## 2021-10-10 PROCEDURE — 74174 CTA ABD&PLVS W/CONTRAST: CPT

## 2021-10-10 RX ORDER — SODIUM CHLORIDE 0.9 % (FLUSH) 0.9 %
10 SYRINGE (ML) INJECTION ONCE
Status: COMPLETED | OUTPATIENT
Start: 2021-10-10 | End: 2021-10-10

## 2021-10-10 RX ADMIN — Medication 10 ML: at 09:22

## 2021-10-10 RX ADMIN — IOPAMIDOL 90 ML: 755 INJECTION, SOLUTION INTRAVENOUS at 09:21

## 2021-10-11 ENCOUNTER — TELEPHONE (OUTPATIENT)
Dept: FAMILY MEDICINE CLINIC | Age: 66
End: 2021-10-11

## 2021-10-11 ENCOUNTER — TELEPHONE (OUTPATIENT)
Dept: ADMINISTRATIVE | Age: 66
End: 2021-10-11

## 2021-10-11 DIAGNOSIS — M47.26 OSTEOARTHRITIS OF SPINE WITH RADICULOPATHY, LUMBAR REGION: ICD-10-CM

## 2021-10-11 DIAGNOSIS — M48.061 NEURAL FORAMINAL STENOSIS OF LUMBAR SPINE: Primary | ICD-10-CM

## 2021-10-11 DIAGNOSIS — E11.42 DIABETIC PERIPHERAL NEUROPATHY (HCC): ICD-10-CM

## 2021-10-11 DIAGNOSIS — M48.061 SPINAL STENOSIS OF LUMBAR REGION, UNSPECIFIED WHETHER NEUROGENIC CLAUDICATION PRESENT: ICD-10-CM

## 2021-10-11 RX ORDER — PREGABALIN 75 MG/1
75 CAPSULE ORAL 3 TIMES DAILY
Qty: 90 CAPSULE | Refills: 2 | Status: SHIPPED
Start: 2021-10-11 | End: 2021-12-10 | Stop reason: SDUPTHER

## 2021-10-11 NOTE — TELEPHONE ENCOUNTER
The patient called very upset because her appointment with Dr Carl Victoria was canceled this morning. She is rescheduled for December   The patient states she cannot wait until December to be treated for her pain.    The patient is requesting a new referral to be seen by a different pain management group   Please advise

## 2021-10-11 NOTE — TELEPHONE ENCOUNTER
Spoke with the patient. Will increase lyrica to 75 mg tid for the pain and called Summer MULLINS to see if appointment could be moved up.

## 2021-10-13 ENCOUNTER — OFFICE VISIT (OUTPATIENT)
Dept: PHYSICAL MEDICINE AND REHAB | Age: 66
End: 2021-10-13
Payer: MEDICARE

## 2021-10-13 VITALS
DIASTOLIC BLOOD PRESSURE: 85 MMHG | SYSTOLIC BLOOD PRESSURE: 151 MMHG | HEART RATE: 81 BPM | BODY MASS INDEX: 43.4 KG/M2 | HEIGHT: 69 IN | WEIGHT: 293 LBS

## 2021-10-13 DIAGNOSIS — M54.50 CHRONIC BILATERAL LOW BACK PAIN WITHOUT SCIATICA: ICD-10-CM

## 2021-10-13 DIAGNOSIS — G89.29 CHRONIC BILATERAL LOW BACK PAIN WITHOUT SCIATICA: ICD-10-CM

## 2021-10-13 DIAGNOSIS — M47.816 LUMBAR SPONDYLOSIS: Primary | ICD-10-CM

## 2021-10-13 DIAGNOSIS — E66.01 MORBID OBESITY WITH BMI OF 45.0-49.9, ADULT (HCC): ICD-10-CM

## 2021-10-13 DIAGNOSIS — M47.819 FACET ARTHROPATHY: ICD-10-CM

## 2021-10-13 PROCEDURE — 99204 OFFICE O/P NEW MOD 45 MIN: CPT | Performed by: PHYSICAL MEDICINE & REHABILITATION

## 2021-10-13 PROCEDURE — G8484 FLU IMMUNIZE NO ADMIN: HCPCS | Performed by: PHYSICAL MEDICINE & REHABILITATION

## 2021-10-13 PROCEDURE — G8417 CALC BMI ABV UP PARAM F/U: HCPCS | Performed by: PHYSICAL MEDICINE & REHABILITATION

## 2021-10-13 PROCEDURE — 1036F TOBACCO NON-USER: CPT | Performed by: PHYSICAL MEDICINE & REHABILITATION

## 2021-10-13 PROCEDURE — G8427 DOCREV CUR MEDS BY ELIG CLIN: HCPCS | Performed by: PHYSICAL MEDICINE & REHABILITATION

## 2021-10-13 PROCEDURE — 3017F COLORECTAL CA SCREEN DOC REV: CPT | Performed by: PHYSICAL MEDICINE & REHABILITATION

## 2021-10-13 NOTE — PROGRESS NOTES
tablet 1    allopurinol (ZYLOPRIM) 100 MG tablet TAKE 1 TABLET TWICE DAILY 180 tablet 1    tiZANidine (ZANAFLEX) 4 MG tablet TAKE 1 TABLET BY MOUTH EVERY 6 HOURS AS NEEDED FOR MUSCLE SPASM 90 tablet 0    metoprolol (LOPRESSOR) 100 MG tablet TAKE 1 TABLET TWICE DAILY 180 tablet 1    acetaminophen (TYLENOL) 500 MG tablet Take 500 mg by mouth every 6 hours as needed for Pain      metFORMIN (GLUCOPHAGE XR) 500 MG extended release tablet Take 1 tablet by mouth daily (with breakfast) 90 tablet 1    valsartan-hydroCHLOROthiazide (DIOVAN-HCT) 160-25 MG per tablet Take 160 tablets by mouth daily 160-25 po daily per Dr. Kimberly Coles 90 tablet 1    vitamin D3 (CHOLECALCIFEROL) 25 MCG (1000 UT) TABS tablet Take 2 tablets by mouth daily , prescribed by renal 180 tablet 1    insulin aspart (NOVOLOG FLEXPEN) 100 UNIT/ML injection pen USE AS DIRECTED BY YOUR PRESCRIBER.  COMPLETE DIRECTIONS ARE INCLUDED IN A LETTER WITH YOUR ORIGINAL ORDER 100 mL 2    omeprazole (PRILOSEC) 40 MG delayed release capsule TAKE 1 CAPSULE EVERY DAY WITH BREAKFAST 90 capsule 1    potassium chloride (KLOR-CON M) 10 MEQ extended release tablet TAKE 1 TABLET TWICE DAILY 180 tablet 1    atorvastatin (LIPITOR) 80 MG tablet TAKE 1 TABLET EVERY NIGHT 90 tablet 1    cloNIDine (CATAPRES) 0.1 MG tablet TAKE 1 TABLET BY MOUTH 3 TIMES DAILY 270 tablet 1    LANTUS SOLOSTAR 100 UNIT/ML injection pen INJECT 55 UNITS INTO THE SKIN 2 TIMES DAILY 36 pen 1    blood glucose test strips (TRUE METRIX BLOOD GLUCOSE TEST) strip TEST BLOOD SUGAR UP TO 5 TIMES DAILY 500 strip 3    TRUEplus Lancets 33G MISC USE  TO  TEST  UP  TO FIVE TIMES DAILY 500 each 3    Insulin Pen Needle (B-D UF III MINI PEN NEEDLES) 31G X 5 MM MISC Use to inject insulin 5 times daily 500 each 3    DULoxetine (CYMBALTA) 60 MG extended release capsule TAKE 1 CAPSULE EVERY DAY (Patient not taking: Reported on 10/13/2021) 90 capsule 1     No current facility-administered medications for this visit. Past Medical History:   Diagnosis Date    Anxiety     Carpal tunnel syndrome on right     Chronic back pain     Chronic kidney disease, stage 3 ([address]     Depression 10/28/2010    Diabetes mellitus type 2, uncontrolled ([address] 10/28/2010    with microalbuminuria    Diverticulosis     GERD (gastroesophageal reflux disease) 10/28/2010    Herniated intervertebral disk     neck and lower back    Hypercholesterolemia 10/28/2010    Hypertension 10/28/2010    Kidney stones     Neurofibroma of trunk     irritating mole on the back.  biopsy done 10/21/09    Obesity     Osteoarthritis     PAD (peripheral artery disease) (HCC)     Type II or unspecified type diabetes mellitus without mention of complication, not stated as uncontrolled     Vitamin D insufficiency        Past Surgical History:   Procedure Laterality Date    COLONOSCOPY N/A 2021    COLONOSCOPY POLYPECTOMY SNARE/COLD BIOPSY performed by Ida Bauer MD at [address] ECHO COMPLETE  3/19/2012         EYE MUSCLE SURGERY      as a child    HYSTERECTOMY      with salpingoophorectomy    HYSTERECTOMY, TOTAL ABDOMINAL Bilateral 2003    cervix removed per patient    NERVE BLOCK Right 2018    lumbar transforaminal nerve block #1 with sedation    CA KALIA NOSE/CLEFT LIP/TIP Right 2018    RIGHT LUMBAR TRANSFORAMINAL NERVE BLOCK #1 L3-4 L4-5 WITH IV SEDATIN performed by Lea Patel DO at [facility]         Family History   Problem Relation Age of Onset    Lung Cancer Mother 71        mesothelioma metastatized to the brain    Diabetes Mother     High Cholesterol Mother     High Blood Pressure Mother     Arthritis Mother     Cancer Mother         lung to brain    Diabetes Father     Arthritis Father     High Blood Pressure Sister     Arthritis Sister     High Cholesterol Sister     High Blood Pressure Brother     Substance Abuse Brother     Breast Cancer Paternal Aunt          at 80y    High Blood Pressure Paternal Aunt     Cancer Maternal Grandmother [de-identified]        colorectal cancer    High Blood Pressure Maternal Grandmother     Heart Failure Maternal Grandmother     Arthritis Maternal Grandmother     High Cholesterol Maternal Grandmother     High Blood Pressure Paternal Grandmother     Stroke Paternal Grandmother     High Blood Pressure Maternal Aunt     High Cholesterol Maternal Aunt     Cancer Maternal Aunt         mesothelioma    Diabetes Maternal Uncle     High Blood Pressure Maternal Uncle     High Cholesterol Maternal Uncle     Substance Abuse Maternal Uncle     High Blood Pressure Paternal Uncle        Social History     Tobacco Use    Smoking status: Never Smoker    Smokeless tobacco: Never Used   Substance Use Topics    Alcohol use: Yes     Alcohol/week: 2.0 standard drinks     Types: 1 Glasses of wine, 1 Cans of beer per week     Comment: 1 time per month    Drug use: No          Functional Status: The patient is able to ambulate and perform activities of daily living with the use of an assistive device. ROS: For more complete ROS answered by the patient, please see . Constitutional: Denies fevers, chills, night sweats, unintentional weight loss     Skin: Denies rash or skin changes     Eyes: Denies vision changes    Ears/Nose/Throat: Denies nasal congestion or sore throat     Respiratory: Denies SOB or cough     Cardiovascular: Denies CP, palpitations, edema      Gastrointestinal: Denies abdominal pain,  N/V, constipation, or diarrhea    Genitourinary: Denies urinary symptoms    Neurologic: See HPI.     MSK: See HPI. Psychiatric: Denies sleep disturbance, anxiety, depression    Hematologic/Lymphatic/Immunologic: Denies bruising       Physical Exam:   Blood pressure (!) 151/85, pulse 81, height 5' 9\" (1.753 m), weight (!) 328 lb (148.8 kg), not currently breastfeeding.    General: well developed and well nourished in no acute distress. Body mass index is 48.44 kg/m². HEENT: No rhinorrhea, sneezing, yawning, or lacrimation. No scleral icterus or conjunctival injection. Resp: symmetrical chest expansion, unlabored breathing, respirations unlabored. CV: Heart rate is regular. Peripheral pulses are palpable  Lymph: No visible regional lymphadenopathy. Skin: No rashes or ecchymosis. Normal turgor. Psych: Mood is calm. Affect is normal.   Ext: bilateral lower extremity edema     MSK:   Back/Hip Exam:   Inspection: Pelvis was asymmetric. Lumbar lordotic curvature was increased. There was scoliosis present. No ecchymoses or erythema. Palpation: Palpatory exam revealed tenderness along lumbosacral paraspinals, midline spine, SI joint sulcus, ttp greater trochanters. There was no paraspinal spasms. There were no trigger points. ROM: ROM decreased. +facet loading. Special/provocative testing:   SLR negative       Neurological Exam:  Strength:   R  L  Hip Flex  5  5  Knee Ext  5  5  Ankle dorsi  5  5  EHL   5 5  Ankle Plantar  5  5    Sensory:  Intact for light touch in all lower extremity dermatomes. Reflexes:   R  L  Patellar  (2+) (2+)  Ankle Jerk  (2+) (2+)      Gait: ambulated with rollator. Forward lean, slow. Impression:   Edita Mcclain is a 61 y.o. female    1. Lumbar spondylosis    2. Chronic bilateral low back pain without sciatica    3. Morbid obesity with BMI of 45.0-49.9, adult ([address]    4. Facet arthropathy        Plan:   · Will get MRI lumbar for ongoing pain despite conservative treatment. · Discussed doing MBB/RFA after MRI done. · Continue HEP   · Discussed weight and importance of healthy weight loss through diet and exercise.  The patient was educated about the diagnosis, prognosis, indications, risks and benefits of treatment. An opportunity to ask questions was given to the patient and questions were answered.   The patient agreed to proceed with the recommended treatment as described above.      Follow up - will call with MRI results and hopefully proceed with MBB     Thank you for the consultation and for allowing me to participate in the care of this patient.         Sincerely,     Lisa Sanchez, , [facility]   Board Certified Physical Medicine and Rehabilitation

## 2021-11-15 ENCOUNTER — HOSPITAL ENCOUNTER (OUTPATIENT)
Dept: MRI IMAGING | Age: 66
Discharge: HOME | End: 2021-11-17
Payer: MEDICARE

## 2021-11-15 ENCOUNTER — TELEPHONE (OUTPATIENT)
Dept: PHYSICAL MEDICINE AND REHAB | Age: 66
End: 2021-11-15

## 2021-11-15 DIAGNOSIS — M47.816 LUMBAR SPONDYLOSIS: ICD-10-CM

## 2021-11-15 DIAGNOSIS — M54.50 CHRONIC BILATERAL LOW BACK PAIN WITHOUT SCIATICA: ICD-10-CM

## 2021-11-15 DIAGNOSIS — R93.7 ABNORMAL MRI, LUMBAR SPINE: Primary | ICD-10-CM

## 2021-11-15 DIAGNOSIS — G89.29 CHRONIC BILATERAL LOW BACK PAIN WITHOUT SCIATICA: ICD-10-CM

## 2021-11-15 PROCEDURE — 72148 MRI LUMBAR SPINE W/O DYE: CPT

## 2021-11-15 NOTE — TELEPHONE ENCOUNTER
----- Message from Deshawn Maxwell DO sent at 11/15/2021  2:37 PM EST -----  Please call patient with MRI results. There is extensive abnormal signal seen in the lower lumbar spine that needs further imaging to differentiate what it is. It is probably due to severe degenerative changes but cannot be confirmed. The radiologist is recommending an MRI with contrast to rule out other causes of this finding such as infection. I placed the order.

## 2021-11-15 NOTE — TELEPHONE ENCOUNTER
Called and spoke with the patient and informed her of the results of MRI. Patient is aware and voiced understanding.

## 2021-12-09 ENCOUNTER — TELEPHONE (OUTPATIENT)
Dept: PHYSICAL MEDICINE AND REHAB | Age: 66
End: 2021-12-09

## 2021-12-09 DIAGNOSIS — R93.7 ABNORMAL MRI, LUMBAR SPINE: Primary | ICD-10-CM

## 2021-12-09 NOTE — TELEPHONE ENCOUNTER
[facility] from MRI called and noticed that the new order for her MRI on Monday is for w/wo contrast.  Do you want to repeat the wo contrast again? If you just want the contrasted MRI you need to put in a new order.  Please advise

## 2021-12-10 ENCOUNTER — OFFICE VISIT (OUTPATIENT)
Dept: FAMILY MEDICINE CLINIC | Age: 66
End: 2021-12-10
Payer: MEDICARE

## 2021-12-10 VITALS
WEIGHT: 293 LBS | DIASTOLIC BLOOD PRESSURE: 63 MMHG | HEIGHT: 69 IN | OXYGEN SATURATION: 99 % | RESPIRATION RATE: 18 BRPM | TEMPERATURE: 97.2 F | SYSTOLIC BLOOD PRESSURE: 135 MMHG | HEART RATE: 79 BPM | BODY MASS INDEX: 43.4 KG/M2

## 2021-12-10 DIAGNOSIS — E11.65 UNCONTROLLED TYPE 2 DIABETES MELLITUS WITH HYPERGLYCEMIA (HCC): Primary | ICD-10-CM

## 2021-12-10 DIAGNOSIS — I10 ESSENTIAL HYPERTENSION: ICD-10-CM

## 2021-12-10 DIAGNOSIS — N18.32 STAGE 3B CHRONIC KIDNEY DISEASE (HCC): ICD-10-CM

## 2021-12-10 DIAGNOSIS — M48.061 NEURAL FORAMINAL STENOSIS OF LUMBAR SPINE: ICD-10-CM

## 2021-12-10 DIAGNOSIS — E11.42 DIABETIC PERIPHERAL NEUROPATHY (HCC): ICD-10-CM

## 2021-12-10 DIAGNOSIS — Z23 NEED FOR INFLUENZA VACCINATION: ICD-10-CM

## 2021-12-10 PROCEDURE — 99212 OFFICE O/P EST SF 10 MIN: CPT | Performed by: FAMILY MEDICINE

## 2021-12-10 PROCEDURE — 3017F COLORECTAL CA SCREEN DOC REV: CPT | Performed by: FAMILY MEDICINE

## 2021-12-10 PROCEDURE — 1036F TOBACCO NON-USER: CPT | Performed by: FAMILY MEDICINE

## 2021-12-10 PROCEDURE — 99214 OFFICE O/P EST MOD 30 MIN: CPT | Performed by: FAMILY MEDICINE

## 2021-12-10 PROCEDURE — G8417 CALC BMI ABV UP PARAM F/U: HCPCS | Performed by: FAMILY MEDICINE

## 2021-12-10 PROCEDURE — G8482 FLU IMMUNIZE ORDER/ADMIN: HCPCS | Performed by: FAMILY MEDICINE

## 2021-12-10 PROCEDURE — 6360000002 HC RX W HCPCS

## 2021-12-10 PROCEDURE — 2022F DILAT RTA XM EVC RTNOPTHY: CPT | Performed by: FAMILY MEDICINE

## 2021-12-10 PROCEDURE — G0008 ADMIN INFLUENZA VIRUS VAC: HCPCS

## 2021-12-10 PROCEDURE — G8427 DOCREV CUR MEDS BY ELIG CLIN: HCPCS | Performed by: FAMILY MEDICINE

## 2021-12-10 PROCEDURE — 90686 IIV4 VACC NO PRSV 0.5 ML IM: CPT

## 2021-12-10 PROCEDURE — 3051F HG A1C>EQUAL 7.0%<8.0%: CPT | Performed by: FAMILY MEDICINE

## 2021-12-10 RX ORDER — TIZANIDINE 4 MG/1
TABLET ORAL
Qty: 90 TABLET | Refills: 1 | Status: SHIPPED
Start: 2021-12-10 | End: 2022-09-27

## 2021-12-10 RX ORDER — VALSARTAN AND HYDROCHLOROTHIAZIDE 160; 25 MG/1; MG/1
160 TABLET ORAL DAILY
Qty: 90 TABLET | Refills: 1 | Status: SHIPPED
Start: 2021-12-10 | End: 2022-01-25

## 2021-12-10 RX ORDER — PREGABALIN 75 MG/1
75 CAPSULE ORAL 3 TIMES DAILY
Qty: 90 CAPSULE | Refills: 2 | Status: SHIPPED
Start: 2021-12-10 | End: 2022-02-21 | Stop reason: SDUPTHER

## 2021-12-10 RX ORDER — FUROSEMIDE 20 MG/1
TABLET ORAL
Qty: 135 TABLET | Refills: 1 | Status: SHIPPED
Start: 2021-12-10 | End: 2022-01-25

## 2021-12-10 RX ORDER — INSULIN ASPART 100 [IU]/ML
INJECTION, SOLUTION INTRAVENOUS; SUBCUTANEOUS
Qty: 100 ML | Refills: 2 | Status: SHIPPED
Start: 2021-12-10 | End: 2021-12-13 | Stop reason: SDUPTHER

## 2021-12-10 RX ORDER — METFORMIN HYDROCHLORIDE 500 MG/1
500 TABLET, EXTENDED RELEASE ORAL
Qty: 90 TABLET | Refills: 1 | Status: SHIPPED
Start: 2021-12-10 | End: 2022-04-01 | Stop reason: SDUPTHER

## 2021-12-10 ASSESSMENT — ENCOUNTER SYMPTOMS
EYE REDNESS: 0
BACK PAIN: 1
BLOOD IN STOOL: 0
DIARRHEA: 0
SORE THROAT: 0
VOMITING: 0
NAUSEA: 0
SHORTNESS OF BREATH: 0
COUGH: 0
ABDOMINAL PAIN: 0
CONSTIPATION: 0
EYE PAIN: 0

## 2021-12-10 NOTE — PROGRESS NOTES
[address] [place] [address]  FAMILY MEDICINE RESIDENCY PROGRAM   OFFICE PROGRESS NOTE  DATE OF VISIT : 12/10/21    Patient : Bharath See    : female   Age : 61 y.o.  : 1955           Chief Complaint :   Chief Complaint   Patient presents with    Diabetes     F/U    Back Pain    Other     Middle of back burns       HPI:   61 y.o. female comes in today for Dm2 and htn. She reports she eats 2 meals per day. Her first at 2 pm. She checks her bg right before then. She did not bring her logbook today but thinks her bg is about 157. Patient has not seen Dr. Basilio Garay for 4 months and is eager to follow up with him. Reports she has chronic back pain. Saw PMR who ordered an MRI with contrast.  She did one aquatherapy session but wanted to wait until after her follow up with PMR. Her back is burning. Admits that she has been unable reach the area when she washes.      Patient Active Problem List   Diagnosis    Diabetes mellitus type 2, uncontrolled ([address]    Hypertension    Hypercholesterolemia    GERD (gastroesophageal reflux disease)    Depression    Enlarged liver    Heart murmur    Chronic lower back pain    Facet joint disease    DJD (degenerative joint disease), lumbar    Bulging lumbar disc    Back pain    Anxiety    Bilateral leg edema    Microalbuminuria    Vitamin D insufficiency    Chronic kidney disease, stage 3, mod decreased GFR (HCC)    Senile nuclear cataract    Fatty liver    Diabetic peripheral neuropathy (HCC)    Neural foraminal stenosis of lumbar spine    Type 2 diabetes mellitus with both eyes affected by mild nonproliferative retinopathy without macular edema, with long-term current use of insulin ([address]    Spinal stenosis of lumbar region    Major depressive disorder, recurrent, in partial remission ([address]    Ganglioneuroma       Past Medical History:   Diagnosis Date    Anxiety     Carpal tunnel syndrome on right     Chronic back pain     Chronic [facility] disease, stage 3 [facility]     Depression 10/28/2010    Diabetes mellitus type 2, uncontrolled ([facility] 10/28/2010    with microalbuminuria    Diverticulosis     GERD (gastroesophageal reflux disease) 10/28/2010    Herniated intervertebral disk     neck and lower back    Hypercholesterolemia 10/28/2010    Hypertension 10/28/2010    Kidney stones     Neurofibroma of trunk     irritating mole on the back.  biopsy done 10/21/09    Obesity     Osteoarthritis     PAD (peripheral artery disease) ([facility]     Type II or unspecified type diabetes mellitus without mention of complication, not stated as uncontrolled     Vitamin D insufficiency        Current Outpatient Medications on File Prior to Visit   Medication Sig Dispense Refill    LANTUS SOLOSTAR 100 UNIT/ML injection pen INJECT 55 UNITS INTO THE SKIN 2 TIMES DAILY 36 pen 1    potassium chloride (KLOR-CON M) 10 MEQ extended release tablet TAKE 1 TABLET TWICE DAILY 180 tablet 1    cloNIDine (CATAPRES) 0.1 MG tablet TAKE 1 TABLET BY MOUTH 3 TIMES DAILY 270 tablet 1    omeprazole (PRILOSEC) 40 MG delayed release capsule TAKE 1 CAPSULE EVERY DAY WITH BREAKFAST 90 capsule 1    atorvastatin (LIPITOR) 80 MG tablet TAKE 1 TABLET EVERY NIGHT 90 tablet 1    busPIRone (BUSPAR) 5 MG tablet TAKE 1 TABLET THREE TIMES DAILY 270 tablet 0    vitamin D3 (CHOLECALCIFEROL) 25 MCG (1000 UT) TABS tablet TAKE 2 TABLETS EVERY DAY (PRESCRIBED BY RENAL) 180 tablet 1    DULoxetine (CYMBALTA) 60 MG extended release capsule TAKE 1 CAPSULE EVERY DAY 90 capsule 1    allopurinol (ZYLOPRIM) 100 MG tablet TAKE 1 TABLET TWICE DAILY 180 tablet 1    metoprolol (LOPRESSOR) 100 MG tablet TAKE 1 TABLET TWICE DAILY 180 tablet 1    acetaminophen (TYLENOL) 500 MG tablet Take 500 mg by mouth every 6 hours as needed for Pain      blood glucose test strips (TRUE METRIX BLOOD GLUCOSE TEST) strip TEST BLOOD SUGAR UP TO 5 TIMES DAILY 500 strip 3    TRUEplus Lancets 33G MISC USE  TO  TEST  UP  TO FIVE TIMES DAILY 500 each 3    Insulin Pen Needle (B-D UF III MINI PEN NEEDLES) 31G X 5 MM MISC Use to inject insulin 5 times daily 500 each 3    zoster recombinant adjuvanted vaccine (SHINGRIX) 50 MCG/0.5ML SUSR injection Inject 0.5 mLs into the muscle See Admin Instructions 1 dose now and repeat in 2-6 months 0.5 mL 1    [DISCONTINUED] insulin lispro (HUMALOG KWIKPEN) 100 UNIT/ML injection Inject 10 Units into the skin 3 times daily (before meals). 3 Pen 6     No current facility-administered medications on file prior to visit.        No Known Allergies    Family History   Problem Relation Age of Onset    Lung Cancer Mother 71        mesothelioma metastatized to the brain    Diabetes Mother     High Cholesterol Mother     High Blood Pressure Mother     Arthritis Mother     Cancer Mother         lung to brain    Diabetes Father     Arthritis Father     High Blood Pressure Sister     Arthritis Sister     High Cholesterol Sister     High Blood Pressure Brother     Substance Abuse Brother     Breast Cancer Paternal Aunt          at 80y    High Blood Pressure Paternal Aunt     Cancer Maternal Grandmother [de-identified]        colorectal cancer    High Blood Pressure Maternal Grandmother     Heart Failure Maternal Grandmother     Arthritis Maternal Grandmother     High Cholesterol Maternal Grandmother     High Blood Pressure Paternal Grandmother     Stroke Paternal Grandmother     High Blood Pressure Maternal Aunt     High Cholesterol Maternal Aunt     Cancer Maternal Aunt         mesothelioma    Diabetes Maternal Uncle     High Blood Pressure Maternal Uncle     High Cholesterol Maternal Uncle     Substance Abuse Maternal Uncle     High Blood Pressure Paternal Uncle        Past Surgical History:   Procedure Laterality Date    COLONOSCOPY N/A 2021    COLONOSCOPY POLYPECTOMY SNARE/COLD BIOPSY performed [facility] Monroe [facility] at [facility] ENDOSCOPY    ECHO COMPLETE  3/19/2012  EYE MUSCLE SURGERY      as a child    HYSTERECTOMY      with salpingoophorectomy    HYSTERECTOMY, TOTAL ABDOMINAL Bilateral 2003    cervix removed per patient    NERVE BLOCK Right 05/01/2018    lumbar transforaminal nerve block #1 with sedation    [place] KALIA NOSE/CLEFT LIP/TIP Right 5/1/2018    RIGHT LUMBAR TRANSFORAMINAL NERVE BLOCK #1 L3-4 L4-5 WITH IV SEDATIN performed by Jamison Mac DO at [address] History     Tobacco Use    Smoking status: Never Smoker    Smokeless tobacco: Never Used   Substance Use Topics    Alcohol use: Yes     Alcohol/week: 2.0 standard drinks     Types: 1 Glasses of wine, 1 Cans of beer per week     Comment: 1 time per month    Drug use: No       Review of Systems  Review of Systems   Constitutional: Negative for chills and fever. HENT: Negative for congestion, ear pain and sore throat. Eyes: Negative for pain and redness. Respiratory: Negative for cough and shortness of breath. Cardiovascular: Negative for chest pain, palpitations and leg swelling. Gastrointestinal: Negative for abdominal pain, blood in stool, constipation, diarrhea, nausea and vomiting. Genitourinary: Negative for dysuria, frequency and hematuria. Musculoskeletal: Positive for back pain and myalgias. Skin: Negative for rash. Allergic/Immunologic: Negative for environmental allergies. Neurological: Negative for dizziness and headaches. Hematological: Does not bruise/bleed easily. Psychiatric/Behavioral: The patient is not nervous/anxious. Physical Exam:  VS:  Blood pressure 135/63, pulse 79, temperature 97.2 °F (36.2 °C), temperature source Temporal, resp. rate 18, height 5' 9\" (1.753 m), weight (!) 328 lb (148.8 kg), SpO2 99 %, not currently breastfeeding. Physical Exam  Constitutional:       Appearance: She is well-developed. HENT:      Head: Normocephalic and atraumatic.    Cardiovascular:      Rate and Rhythm: Normal rate and PF)      Additional plan and future considerations:      Return in about 3 months (around 3/10/2022) for Diabetes and back pain.     Signed by : Sherrie Sanabria MD

## 2021-12-11 ENCOUNTER — HOSPITAL ENCOUNTER (OUTPATIENT)
Age: 66
Discharge: HOME | End: 2021-12-11
Payer: MEDICARE

## 2021-12-11 DIAGNOSIS — R93.7 ABNORMAL MRI, LUMBAR SPINE: ICD-10-CM

## 2021-12-11 LAB
BUN BLDV-MCNC: 16 MG/DL (ref 6–23)
CREAT SERPL-MCNC: 1.1 MG/DL (ref 0.5–1)
GFR AFRICAN AMERICAN: >60
GFR NON-AFRICAN AMERICAN: >60 ML/MIN/1.73

## 2021-12-11 PROCEDURE — 36415 COLL VENOUS BLD VENIPUNCTURE: CPT

## 2021-12-11 PROCEDURE — 82565 ASSAY OF CREATININE: CPT

## 2021-12-11 PROCEDURE — 84520 ASSAY OF UREA NITROGEN: CPT

## 2021-12-13 DIAGNOSIS — E11.65 UNCONTROLLED TYPE 2 DIABETES MELLITUS WITH HYPERGLYCEMIA (HCC): ICD-10-CM

## 2021-12-13 RX ORDER — INSULIN ASPART 100 [IU]/ML
INJECTION, SOLUTION INTRAVENOUS; SUBCUTANEOUS
Qty: 35 ML | Refills: 1 | Status: SHIPPED
Start: 2021-12-13 | End: 2022-04-01 | Stop reason: SDUPTHER

## 2022-01-06 ENCOUNTER — HOSPITAL ENCOUNTER (OUTPATIENT)
Dept: MRI IMAGING | Age: 67
Discharge: HOME | End: 2022-01-08
Payer: MEDICARE

## 2022-01-06 DIAGNOSIS — R93.7 ABNORMAL MRI, LUMBAR SPINE: ICD-10-CM

## 2022-01-06 PROCEDURE — A9579 GAD-BASE MR CONTRAST NOS,1ML: HCPCS | Performed by: RADIOLOGY

## 2022-01-06 PROCEDURE — 72158 MRI LUMBAR SPINE W/O & W/DYE: CPT

## 2022-01-06 PROCEDURE — 6360000004 HC RX CONTRAST MEDICATION: Performed by: RADIOLOGY

## 2022-01-06 RX ADMIN — GADOTERIDOL 20 ML: 279.3 INJECTION, SOLUTION INTRAVENOUS at 12:56

## 2022-01-07 ENCOUNTER — TELEPHONE (OUTPATIENT)
Dept: PHYSICAL MEDICINE AND REHAB | Age: 67
End: 2022-01-07

## 2022-01-07 DIAGNOSIS — R93.7 ABNORMAL MRI, LUMBAR SPINE: Primary | ICD-10-CM

## 2022-01-07 DIAGNOSIS — M47.816 LUMBAR SPONDYLOSIS: ICD-10-CM

## 2022-01-07 NOTE — TELEPHONE ENCOUNTER
----- Message from Aric Daily, DO sent at 1/7/2022 11:46 AM EST -----  Please call patient with MRI findings. There are findings suspicious for discitis/osteomyelitis in the spine-- infection. I ordered some labs to be drawn to evaluate-try to get these today if she can. See if patient has a fever or feels sick at all. I also placed a referral to NSGY to evaluate.  If she is sick or has fever, and if labs come back abnormal, I will have her go to hospital.

## 2022-01-07 NOTE — TELEPHONE ENCOUNTER
Patient called in and I explained the message from Dr. Fernanda Wang and explained that lab work was ordered and to get labs drawn today if she can if not today as soon as she can, explained importance of possible infection and told her referral was sent to neurosurgery. Patient verbalizes understanding.

## 2022-01-08 ENCOUNTER — HOSPITAL ENCOUNTER (OUTPATIENT)
Age: 67
Discharge: HOME | End: 2022-01-08
Payer: MEDICARE

## 2022-01-08 DIAGNOSIS — R93.7 ABNORMAL MRI, LUMBAR SPINE: ICD-10-CM

## 2022-01-08 LAB
BASOPHILS ABSOLUTE: 0.04 E9/L (ref 0–0.2)
BASOPHILS RELATIVE PERCENT: 0.4 % (ref 0–2)
C-REACTIVE PROTEIN: 1.1 MG/DL (ref 0–0.4)
EOSINOPHILS ABSOLUTE: 0.23 E9/L (ref 0.05–0.5)
EOSINOPHILS RELATIVE PERCENT: 2.4 % (ref 0–6)
HCT VFR BLD CALC: 38.3 % (ref 34–48)
HEMOGLOBIN: 11.7 G/DL (ref 11.5–15.5)
IMMATURE GRANULOCYTES #: 0.02 E9/L
IMMATURE GRANULOCYTES %: 0.2 % (ref 0–5)
LYMPHOCYTES ABSOLUTE: 2.29 E9/L (ref 1.5–4)
LYMPHOCYTES RELATIVE PERCENT: 23.8 % (ref 20–42)
MCH RBC QN AUTO: 27.7 PG (ref 26–35)
MCHC RBC AUTO-ENTMCNC: 30.5 % (ref 32–34.5)
MCV RBC AUTO: 90.8 FL (ref 80–99.9)
MONOCYTES ABSOLUTE: 0.89 E9/L (ref 0.1–0.95)
MONOCYTES RELATIVE PERCENT: 9.2 % (ref 2–12)
NEUTROPHILS ABSOLUTE: 6.16 E9/L (ref 1.8–7.3)
NEUTROPHILS RELATIVE PERCENT: 64 % (ref 43–80)
PDW BLD-RTO: 14.2 FL (ref 11.5–15)
PLATELET # BLD: 316 E9/L (ref 130–450)
PMV BLD AUTO: 10.9 FL (ref 7–12)
RBC # BLD: 4.22 E12/L (ref 3.5–5.5)
SEDIMENTATION RATE, ERYTHROCYTE: 39 MM/HR (ref 0–20)
WBC # BLD: 9.6 E9/L (ref 4.5–11.5)

## 2022-01-08 PROCEDURE — 86140 C-REACTIVE PROTEIN: CPT

## 2022-01-08 PROCEDURE — 85025 COMPLETE CBC W/AUTO DIFF WBC: CPT

## 2022-01-08 PROCEDURE — 85651 RBC SED RATE NONAUTOMATED: CPT

## 2022-01-08 PROCEDURE — 36415 COLL VENOUS BLD VENIPUNCTURE: CPT

## 2022-01-10 ENCOUNTER — TELEPHONE (OUTPATIENT)
Dept: PHYSICAL MEDICINE AND REHAB | Age: 67
End: 2022-01-10

## 2022-01-10 NOTE — TELEPHONE ENCOUNTER
Called and spoke with the patient to inform her of the results of her lab work. Patient is aware and voiced understanding.

## 2022-01-10 NOTE — TELEPHONE ENCOUNTER
----- Message from Radha Ricks DO sent at 1/10/2022 12:44 PM EST -----  Please call patient. Labs revealed some mild inflammation but non specific finding. No sign of infection in the white count. I would still like her to see NSGY prior to any intervention like injection.

## 2022-01-22 ENCOUNTER — HOSPITAL ENCOUNTER (OUTPATIENT)
Age: 67
Discharge: HOME | End: 2022-01-22
Payer: MEDICARE

## 2022-01-22 LAB
ALBUMIN SERPL-MCNC: 4 G/DL (ref 3.5–5.2)
ALP BLD-CCNC: 142 U/L (ref 35–104)
ALT SERPL-CCNC: 18 U/L (ref 0–32)
ANION GAP SERPL CALCULATED.3IONS-SCNC: 13 MMOL/L (ref 7–16)
AST SERPL-CCNC: 23 U/L (ref 0–31)
BASOPHILS ABSOLUTE: 0.06 E9/L (ref 0–0.2)
BASOPHILS RELATIVE PERCENT: 0.6 % (ref 0–2)
BILIRUB SERPL-MCNC: 0.6 MG/DL (ref 0–1.2)
BUN BLDV-MCNC: 18 MG/DL (ref 6–23)
CALCIUM SERPL-MCNC: 10.2 MG/DL (ref 8.6–10.2)
CHLORIDE BLD-SCNC: 99 MMOL/L (ref 98–107)
CHOLESTEROL, FASTING: 140 MG/DL (ref 0–199)
CO2: 29 MMOL/L (ref 22–29)
CREAT SERPL-MCNC: 1.2 MG/DL (ref 0.5–1)
EOSINOPHILS ABSOLUTE: 0.15 E9/L (ref 0.05–0.5)
EOSINOPHILS RELATIVE PERCENT: 1.4 % (ref 0–6)
GFR AFRICAN AMERICAN: 55
GFR NON-AFRICAN AMERICAN: 55 ML/MIN/1.73
GLUCOSE BLD-MCNC: 116 MG/DL (ref 74–99)
HBA1C MFR BLD: 6.6 % (ref 4–5.6)
HCT VFR BLD CALC: 41.2 % (ref 34–48)
HDLC SERPL-MCNC: 41 MG/DL
HEMOGLOBIN: 12.3 G/DL (ref 11.5–15.5)
IMMATURE GRANULOCYTES #: 0.05 E9/L
IMMATURE GRANULOCYTES %: 0.5 % (ref 0–5)
LDL CHOLESTEROL CALCULATED: 80 MG/DL (ref 0–99)
LYMPHOCYTES ABSOLUTE: 2.18 E9/L (ref 1.5–4)
LYMPHOCYTES RELATIVE PERCENT: 20.7 % (ref 20–42)
MCH RBC QN AUTO: 27.2 PG (ref 26–35)
MCHC RBC AUTO-ENTMCNC: 29.9 % (ref 32–34.5)
MCV RBC AUTO: 91.2 FL (ref 80–99.9)
MONOCYTES ABSOLUTE: 0.81 E9/L (ref 0.1–0.95)
MONOCYTES RELATIVE PERCENT: 7.7 % (ref 2–12)
NEUTROPHILS ABSOLUTE: 7.26 E9/L (ref 1.8–7.3)
NEUTROPHILS RELATIVE PERCENT: 69.1 % (ref 43–80)
PARATHYROID HORMONE INTACT: 89 PG/ML (ref 15–65)
PDW BLD-RTO: 14.2 FL (ref 11.5–15)
PHOSPHORUS: 2.7 MG/DL (ref 2.5–4.5)
PLATELET # BLD: 359 E9/L (ref 130–450)
PMV BLD AUTO: 11 FL (ref 7–12)
POTASSIUM SERPL-SCNC: 4.2 MMOL/L (ref 3.5–5)
RBC # BLD: 4.52 E12/L (ref 3.5–5.5)
SODIUM BLD-SCNC: 141 MMOL/L (ref 132–146)
TOTAL PROTEIN: 7.6 G/DL (ref 6.4–8.3)
TRIGLYCERIDE, FASTING: 93 MG/DL (ref 0–149)
VITAMIN D 25-HYDROXY: 41 NG/ML (ref 30–100)
VLDLC SERPL CALC-MCNC: 19 MG/DL
WBC # BLD: 10.5 E9/L (ref 4.5–11.5)

## 2022-01-22 PROCEDURE — 80053 COMPREHEN METABOLIC PANEL: CPT

## 2022-01-22 PROCEDURE — 84100 ASSAY OF PHOSPHORUS: CPT

## 2022-01-22 PROCEDURE — 85025 COMPLETE CBC W/AUTO DIFF WBC: CPT

## 2022-01-22 PROCEDURE — 83970 ASSAY OF PARATHORMONE: CPT

## 2022-01-22 PROCEDURE — 82306 VITAMIN D 25 HYDROXY: CPT

## 2022-01-22 PROCEDURE — 36415 COLL VENOUS BLD VENIPUNCTURE: CPT

## 2022-01-22 PROCEDURE — 80061 LIPID PANEL: CPT

## 2022-01-22 PROCEDURE — 83036 HEMOGLOBIN GLYCOSYLATED A1C: CPT

## 2022-01-24 DIAGNOSIS — F41.9 ANXIETY: ICD-10-CM

## 2022-01-25 RX ORDER — VALSARTAN AND HYDROCHLOROTHIAZIDE 160; 25 MG/1; MG/1
TABLET ORAL
Qty: 90 TABLET | Refills: 1 | Status: SHIPPED
Start: 2022-01-25 | End: 2022-04-01 | Stop reason: SDUPTHER

## 2022-01-25 RX ORDER — BUSPIRONE HYDROCHLORIDE 5 MG/1
TABLET ORAL
Qty: 270 TABLET | Refills: 0 | Status: SHIPPED
Start: 2022-01-25 | End: 2022-04-01 | Stop reason: SDUPTHER

## 2022-01-25 RX ORDER — FUROSEMIDE 20 MG/1
TABLET ORAL
Qty: 135 TABLET | Refills: 1 | Status: SHIPPED
Start: 2022-01-25 | End: 2022-04-01 | Stop reason: SDUPTHER

## 2022-01-25 NOTE — TELEPHONE ENCOUNTER
Last Appointment:  12/10/2021  Future Appointments   Date Time Provider Eloise Dorie   2/9/2022  3:15 PM Francisca Cedillo MD [facility]   3/23/2022  1:40 PM [facility]

## 2022-02-09 ENCOUNTER — OFFICE VISIT (OUTPATIENT)
Dept: NEUROSURGERY | Age: 67
End: 2022-02-09
Payer: MEDICARE

## 2022-02-09 VITALS
RESPIRATION RATE: 18 BRPM | TEMPERATURE: 98.4 F | HEIGHT: 69 IN | WEIGHT: 293 LBS | BODY MASS INDEX: 43.4 KG/M2 | HEART RATE: 99 BPM | OXYGEN SATURATION: 98 % | DIASTOLIC BLOOD PRESSURE: 88 MMHG | SYSTOLIC BLOOD PRESSURE: 135 MMHG

## 2022-02-09 DIAGNOSIS — M54.42 CHRONIC MIDLINE LOW BACK PAIN WITH BILATERAL SCIATICA: Primary | ICD-10-CM

## 2022-02-09 DIAGNOSIS — G89.29 CHRONIC MIDLINE LOW BACK PAIN WITH BILATERAL SCIATICA: Primary | ICD-10-CM

## 2022-02-09 DIAGNOSIS — M54.41 CHRONIC MIDLINE LOW BACK PAIN WITH BILATERAL SCIATICA: Primary | ICD-10-CM

## 2022-02-09 DIAGNOSIS — M46.26 ACUTE OSTEOMYELITIS OF LUMBAR SPINE (HCC): ICD-10-CM

## 2022-02-09 PROCEDURE — 1036F TOBACCO NON-USER: CPT | Performed by: NEUROLOGICAL SURGERY

## 2022-02-09 PROCEDURE — G8417 CALC BMI ABV UP PARAM F/U: HCPCS | Performed by: NEUROLOGICAL SURGERY

## 2022-02-09 PROCEDURE — 3017F COLORECTAL CA SCREEN DOC REV: CPT | Performed by: NEUROLOGICAL SURGERY

## 2022-02-09 PROCEDURE — G8482 FLU IMMUNIZE ORDER/ADMIN: HCPCS | Performed by: NEUROLOGICAL SURGERY

## 2022-02-09 PROCEDURE — G8427 DOCREV CUR MEDS BY ELIG CLIN: HCPCS | Performed by: NEUROLOGICAL SURGERY

## 2022-02-09 PROCEDURE — 99204 OFFICE O/P NEW MOD 45 MIN: CPT | Performed by: NEUROLOGICAL SURGERY

## 2022-02-09 ASSESSMENT — ENCOUNTER SYMPTOMS
GASTROINTESTINAL NEGATIVE: 1
ALLERGIC/IMMUNOLOGIC NEGATIVE: 1
RESPIRATORY NEGATIVE: 1
BACK PAIN: 1
EYES NEGATIVE: 1

## 2022-02-09 NOTE — PROGRESS NOTES
Sophia Daniels (:  1955) is a 61 y.o. female,New patient, here for evaluation of the following chief complaint(s):  Back Pain (back pain, pt stated she has infection in spine. Was going to see  but she wanted her to be seen by you first due to infection. Pt states she has loss of bladder control sometimes due to pain. )         ASSESSMENT/PLAN:  1. Chronic midline low back pain with bilateral sciatica  2. Acute osteomyelitis of lumbar spine ([address]  -     IR INTERVENTIONAL RADIOLOGY PROCEDURE REQUEST; Future  3. Body mass index (BMI) 45.0-49.9, adult ([address]  61year old lady with history old stenosis from L3-S1. She has chronic back pain. Her most recent MRI is suspicious for osteomyelitis. I will send her for CT guided biopsy    No follow-ups on file. Subjective   SUBJECTIVE/OBJECTIVE:  HPI  61year old lady who was seen almost 4 years ago for back and bilateral leg pain. Her pain is made worse with activity and better with rest.  The pain is rated as an 8/10. She admits to numbness and tingling in her legs. There is no loss of control of bowel or bladder function. Review of Systems   Constitutional: Negative. HENT: Negative. Eyes: Negative. Respiratory: Negative. Cardiovascular: Negative. Gastrointestinal: Negative. Endocrine: Negative. Genitourinary: Negative. Musculoskeletal: Positive for arthralgias, back pain, gait problem and joint swelling. Skin: Negative. Allergic/Immunologic: Negative. Hematological: Bruises/bleeds easily. Psychiatric/Behavioral: Negative. Objective   Physical Exam  Vitals reviewed. Constitutional:       Appearance: She is obese. HENT:      Head: Normocephalic and atraumatic. Nose: Nose normal.   Eyes:      General: No visual field deficit or scleral icterus. Right eye: No discharge. Left eye: No discharge. Extraocular Movements: Extraocular movements intact. Conjunctiva/sclera: Conjunctivae normal.      Pupils: Pupils are equal, round, and reactive to light. Pulmonary:      Effort: Pulmonary effort is normal. No respiratory distress. Abdominal:      General: There is no distension. Musculoskeletal:         General: No tenderness, deformity or signs of injury. Right lower leg: No edema. Left lower leg: No edema. Skin:     General: Skin is warm and dry. Capillary Refill: Capillary refill takes less than 2 seconds. Coloration: Skin is not jaundiced or pale. Findings: No bruising, erythema, lesion or rash. Neurological:      General: No focal deficit present. Mental Status: She is oriented to person, place, and time. Mental status is at baseline. GCS: GCS eye subscore is 4. GCS verbal subscore is 5. GCS motor subscore is 6. Cranial Nerves: Cranial nerves are intact. No cranial nerve deficit, dysarthria or facial asymmetry. Sensory: Sensation is intact. No sensory deficit. Motor: Motor function is intact. No tremor, atrophy, abnormal muscle tone, seizure activity or pronator drift. Coordination: Coordination is intact. Romberg sign negative. Coordination normal. Finger-Nose-Finger Test and Heel to [facility] Test normal. Rapid alternating movements normal.      Gait: Gait normal.      Deep Tendon Reflexes: Reflexes normal. Babinski sign absent on the right side. Babinski sign absent on the left side. Reflex Scores:       Tricep reflexes are 2+ on the right side and 2+ on the left side. Bicep reflexes are 2+ on the right side and 2+ on the left side. Brachioradialis reflexes are 2+ on the right side and 2+ on the left side. Patellar reflexes are 2+ on the right side and 2+ on the left side. Achilles reflexes are 2+ on the right side and 2+ on the left side. Psychiatric:         Mood and Affect: Mood normal.         Behavior: Behavior normal.         Thought Content:  Thought content normal.         Judgment: Judgment normal.            On this date 2/9/2022 I have spent 45 minutes reviewing previous notes, test results and face to face with the patient discussing the diagnosis and importance of compliance with the treatment plan as well as documenting on the day of the visit. An electronic signature was used to authenticate this note.     --Rani Mendes MD

## 2022-02-10 DIAGNOSIS — Z01.812 PRE-PROCEDURE LAB EXAM: Primary | ICD-10-CM

## 2022-02-10 PROCEDURE — 85610 PROTHROMBIN TIME: CPT | Performed by: NEUROLOGICAL SURGERY

## 2022-02-21 DIAGNOSIS — E11.42 DIABETIC PERIPHERAL NEUROPATHY (HCC): ICD-10-CM

## 2022-02-21 DIAGNOSIS — Z76.0 MEDICATION REFILL: ICD-10-CM

## 2022-02-21 DIAGNOSIS — E11.65 UNCONTROLLED TYPE 2 DIABETES MELLITUS WITH HYPERGLYCEMIA (HCC): Chronic | ICD-10-CM

## 2022-02-21 RX ORDER — INSULIN GLARGINE 100 [IU]/ML
55 INJECTION, SOLUTION SUBCUTANEOUS 2 TIMES DAILY
Qty: 36 PEN | Refills: 1 | Status: SHIPPED
Start: 2022-02-21 | End: 2022-04-01 | Stop reason: SDUPTHER

## 2022-02-21 RX ORDER — PREGABALIN 75 MG/1
75 CAPSULE ORAL 3 TIMES DAILY
Qty: 90 CAPSULE | Refills: 2 | Status: SHIPPED
Start: 2022-02-21 | End: 2022-03-23 | Stop reason: SDUPTHER

## 2022-02-21 NOTE — TELEPHONE ENCOUNTER
Last Appointment:  12/10/2021  Future Appointments   Date Time Provider Eloise Dorie   3/7/2022  9:00 AM [facility] CT SCAN 1 [facility] CT [facility] Radiolo   3/23/2022  1:40 PM MD Blayne Fernandes [facility]

## 2022-02-22 RX ORDER — GLUCOSAM/CHON-MSM1/C/MANG/BOSW 500-416.6
TABLET ORAL
Qty: 500 EACH | Refills: 3 | Status: SHIPPED | OUTPATIENT
Start: 2022-02-22

## 2022-03-07 ENCOUNTER — ANESTHESIA EVENT (OUTPATIENT)
Dept: CT IMAGING | Age: 67
End: 2022-03-07

## 2022-03-07 ENCOUNTER — ANESTHESIA (OUTPATIENT)
Dept: CT IMAGING | Age: 67
End: 2022-03-07

## 2022-03-07 ENCOUNTER — HOSPITAL ENCOUNTER (OUTPATIENT)
Dept: CT IMAGING | Age: 67
Discharge: HOME | End: 2022-03-09
Payer: MEDICARE

## 2022-03-07 VITALS
TEMPERATURE: 97.6 F | BODY MASS INDEX: 43.4 KG/M2 | HEIGHT: 69 IN | SYSTOLIC BLOOD PRESSURE: 145 MMHG | WEIGHT: 293 LBS | DIASTOLIC BLOOD PRESSURE: 70 MMHG | RESPIRATION RATE: 18 BRPM | OXYGEN SATURATION: 96 % | HEART RATE: 78 BPM

## 2022-03-07 VITALS
OXYGEN SATURATION: 100 % | DIASTOLIC BLOOD PRESSURE: 67 MMHG | RESPIRATION RATE: 15 BRPM | SYSTOLIC BLOOD PRESSURE: 102 MMHG

## 2022-03-07 DIAGNOSIS — M46.26 ACUTE OSTEOMYELITIS OF LUMBAR SPINE (HCC): ICD-10-CM

## 2022-03-07 LAB
ANION GAP SERPL CALCULATED.3IONS-SCNC: 10 MMOL/L (ref 7–16)
BUN BLDV-MCNC: 21 MG/DL (ref 6–23)
CALCIUM SERPL-MCNC: 9.8 MG/DL (ref 8.6–10.2)
CHLORIDE BLD-SCNC: 106 MMOL/L (ref 98–107)
CO2: 30 MMOL/L (ref 22–29)
CREAT SERPL-MCNC: 1.2 MG/DL (ref 0.5–1)
GFR AFRICAN AMERICAN: 55
GFR NON-AFRICAN AMERICAN: 55 ML/MIN/1.73
GLUCOSE BLD-MCNC: 120 MG/DL (ref 74–99)
INR BLD: 1.1
PLATELET # BLD: 354 E9/L (ref 130–450)
POTASSIUM SERPL-SCNC: 3.3 MMOL/L (ref 3.5–5)
PROTHROMBIN TIME: 11.7 SEC (ref 9.3–12.4)
SODIUM BLD-SCNC: 146 MMOL/L (ref 132–146)

## 2022-03-07 PROCEDURE — 87205 SMEAR GRAM STAIN: CPT

## 2022-03-07 PROCEDURE — 85610 PROTHROMBIN TIME: CPT

## 2022-03-07 PROCEDURE — 36591 DRAW BLOOD OFF VENOUS DEVICE: CPT

## 2022-03-07 PROCEDURE — 3700000001 HC ADD 15 MINUTES (ANESTHESIA)

## 2022-03-07 PROCEDURE — 77012 CT SCAN FOR NEEDLE BIOPSY: CPT | Performed by: RADIOLOGY

## 2022-03-07 PROCEDURE — 2580000003 HC RX 258: Performed by: ANESTHESIOLOGIST ASSISTANT

## 2022-03-07 PROCEDURE — 80048 BASIC METABOLIC PNL TOTAL CA: CPT

## 2022-03-07 PROCEDURE — 85049 AUTOMATED PLATELET COUNT: CPT

## 2022-03-07 PROCEDURE — 36415 COLL VENOUS BLD VENIPUNCTURE: CPT

## 2022-03-07 PROCEDURE — 6360000002 HC RX W HCPCS: Performed by: NURSE ANESTHETIST, CERTIFIED REGISTERED

## 2022-03-07 PROCEDURE — 3700000000 HC ANESTHESIA ATTENDED CARE

## 2022-03-07 PROCEDURE — 87070 CULTURE OTHR SPECIMN AEROBIC: CPT

## 2022-03-07 PROCEDURE — 20225 BONE BIOPSY TROCAR/NDL DEEP: CPT | Performed by: RADIOLOGY

## 2022-03-07 PROCEDURE — 7100000010 HC PHASE II RECOVERY - FIRST 15 MIN

## 2022-03-07 PROCEDURE — 20225 BONE BIOPSY TROCAR/NDL DEEP: CPT

## 2022-03-07 PROCEDURE — 2709999900 CT GUIDED NEEDLE PLACEMENT

## 2022-03-07 PROCEDURE — 7100000011 HC PHASE II RECOVERY - ADDTL 15 MIN

## 2022-03-07 RX ORDER — MIDAZOLAM HYDROCHLORIDE 1 MG/ML
INJECTION INTRAMUSCULAR; INTRAVENOUS PRN
Status: DISCONTINUED | OUTPATIENT
Start: 2022-03-07 | End: 2022-03-07 | Stop reason: SDUPTHER

## 2022-03-07 RX ORDER — SODIUM CHLORIDE 9 MG/ML
INJECTION, SOLUTION INTRAVENOUS CONTINUOUS PRN
Status: DISCONTINUED | OUTPATIENT
Start: 2022-03-07 | End: 2022-03-07 | Stop reason: SDUPTHER

## 2022-03-07 RX ORDER — FENTANYL CITRATE 50 UG/ML
INJECTION, SOLUTION INTRAMUSCULAR; INTRAVENOUS PRN
Status: DISCONTINUED | OUTPATIENT
Start: 2022-03-07 | End: 2022-03-07 | Stop reason: SDUPTHER

## 2022-03-07 RX ORDER — PROPOFOL 10 MG/ML
INJECTION, EMULSION INTRAVENOUS PRN
Status: DISCONTINUED | OUTPATIENT
Start: 2022-03-07 | End: 2022-03-07 | Stop reason: SDUPTHER

## 2022-03-07 RX ADMIN — FENTANYL CITRATE 50 MCG: 50 INJECTION, SOLUTION INTRAMUSCULAR; INTRAVENOUS at 12:55

## 2022-03-07 RX ADMIN — SODIUM CHLORIDE: 9 INJECTION, SOLUTION INTRAVENOUS at 12:41

## 2022-03-07 RX ADMIN — FENTANYL CITRATE 50 MCG: 50 INJECTION, SOLUTION INTRAMUSCULAR; INTRAVENOUS at 13:02

## 2022-03-07 RX ADMIN — PROPOFOL 350 MG: 10 INJECTION, EMULSION INTRAVENOUS at 13:15

## 2022-03-07 RX ADMIN — MIDAZOLAM 2 MG: 1 INJECTION INTRAMUSCULAR; INTRAVENOUS at 12:55

## 2022-03-07 NOTE — H&P
Interventional Radiology  Attending Pre-operative History and Physical    DIAGNOSIS:    Patient Active Problem List   Diagnosis    Diabetes mellitus type 2, uncontrolled ([facility] [address]    Hypertension    Hypercholesterolemia    GERD (gastroesophageal reflux disease)    Depression    Enlarged liver    Heart murmur    Chronic lower back pain    Facet joint disease    DJD (degenerative joint disease), lumbar    Bulging lumbar disc    Back pain    Anxiety    Bilateral leg edema    Microalbuminuria    Vitamin D insufficiency    Chronic kidney disease, stage 3, mod decreased GFR (HCC)    Senile nuclear cataract    Fatty liver    Diabetic peripheral neuropathy (HCC)    Neural foraminal stenosis of lumbar spine    Type 2 diabetes mellitus with both eyes affected by mild nonproliferative retinopathy without macular edema, with long-term current use of insulin (HCC)    Spinal stenosis of lumbar region    Major depressive disorder, recurrent, in partial remission ([facility] [address]    Ganglioneuroma       CHIEF COMPLAINT: <principal problem not specified>          Current Outpatient Medications:     TRUEplus Lancets 33G MISC, USE  TO  TEST  UP  TO FIVE TIMES DAILY, Disp: 500 each, Rfl: 3    insulin glargine (LANTUS SOLOSTAR) 100 UNIT/ML injection pen, Inject 55 Units into the skin 2 times daily, Disp: 36 pen, Rfl: 1    pregabalin (LYRICA) 75 MG capsule, Take 1 capsule by mouth 3 times daily for 90 days. , Disp: 90 capsule, Rfl: 2    valsartan-hydroCHLOROthiazide (DIOVAN-HCT) 160-25 MG per tablet, TAKE 1 TABLET EVERY DAY PER DR JARAMILLO, Disp: 90 tablet, Rfl: 1    furosemide (LASIX) 20 MG tablet, TAKE 1 TABLET (20MG) EVERY OTHER DAY ALTERNATING  WITH 2 TABLETS (40MG) EVERY OTHER DAY, Disp: 135 tablet, Rfl: 1    busPIRone (BUSPAR) 5 MG tablet, TAKE 1 TABLET THREE TIMES DAILY, Disp: 270 tablet, Rfl: 0    insulin aspart (NOVOLOG FLEXPEN) 100 UNIT/ML injection pen, 20 units tid w/ SSI TID AC: 150-200 2 units; 201-250 4; 251-300 6; 301-350 8; 351-400 10; 401-450 12; 451-500 14; 501+ 16 units, Disp: 35 mL, Rfl: 1    tiZANidine (ZANAFLEX) 4 MG tablet, TAKE 1 TABLET BY MOUTH EVERY 6 HOURS AS NEEDED FOR MUSCLE SPASM, Disp: 90 tablet, Rfl: 1    metFORMIN (GLUCOPHAGE XR) 500 MG extended release tablet, Take 1 tablet by mouth daily (with breakfast), Disp: 90 tablet, Rfl: 1    potassium chloride (KLOR-CON M) 10 MEQ extended release tablet, TAKE 1 TABLET TWICE DAILY, Disp: 180 tablet, Rfl: 1    cloNIDine (CATAPRES) 0.1 MG tablet, TAKE 1 TABLET BY MOUTH 3 TIMES DAILY, Disp: 270 tablet, Rfl: 1    omeprazole (PRILOSEC) 40 MG delayed release capsule, TAKE 1 CAPSULE EVERY DAY WITH BREAKFAST, Disp: 90 capsule, Rfl: 1    atorvastatin (LIPITOR) 80 MG tablet, TAKE 1 TABLET EVERY NIGHT, Disp: 90 tablet, Rfl: 1    vitamin D3 (CHOLECALCIFEROL) 25 MCG (1000 UT) TABS tablet, TAKE 2 TABLETS EVERY DAY (PRESCRIBED BY RENAL), Disp: 180 tablet, Rfl: 1    DULoxetine (CYMBALTA) 60 MG extended release capsule, TAKE 1 CAPSULE EVERY DAY, Disp: 90 capsule, Rfl: 1    allopurinol (ZYLOPRIM) 100 MG tablet, TAKE 1 TABLET TWICE DAILY, Disp: 180 tablet, Rfl: 1    metoprolol (LOPRESSOR) 100 MG tablet, TAKE 1 TABLET TWICE DAILY, Disp: 180 tablet, Rfl: 1    acetaminophen (TYLENOL) 500 MG tablet, Take 500 mg by mouth every 6 hours as needed for Pain, Disp: , Rfl:     blood glucose test strips (TRUE METRIX BLOOD GLUCOSE TEST) strip, TEST BLOOD SUGAR UP TO 5 TIMES DAILY, Disp: 500 strip, Rfl: 3    Insulin Pen Needle (B-D UF III MINI PEN NEEDLES) 31G X 5 MM MISC, Use to inject insulin 5 times daily, Disp: 500 each, Rfl: 3    zoster recombinant adjuvanted vaccine (SHINGRIX) 50 MCG/0.5ML SUSR injection, Inject 0.5 mLs into the muscle See Admin Instructions 1 dose now and repeat in 2-6 months, Disp: 0.5 mL, Rfl: 1    No Known Allergies    Past Medical History:   Diagnosis Date    Anxiety     Carpal tunnel syndrome on right     Chronic back pain     Chronic kidney disease, stage 3 ([facility] [address]     Depression 10/28/2010    Diabetes mellitus type 2, uncontrolled ([facility] [address] 10/28/2010    with microalbuminuria    Diverticulosis     GERD (gastroesophageal reflux disease) 10/28/2010    Herniated intervertebral disk     neck and lower back    Hypercholesterolemia 10/28/2010    Hypertension 10/28/2010    Kidney stones     Neurofibroma of trunk     irritating mole on the back.  biopsy done 10/21/09    Obesity     Osteoarthritis     PAD (peripheral artery disease) (HCC)     Type II or unspecified type diabetes mellitus without mention of complication, not stated as uncontrolled     Vitamin D insufficiency        Past Surgical History:   Procedure Laterality Date    COLONOSCOPY N/A 2021    COLONOSCOPY POLYPECTOMY SNARE/COLD BIOPSY performed by Ilana Dumas MD at [address] [facility] ECHO COMPLETE  3/19/2012         EYE MUSCLE SURGERY      as a child    HYSTERECTOMY      with salpingoophorectomy    HYSTERECTOMY, TOTAL ABDOMINAL Bilateral 2003    cervix removed per patient    NERVE BLOCK Right 2018    lumbar transforaminal nerve block #1 with sedation    MN KALIA NOSE/CLEFT LIP/TIP Right 2018    RIGHT LUMBAR TRANSFORAMINAL NERVE BLOCK #1 L3-4 L4-5 WITH IV SEDATIN performed by Sebastian Álvarez DO at [facility]         Family History   Problem Relation Age of Onset    Lung Cancer Mother 71        mesothelioma metastatized to the brain    Diabetes Mother     High Cholesterol Mother     High Blood Pressure Mother     Arthritis Mother     Cancer Mother         lung to brain    Diabetes Father     Arthritis Father     High Blood Pressure Sister     Arthritis Sister     High Cholesterol Sister     High Blood Pressure Brother     Substance Abuse Brother     Breast Cancer Paternal Aunt          at 80y    High Blood Pressure Paternal Aunt     Cancer Maternal Grandmother [de-identified]        colorectal cancer    High Blood Pressure Maternal Grandmother     Heart Failure Maternal Grandmother     Arthritis Maternal Grandmother     High Cholesterol Maternal Grandmother     High Blood Pressure Paternal Grandmother     Stroke Paternal Grandmother     High Blood Pressure Maternal Aunt     High Cholesterol Maternal Aunt     Cancer Maternal Aunt         mesothelioma    Diabetes Maternal Uncle     High Blood Pressure Maternal Uncle     High Cholesterol Maternal Uncle     Substance Abuse Maternal Uncle     High Blood Pressure Paternal Uncle        Social History     Socioeconomic History    Marital status:      Spouse name: Not on file    Number of children: Not on file    Years of education: Not on file    Highest education level: Not on file   Occupational History    Not on file   Tobacco Use    Smoking status: Never Smoker    Smokeless tobacco: Never Used   Substance and Sexual Activity    Alcohol use: Yes     Alcohol/week: 2.0 standard drinks     Types: 1 Glasses of wine, 1 Cans of beer per week     Comment: 1 time per month    Drug use: No    Sexual activity: Not Currently     Partners: Male   Other Topics Concern    Not on file   Social History Narrative    Religious; lolly important to her     Social Determinants of Health     Financial Resource Strain:     Difficulty of Paying Living Expenses: Not on file   Food Insecurity:     Worried About Running Out of Food in the Last Year: Not on file    Stevie of Food in the Last Year: Not on file   Transportation Needs:     Lack of Transportation (Medical): Not on file    Lack of Transportation (Non-Medical):  Not on file   Physical Activity:     Days of Exercise per Week: Not on file    Minutes of Exercise per Session: Not on file   Stress:     Feeling of Stress : Not on file   Social Connections:     Frequency of Communication with Friends and Family: Not on file    Frequency of Social Gatherings with Friends and Family: Not on file    Attends [employer] Services: Not on file    Active Member of Clubs or Organizations: Not on file    Attends Club or Organization Meetings: Not on file    Marital Status: Not on file   Intimate Partner Violence:     Fear of Current or Ex-Partner: Not on file    Emotionally Abused: Not on file    Physically Abused: Not on file    Sexually Abused: Not on file   Housing Stability:     Unable to Pay for Housing in the Last Year: Not on file    Number of Places Lived in the Last Year: Not on file    Unstable Housing in the Last Year: Not on file       ROS: Non-contributory other than as noted above    PHYSICAL EXAM:      Heart and Lungs:  demonstrate no contraindications to proceed    DATA:  CBC:   Lab Results   Component Value Date    WBC 10.5 01/22/2022    RBC 4.52 01/22/2022    HGB 12.3 01/22/2022    HCT 41.2 01/22/2022    MCV 91.2 01/22/2022    MCH 27.2 01/22/2022    MCHC 29.9 01/22/2022    RDW 14.2 01/22/2022     03/07/2022    MPV 11.0 01/22/2022     CBC with Differential:    Lab Results   Component Value Date    WBC 10.5 01/22/2022    RBC 4.52 01/22/2022    HGB 12.3 01/22/2022    HCT 41.2 01/22/2022     03/07/2022    MCV 91.2 01/22/2022    MCH 27.2 01/22/2022    MCHC 29.9 01/22/2022    RDW 14.2 01/22/2022    SEGSPCT 69 10/01/2012    LYMPHOPCT 20.7 01/22/2022    MONOPCT 7.7 01/22/2022    BASOPCT 0.6 01/22/2022    MONOSABS 0.81 01/22/2022    LYMPHSABS 2.18 01/22/2022    EOSABS 0.15 01/22/2022    BASOSABS 0.06 01/22/2022     Platelets:    Lab Results   Component Value Date     03/07/2022     BUN/Creatinine:    Lab Results   Component Value Date    BUN 21 03/07/2022    CREATININE 1.2 03/07/2022       ASSESSMENT AND PLAN:  1. Osteomyelitis L3 and L4 plan bx  2. Procedure options, risks and benefits reviewed with patient. Patient expresses understanding.     Electronically signed by Rodney Lala II, MD on 3/7/2022 at 12:17 PM

## 2022-03-07 NOTE — ANESTHESIA POSTPROCEDURE EVALUATION
Department of Anesthesiology  Postprocedure Note    Patient: Onelia Oscar  MRN: [id number]  YOB: 1955  Date of evaluation: 3/7/2022  Time:  3:00 PM     Procedure Summary     Date: 03/07/22 Room / Location: [address] Scan; [address] [place] Procedures; [address] Radiology    Anesthesia Start: 8457 Anesthesia Stop: 9455    Procedures:       CT GUIDED NEEDLE PLACEMENT      CT BIOPSY PERCUTANEOUS DEEP BONE Diagnosis:       Acute osteomyelitis of lumbar spine ([facility] [address]      (CT guided biopsy of L3 and L4 vertebral bodies. Please send for gram stain and culture)    Scheduled Providers: [place] Radiologist Responsible Provider: Kei Mueller MD    Anesthesia Type: MAC ASA Status: 3          Anesthesia Type: MAC    Dany Phase I:      Dany Phase II:      Last vitals: Reviewed and per EMR flowsheets.        Anesthesia Post Evaluation    Patient location during evaluation: bedside (angio)  Patient participation: complete - patient participated  Level of consciousness: awake and alert  Pain score: 0  Airway patency: patent  Nausea & Vomiting: no nausea and no vomiting  Complications: no  Cardiovascular status: hemodynamically stable  Respiratory status: acceptable  Hydration status: stable

## 2022-03-07 NOTE — ANESTHESIA PRE PROCEDURE
Department of Anesthesiology  Preprocedure Note       Name:  Rosalia Severance   Age:  61 y.o.  :  1955                                          MRN:  [id number]         Date:  3/7/2022      Surgeon: Bry Mccullough    Procedure: CT BIOPSY    Medications prior to admission:   Prior to Admission medications    Medication Sig Start Date End Date Taking? Authorizing Provider   TRUEplus Lancets 33G MISC USE  TO  TEST  UP  TO FIVE TIMES DAILY 22   Krissy Salomon MD   insulin glargine (LANTUS SOLOSTAR) 100 UNIT/ML injection pen Inject 55 Units into the skin 2 times daily 22   Krissy Salomon MD   pregabalin (LYRICA) 75 MG capsule Take 1 capsule by mouth 3 times daily for 90 days.  22  Krissy Salomon MD   valsartan-hydroCHLOROthiazide (DIOVAN-HCT) 160-25 MG per tablet TAKE 1 TABLET EVERY DAY PER DR JARAMILLO 22   Krissy Salomon MD   furosemide (LASIX) 20 MG tablet TAKE 1 TABLET (20MG) EVERY OTHER DAY ALTERNATING  WITH 2 TABLETS (40MG) EVERY OTHER DAY 22   Krissy Salomon MD   busPIRone (BUSPAR) 5 MG tablet TAKE 1 TABLET THREE TIMES DAILY 22   Krissy Salomon MD   insulin aspart (NOVOLOG FLEXPEN) 100 UNIT/ML injection pen 20 units tid w/ SSI TID AC: 150-200 2 units; 201-250 4; 251-300 6; 301-350 8; 351-400 10; 401-450 12; 451-500 14; 501+ 16 units 21   Krissy Salomon MD   tiZANidine (ZANAFLEX) 4 MG tablet TAKE 1 TABLET BY MOUTH EVERY 6 HOURS AS NEEDED FOR MUSCLE SPASM 12/10/21   Krissy Salomon MD   metFORMIN (GLUCOPHAGE XR) 500 MG extended release tablet Take 1 tablet by mouth daily (with breakfast) 12/10/21   Krissy Salomon MD   potassium chloride (KLOR-CON M) 10 MEQ extended release tablet TAKE 1 TABLET TWICE DAILY 21   Krissy Salomon MD   cloNIDine (CATAPRES) 0.1 MG tablet TAKE 1 TABLET BY MOUTH 3 TIMES DAILY 21   Krissy Salomon MD   omeprazole (PRILOSEC) 40 MG delayed release capsule TAKE 1 CAPSULE EVERY DAY WITH BREAKFAST 11/9/21   Jessica Donis MD   atorvastatin (LIPITOR) 80 MG tablet TAKE 1 TABLET EVERY NIGHT 11/9/21   Jessica Donis MD   vitamin D3 (CHOLECALCIFEROL) 25 MCG (1000 UT) TABS tablet TAKE 2 TABLETS EVERY DAY (PRESCRIBED BY RENAL) 11/9/21   Jessica Donis MD   DULoxetine (CYMBALTA) 60 MG extended release capsule TAKE 1 CAPSULE EVERY DAY 11/9/21   Jessica Donis MD   zoster recombinant adjuvanted vaccine [facility]) 50 MCG/0.5ML SUSR injection Inject 0.5 mLs into the muscle See Admin Instructions 1 dose now and repeat in 2-6 months 9/17/21 3/16/22  Jessica Donis MD   allopurinol (ZYLOPRIM) 100 MG tablet TAKE 1 TABLET TWICE DAILY 9/9/21   Jessica Donis MD   metoprolol (LOPRESSOR) 100 MG tablet TAKE 1 TABLET TWICE DAILY 9/8/21   Jessica Donis MD   acetaminophen (TYLENOL) 500 MG tablet Take 500 mg by mouth every 6 hours as needed for Pain    Historical Provider, MD   blood glucose test strips (TRUE METRIX BLOOD GLUCOSE TEST) strip TEST BLOOD SUGAR UP TO 5 TIMES DAILY 2/3/21   Jessica Donis MD   Insulin Pen Needle (B-D UF III MINI PEN NEEDLES) 31G X 5 MM MISC Use to inject insulin 5 times daily 1/8/21   Jessica Donis MD   insulin lispro (HUMALOG KWIKPEN) 100 UNIT/ML injection Inject 10 Units into the skin 3 times daily (before meals). 5/17/12 5/17/12  Deb Ramey MD       Current medications:    Current Outpatient Medications   Medication Sig Dispense Refill    TRUEplus Lancets 33G MISC USE  TO  TEST  UP  TO FIVE TIMES DAILY 500 each 3    insulin glargine (LANTUS SOLOSTAR) 100 UNIT/ML injection pen Inject 55 Units into the skin 2 times daily 36 pen 1    pregabalin (LYRICA) 75 MG capsule Take 1 capsule by mouth 3 times daily for 90 days.  90 capsule 2    valsartan-hydroCHLOROthiazide (DIOVAN-HCT) 160-25 MG per tablet TAKE 1 TABLET EVERY DAY PER DR JARAMILLO 90 tablet 1    furosemide (LASIX) 20 MG tablet TAKE 1 TABLET (20MG) EVERY OTHER DAY ALTERNATING  WITH 2 TABLETS (40MG) EVERY OTHER  tablet 1    busPIRone (BUSPAR) 5 MG tablet TAKE 1 TABLET THREE TIMES DAILY 270 tablet 0    insulin aspart (NOVOLOG FLEXPEN) 100 UNIT/ML injection pen 20 units tid w/ SSI TID AC: 150-200 2 units; 201-250 4; 251-300 6; 301-350 8; 351-400 10; 401-450 12; 451-500 14; 501+ 16 units 35 mL 1    tiZANidine (ZANAFLEX) 4 MG tablet TAKE 1 TABLET BY MOUTH EVERY 6 HOURS AS NEEDED FOR MUSCLE SPASM 90 tablet 1    metFORMIN (GLUCOPHAGE XR) 500 MG extended release tablet Take 1 tablet by mouth daily (with breakfast) 90 tablet 1    potassium chloride (KLOR-CON M) 10 MEQ extended release tablet TAKE 1 TABLET TWICE DAILY 180 tablet 1    cloNIDine (CATAPRES) 0.1 MG tablet TAKE 1 TABLET BY MOUTH 3 TIMES DAILY 270 tablet 1    omeprazole (PRILOSEC) 40 MG delayed release capsule TAKE 1 CAPSULE EVERY DAY WITH BREAKFAST 90 capsule 1    atorvastatin (LIPITOR) 80 MG tablet TAKE 1 TABLET EVERY NIGHT 90 tablet 1    vitamin D3 (CHOLECALCIFEROL) 25 MCG (1000 UT) TABS tablet TAKE 2 TABLETS EVERY DAY (PRESCRIBED BY RENAL) 180 tablet 1    DULoxetine (CYMBALTA) 60 MG extended release capsule TAKE 1 CAPSULE EVERY DAY 90 capsule 1    zoster recombinant adjuvanted vaccine (SHINGRIX) 50 MCG/0.5ML SUSR injection Inject 0.5 mLs into the muscle See Admin Instructions 1 dose now and repeat in 2-6 months 0.5 mL 1    allopurinol (ZYLOPRIM) 100 MG tablet TAKE 1 TABLET TWICE DAILY 180 tablet 1    metoprolol (LOPRESSOR) 100 MG tablet TAKE 1 TABLET TWICE DAILY 180 tablet 1    acetaminophen (TYLENOL) 500 MG tablet Take 500 mg by mouth every 6 hours as needed for Pain      blood glucose test strips (TRUE METRIX BLOOD GLUCOSE TEST) strip TEST BLOOD SUGAR UP TO 5 TIMES DAILY 500 strip 3    Insulin Pen Needle (B-D UF III MINI PEN NEEDLES) 31G X 5 MM MISC Use to inject insulin 5 times daily 500 each 3     No current facility-administered medications for this visit.        Allergies:  No Known Allergies    Problem List:    Patient Active Problem List   Diagnosis Code    Diabetes mellitus type 2, uncontrolled ([facility] E11.65    Hypertension I10    Hypercholesterolemia E78.00    GERD (gastroesophageal reflux disease) K21.9    Depression F32. A    Enlarged liver R16.0    Heart murmur R01.1    Chronic lower back pain M54.50, G89.29    Facet joint disease M47.819    DJD (degenerative joint disease), lumbar M47.816    Bulging lumbar disc M51.26    Back pain M54.9    Anxiety F41.9    Bilateral leg edema R60.0    Microalbuminuria R80.9    Vitamin D insufficiency E55.9    Chronic kidney disease, stage 3, mod decreased GFR (HCC) N18.30    Senile nuclear cataract H25.10    Fatty liver K76.0    Diabetic peripheral neuropathy (HCC) E11.42    Neural foraminal stenosis of lumbar spine M48.061    Type 2 diabetes mellitus with both eyes affected by mild nonproliferative retinopathy without macular edema, with long-term current use of insulin ([facility] A99.7082, Z79.4    Spinal stenosis of lumbar region M48.061    Major depressive disorder, recurrent, in partial remission ([facility] F33.41    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       Past Medical History:        Diagnosis Date    Anxiety     Carpal tunnel syndrome on right     Chronic back pain     Chronic kidney disease, stage 3 ([address]     Depression 10/28/2010    Diabetes mellitus type 2, uncontrolled ([facility] 10/28/2010    with microalbuminuria    Diverticulosis     GERD (gastroesophageal reflux disease) 10/28/2010    Herniated intervertebral disk     neck and lower back    Hypercholesterolemia 10/28/2010    Hypertension 10/28/2010    Kidney stones     Neurofibroma of trunk     irritating mole on the back.  biopsy done 10/21/09    Obesity     Osteoarthritis     [facility]AD [facility]peripheral artery disease) [facility]     Type II or unspecified type diabetes mellitus without mention of complication, not stated as uncontrolled     Vitamin D insufficiency        Past Surgical History:        Procedure Laterality Date    COLONOSCOPY N/A 9/8/2021    COLONOSCOPY POLYPECTOMY SNARE/COLD BIOPSY performed by Angela Price MD at [address] COLONOSCOPY      ECHO COMPLETE  3/19/2012         EYE MUSCLE SURGERY      as a child    HYSTERECTOMY      with salpingoophorectomy    HYSTERECTOMY, TOTAL ABDOMINAL Bilateral 2003    cervix removed per patient    NERVE BLOCK Right 05/01/2018    lumbar transforaminal nerve block #1 with sedation    [place] KALIA NOSE/CLEFT LIP/TIP Right 5/1/2018    RIGHT LUMBAR TRANSFORAMINAL NERVE BLOCK #1 L3-4 L4-5 WITH IV SEDATIN performed by Belen Jones DO at [address] History:    Social History     Tobacco Use    Smoking status: Never Smoker    Smokeless tobacco: Never Used   Substance Use Topics    Alcohol use: Yes     Alcohol/week: 2.0 standard drinks     Types: 1 Glasses of wine, 1 Cans of beer per week     Comment: 1 time per month                                Counseling given: Not Answered      Vital Signs (Current): There were no vitals filed for this visit.                                            BP Readings from Last 3 Encounters:   03/07/22 (!) 156/72   02/09/22 135/88   12/10/21 135/63       NPO Status:  > 8 HOURS                                                                               BMI:   Wt Readings from Last 3 Encounters:   03/07/22 (!) 315 lb (142.9 kg)   02/09/22 (!) 328 lb (148.8 kg)   12/10/21 (!) 328 lb (148.8 kg)     There is no height or weight on file to calculate BMI.    CBC:   Lab Results   Component Value Date    WBC 10.5 01/22/2022    RBC 4.52 01/22/2022    HGB 12.3 01/22/2022    HCT 41.2 01/22/2022    MCV 91.2 01/22/2022    RDW 14.2 01/22/2022     03/07/2022       CMP:   Lab Results   Component Value Date     03/07/2022    K 3.3 03/07/2022     03/07/2022    CO2 30 03/07/2022    BUN 21 03/07/2022    CREATININE 1.2 03/07/2022    GFRAA 55 03/07/2022    LABGLOM 55 03/07/2022    GLUCOSE 120 03/07/2022 GLUCOSE 138 05/09/2012    PROT 7.6 01/22/2022    CALCIUM 9.8 03/07/2022    BILITOT 0.6 01/22/2022    ALKPHOS 142 01/22/2022    AST 23 01/22/2022    ALT 18 01/22/2022       POC Tests: No results for input(s): POCGLU, POCNA, POCK, POCCL, POCBUN, POCHEMO, POCHCT in the last 72 hours. Coags:   Lab Results   Component Value Date    PROTIME 11.7 03/07/2022    INR 1.1 03/07/2022       HCG (If Applicable): No results found for: PREGTESTUR, PREGSERUM, HCG, HCGQUANT     ABGs: No results found for: PHART, PO2ART, ZMV9OYQ, OSP3TQK, BEART, U8UNRVLL     Type & Screen (If Applicable):  No results found for: LABABO, LABRH    Drug/Infectious Status (If Applicable):  No results found for: HIV, HEPCAB    COVID-19 Screening (If Applicable): No results found for: COVID19    12/3/2010 EKG    ST-104    2/3/14 ECHO     Findings      Left Ventricle   Normal left ventricle size and systolic function   Mild concentric left ventricular hypertrophy   Stage I diastolic dysfunction   No wall motion abnormalities   Ejection fraction is visually estimated at 60%. Right Ventricle   Normal right ventricular size and function. Left Atrium   The left atrium is moderately dilated. Right Atrium   Normal right atrium size. Mitral Valve   Moderate mitral annular calcification   Mildly calcified mitral valve leaflets   Mild prolapse of the posterior mitral valve leaflet   Trace mitral regurgitation      Tricuspid Valve   Mild tricuspid regurgitation. RVSP is 44 mmHg. Aortic Valve   Mild calcification of the aortic valve   No hemodynamically significant aortic stenosis is present. Mild aortic regurgitation      Pulmonic Valve   Physiologic and/or trace pulmonic regurgitation present. Pericardial Effusion   No evidence of pericardial effusion. Aorta   Aortic root dimension within normal limits.    Miscellaneous   Venous: not well visualized      Conclusions      Summary   Normal left ventricle size and systolic function   Mild concentric left ventricular hypertrophy   Stage I diastolic dysfunction   The left atrium is moderately dilated. Trace mitral regurgitation   Mild aortic regurgitation   RVSP is 44 mmHg. Signature      ----------------------------------------------------------------   Electronically signed by Domo Valdez DO(Interpreting   physician) on 02/03/2014 07:22 PM   ----------------------------------------------------------------      Anesthesia Evaluation  Patient summary reviewed and Nursing notes reviewed no history of anesthetic complications:   Airway: Mallampati: III  TM distance: >3 FB   Neck ROM: full  Mouth opening: > = 3 FB Dental:          Pulmonary: breath sounds clear to auscultation            Patient did not smoke on day of surgery. Cardiovascular:    (+) hypertension: moderate, valvular problems/murmurs (hx: murmur):, hyperlipidemia      ECG reviewed  Rhythm: regular  Rate: normal  Echocardiogram reviewed         Beta Blocker:  Dose within 24 Hrs      ROS comment: 2/3/14 ECHO       Summary   Normal left ventricle size and systolic function   Mild concentric left ventricular hypertrophy   Stage I diastolic dysfunction   The left atrium is moderately dilated. Trace mitral regurgitation   Mild aortic regurgitation   RVSP is 44 mmHg. Neuro/Psych:   (+) neuromuscular disease:, psychiatric history: stable with treatmentdepression/anxiety              ROS comment: Hx: Chronic back pain  Herniated intervertebral disk(neck and lower back)  Bulging lumbar disc  DJD  Spinal stenosis of lumbar region  Senile nuclear cataract GI/Hepatic/Renal:   (+) GERD: well controlled, liver disease (enlarged liver):,          ROS comment: Hx: Diverticulosis  Kidney stones.    Endo/Other:    (+) DiabetesType I DM, well controlled, using insulin, : arthritis: OA., .    (-) no electrolyte abnormalities        Pt had no PAT visit        ROS comment: Hx: Neurofibroma of trunk  Diabetic peripheral neuropathy  Ganglioneuroma Abdominal:   (+) obese,           Vascular:           ROS comment: PAD  BLE edema. Other Findings:               Anesthesia Plan      MAC     ASA 3       Induction: intravenous. Anesthetic plan and risks discussed with patient. Use of blood products discussed with patient whom consented to blood products. Plan discussed with attending.                   RON Flood - CRNA   3/7/2022

## 2022-03-07 NOTE — PROCEDURES
1405Patient returned from procedure. Dressing checked, clean, dry, and intact. Patient stable. No s/s of complications noted or reported. Vitals will be checked q 15min, see flow sheets. 1415Patient eating and drinking well with no s/s of complications noted or reported. 1445Patient discharged, site was checked with every set of vitals. Site clean dry and intact. Discharge papers reviewed with patient, questions answered, discharge paper signed. Patient taken to door. Patient in stable condition, no s/s of complications noted or reported.

## 2022-03-07 NOTE — BRIEF OP NOTE
Brief Postoperative Note    Tamika Vicente  YOB: 1955  [id number]    Pre-operative Diagnosis and Procedure: Osteomyelitis L3 and L4 plan bx    Post-operative Diagnosis: Same    Anesthesia: Local    Estimated Blood Loss: < 10 cc    Surgeon: Atul SZYMANSKI     Complications: none    Specimen obtained: yes    Findings: none     Bobby Redd II, MD   3/7/2022 12:18 PM

## 2022-03-07 NOTE — PROGRESS NOTES
PT tolerated procedure well, PT brought to bay 3, no problems or concerns.   Samples sent to lab no. [id number], [id number]

## 2022-03-07 NOTE — PROGRESS NOTES
Patient arrived with family to Radiology department for L3 and L4 biopsy. Allergies, home medications, H&P and fasting instructions reviewed with patient. Vital signs taken. 20g IV placed, blood obtained, IV flushed and prn adapter attached. Blood sample sent to lab for ordered tests. Procedural instructions given, questions answered, understanding expressed and consent signed. Patient given fluoroscopy education, no questions at this time.

## 2022-03-08 LAB
GRAM STAIN ORDERABLE: NORMAL
GRAM STAIN ORDERABLE: NORMAL

## 2022-03-09 LAB
CULTURE SURGICAL: NORMAL
CULTURE SURGICAL: NORMAL

## 2022-03-21 NOTE — PROGRESS NOTES
Fluids given to patient. Banner Transposition Flap Text: The defect edges were debeveled with a #15 scalpel blade.  Given the location of the defect and the proximity to free margins a Banner transposition flap was deemed most appropriate.  Using a sterile surgical marker, an appropriate flap drawn around the defect. The area thus outlined was incised deep to adipose tissue with a #15 scalpel blade.  The skin margins were undermined to an appropriate distance in all directions utilizing iris scissors.

## 2022-03-23 ENCOUNTER — OFFICE VISIT (OUTPATIENT)
Dept: FAMILY MEDICINE CLINIC | Age: 67
End: 2022-03-23
Payer: MEDICARE

## 2022-03-23 VITALS
BODY MASS INDEX: 43.4 KG/M2 | TEMPERATURE: 96.4 F | DIASTOLIC BLOOD PRESSURE: 72 MMHG | SYSTOLIC BLOOD PRESSURE: 138 MMHG | OXYGEN SATURATION: 98 % | WEIGHT: 293 LBS | HEART RATE: 75 BPM | HEIGHT: 69 IN

## 2022-03-23 DIAGNOSIS — I10 ESSENTIAL HYPERTENSION: ICD-10-CM

## 2022-03-23 DIAGNOSIS — E11.42 DIABETIC PERIPHERAL NEUROPATHY (HCC): ICD-10-CM

## 2022-03-23 DIAGNOSIS — S21.209A WOUND OF BACK, UNSPECIFIED LATERALITY, INITIAL ENCOUNTER: ICD-10-CM

## 2022-03-23 DIAGNOSIS — K21.9 GASTROESOPHAGEAL REFLUX DISEASE WITHOUT ESOPHAGITIS: ICD-10-CM

## 2022-03-23 DIAGNOSIS — N18.32 STAGE 3B CHRONIC KIDNEY DISEASE (HCC): ICD-10-CM

## 2022-03-23 DIAGNOSIS — F33.41 MAJOR DEPRESSIVE DISORDER, RECURRENT, IN PARTIAL REMISSION (HCC): ICD-10-CM

## 2022-03-23 DIAGNOSIS — Z79.4 CONTROLLED TYPE 2 DIABETES MELLITUS WITH DIABETIC DERMATITIS, WITH LONG-TERM CURRENT USE OF INSULIN (HCC): ICD-10-CM

## 2022-03-23 DIAGNOSIS — E11.620 CONTROLLED TYPE 2 DIABETES MELLITUS WITH DIABETIC DERMATITIS, WITH LONG-TERM CURRENT USE OF INSULIN (HCC): ICD-10-CM

## 2022-03-23 DIAGNOSIS — M48.061 NEURAL FORAMINAL STENOSIS OF LUMBAR SPINE: Primary | ICD-10-CM

## 2022-03-23 PROCEDURE — 3017F COLORECTAL CA SCREEN DOC REV: CPT | Performed by: FAMILY MEDICINE

## 2022-03-23 PROCEDURE — G8482 FLU IMMUNIZE ORDER/ADMIN: HCPCS | Performed by: FAMILY MEDICINE

## 2022-03-23 PROCEDURE — 99212 OFFICE O/P EST SF 10 MIN: CPT | Performed by: FAMILY MEDICINE

## 2022-03-23 PROCEDURE — 99214 OFFICE O/P EST MOD 30 MIN: CPT | Performed by: FAMILY MEDICINE

## 2022-03-23 PROCEDURE — G8417 CALC BMI ABV UP PARAM F/U: HCPCS | Performed by: FAMILY MEDICINE

## 2022-03-23 PROCEDURE — 3044F HG A1C LEVEL LT 7.0%: CPT | Performed by: FAMILY MEDICINE

## 2022-03-23 PROCEDURE — 2022F DILAT RTA XM EVC RTNOPTHY: CPT | Performed by: FAMILY MEDICINE

## 2022-03-23 PROCEDURE — G8427 DOCREV CUR MEDS BY ELIG CLIN: HCPCS | Performed by: FAMILY MEDICINE

## 2022-03-23 PROCEDURE — 1036F TOBACCO NON-USER: CPT | Performed by: FAMILY MEDICINE

## 2022-03-23 RX ORDER — OMEPRAZOLE 40 MG/1
CAPSULE, DELAYED RELEASE ORAL
Qty: 90 CAPSULE | Refills: 3 | Status: SHIPPED | OUTPATIENT
Start: 2022-03-23

## 2022-03-23 SDOH — ECONOMIC STABILITY: FOOD INSECURITY: WITHIN THE PAST 12 MONTHS, YOU WORRIED THAT YOUR FOOD WOULD RUN OUT BEFORE YOU GOT MONEY TO BUY MORE.: NEVER TRUE

## 2022-03-23 SDOH — ECONOMIC STABILITY: FOOD INSECURITY: WITHIN THE PAST 12 MONTHS, THE FOOD YOU BOUGHT JUST DIDN'T LAST AND YOU DIDN'T HAVE MONEY TO GET MORE.: NEVER TRUE

## 2022-03-23 ASSESSMENT — PATIENT HEALTH QUESTIONNAIRE - PHQ9
7. TROUBLE CONCENTRATING ON THINGS, SUCH AS READING THE NEWSPAPER OR WATCHING TELEVISION: 0
SUM OF ALL RESPONSES TO PHQ QUESTIONS 1-9: 2
2. FEELING DOWN, DEPRESSED OR HOPELESS: 1
SUM OF ALL RESPONSES TO PHQ9 QUESTIONS 1 & 2: 1
6. FEELING BAD ABOUT YOURSELF - OR THAT YOU ARE A FAILURE OR HAVE LET YOURSELF OR YOUR FAMILY DOWN: 1
9. THOUGHTS THAT YOU WOULD BE BETTER OFF DEAD, OR OF HURTING YOURSELF: 0
1. LITTLE INTEREST OR PLEASURE IN DOING THINGS: 0
8. MOVING OR SPEAKING SO SLOWLY THAT OTHER PEOPLE COULD HAVE NOTICED. OR THE OPPOSITE, BEING SO FIGETY OR RESTLESS THAT YOU HAVE BEEN MOVING AROUND A LOT MORE THAN USUAL: 0
5. POOR APPETITE OR OVEREATING: 0
4. FEELING TIRED OR HAVING LITTLE ENERGY: 0
SUM OF ALL RESPONSES TO PHQ QUESTIONS 1-9: 2
SUM OF ALL RESPONSES TO PHQ QUESTIONS 1-9: 2
10. IF YOU CHECKED OFF ANY PROBLEMS, HOW DIFFICULT HAVE THESE PROBLEMS MADE IT FOR YOU TO DO YOUR WORK, TAKE CARE OF THINGS AT HOME, OR GET ALONG WITH OTHER PEOPLE: 1
3. TROUBLE FALLING OR STAYING ASLEEP: 0
SUM OF ALL RESPONSES TO PHQ QUESTIONS 1-9: 2

## 2022-03-23 ASSESSMENT — ENCOUNTER SYMPTOMS
EYE PAIN: 0
BLOOD IN STOOL: 0
SORE THROAT: 0
SHORTNESS OF BREATH: 0
EYE REDNESS: 0
ABDOMINAL PAIN: 0
NAUSEA: 0
DIARRHEA: 0
CONSTIPATION: 0
VOMITING: 0
COUGH: 0
BACK PAIN: 1

## 2022-03-23 ASSESSMENT — LIFESTYLE VARIABLES: HOW OFTEN DO YOU HAVE A DRINK CONTAINING ALCOHOL: NEVER

## 2022-03-23 ASSESSMENT — SOCIAL DETERMINANTS OF HEALTH (SDOH): HOW HARD IS IT FOR YOU TO PAY FOR THE VERY BASICS LIKE FOOD, HOUSING, MEDICAL CARE, AND HEATING?: NOT HARD AT ALL

## 2022-03-23 NOTE — PROGRESS NOTES
[address] [place] [address]  FAMILY MEDICINE RESIDENCY PROGRAM   OFFICE PROGRESS NOTE  DATE OF VISIT : 3/23/22    Patient : Adan Galindo    : female   Age : 61 y.o.  : 1955           Chief Complaint :   Chief Complaint   Patient presents with    Back Pain     Follow up    Results       HPI:   61 y.o. female comes in today for here for follow up of dm2 and back pain. No changes with her diabetes. She went to see Dr. Wilfred Hernandez after an MRI ordered showed possible osteomyelitis and labs/IR biopsy were ordered. She had the biopsy 3/7 which was negative. Lyrica helps take the edge off of the pain. She has been to pain mgmt and is more interested in following up with PMR for spine injections. Also reports a bump on her back after she used her back scratcher to reach an itch for approximately the past week. PHQ-9 today is 2. She continues with taking buspar and cymbalta.      Patient Active Problem List   Diagnosis    Diabetes mellitus type 2, uncontrolled ([address]    Hypertension    Hypercholesterolemia    GERD (gastroesophageal reflux disease)    Depression    Enlarged liver    Heart murmur    Chronic lower back pain    Facet joint disease    DJD (degenerative joint disease), lumbar    Bulging lumbar disc    Back pain    Anxiety    Bilateral leg edema    Microalbuminuria    Vitamin D insufficiency    Chronic kidney disease, stage 3, mod decreased GFR (HCC)    Senile nuclear cataract    Fatty liver    Diabetic peripheral neuropathy (HCC)    Neural foraminal stenosis of lumbar spine    Type 2 diabetes mellitus with both eyes affected by mild nonproliferative retinopathy without macular edema, with long-term current use of insulin ([address]    Spinal stenosis of lumbar region    Major depressive disorder, recurrent, in partial remission ([address]    Ganglioneuroma       Past Medical History:   Diagnosis Date    Anxiety     Carpal tunnel syndrome on right     Chronic back pain     Chronic kidney disease, stage 3 ([facility]     Depression 10/28/2010    Diabetes mellitus type 2, uncontrolled ([facility] 10/28/2010    with microalbuminuria    Diverticulosis     GERD (gastroesophageal reflux disease) 10/28/2010    Herniated intervertebral disk     neck and lower back    Hypercholesterolemia 10/28/2010    Hypertension 10/28/2010    Kidney stones     Neurofibroma of trunk     irritating mole on the back. biopsy done 10/21/09    Obesity     Osteoarthritis     PAD [facility]peripheral artery disease) ([facility]     Type II or unspecified type diabetes mellitus without mention of complication, not stated as uncontrolled     Vitamin D insufficiency        Current Outpatient Medications on File Prior to Visit   Medication Sig Dispense Refill    TRUEplus Lancets 33G MISC USE  TO  TEST  UP  TO FIVE TIMES DAILY 500 each 3    insulin glargine (LANTUS SOLOSTAR) 100 UNIT/ML injection pen Inject 55 Units into the skin 2 times daily 36 pen 1    pregabalin (LYRICA) 75 MG capsule Take 1 capsule by mouth 3 times daily for 90 days.  90 capsule 2    valsartan-hydroCHLOROthiazide (DIOVAN-HCT) 160-25 MG per tablet TAKE 1 TABLET EVERY DAY PER DR JARAMILLO 90 tablet 1    furosemide (LASIX) 20 MG tablet TAKE 1 TABLET (20MG) EVERY OTHER DAY ALTERNATING  WITH 2 TABLETS (40MG) EVERY OTHER  tablet 1    busPIRone (BUSPAR) 5 MG tablet TAKE 1 TABLET THREE TIMES DAILY 270 tablet 0    insulin aspart (NOVOLOG FLEXPEN) 100 UNIT/ML injection pen 20 units tid w/ SSI TID AC: 150-200 2 units; 201-250 4; 251-300 6; 301-350 8; 351-400 10; 401-450 12; 451-500 14; 501+ 16 units 35 mL 1    tiZANidine (ZANAFLEX) 4 MG tablet TAKE 1 TABLET BY MOUTH EVERY 6 HOURS AS NEEDED FOR MUSCLE SPASM 90 tablet 1    metFORMIN (GLUCOPHAGE XR) 500 MG extended release tablet Take 1 tablet by mouth daily (with breakfast) 90 tablet 1    potassium chloride (KLOR-CON M) 10 MEQ extended release tablet TAKE 1 TABLET TWICE DAILY 180 tablet 1  cloNIDine (CATAPRES) 0.1 MG tablet TAKE 1 TABLET BY MOUTH 3 TIMES DAILY 270 tablet 1    atorvastatin (LIPITOR) 80 MG tablet TAKE 1 TABLET EVERY NIGHT 90 tablet 1    vitamin D3 (CHOLECALCIFEROL) 25 MCG (1000 UT) TABS tablet TAKE 2 TABLETS EVERY DAY (PRESCRIBED BY RENAL) 180 tablet 1    DULoxetine (CYMBALTA) 60 MG extended release capsule TAKE 1 CAPSULE EVERY DAY 90 capsule 1    allopurinol (ZYLOPRIM) 100 MG tablet TAKE 1 TABLET TWICE DAILY 180 tablet 1    metoprolol (LOPRESSOR) 100 MG tablet TAKE 1 TABLET TWICE DAILY 180 tablet 1    acetaminophen (TYLENOL) 500 MG tablet Take 500 mg by mouth every 6 hours as needed for Pain      blood glucose test strips (TRUE METRIX BLOOD GLUCOSE TEST) strip TEST BLOOD SUGAR UP TO 5 TIMES DAILY 500 strip 3    Insulin Pen Needle (B-D UF III MINI PEN NEEDLES) 31G X 5 MM MISC Use to inject insulin 5 times daily 500 each 3    [DISCONTINUED] insulin lispro (HUMALOG KWIKPEN) 100 UNIT/ML injection Inject 10 Units into the skin 3 times daily (before meals). 3 Pen 6     No current facility-administered medications on file prior to visit.        No Known Allergies    Family History   Problem Relation Age of Onset    Lung Cancer Mother 71        mesothelioma metastatized to the brain    Diabetes Mother     High Cholesterol Mother     High Blood Pressure Mother     Arthritis Mother     Cancer Mother         lung to brain    Diabetes Father     Arthritis Father     High Blood Pressure Sister     Arthritis Sister     High Cholesterol Sister     High Blood Pressure Brother     Substance Abuse Brother     Breast Cancer Paternal Aunt          at 80y    High Blood Pressure Paternal Aunt     Cancer Maternal Grandmother [de-identified]        colorectal cancer    High Blood Pressure Maternal Grandmother     Heart Failure Maternal Grandmother     Arthritis Maternal Grandmother     High Cholesterol Maternal Grandmother     High Blood Pressure Paternal Grandmother     Stroke Paternal Grandmother     High Blood Pressure Maternal Aunt     High Cholesterol Maternal Aunt     Cancer Maternal Aunt         mesothelioma    Diabetes Maternal Uncle     High Blood Pressure Maternal Uncle     High Cholesterol Maternal Uncle     Substance Abuse Maternal Uncle     High Blood Pressure Paternal Uncle        Past Surgical History:   Procedure Laterality Date    COLONOSCOPY N/A 9/8/2021    COLONOSCOPY POLYPECTOMY SNARE/COLD BIOPSY performed by Rina Syed MD at [facility]      CT BIOPSY PERCUTANEOUS DEEP BONE  3/7/2022    CT BIOPSY PERCUTANEOUS DEEP BONE 3/7/2022 Tejal Schmidt II, MD [facility]    ECHO COMPLETE  3/19/2012         EYE MUSCLE SURGERY      as a child    HYSTERECTOMY      with salpingoophorectomy    HYSTERECTOMY, TOTAL ABDOMINAL Bilateral 2003    cervix removed per patient    NERVE BLOCK Right 05/01/2018    lumbar transforaminal nerve block #1 with sedation    NC KALIA NOSE/CLEFT LIP/TIP Right 5/1/2018    RIGHT LUMBAR TRANSFORAMINAL NERVE BLOCK #1 L3-4 L4-5 WITH IV SEDATIN performed by Rogene Favre, DO at [address] History     Tobacco Use    Smoking status: Never Smoker    Smokeless tobacco: Never Used   Substance Use Topics    Alcohol use: Yes     Alcohol/week: 2.0 standard drinks     Types: 1 Glasses of wine, 1 Cans of beer per week     Comment: 1 time per month    Drug use: No       Review of Systems  Review of Systems   Constitutional: Negative for chills and fever. HENT: Negative for congestion, ear pain and sore throat. Eyes: Negative for pain and redness. Respiratory: Negative for cough and shortness of breath. Cardiovascular: Negative for chest pain, palpitations and leg swelling. Gastrointestinal: Negative for abdominal pain, blood in stool, constipation, diarrhea, nausea and vomiting. Genitourinary: Negative for dysuria, frequency and hematuria.    Musculoskeletal: Positive for arthralgias, back pain and gait problem. Negative for myalgias. Skin: Positive for wound. Negative for rash. Allergic/Immunologic: Negative for environmental allergies. Neurological: Negative for dizziness and headaches. Hematological: Does not bruise/bleed easily. Psychiatric/Behavioral: Positive for dysphoric mood. The patient is not nervous/anxious. Physical Exam:  VS:  Blood pressure 138/72, pulse 75, temperature 96.4 °F (35.8 °C), temperature source Temporal, height 5' 9\" (1.753 m), weight (!) 315 lb (142.9 kg), SpO2 98 %, not currently breastfeeding. Physical Exam  Constitutional:       Appearance: She is well-developed. HENT:      Head: Normocephalic and atraumatic. Cardiovascular:      Rate and Rhythm: Normal rate and regular rhythm. Heart sounds: No murmur heard. No friction rub. No gallop. Pulmonary:      Breath sounds: Normal breath sounds. No wheezing, rhonchi or rales. Abdominal:      General: Bowel sounds are normal.      Palpations: Abdomen is soft. There is no mass. Tenderness: There is no abdominal tenderness. Skin:     Nails: There is no clubbing. 12mm x 3mm thoracic spine  No erythema or drainage  Leg ms 5/5    Assessment/Plan:  1. Neural foraminal stenosis of lumbar spine  Follow up with Dr. Will Valdez or Dr. Gloria Thacker for injections. Wants to wait on restarting aquatherapy PT for now. 2. Diabetic peripheral neuropathy ([address]  - Jay Joseph DPM, Podiatry, [facility] ([facility])    3. Controlled type 2 diabetes mellitus with hyperglycemia (HCC)  Stable now with meds    4. Wound of back, unspecified laterality, initial encounter  Advised basic wound care and covering. Recheck in 2 weeks    5. Stage 3b chronic kidney disease (HCC)  Stable. Follows with Dr. Chang Welch    6. Major depressive disorder, recurrent, in partial remission (HCC)  Stable on current meds.     7. Gastroesophageal reflux disease without esophagitis  - omeprazole (PRILOSEC) 40 MG delayed release capsule; TAKE 1 CAPSULE EVERY DAY WITH BREAKFAST  Dispense: 90 capsule; Refill: 3      Additional plan and future considerations:       Return in about 9 days (around 4/1/2022) for 4/1 11 am for recheck of back wound.     Signed by : Ana Ni MD

## 2022-03-24 RX ORDER — POTASSIUM CHLORIDE 750 MG/1
10 TABLET, EXTENDED RELEASE ORAL 2 TIMES DAILY
Qty: 180 TABLET | Refills: 3 | Status: SHIPPED | OUTPATIENT
Start: 2022-03-24

## 2022-03-24 RX ORDER — PREGABALIN 75 MG/1
75 CAPSULE ORAL 3 TIMES DAILY
Qty: 270 CAPSULE | Refills: 0 | Status: SHIPPED
Start: 2022-03-24 | End: 2022-07-01

## 2022-03-24 NOTE — TELEPHONE ENCOUNTER
Last Appointment:  3/23/2022  Future Appointments   Date Time Provider Eloise Oshea   4/1/2022 11:00 AM MD Tim Mclaughlin [facility]

## 2022-03-24 NOTE — TELEPHONE ENCOUNTER
Controlled Substance Monitoring:    Acute and Chronic Pain Monitoring:   RX Monitoring 3/24/2022   Periodic Controlled Substance Monitoring Possible medication side effects, risk of tolerance/dependence & alternative treatments discussed. ;No signs of potential drug abuse or diversion identified. ;Assessed functional status. Lyrica refilled for 90 day supply.

## 2022-04-01 ENCOUNTER — OFFICE VISIT (OUTPATIENT)
Dept: FAMILY MEDICINE CLINIC | Age: 67
End: 2022-04-01
Payer: MEDICARE

## 2022-04-01 VITALS
HEIGHT: 69 IN | HEART RATE: 74 BPM | WEIGHT: 293 LBS | RESPIRATION RATE: 18 BRPM | TEMPERATURE: 97 F | OXYGEN SATURATION: 96 % | BODY MASS INDEX: 43.4 KG/M2 | SYSTOLIC BLOOD PRESSURE: 134 MMHG | DIASTOLIC BLOOD PRESSURE: 62 MMHG

## 2022-04-01 DIAGNOSIS — E11.42 DIABETIC PERIPHERAL NEUROPATHY (HCC): ICD-10-CM

## 2022-04-01 DIAGNOSIS — S21.209D WOUND OF BACK, UNSPECIFIED LATERALITY, SUBSEQUENT ENCOUNTER: ICD-10-CM

## 2022-04-01 DIAGNOSIS — I10 ESSENTIAL HYPERTENSION: ICD-10-CM

## 2022-04-01 DIAGNOSIS — M48.061 NEURAL FORAMINAL STENOSIS OF LUMBAR SPINE: ICD-10-CM

## 2022-04-01 DIAGNOSIS — F41.9 ANXIETY: ICD-10-CM

## 2022-04-01 DIAGNOSIS — E11.65 UNCONTROLLED TYPE 2 DIABETES MELLITUS WITH HYPERGLYCEMIA (HCC): Primary | Chronic | ICD-10-CM

## 2022-04-01 PROCEDURE — 99212 OFFICE O/P EST SF 10 MIN: CPT | Performed by: FAMILY MEDICINE

## 2022-04-01 PROCEDURE — G8417 CALC BMI ABV UP PARAM F/U: HCPCS | Performed by: FAMILY MEDICINE

## 2022-04-01 PROCEDURE — 3017F COLORECTAL CA SCREEN DOC REV: CPT | Performed by: FAMILY MEDICINE

## 2022-04-01 PROCEDURE — 1036F TOBACCO NON-USER: CPT | Performed by: FAMILY MEDICINE

## 2022-04-01 PROCEDURE — 2022F DILAT RTA XM EVC RTNOPTHY: CPT | Performed by: FAMILY MEDICINE

## 2022-04-01 PROCEDURE — 3044F HG A1C LEVEL LT 7.0%: CPT | Performed by: FAMILY MEDICINE

## 2022-04-01 PROCEDURE — G8427 DOCREV CUR MEDS BY ELIG CLIN: HCPCS | Performed by: FAMILY MEDICINE

## 2022-04-01 PROCEDURE — 99213 OFFICE O/P EST LOW 20 MIN: CPT | Performed by: FAMILY MEDICINE

## 2022-04-01 RX ORDER — CLONIDINE HYDROCHLORIDE 0.1 MG/1
0.1 TABLET ORAL 3 TIMES DAILY
Qty: 270 TABLET | Refills: 3 | Status: SHIPPED | OUTPATIENT
Start: 2022-04-01

## 2022-04-01 RX ORDER — METFORMIN HYDROCHLORIDE 500 MG/1
500 TABLET, EXTENDED RELEASE ORAL
Qty: 90 TABLET | Refills: 3 | Status: SHIPPED | OUTPATIENT
Start: 2022-04-01

## 2022-04-01 RX ORDER — ALLOPURINOL 100 MG/1
TABLET ORAL
Qty: 180 TABLET | Refills: 3 | Status: SHIPPED | OUTPATIENT
Start: 2022-04-01

## 2022-04-01 RX ORDER — PEN NEEDLE, DIABETIC 31 GX5/16"
NEEDLE, DISPOSABLE MISCELLANEOUS
Qty: 500 EACH | Refills: 3 | Status: CANCELLED | OUTPATIENT
Start: 2022-04-01

## 2022-04-01 RX ORDER — FUROSEMIDE 20 MG/1
TABLET ORAL
Qty: 135 TABLET | Refills: 1 | Status: SHIPPED | OUTPATIENT
Start: 2022-04-01

## 2022-04-01 RX ORDER — BACITRACIN 500 [USP'U]/G
OINTMENT OPHTHALMIC
Qty: 3.5 G | Refills: 2 | Status: SHIPPED
Start: 2022-04-01 | End: 2022-04-05 | Stop reason: RX

## 2022-04-01 RX ORDER — ATORVASTATIN CALCIUM 80 MG/1
TABLET, FILM COATED ORAL
Qty: 90 TABLET | Refills: 3 | Status: SHIPPED | OUTPATIENT
Start: 2022-04-01

## 2022-04-01 RX ORDER — METOPROLOL TARTRATE 100 MG/1
TABLET ORAL
Qty: 180 TABLET | Refills: 3 | Status: SHIPPED | OUTPATIENT
Start: 2022-04-01

## 2022-04-01 RX ORDER — BUSPIRONE HYDROCHLORIDE 5 MG/1
TABLET ORAL
Qty: 270 TABLET | Refills: 3 | Status: SHIPPED | OUTPATIENT
Start: 2022-04-01

## 2022-04-01 RX ORDER — MELATONIN
Qty: 180 TABLET | Refills: 3 | Status: SHIPPED | OUTPATIENT
Start: 2022-04-01

## 2022-04-01 RX ORDER — VALSARTAN AND HYDROCHLOROTHIAZIDE 160; 25 MG/1; MG/1
TABLET ORAL
Qty: 90 TABLET | Refills: 3 | Status: SHIPPED | OUTPATIENT
Start: 2022-04-01

## 2022-04-01 RX ORDER — INSULIN ASPART 100 [IU]/ML
INJECTION, SOLUTION INTRAVENOUS; SUBCUTANEOUS
Qty: 35 ML | Refills: 3 | Status: SHIPPED | OUTPATIENT
Start: 2022-04-01

## 2022-04-01 RX ORDER — DULOXETIN HYDROCHLORIDE 60 MG/1
CAPSULE, DELAYED RELEASE ORAL
Qty: 90 CAPSULE | Refills: 3 | Status: SHIPPED | OUTPATIENT
Start: 2022-04-01

## 2022-04-01 RX ORDER — INSULIN GLARGINE 100 [IU]/ML
55 INJECTION, SOLUTION SUBCUTANEOUS 2 TIMES DAILY
Qty: 36 PEN | Refills: 3 | Status: SHIPPED | OUTPATIENT
Start: 2022-04-01

## 2022-04-01 RX ORDER — TIZANIDINE 4 MG/1
TABLET ORAL
Qty: 90 TABLET | Refills: 1 | Status: CANCELLED | OUTPATIENT
Start: 2022-04-01

## 2022-04-01 ASSESSMENT — ENCOUNTER SYMPTOMS
VOMITING: 0
BACK PAIN: 1
SHORTNESS OF BREATH: 0
COUGH: 0
NAUSEA: 0
ABDOMINAL PAIN: 0

## 2022-04-01 NOTE — PROGRESS NOTES
[address] [place] [address]  FAMILY MEDICINE RESIDENCY PROGRAM   OFFICE PROGRESS NOTE  DATE OF VISIT : 22    Patient : Virgen Erazo    : female   Age : 61 y.o.  : 1955           Chief Complaint :   Chief Complaint   Patient presents with    Back Pain     and wound care       HPI:   61 y.o. female comes in today for follow up of back wound. She has been applying a dressing. No drainage. Tolerating meds well. No changes in back pain since she was last seen. Review of Systems  Review of Systems   Constitutional: Negative for chills and fever. HENT: Negative for congestion. Respiratory: Negative for cough and shortness of breath. Cardiovascular: Negative for chest pain, palpitations and leg swelling. Gastrointestinal: Negative for abdominal pain, nausea and vomiting. Musculoskeletal: Positive for arthralgias and back pain. Skin: Positive for wound. Physical Exam:  VS:  Blood pressure 134/62, pulse 74, temperature 97 °F (36.1 °C), temperature source Temporal, resp. rate 18, height 5' 9\" (1.753 m), weight (!) 315 lb (142.9 kg), SpO2 96 %, not currently breastfeeding. Physical Exam  Constitutional:       General: She is not in acute distress. Appearance: Normal appearance. She is well-developed. HENT:      Head: Normocephalic and atraumatic. Cardiovascular:      Rate and Rhythm: Normal rate and regular rhythm. Heart sounds: No murmur heard. No friction rub. No gallop. Pulmonary:      Breath sounds: Normal breath sounds. No wheezing, rhonchi or rales. Abdominal:      General: Bowel sounds are normal.      Palpations: Abdomen is soft. There is no mass. Tenderness: There is no abdominal tenderness. Musculoskeletal:      Lumbar back: Spasms and tenderness present. Decreased range of motion. Negative right straight leg raise test and negative left straight leg raise test.   Skin:     Nails: There is no clubbing.    Neurological: Mental Status: She is alert. Sensory: Sensation is intact. Gait: Gait normal.      Deep Tendon Reflexes: Reflexes are normal and symmetric.     ms 5/5      No erythema; granulation tissue present; rt side with crusted tissue    Assessment/Plan:  1. Uncontrolled type 2 diabetes mellitus with hyperglycemia (HCC)  6.6, controlled  - insulin glargine (LANTUS SOLOSTAR) 100 UNIT/ML injection pen; Inject 55 Units into the skin 2 times daily  Dispense: 36 pen; Refill: 3  - insulin aspart (NOVOLOG FLEXPEN) 100 UNIT/ML injection pen; 20 units tid w/ SSI TID AC: 150-200 2 units; 201-250 4; 251-300 6; 301-350 8; 351-400 10; 401-450 12; 451-500 14; 501+ 16 units  Dispense: 35 mL; Refill: 3  - metFORMIN (GLUCOPHAGE XR) 500 MG extended release tablet; Take 1 tablet by mouth daily (with breakfast)  Dispense: 90 tablet; Refill: 3  - atorvastatin (LIPITOR) 80 MG tablet; TAKE 1 TABLET EVERY NIGHT  Dispense: 90 tablet; Refill: 3    2. Essential hypertension  controlled  - cloNIDine (CATAPRES) 0.1 MG tablet; Take 1 tablet by mouth 3 times daily  Dispense: 270 tablet; Refill: 3  - metoprolol (LOPRESSOR) 100 MG tablet; TAKE 1 TABLET TWICE DAILY  Dispense: 180 tablet; Refill: 3    3. Neural foraminal stenosis of lumbar spine  Follow up with pmr    4. Anxiety  stable  - busPIRone (BUSPAR) 5 MG tablet; TAKE 1 TABLET THREE TIMES DAILY  Dispense: 270 tablet; Refill: 3    5. Diabetic peripheral neuropathy (HCC)  stable  - DULoxetine (CYMBALTA) 60 MG extended release capsule; TAKE 1 CAPSULE EVERY DAY  Dispense: 90 capsule; Refill: 3    6. Wound in lumbar center of back  Healing. Apply bacitracin to site bid. No signs of infection    Additional plan and future considerations:       Return in about 5 weeks (around 5/6/2022) for double book 11 am to check wound.     Signed by : Esa Fishman MD

## 2022-04-05 ENCOUNTER — TELEPHONE (OUTPATIENT)
Dept: FAMILY MEDICINE CLINIC | Age: 67
End: 2022-04-05

## 2022-04-05 DIAGNOSIS — S21.209D WOUND OF BACK, UNSPECIFIED LATERALITY, SUBSEQUENT ENCOUNTER: Primary | ICD-10-CM

## 2022-04-05 RX ORDER — BACITRACIN, NEOMYCIN, POLYMYXIN B 400; 3.5; 5 [USP'U]/G; MG/G; [USP'U]/G
OINTMENT TOPICAL
Qty: 1 EACH | Refills: 1 | Status: SHIPPED | OUTPATIENT
Start: 2022-04-05 | End: 2022-04-15

## 2022-04-05 NOTE — TELEPHONE ENCOUNTER
Detailed message left advising that ointment was sent to her mail order pharmacy. Requested a return call if she has any questions or concerns .

## 2022-04-05 NOTE — TELEPHONE ENCOUNTER
Patient phoned stated that the cream doctor ordered the pharmacy does not carry it anymore.   Patient would like doctor to order a new cream   Please advise  Thank you April

## 2022-04-21 ENCOUNTER — TELEPHONE (OUTPATIENT)
Dept: FAMILY MEDICINE CLINIC | Age: 67
End: 2022-04-21

## 2022-04-21 DIAGNOSIS — E11.42 DIABETIC PERIPHERAL NEUROPATHY (HCC): Primary | ICD-10-CM

## 2022-04-21 NOTE — TELEPHONE ENCOUNTER
----- Message from Marbin Balderas sent at 4/20/2022  2:54 PM EDT -----  Subject: Referral Request    QUESTIONS   Reason for referral request? Podiatrist - previous referral given for a   podiatrist was not in Pt's network. Please contact Pt with new referral   Has the physician seen you for this condition before? No   Preferred Specialist (if applicable)? Veronika Mccord you already have an appointment scheduled? No  Additional Information for Provider? Podiatrist - previous referral given   for a podiatrist was not in Pt's network. Please contact Pt with new   referral  ---------------------------------------------------------------------------  --------------  CALL BACK INFO  What is the best way for the office to contact you? OK to leave message on   voicemail  Preferred Call Back Phone Number? [phone]  ---------------------------------------------------------------------------  --------------  SCRIPT ANSWERS  Relationship to Patient?  Self

## 2022-04-22 ENCOUNTER — TELEPHONE (OUTPATIENT)
Dept: ADMINISTRATIVE | Age: 67
End: 2022-04-22

## 2022-04-25 ENCOUNTER — OFFICE VISIT (OUTPATIENT)
Dept: PODIATRY | Age: 67
End: 2022-04-25
Payer: MEDICARE

## 2022-04-25 VITALS — WEIGHT: 293 LBS | BODY MASS INDEX: 43.4 KG/M2 | HEIGHT: 69 IN

## 2022-04-25 DIAGNOSIS — G60.8 HEREDITARY SENSORY NEUROPATHY: ICD-10-CM

## 2022-04-25 DIAGNOSIS — E11.9 TYPE 2 DIABETES MELLITUS WITHOUT COMPLICATION, UNSPECIFIED WHETHER LONG TERM INSULIN USE (HCC): ICD-10-CM

## 2022-04-25 DIAGNOSIS — B35.1 TINEA UNGUIUM: Primary | ICD-10-CM

## 2022-04-25 PROCEDURE — 2022F DILAT RTA XM EVC RTNOPTHY: CPT | Performed by: PODIATRIST

## 2022-04-25 PROCEDURE — 3044F HG A1C LEVEL LT 7.0%: CPT | Performed by: PODIATRIST

## 2022-04-25 PROCEDURE — 11721 DEBRIDE NAIL 6 OR MORE: CPT | Performed by: PODIATRIST

## 2022-04-25 PROCEDURE — 99203 OFFICE O/P NEW LOW 30 MIN: CPT | Performed by: PODIATRIST

## 2022-04-25 PROCEDURE — G8427 DOCREV CUR MEDS BY ELIG CLIN: HCPCS | Performed by: PODIATRIST

## 2022-04-25 PROCEDURE — G8417 CALC BMI ABV UP PARAM F/U: HCPCS | Performed by: PODIATRIST

## 2022-04-25 PROCEDURE — 1036F TOBACCO NON-USER: CPT | Performed by: PODIATRIST

## 2022-04-25 PROCEDURE — 3017F COLORECTAL CA SCREEN DOC REV: CPT | Performed by: PODIATRIST

## 2022-04-25 NOTE — PROGRESS NOTES
Burke Becerra is here today for a diabetic foot exam. her PCP is Amalia Saab MD last OV was 2022. Burke Becerra : 1955 Sex: female  Age: 61 y.o. Patient was referred by Amalia Saab MD    CC:   Diabetic foot exam and painful elongated toenails 1-5 right and left    HPI:   This pleasant 81-year-old female diabetic patient for me today foot and ankle exam.  History of insulin and metformin. Denies any calf pain. No previous history of diabetic wounds or skin abrasions foot or ankle related. No calf pain today. Here today for diabetic foot exam and painful elongated toenails 1-5 right and left. No additional pedal complaints at this time.     ROS:  Const: Denies constitutional symptoms  Musculo: Denies symptoms other than stated above  Skin: Denies symptoms other than stated above      Current Outpatient Medications:     insulin glargine (LANTUS SOLOSTAR) 100 UNIT/ML injection pen, Inject 55 Units into the skin 2 times daily, Disp: 36 pen, Rfl: 3    valsartan-hydroCHLOROthiazide (DIOVAN-HCT) 160-25 MG per tablet, TAKE 1 TABLET EVERY DAY PER DR JARAMILLO, Disp: 90 tablet, Rfl: 3    furosemide (LASIX) 20 MG tablet, TAKE 1 TABLET (20MG) EVERY OTHER DAY ALTERNATING  WITH 2 TABLETS (40MG) EVERY OTHER DAY, Disp: 135 tablet, Rfl: 1    busPIRone (BUSPAR) 5 MG tablet, TAKE 1 TABLET THREE TIMES DAILY, Disp: 270 tablet, Rfl: 3    insulin aspart (NOVOLOG FLEXPEN) 100 UNIT/ML injection pen, 20 units tid w/ SSI TID AC: 150-200 2 units; 201-250 4; 251-300 6; 301-350 8; 351-400 10; 401-450 12; 451-500 14; 501+ 16 units, Disp: 35 mL, Rfl: 3    metFORMIN (GLUCOPHAGE XR) 500 MG extended release tablet, Take 1 tablet by mouth daily (with breakfast), Disp: 90 tablet, Rfl: 3    cloNIDine (CATAPRES) 0.1 MG tablet, Take 1 tablet by mouth 3 times daily, Disp: 270 tablet, Rfl: 3    atorvastatin (LIPITOR) 80 MG tablet, TAKE 1 TABLET EVERY NIGHT, Disp: 90 tablet, Rfl: 3    vitamin D3 (CHOLECALCIFEROL) 25 MCG (1000 UT) TABS tablet, TAKE 2 TABLETS EVERY DAY (PRESCRIBED BY RENAL), Disp: 180 tablet, Rfl: 3    DULoxetine (CYMBALTA) 60 MG extended release capsule, TAKE 1 CAPSULE EVERY DAY, Disp: 90 capsule, Rfl: 3    allopurinol (ZYLOPRIM) 100 MG tablet, TAKE 1 TABLET TWICE DAILY, Disp: 180 tablet, Rfl: 3    metoprolol (LOPRESSOR) 100 MG tablet, TAKE 1 TABLET TWICE DAILY, Disp: 180 tablet, Rfl: 3    potassium chloride (KLOR-CON M) 10 MEQ extended release tablet, Take 1 tablet by mouth 2 times daily TAKE 1 TABLET TWICE DAILY, Disp: 180 tablet, Rfl: 3    pregabalin (LYRICA) 75 MG capsule, Take 1 capsule by mouth 3 times daily for 90 days. , Disp: 270 capsule, Rfl: 0    omeprazole (PRILOSEC) 40 MG delayed release capsule, TAKE 1 CAPSULE EVERY DAY WITH BREAKFAST, Disp: 90 capsule, Rfl: 3    TRUEplus Lancets 33G MISC, USE  TO  TEST  UP  TO FIVE TIMES DAILY, Disp: 500 each, Rfl: 3    tiZANidine (ZANAFLEX) 4 MG tablet, TAKE 1 TABLET BY MOUTH EVERY 6 HOURS AS NEEDED FOR MUSCLE SPASM, Disp: 90 tablet, Rfl: 1    acetaminophen (TYLENOL) 500 MG tablet, Take 500 mg by mouth every 6 hours as needed for Pain, Disp: , Rfl:     blood glucose test strips (TRUE METRIX BLOOD GLUCOSE TEST) strip, TEST BLOOD SUGAR UP TO 5 TIMES DAILY, Disp: 500 strip, Rfl: 3    Insulin Pen Needle (B-D UF III MINI PEN NEEDLES) 31G X 5 MM MISC, Use to inject insulin 5 times daily, Disp: 500 each, Rfl: 3  No Known Allergies    Past Medical History:   Diagnosis Date    Anxiety     Carpal tunnel syndrome on right     Chronic back pain     [facility]     Depression 10/28/2010    Diabetes mellitus type 2, uncontrolled ([address] 10/28/2010    with microalbuminuria    Diverticulosis     GERD (gastroesophageal reflux disease) 10/28/2010    Herniated intervertebral disk     neck and lower back    Hypercholesterolemia 10/28/2010    Hypertension 10/28/2010    Kidney stones     Neurofibroma of trunk irritating mole on the back. biopsy done 10/21/09    Obesity     Osteoarthritis     PAD (peripheral artery disease) (HCC)     Type II or unspecified type diabetes mellitus without mention of complication, not stated as uncontrolled     Vitamin D insufficiency        There were no vitals filed for this visit. Work History/Social History: Foot and ankle history:     Focused Lower Extremity Physical Exam:      Neurovascular examination:    Dorsalis Pedis palpable bilateral.  Posterior tibialis weakly palpable bilateral.    Capillary Refill Time:  Immediate return  Hair growth:  Symmetrical and bilateral   Skin:  Not atrophic  Edema: Mild edema bilateral feet. Mild edema bilateral ankles. Neurologic:  Light touch diminished bilateral.  Warm to coolness bilateral distal toes  Decreased epicritic sensation     Musculoskeletal/ Orthopedic examination:    Equinis: present bilateral  Dorsiflexion, plantarflexion, inversion, eversion bilateral 5 out of 5 muscle strength  Wiggling toes  [facility]    Dermatology examination:    Toenails 1 through 5 bilateral thickened, elongated, dystrophic, mycotic with subungual debris. Web spaces 1 through 4 bilateral clean dry and intact. No significant hyperkeratotic tissue noted. No erythema or open wounds. Assessment and Plan:  Andrés Lincoln was seen today for numbness and diabetes. Diagnoses and all orders for this visit:    Tinea unguium    Type 2 diabetes mellitus without complication, unspecified whether long term insulin use (HCC)    Hereditary sensory neuropathy          Diabetic foot and ankle examination performed today  Formal debridement toenails 1 through 5 right and left with manual debridement for thickness and overall length. Hemoglobin A1c 6.6 from January 22, 2022. Lotion daily. Avoid barefoot. Follow-up 2 months. Return in about 2 months (around 6/25/2022).      Seen By:  Meera Sifuentes DPM      Document was created using voice recognition software. Note was reviewed however may contain grammatical errors.

## 2022-05-03 DIAGNOSIS — I89.0 LYMPHEDEMA: ICD-10-CM

## 2022-05-03 DIAGNOSIS — E11.9 TYPE 2 DIABETES MELLITUS WITHOUT COMPLICATION, UNSPECIFIED WHETHER LONG TERM INSULIN USE (HCC): Primary | ICD-10-CM

## 2022-05-04 ENCOUNTER — OFFICE VISIT (OUTPATIENT)
Dept: PHYSICAL MEDICINE AND REHAB | Age: 67
End: 2022-05-04
Payer: MEDICARE

## 2022-05-04 ENCOUNTER — PREP FOR PROCEDURE (OUTPATIENT)
Dept: PHYSICAL MEDICINE AND REHAB | Age: 67
End: 2022-05-04

## 2022-05-04 VITALS
BODY MASS INDEX: 43.4 KG/M2 | SYSTOLIC BLOOD PRESSURE: 142 MMHG | WEIGHT: 293 LBS | HEART RATE: 80 BPM | DIASTOLIC BLOOD PRESSURE: 84 MMHG | HEIGHT: 69 IN

## 2022-05-04 DIAGNOSIS — M47.816 LUMBAR SPONDYLOSIS: Primary | ICD-10-CM

## 2022-05-04 DIAGNOSIS — M48.061 LUMBAR STENOSIS WITHOUT NEUROGENIC CLAUDICATION: ICD-10-CM

## 2022-05-04 DIAGNOSIS — M54.50 CHRONIC BILATERAL LOW BACK PAIN WITHOUT SCIATICA: ICD-10-CM

## 2022-05-04 DIAGNOSIS — E66.01 MORBID OBESITY WITH BMI OF 45.0-49.9, ADULT (HCC): ICD-10-CM

## 2022-05-04 DIAGNOSIS — M51.36 DDD (DEGENERATIVE DISC DISEASE), LUMBAR: ICD-10-CM

## 2022-05-04 DIAGNOSIS — G89.29 CHRONIC BILATERAL LOW BACK PAIN WITHOUT SCIATICA: ICD-10-CM

## 2022-05-04 PROBLEM — N18.30 CHRONIC RENAL DISEASE, STAGE III (HCC): Status: ACTIVE | Noted: 2022-05-04

## 2022-05-04 PROCEDURE — 64493 INJ PARAVERT F JNT L/S 1 LEV: CPT | Performed by: PHYSICAL MEDICINE & REHABILITATION

## 2022-05-04 PROCEDURE — G8427 DOCREV CUR MEDS BY ELIG CLIN: HCPCS | Performed by: PHYSICAL MEDICINE & REHABILITATION

## 2022-05-04 PROCEDURE — 1036F TOBACCO NON-USER: CPT | Performed by: PHYSICAL MEDICINE & REHABILITATION

## 2022-05-04 PROCEDURE — G8417 CALC BMI ABV UP PARAM F/U: HCPCS | Performed by: PHYSICAL MEDICINE & REHABILITATION

## 2022-05-04 PROCEDURE — 99214 OFFICE O/P EST MOD 30 MIN: CPT | Performed by: PHYSICAL MEDICINE & REHABILITATION

## 2022-05-04 PROCEDURE — 3017F COLORECTAL CA SCREEN DOC REV: CPT | Performed by: PHYSICAL MEDICINE & REHABILITATION

## 2022-05-04 RX ORDER — SODIUM CHLORIDE 0.9 % (FLUSH) 0.9 %
5-40 SYRINGE (ML) INJECTION PRN
Status: CANCELLED | OUTPATIENT
Start: 2022-05-04

## 2022-05-04 RX ORDER — SODIUM CHLORIDE 9 MG/ML
INJECTION, SOLUTION INTRAVENOUS PRN
Status: CANCELLED | OUTPATIENT
Start: 2022-05-04

## 2022-05-04 RX ORDER — SODIUM CHLORIDE 0.9 % (FLUSH) 0.9 %
5-40 SYRINGE (ML) INJECTION EVERY 12 HOURS SCHEDULED
Status: CANCELLED | OUTPATIENT
Start: 2022-05-04

## 2022-05-04 NOTE — H&P
Octavio Monae, [address] [facility] Medicine [facility] Rehabilitation  [address] [facility] [address] Jared Garcia  Phone: [phone]  Fax: [phone]    PCP: Karin Schofield MD  Date of visit: 5/4/22    Chief Complaint   Patient presents with    Back Pain     follow up     Interval:   Patient presents today for follow up visit regarding back pain. I last saw her in October. Lumbar MRI was done and suspicious for osteomyelitis/discitis. She was referred to [address] [place] [address] and CT guided biopsy was done and negative for any growth. I have unfortunately been out on leave and she presents today for follow up to discuss possible injections again. She has constant low back pain. The pain is rated Pain Score:   8, is described as achy, and is located in the low back without radiation to the legs. The symptoms have been worse since onset. The pain is better with rest.  The pain is worse with standing. There is numbness/tingling in her feet- states she has neuropathy. There is occasional weakness. There is no bowel/bladder changes. She is using a rollator for ambulation. She is worried about her weight and states she does not eat right. She is unable to get around due to her pain. The prior workup has included: Xray, MRI L spine, CT guided biopsy     The prior treatment has included:  PT: yes with no relief   Chiropractic: none . Modalities: yes   OTC Tylenol: yes with no relief   NSAIDS: yes with no relief   Opioids: none   Membrane stabilizers: neurontin, lyrica, cymbalta   Muscle relaxers: zanaflex   Previous injections: yes- Dr. Emelia Ga, epidurals with relief.    Previous surgery at this site: none    No Known Allergies    Current Outpatient Medications   Medication Sig Dispense Refill    insulin glargine (LANTUS SOLOSTAR) 100 UNIT/ML injection pen Inject 55 Units into the skin 2 times daily 36 pen 3    valsartan-hydroCHLOROthiazide (DIOVAN-HCT) 160-25 MG per tablet TAKE 1 TABLET EVERY DAY PER  JARAMILLO 90 tablet 3    furosemide (LASIX) 20 MG tablet TAKE 1 TABLET (20MG) EVERY OTHER DAY ALTERNATING  WITH 2 TABLETS (40MG) EVERY OTHER  tablet 1    busPIRone (BUSPAR) 5 MG tablet TAKE 1 TABLET THREE TIMES DAILY 270 tablet 3    insulin aspart (NOVOLOG FLEXPEN) 100 UNIT/ML injection pen 20 units tid w/ SSI TID AC: 150-200 2 units; 201-250 4; 251-300 6; 301-350 8; 351-400 10; 401-450 12; 451-500 14; 501+ 16 units 35 mL 3    metFORMIN (GLUCOPHAGE XR) 500 MG extended release tablet Take 1 tablet by mouth daily (with breakfast) 90 tablet 3    cloNIDine (CATAPRES) 0.1 MG tablet Take 1 tablet by mouth 3 times daily 270 tablet 3    atorvastatin (LIPITOR) 80 MG tablet TAKE 1 TABLET EVERY NIGHT 90 tablet 3    vitamin D3 (CHOLECALCIFEROL) 25 MCG (1000 UT) TABS tablet TAKE 2 TABLETS EVERY DAY (PRESCRIBED BY RENAL) 180 tablet 3    DULoxetine (CYMBALTA) 60 MG extended release capsule TAKE 1 CAPSULE EVERY DAY 90 capsule 3    allopurinol (ZYLOPRIM) 100 MG tablet TAKE 1 TABLET TWICE DAILY 180 tablet 3    metoprolol (LOPRESSOR) 100 MG tablet TAKE 1 TABLET TWICE DAILY 180 tablet 3    potassium chloride (KLOR-CON M) 10 MEQ extended release tablet Take 1 tablet by mouth 2 times daily TAKE 1 TABLET TWICE DAILY 180 tablet 3    pregabalin (LYRICA) 75 MG capsule Take 1 capsule by mouth 3 times daily for 90 days.  270 capsule 0    omeprazole (PRILOSEC) 40 MG delayed release capsule TAKE 1 CAPSULE EVERY DAY WITH BREAKFAST 90 capsule 3    TRUEplus Lancets 33G MISC USE  TO  TEST  UP  TO FIVE TIMES DAILY 500 each 3    tiZANidine (ZANAFLEX) 4 MG tablet TAKE 1 TABLET BY MOUTH EVERY 6 HOURS AS NEEDED FOR MUSCLE SPASM 90 tablet 1    acetaminophen (TYLENOL) 500 MG tablet Take 500 mg by mouth every 6 hours as needed for Pain      blood glucose test strips (TRUE METRIX BLOOD GLUCOSE TEST) strip TEST BLOOD SUGAR UP TO 5 TIMES DAILY 500 strip 3    Insulin Pen Needle (B-D UF III MINI PEN NEEDLES) 31G X 5 MM MISC Use to inject insulin 5 times daily 500 each 3     No current facility-administered medications for this visit. Past Medical History:   Diagnosis Date    Anxiety     Carpal tunnel syndrome on right     Chronic back pain     Chronic kidney disease, stage 3 ([address]     Depression 10/28/2010    Diabetes mellitus type 2, uncontrolled ([address] 10/28/2010    with microalbuminuria    Diverticulosis     GERD (gastroesophageal reflux disease) 10/28/2010    Herniated intervertebral disk     neck and lower back    Hypercholesterolemia 10/28/2010    Hypertension 10/28/2010    Kidney stones     Neurofibroma of trunk     irritating mole on the back.  biopsy done 10/21/09    Obesity     Osteoarthritis     PAD (peripheral artery disease) (HCC)     Type II or unspecified type diabetes mellitus without mention of complication, not stated as uncontrolled     Vitamin D insufficiency        Past Surgical History:   Procedure Laterality Date    COLONOSCOPY N/A 9/8/2021    COLONOSCOPY POLYPECTOMY SNARE/COLD BIOPSY performed by Lisette Murphy MD at [address] [facility] [address] BIOPSY PERCUTANEOUS DEEP BONE  3/7/2022    CT BIOPSY PERCUTANEOUS DEEP BONE 3/7/2022 Mala Sanderson II, MD [facility]    ECHO COMPLETE  3/19/2012         EYE MUSCLE SURGERY      as a child    HYSTERECTOMY      with salpingoophorectomy    HYSTERECTOMY, TOTAL ABDOMINAL Bilateral 2003    cervix removed per patient    NERVE BLOCK Right 05/01/2018    lumbar transforaminal nerve block #1 with sedation    SD KALIA NOSE/CLEFT LIP/TIP Right 5/1/2018    RIGHT LUMBAR TRANSFORAMINAL NERVE BLOCK #1 L3-4 L4-5 WITH IV SEDATIN performed by Jack Trujillo DO at [facility]         Family History   Problem Relation Age of Onset    Lung Cancer Mother 71        mesothelioma metastatized to the brain    Diabetes Mother     High Cholesterol Mother     High Blood Pressure Mother     Arthritis Mother     Cancer Mother         lung to brain    Diabetes Father     Arthritis Father     High Blood Pressure Sister     Arthritis Sister     High Cholesterol Sister     High Blood Pressure Brother     Substance Abuse Brother     Breast Cancer Paternal Aunt          at 80y    High Blood Pressure Paternal Aunt     Cancer Maternal Grandmother [de-identified]        colorectal cancer    High Blood Pressure Maternal Grandmother     Heart Failure Maternal Grandmother     Arthritis Maternal Grandmother     High Cholesterol Maternal Grandmother     High Blood Pressure Paternal Grandmother     Stroke Paternal Grandmother     High Blood Pressure Maternal Aunt     High Cholesterol Maternal Aunt     Cancer Maternal Aunt         mesothelioma    Diabetes Maternal Uncle     High Blood Pressure Maternal Uncle     High Cholesterol Maternal Uncle     Substance Abuse Maternal Uncle     High Blood Pressure Paternal Uncle        Social History     Tobacco Use    Smoking status: Never Smoker    Smokeless tobacco: Never Used   Substance Use Topics    Alcohol use: Yes     Alcohol/week: 2.0 standard drinks     Types: 1 Glasses of wine, 1 Cans of beer per week     Comment: 1 time per month    Drug use: No          Functional Status: The patient is able to ambulate and perform activities of daily living with the use of an assistive device. ROS:    Constitutional: Denies fevers, chills, night sweats, unintentional weight loss     Skin: Denies rash or skin changes     Eyes: Denies vision changes    Ears/Nose/Throat: Denies nasal congestion or sore throat     Respiratory: Denies SOB or cough     Cardiovascular: Denies CP, palpitations, edema      Gastrointestinal: Denies abdominal pain,  N/V, constipation, or diarrhea    Genitourinary: Denies urinary symptoms    Neurologic: See HPI.     MSK: See HPI.      Psychiatric: Denies sleep disturbance, anxiety, depression    Hematologic/Lymphatic/Immunologic: Denies bruising       Physical Exam:   Blood pressure (!) 142/84, pulse 80, height 5' 9\" (1.753 m), weight (!) 328 lb (148.8 kg), not currently breastfeeding. General: well developed and well nourished in no acute distress. Body mass index is 48.44 kg/m². HEENT: No rhinorrhea, sneezing, yawning, or lacrimation. No scleral icterus or conjunctival injection. Resp: symmetrical chest expansion, unlabored breathing, respirations unlabored. CV: Heart rate is regular. Peripheral pulses are palpable  Lymph: No visible regional lymphadenopathy. Skin: No rashes or ecchymosis. Normal turgor. Psych: Mood is calm. Affect is normal.   Ext: bilateral lower extremity edema     MSK:   Back/Hip Exam:   Inspection: Pelvis was asymmetric. Lumbar lordotic curvature was increased. There was scoliosis present. No ecchymoses or erythema. Palpation: Palpatory exam revealed tenderness along lumbosacral paraspinals, midline spine, SI joint sulcus, ttp greater trochanters. There was no paraspinal spasms. There were no trigger points. ROM: ROM decreased. +facet loading. Special/provocative testing:   SLR negative       Neurological Exam:  Strength:   R  L  Hip Flex  5  5  Knee Ext  5  5  Ankle dorsi  5  5  EHL   5 5  Ankle Plantar  5  5    Sensory:  Intact for light touch in all lower extremity dermatomes. Reflexes:   R  L  Patellar  (2+) (2+)  Ankle Jerk  (0) (0)      Gait: ambulated with rollator. Forward lean, slow. Impression:   Joe Holguin is a 61 y.o. female    1. Lumbar spondylosis    2. DDD (degenerative disc disease), lumbar    3. Chronic bilateral low back pain without sciatica    4. Lumbar stenosis without neurogenic claudication    5. Morbid obesity with BMI of 45.0-49.9, adult [facility])        Plan:   · All imaging reviewed with patient. Severe facet changes, DDD, stenosis. No growth on biopsy. · Patient would benefit from bilateral L3-4, L4-5, L5-S1 MBB x 2 with intent for RFA. Procedure risks, benefits and alternatives were discussed.  Patient would like to proceed. · Will refer to weight management. · Continue medication regimen per prescribing physician      The patient was educated about the diagnosis, prognosis, indications, risks and benefits of treatment. An opportunity to ask questions was given to the patient and questions were answered. The patient agreed to proceed with the recommended treatment as described above.      Follow up after MBB     Patrice Flores DO, FAAPMR   Board Certified Physical Medicine and Rehabilitation

## 2022-05-04 NOTE — PROGRESS NOTES
[employer], [address] [facility] Rehabilitation  [address] [facility] [place]  Phone: [phone]  Fax: [phone]    PCP: Killian Wheeler MD  Date of visit: 5/4/22    Chief Complaint   Patient presents with    Back Pain     follow up     Interval:   Patient presents today for follow up visit regarding back pain. I last saw her in October. Lumbar MRI was done and suspicious for osteomyelitis/discitis. She was referred to [address] [place] [address] and CT guided biopsy was done and negative for any growth. I have unfortunately been out on leave and she presents today for follow up to discuss possible injections again. She has constant low back pain. The pain is rated Pain Score:   8, is described as achy, and is located in the low back without radiation to the legs. The symptoms have been worse since onset. The pain is better with rest.  The pain is worse with standing. There is numbness/tingling in her feet- states she has neuropathy. There is occasional weakness. There is no bowel/bladder changes. She is using a rollator for ambulation. She is worried about her weight and states she does not eat right. She is unable to get around due to her pain. The prior workup has included: Xray, MRI L spine, CT guided biopsy     The prior treatment has included:  PT: yes with no relief   Chiropractic: none . Modalities: yes   OTC Tylenol: yes with no relief   NSAIDS: yes with no relief   Opioids: none   Membrane stabilizers: neurontin, lyrica, cymbalta   Muscle relaxers: zanaflex   Previous injections: yes- Dr. Maxwell Lester, epidurals with relief.    Previous surgery at this site: none    No Known Allergies    Current Outpatient Medications   Medication Sig Dispense Refill    insulin glargine (LANTUS SOLOSTAR) 100 UNIT/ML injection pen Inject 55 Units into the skin 2 times daily 36 pen 3    valsartan-hydroCHLOROthiazide (DIOVAN-HCT) 160-25 MG per tablet TAKE 1 TABLET EVERY DAY PER  JARAMILLO 90 tablet 3    furosemide (LASIX) 20 MG tablet TAKE 1 TABLET (20MG) EVERY OTHER DAY ALTERNATING  WITH 2 TABLETS (40MG) EVERY OTHER  tablet 1    busPIRone (BUSPAR) 5 MG tablet TAKE 1 TABLET THREE TIMES DAILY 270 tablet 3    insulin aspart (NOVOLOG FLEXPEN) 100 UNIT/ML injection pen 20 units tid w/ SSI TID AC: 150-200 2 units; 201-250 4; 251-300 6; 301-350 8; 351-400 10; 401-450 12; 451-500 14; 501+ 16 units 35 mL 3    metFORMIN (GLUCOPHAGE XR) 500 MG extended release tablet Take 1 tablet by mouth daily (with breakfast) 90 tablet 3    cloNIDine (CATAPRES) 0.1 MG tablet Take 1 tablet by mouth 3 times daily 270 tablet 3    atorvastatin (LIPITOR) 80 MG tablet TAKE 1 TABLET EVERY NIGHT 90 tablet 3    vitamin D3 (CHOLECALCIFEROL) 25 MCG (1000 UT) TABS tablet TAKE 2 TABLETS EVERY DAY (PRESCRIBED BY RENAL) 180 tablet 3    DULoxetine (CYMBALTA) 60 MG extended release capsule TAKE 1 CAPSULE EVERY DAY 90 capsule 3    allopurinol (ZYLOPRIM) 100 MG tablet TAKE 1 TABLET TWICE DAILY 180 tablet 3    metoprolol (LOPRESSOR) 100 MG tablet TAKE 1 TABLET TWICE DAILY 180 tablet 3    potassium chloride (KLOR-CON M) 10 MEQ extended release tablet Take 1 tablet by mouth 2 times daily TAKE 1 TABLET TWICE DAILY 180 tablet 3    pregabalin (LYRICA) 75 MG capsule Take 1 capsule by mouth 3 times daily for 90 days.  270 capsule 0    omeprazole (PRILOSEC) 40 MG delayed release capsule TAKE 1 CAPSULE EVERY DAY WITH BREAKFAST 90 capsule 3    TRUEplus Lancets 33G MISC USE  TO  TEST  UP  TO FIVE TIMES DAILY 500 each 3    tiZANidine (ZANAFLEX) 4 MG tablet TAKE 1 TABLET BY MOUTH EVERY 6 HOURS AS NEEDED FOR MUSCLE SPASM 90 tablet 1    acetaminophen (TYLENOL) 500 MG tablet Take 500 mg by mouth every 6 hours as needed for Pain      blood glucose test strips (TRUE METRIX BLOOD GLUCOSE TEST) strip TEST BLOOD SUGAR UP TO 5 TIMES DAILY 500 strip 3    Insulin Pen Needle (B-D UF III MINI PEN NEEDLES) 31G X 5 MM MISC Use to inject insulin 5 times daily 500 each 3     No current facility-administered medications for this visit. Past Medical History:   Diagnosis Date    Anxiety     Carpal tunnel syndrome on right     Chronic back pain     Chronic kidney disease, stage 3 ([address]     Depression 10/28/2010    Diabetes mellitus type 2, uncontrolled ([address] 10/28/2010    with microalbuminuria    Diverticulosis     GERD (gastroesophageal reflux disease) 10/28/2010    Herniated intervertebral disk     neck and lower back    Hypercholesterolemia 10/28/2010    Hypertension 10/28/2010    Kidney stones     Neurofibroma of trunk     irritating mole on the back.  biopsy done 10/21/09    Obesity     Osteoarthritis     PAD (peripheral artery disease) (HCC)     Type II or unspecified type diabetes mellitus without mention of complication, not stated as uncontrolled     Vitamin D insufficiency        Past Surgical History:   Procedure Laterality Date    COLONOSCOPY N/A 9/8/2021    COLONOSCOPY POLYPECTOMY SNARE/COLD BIOPSY performed by Maximus Jensen MD at [address] [place] [address] CT BIOPSY PERCUTANEOUS DEEP BONE  3/7/2022    CT BIOPSY PERCUTANEOUS DEEP BONE 3/7/2022 Osorio Nguyen II, MD [facility]    ECHO COMPLETE  3/19/2012         EYE MUSCLE SURGERY      as a child    HYSTERECTOMY      with salpingoophorectomy    HYSTERECTOMY, TOTAL ABDOMINAL Bilateral 2003    cervix removed per patient    NERVE BLOCK Right 05/01/2018    lumbar transforaminal nerve block #1 with sedation    CO KALIA NOSE/CLEFT LIP/TIP Right 5/1/2018    RIGHT LUMBAR TRANSFORAMINAL NERVE BLOCK #1 L3-4 L4-5 WITH IV SEDATIN performed by Chapis Alcazar DO at [facility]         Family History   Problem Relation Age of Onset    Lung Cancer Mother 71        mesothelioma metastatized to the brain    Diabetes Mother     High Cholesterol Mother     High Blood Pressure Mother     Arthritis Mother     Cancer Mother         lung to brain    Diabetes Father     Arthritis Father     High Blood Pressure Sister     Arthritis Sister     High Cholesterol Sister     High Blood Pressure Brother     Substance Abuse Brother     Breast Cancer Paternal Aunt          at 80y    High Blood Pressure Paternal Aunt     Cancer Maternal Grandmother [de-identified]        colorectal cancer    High Blood Pressure Maternal Grandmother     Heart Failure Maternal Grandmother     Arthritis Maternal Grandmother     High Cholesterol Maternal Grandmother     High Blood Pressure Paternal Grandmother     Stroke Paternal Grandmother     High Blood Pressure Maternal Aunt     High Cholesterol Maternal Aunt     Cancer Maternal Aunt         mesothelioma    Diabetes Maternal Uncle     High Blood Pressure Maternal Uncle     High Cholesterol Maternal Uncle     Substance Abuse Maternal Uncle     High Blood Pressure Paternal Uncle        Social History     Tobacco Use    Smoking status: Never Smoker    Smokeless tobacco: Never Used   Substance Use Topics    Alcohol use: Yes     Alcohol/week: 2.0 standard drinks     Types: 1 Glasses of wine, 1 Cans of beer per week     Comment: 1 time per month    Drug use: No          Functional Status: The patient is able to ambulate and perform activities of daily living with the use of an assistive device. ROS:    Constitutional: Denies fevers, chills, night sweats, unintentional weight loss     Skin: Denies rash or skin changes     Eyes: Denies vision changes    Ears/Nose/Throat: Denies nasal congestion or sore throat     Respiratory: Denies SOB or cough     Cardiovascular: Denies CP, palpitations, edema      Gastrointestinal: Denies abdominal pain,  N/V, constipation, or diarrhea    Genitourinary: Denies urinary symptoms    Neurologic: See HPI.     MSK: See HPI.      Psychiatric: Denies sleep disturbance, anxiety, depression    Hematologic/Lymphatic/Immunologic: Denies bruising       Physical Exam:   Blood pressure (!) 142/84, pulse 80, height 5' 9\" (1.753 m), weight (!) 328 lb (148.8 kg), not currently breastfeeding. General: well developed and well nourished in no acute distress. Body mass index is 48.44 kg/m². HEENT: No rhinorrhea, sneezing, yawning, or lacrimation. No scleral icterus or conjunctival injection. Resp: symmetrical chest expansion, unlabored breathing, respirations unlabored. CV: Heart rate is regular. Peripheral pulses are palpable  Lymph: No visible regional lymphadenopathy. Skin: No rashes or ecchymosis. Normal turgor. Psych: Mood is calm. Affect is normal.   Ext: bilateral lower extremity edema     MSK:   Back/Hip Exam:   Inspection: Pelvis was asymmetric. Lumbar lordotic curvature was increased. There was scoliosis present. No ecchymoses or erythema. Palpation: Palpatory exam revealed tenderness along lumbosacral paraspinals, midline spine, SI joint sulcus, ttp greater trochanters. There was no paraspinal spasms. There were no trigger points. ROM: ROM decreased. +facet loading. Special/provocative testing:   SLR negative       Neurological Exam:  Strength:   R  L  Hip Flex  5  5  Knee Ext  5  5  Ankle dorsi  5  5  EHL   5 5  Ankle Plantar  5  5    Sensory:  Intact for light touch in all lower extremity dermatomes. Reflexes:   R  L  Patellar  (2+) (2+)  Ankle Jerk  (0) (0)      Gait: ambulated with rollator. Forward lean, slow. Impression:   Jo Ann Dorado is a 61 y.o. female    1. Lumbar spondylosis    2. DDD (degenerative disc disease), lumbar    3. Chronic bilateral low back pain without sciatica    4. Lumbar stenosis without neurogenic claudication    5. Morbid obesity with BMI of 45.0-49.9, adult [facility])        Plan:   · All imaging reviewed with patient. Severe facet changes, DDD, stenosis. No growth on biopsy. · Patient would benefit from bilateral L3-4, L4-5, L5-S1 MBB x 2 with intent for RFA. Procedure risks, benefits and alternatives were discussed.  Patient would like to proceed. · Will refer to weight management. · Continue medication regimen per prescribing physician      The patient was educated about the diagnosis, prognosis, indications, risks and benefits of treatment. An opportunity to ask questions was given to the patient and questions were answered. The patient agreed to proceed with the recommended treatment as described above.      Follow up after VAUGHN Bowen DO, FAAPMR   Board Certified Physical Medicine and Rehabilitation

## 2022-05-06 ENCOUNTER — OFFICE VISIT (OUTPATIENT)
Dept: FAMILY MEDICINE CLINIC | Age: 67
End: 2022-05-06
Payer: MEDICARE

## 2022-05-06 VITALS
TEMPERATURE: 97 F | DIASTOLIC BLOOD PRESSURE: 60 MMHG | BODY MASS INDEX: 43.4 KG/M2 | WEIGHT: 293 LBS | HEART RATE: 71 BPM | HEIGHT: 69 IN | OXYGEN SATURATION: 97 % | SYSTOLIC BLOOD PRESSURE: 130 MMHG | RESPIRATION RATE: 18 BRPM

## 2022-05-06 DIAGNOSIS — E87.6 POTASSIUM SERUM DECREASED: ICD-10-CM

## 2022-05-06 DIAGNOSIS — M48.061 NEURAL FORAMINAL STENOSIS OF LUMBAR SPINE: ICD-10-CM

## 2022-05-06 DIAGNOSIS — N18.31 STAGE 3A CHRONIC KIDNEY DISEASE (HCC): ICD-10-CM

## 2022-05-06 DIAGNOSIS — S21.209D WOUND OF BACK, UNSPECIFIED LATERALITY, SUBSEQUENT ENCOUNTER: ICD-10-CM

## 2022-05-06 DIAGNOSIS — E87.6 HYPOKALEMIA: Primary | ICD-10-CM

## 2022-05-06 DIAGNOSIS — E87.6 POTASSIUM SERUM DECREASED: Primary | ICD-10-CM

## 2022-05-06 LAB
ANION GAP SERPL CALCULATED.3IONS-SCNC: 13 MMOL/L (ref 7–16)
BUN BLDV-MCNC: 28 MG/DL (ref 6–23)
CALCIUM SERPL-MCNC: 9.7 MG/DL (ref 8.6–10.2)
CHLORIDE BLD-SCNC: 100 MMOL/L (ref 98–107)
CO2: 29 MMOL/L (ref 22–29)
CREAT SERPL-MCNC: 1.2 MG/DL (ref 0.5–1)
GFR AFRICAN AMERICAN: 55
GFR NON-AFRICAN AMERICAN: 55 ML/MIN/1.73
GLUCOSE BLD-MCNC: 133 MG/DL (ref 74–99)
POTASSIUM SERPL-SCNC: 3.1 MMOL/L (ref 3.5–5)
SODIUM BLD-SCNC: 142 MMOL/L (ref 132–146)

## 2022-05-06 PROCEDURE — 36415 COLL VENOUS BLD VENIPUNCTURE: CPT | Performed by: FAMILY MEDICINE

## 2022-05-06 PROCEDURE — 3017F COLORECTAL CA SCREEN DOC REV: CPT | Performed by: FAMILY MEDICINE

## 2022-05-06 PROCEDURE — G8427 DOCREV CUR MEDS BY ELIG CLIN: HCPCS | Performed by: FAMILY MEDICINE

## 2022-05-06 PROCEDURE — 99213 OFFICE O/P EST LOW 20 MIN: CPT | Performed by: FAMILY MEDICINE

## 2022-05-06 PROCEDURE — 1036F TOBACCO NON-USER: CPT | Performed by: FAMILY MEDICINE

## 2022-05-06 PROCEDURE — 99212 OFFICE O/P EST SF 10 MIN: CPT | Performed by: FAMILY MEDICINE

## 2022-05-06 PROCEDURE — G8417 CALC BMI ABV UP PARAM F/U: HCPCS | Performed by: FAMILY MEDICINE

## 2022-05-06 ASSESSMENT — ENCOUNTER SYMPTOMS
VOMITING: 0
COUGH: 0
ABDOMINAL PAIN: 0
BACK PAIN: 1
NAUSEA: 0
SHORTNESS OF BREATH: 0

## 2022-05-06 NOTE — PROGRESS NOTES
[address] [place] [address]  FAMILY MEDICINE RESIDENCY PROGRAM   OFFICE PROGRESS NOTE  DATE OF VISIT : 22    Patient : Nikolay Burton    : female   Age : 61 y.o.  : 1955           Chief Complaint :   Chief Complaint   Patient presents with    Wound Infection     check up    Foot Swelling     KARL x2 months       HPI:   61 y.o. female comes in today for follow up of back pain and wound. She has a crusted scab on her back. She saw the PMR physician who plans to do injections. Her last set of labs had low potassium although she takes lasix and kcl 20 meq daily. She was referred from PMR to lymphedema clinic for leg edema over the past 2 weeks. She is wearing stocking today. Review of Systems  Review of Systems   Constitutional: Negative for chills and fever. HENT: Negative for congestion. Respiratory: Negative for cough and shortness of breath. Cardiovascular: Positive for leg swelling. Negative for chest pain and palpitations. Gastrointestinal: Negative for abdominal pain, nausea and vomiting. Musculoskeletal: Positive for arthralgias and back pain. Skin: Positive for wound. Physical Exam:  VS:  Blood pressure 130/60, pulse 71, temperature 97 °F (36.1 °C), temperature source Temporal, resp. rate 18, height 5' 9\" (1.753 m), weight (!) 328 lb (148.8 kg), SpO2 97 %, not currently breastfeeding. Physical Exam  Constitutional:       Appearance: She is well-developed. HENT:      Head: Normocephalic and atraumatic. Cardiovascular:      Rate and Rhythm: Normal rate and regular rhythm. Heart sounds: No murmur heard. No friction rub. No gallop. Pulmonary:      Breath sounds: Normal breath sounds. No wheezing, rhonchi or rales. Abdominal:      General: Bowel sounds are normal.      Palpations: Abdomen is soft. There is no mass. Tenderness: There is no abdominal tenderness. Skin:     Nails: There is no clubbing.          Then crust removed (no drainage or purulence)      Stockings in place over the legs today, without edema    Assessment/Plan:  1. Wound of back, unspecified laterality, subsequent encounter  Not infected, dressing applied    2. Potassium serum decreased  Recheck labs  - Basic Metabolic Panel; Future    3. Stage 3a chronic kidney disease (HCC)  As above, follows with Dr Lilli Ramsay in the next few months     4. Neural foraminal stenosis of lumbar spine  Will follow up with Dr. Luis A Moreno for injections      Additional plan and future considerations: Follow edema    Return in about 14 weeks (around 8/12/2022) for amwv .     Signed by : Shivani Mina MD

## 2022-05-11 ENCOUNTER — TELEPHONE (OUTPATIENT)
Dept: BARIATRICS/WEIGHT MGMT | Age: 67
End: 2022-05-11

## 2022-05-18 ENCOUNTER — CLINICAL DOCUMENTATION (OUTPATIENT)
Dept: SPIRITUAL SERVICES | Age: 67
End: 2022-05-18

## 2022-05-18 NOTE — PROGRESS NOTES
Called pt, left HIPAA compliant VM requesting a call back regarding advance directives uploaded without documentation.

## 2022-05-25 ENCOUNTER — ANESTHESIA EVENT (OUTPATIENT)
Dept: OPERATING ROOM | Age: 67
End: 2022-05-25
Payer: MEDICARE

## 2022-05-25 ENCOUNTER — TELEPHONE (OUTPATIENT)
Dept: BARIATRICS/WEIGHT MGMT | Age: 67
End: 2022-05-25

## 2022-05-25 NOTE — ANESTHESIA PRE PROCEDURE
Department of Anesthesiology  Preprocedure Note       Name:  Freddy Prajapati   Age:  61 y.o.  :  1955                                          MRN:  [id number]         Date:  2022      Surgeon: Janes Tsai):  Carlos Chavira DO    Procedure: Procedure(s):  BILATERAL MEDIAL BRANCH BLOCK L3-4 AND L4-5 (CPT 37832)    Medications prior to admission:   Prior to Admission medications    Medication Sig Start Date End Date Taking?  Authorizing Provider   insulin glargine (LANTUS SOLOSTAR) 100 UNIT/ML injection pen Inject 55 Units into the skin 2 times daily  Patient taking differently: Inject 55 Units into the skin 2 times daily Take only 1/2 dose evening before 22   Ainsley Nails MD   valsartan-hydroCHLOROthiazide (DIOVAN-HCT) 160-25 MG per tablet TAKE 1 TABLET EVERY DAY PER DR JARAMILLO 22   Ainsley Nails MD   furosemide (LASIX) 20 MG tablet TAKE 1 TABLET (20MG) EVERY OTHER DAY ALTERNATING  WITH 2 TABLETS (40MG) EVERY OTHER DAY 22   Ainsley Nails MD   busPIRone (BUSPAR) 5 MG tablet TAKE 1 TABLET THREE TIMES DAILY 22   Ainsley Nails MD   insulin aspart (NOVOLOG FLEXPEN) 100 UNIT/ML injection pen 20 units tid w/ SSI TID AC: 150-200 2 units; 201-250 4; 251-300 6; 301-350 8; 351-400 10; 401-450 12; 451-500 14; 501+ 16 units 22   Ainsley Nails MD   metFORMIN (GLUCOPHAGE XR) 500 MG extended release tablet Take 1 tablet by mouth daily (with breakfast) 22   Ainsley Nails MD   cloNIDine (CATAPRES) 0.1 MG tablet Take 1 tablet by mouth 3 times daily 22   Ainsley Nails MD   atorvastatin (LIPITOR) 80 MG tablet TAKE 1 TABLET EVERY NIGHT 22   Ainsley Nails MD   vitamin D3 (CHOLECALCIFEROL) 25 MCG (1000 UT) TABS tablet TAKE 2 TABLETS EVERY DAY (PRESCRIBED BY RENAL) 22   Ainsley Nails MD   DULoxetine (CYMBALTA) 60 MG extended release capsule TAKE 1 CAPSULE EVERY DAY 22   Ainsley Nails MD   allopurinol (ZYLOPRIM) 100 MG tablet TAKE 1 TABLET TWICE DAILY 4/1/22   Emelia Orosco MD   metoprolol (LOPRESSOR) 100 MG tablet TAKE 1 TABLET TWICE DAILY 4/1/22   Emelia Orosco MD   potassium chloride (KLOR-CON M) 10 MEQ extended release tablet Take 1 tablet by mouth 2 times daily TAKE 1 TABLET TWICE DAILY 3/24/22   Emelia Orosco MD   pregabalin (LYRICA) 75 MG capsule Take 1 capsule by mouth 3 times daily for 90 days. 3/24/22 6/22/22  Emelia Orosco MD   omeprazole (PRILOSEC) 40 MG delayed release capsule TAKE 1 CAPSULE EVERY DAY WITH BREAKFAST 3/23/22   Emelia Orosco MD   TRUEplus Lancets 33G MISC USE  TO  TEST  UP  TO FIVE TIMES DAILY 2/22/22   Emelia Orosco MD   tiZANidine (ZANAFLEX) 4 MG tablet TAKE 1 TABLET BY MOUTH EVERY 6 HOURS AS NEEDED FOR MUSCLE SPASM 12/10/21   Emelia Orosco MD   acetaminophen (TYLENOL) 500 MG tablet Take 500 mg by mouth every 6 hours as needed for Pain    Historical Provider, MD   blood glucose test strips (TRUE METRIX BLOOD GLUCOSE TEST) strip TEST BLOOD SUGAR UP TO 5 TIMES DAILY 2/3/21   Emelia Orosco MD   Insulin Pen Needle (B-D UF III MINI PEN NEEDLES) 31G X 5 MM MISC Use to inject insulin 5 times daily 1/8/21   Emelia Orosco MD   insulin lispro (HUMALOG KWIKPEN) 100 UNIT/ML injection Inject 10 Units into the skin 3 times daily (before meals). 5/17/12 5/17/12  Ga Starks MD       Current medications:    No current facility-administered medications for this encounter.      Current Outpatient Medications   Medication Sig Dispense Refill    insulin glargine (LANTUS SOLOSTAR) 100 UNIT/ML injection pen Inject 55 Units into the skin 2 times daily (Patient taking differently: Inject 55 Units into the skin 2 times daily Take only 1/2 dose evening before) 36 pen 3    valsartan-hydroCHLOROthiazide (DIOVAN-HCT) 160-25 MG per tablet TAKE 1 TABLET EVERY DAY PER DR JARAMILLO 90 tablet 3    furosemide (LASIX) 20 MG tablet TAKE 1 TABLET (20MG) EVERY OTHER DAY ALTERNATING  WITH 2 TABLETS (40MG) EVERY OTHER  tablet 1    busPIRone (BUSPAR) 5 MG tablet TAKE 1 TABLET THREE TIMES DAILY 270 tablet 3    insulin aspart (NOVOLOG FLEXPEN) 100 UNIT/ML injection pen 20 units tid w/ SSI TID AC: 150-200 2 units; 201-250 4; 251-300 6; 301-350 8; 351-400 10; 401-450 12; 451-500 14; 501+ 16 units 35 mL 3    metFORMIN (GLUCOPHAGE XR) 500 MG extended release tablet Take 1 tablet by mouth daily (with breakfast) 90 tablet 3    cloNIDine (CATAPRES) 0.1 MG tablet Take 1 tablet by mouth 3 times daily 270 tablet 3    atorvastatin (LIPITOR) 80 MG tablet TAKE 1 TABLET EVERY NIGHT 90 tablet 3    vitamin D3 (CHOLECALCIFEROL) 25 MCG (1000 UT) TABS tablet TAKE 2 TABLETS EVERY DAY (PRESCRIBED BY RENAL) 180 tablet 3    DULoxetine (CYMBALTA) 60 MG extended release capsule TAKE 1 CAPSULE EVERY DAY 90 capsule 3    allopurinol (ZYLOPRIM) 100 MG tablet TAKE 1 TABLET TWICE DAILY 180 tablet 3    metoprolol (LOPRESSOR) 100 MG tablet TAKE 1 TABLET TWICE DAILY 180 tablet 3    potassium chloride (KLOR-CON M) 10 MEQ extended release tablet Take 1 tablet by mouth 2 times daily TAKE 1 TABLET TWICE DAILY 180 tablet 3    pregabalin (LYRICA) 75 MG capsule Take 1 capsule by mouth 3 times daily for 90 days.  270 capsule 0    omeprazole (PRILOSEC) 40 MG delayed release capsule TAKE 1 CAPSULE EVERY DAY WITH BREAKFAST 90 capsule 3    TRUEplus Lancets 33G MISC USE  TO  TEST  UP  TO FIVE TIMES DAILY 500 each 3    tiZANidine (ZANAFLEX) 4 MG tablet TAKE 1 TABLET BY MOUTH EVERY 6 HOURS AS NEEDED FOR MUSCLE SPASM 90 tablet 1    acetaminophen (TYLENOL) 500 MG tablet Take 500 mg by mouth every 6 hours as needed for Pain      blood glucose test strips (TRUE METRIX BLOOD GLUCOSE TEST) strip TEST BLOOD SUGAR UP TO 5 TIMES DAILY 500 strip 3    Insulin Pen Needle (B-D UF III MINI PEN NEEDLES) 31G X 5 MM MISC Use to inject insulin 5 times daily 500 each 3       Allergies:  No Known Allergies    Problem List:    Patient Active Problem List   Diagnosis Code    Diabetes mellitus type 2, uncontrolled ([facility] E11.65    Hypertension I10    Hypercholesterolemia E78.00    GERD (gastroesophageal reflux disease) K21.9    Depression F32. A    Enlarged liver R16.0    Heart murmur R01.1    Chronic lower back pain M54.50, G89.29    Facet joint disease M47.819    DJD (degenerative joint disease), lumbar M47.816    Bulging lumbar disc M51.26    Back pain M54.9    Anxiety F41.9    Bilateral leg edema R60.0    Microalbuminuria R80.9    Vitamin D insufficiency E55.9    Chronic kidney disease, stage 3, mod decreased GFR ([facility]) N18.30    Senile nuclear cataract H25.10    Fatty liver K76.0    Diabetic peripheral neuropathy ([facility]) E11.42    Neural foraminal stenosis of lumbar spine M48.061    Type 2 diabetes mellitus with both eyes affected by mild nonproliferative retinopathy without macular edema, with long-term current use of insulin ([facility] P36.3168, Z79.4    Spinal stenosis of lumbar region M48.061    Major depressive disorder, recurrent, in partial remission ([facility]) F33.41    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    Chronic renal disease, stage III ([facility] [505327] N18.30       Past Medical History:        Diagnosis Date    Anxiety     Carpal tunnel syndrome on right     Chronic back pain     Chronic kidney disease, stage 3 ([address]     Depression 10/28/2010    Diabetes mellitus type 2, uncontrolled ([address] 10/28/2010    with microalbuminuria    Diverticulosis     GERD (gastroesophageal reflux disease) 10/28/2010    Herniated intervertebral disk     neck and lower back    Hypercholesterolemia 10/28/2010    Hypertension 10/28/2010    Kidney stones     Neurofibroma of trunk     irritating mole on the back.  biopsy done 10/21/09    Obesity     Osteoarthritis     PAD (peripheral artery disease) ([facility])     Type II or unspecified type diabetes mellitus without mention of complication, not stated as uncontrolled     Vitamin D insufficiency        Past Surgical History:        Procedure Laterality Date    COLONOSCOPY N/A 9/8/2021    COLONOSCOPY POLYPECTOMY SNARE/COLD BIOPSY performed by Luba Kennedy MD at [address] BIOPSY PERCUTANEOUS DEEP BONE  3/7/2022    CT BIOPSY PERCUTANEOUS DEEP BONE 3/7/2022 Zay Daugherty MD [facility]    ECHO COMPLETE  3/19/2012         EYE MUSCLE SURGERY      as a child    HYSTERECTOMY      with salpingoophorectomy    HYSTERECTOMY, TOTAL ABDOMINAL Bilateral 2003    cervix removed per patient    NERVE BLOCK Right 05/01/2018    lumbar transforaminal nerve block #1 with sedation    NE KALIA NOSE/CLEFT LIP/TIP Right 5/1/2018    RIGHT LUMBAR TRANSFORAMINAL NERVE BLOCK #1 L3-4 L4-5 WITH IV SEDATIN performed by Saima Shipley DO at [address] History:    Social History     Tobacco Use    Smoking status: Never Smoker    Smokeless tobacco: Never Used   Substance Use Topics    Alcohol use: Not Currently     Alcohol/week: 2.0 standard drinks     Types: 1 Glasses of wine, 1 Cans of beer per week     Comment: 1 time per month                                Counseling given: Not Answered      Vital Signs (Current):   Vitals:    05/20/22 1124   Weight: (!) 328 lb (148.8 kg)   Height: 5' 9\" (1.753 m)                                              BP Readings from Last 3 Encounters:   05/06/22 130/60   05/04/22 (!) 142/84   04/01/22 134/62       NPO Status:  >8.H                                                                               BMI:   Wt Readings from Last 3 Encounters:   05/06/22 (!) 328 lb (148.8 kg)   05/04/22 (!) 328 lb (148.8 kg)   04/25/22 (!) 315 lb (142.9 kg)     Body mass index is 48.44 kg/m².     CBC:   Lab Results   Component Value Date    WBC 10.5 01/22/2022    RBC 4.52 01/22/2022    HGB 12.3 01/22/2022    HCT 41.2 01/22/2022    MCV 91.2 01/22/2022    RDW 14.2 01/22/2022     03/07/2022       CMP:   Lab Results   Component Value Date     05/06/2022    K 3.1 05/06/2022     05/06/2022    CO2 29 05/06/2022    BUN 28 05/06/2022    CREATININE 1.2 05/06/2022    GFRAA 55 05/06/2022    LABGLOM 55 05/06/2022    GLUCOSE 133 05/06/2022    GLUCOSE 138 05/09/2012    PROT 7.6 01/22/2022    CALCIUM 9.7 05/06/2022    BILITOT 0.6 01/22/2022    ALKPHOS 142 01/22/2022    AST 23 01/22/2022    ALT 18 01/22/2022       POC Tests: No results for input(s): POCGLU, POCNA, POCK, POCCL, POCBUN, POCHEMO, POCHCT in the last 72 hours. Coags:   Lab Results   Component Value Date    PROTIME 11.7 03/07/2022    INR 1.1 03/07/2022       HCG (If Applicable): No results found for: PREGTESTUR, PREGSERUM, HCG, HCGQUANT     ABGs: No results found for: PHART, PO2ART, XKV2IVE, QUB7EOG, BEART, I4LBJTUU     Type & Screen (If Applicable):  No results found for: LABABO, LABRH    Drug/Infectious Status (If Applicable):  No results found for: HIV, HEPCAB    COVID-19 Screening (If Applicable): No results found for: COVID19        Anesthesia Evaluation  Patient summary reviewed no history of anesthetic complications:   Airway: Mallampati: IV  TM distance: >3 FB   Neck ROM: full  Mouth opening: > = 3 FB   Dental:          Pulmonary:Negative Pulmonary ROS breath sounds clear to auscultation      (-) not a current smoker                           Cardiovascular:  Exercise tolerance: good (>4 METS),   (+) hypertension:, hyperlipidemia        Rhythm: regular  Rate: normal                    Neuro/Psych:   (+) neuromuscular disease:, psychiatric history:depression/anxiety             GI/Hepatic/Renal:   (+) GERD:, liver disease:, renal disease: CRI, morbid obesity          Endo/Other:    (+) DiabetesType II DM, poorly controlled, , : arthritis:., .                 Abdominal:   (+) obese,           Vascular: Other Findings:           Anesthesia Plan      MAC     ASA 3       Induction: intravenous.   continuous noninvasive hemodynamic monitor    Anesthetic plan and risks discussed with patient. Plan discussed with CRNA. Attending anesthesiologist reviewed and agrees with Preprocedure content        PAT Chart Review:  Chart reviewed per routine on May 25, 2022 at 10:01 AM by Saleem Marin MD.  (Final assessment and plan per day of surgery team.)        Saleem Marin MD   5/25/2022      Patient seen and evaluated prior to induction, anesthesia plan of care reviewed.    RON Vargas - CRNA

## 2022-05-26 ENCOUNTER — HOSPITAL ENCOUNTER (OUTPATIENT)
Dept: OPERATING ROOM | Age: 67
Setting detail: OUTPATIENT SURGERY
Discharge: HOME | End: 2022-05-26
Attending: PHYSICAL MEDICINE & REHABILITATION
Payer: MEDICARE

## 2022-05-26 ENCOUNTER — ANESTHESIA (OUTPATIENT)
Dept: OPERATING ROOM | Age: 67
End: 2022-05-26
Payer: MEDICARE

## 2022-05-26 ENCOUNTER — HOSPITAL ENCOUNTER (OUTPATIENT)
Age: 67
Setting detail: OUTPATIENT SURGERY
Discharge: HOME | End: 2022-05-26
Attending: PHYSICAL MEDICINE & REHABILITATION | Admitting: PHYSICAL MEDICINE & REHABILITATION
Payer: MEDICARE

## 2022-05-26 VITALS
DIASTOLIC BLOOD PRESSURE: 82 MMHG | WEIGHT: 293 LBS | HEIGHT: 69 IN | BODY MASS INDEX: 43.4 KG/M2 | SYSTOLIC BLOOD PRESSURE: 141 MMHG | HEART RATE: 68 BPM | RESPIRATION RATE: 18 BRPM | TEMPERATURE: 98 F | OXYGEN SATURATION: 98 %

## 2022-05-26 DIAGNOSIS — M47.896 OTHER OSTEOARTHRITIS OF SPINE, LUMBAR REGION: ICD-10-CM

## 2022-05-26 LAB — METER GLUCOSE: 141 MG/DL (ref 74–99)

## 2022-05-26 PROCEDURE — 3209999900 FLUORO FOR SURGICAL PROCEDURES

## 2022-05-26 PROCEDURE — 2580000003 HC RX 258: Performed by: ANESTHESIOLOGY

## 2022-05-26 PROCEDURE — 82962 GLUCOSE BLOOD TEST: CPT | Performed by: PHYSICAL MEDICINE & REHABILITATION

## 2022-05-26 PROCEDURE — 2709999900 HC NON-CHARGEABLE SUPPLY: Performed by: PHYSICAL MEDICINE & REHABILITATION

## 2022-05-26 PROCEDURE — 3600000005 HC SURGERY LEVEL 5 BASE: Performed by: PHYSICAL MEDICINE & REHABILITATION

## 2022-05-26 PROCEDURE — 7100000010 HC PHASE II RECOVERY - FIRST 15 MIN: Performed by: PHYSICAL MEDICINE & REHABILITATION

## 2022-05-26 PROCEDURE — 3600000015 HC SURGERY LEVEL 5 ADDTL 15MIN: Performed by: PHYSICAL MEDICINE & REHABILITATION

## 2022-05-26 PROCEDURE — 6360000002 HC RX W HCPCS: Performed by: NURSE ANESTHETIST, CERTIFIED REGISTERED

## 2022-05-26 PROCEDURE — 64493 INJ PARAVERT F JNT L/S 1 LEV: CPT | Performed by: PHYSICAL MEDICINE & REHABILITATION

## 2022-05-26 PROCEDURE — 3700000001 HC ADD 15 MINUTES (ANESTHESIA): Performed by: PHYSICAL MEDICINE & REHABILITATION

## 2022-05-26 PROCEDURE — 64494 INJ PARAVERT F JNT L/S 2 LEV: CPT | Performed by: PHYSICAL MEDICINE & REHABILITATION

## 2022-05-26 PROCEDURE — 7100000011 HC PHASE II RECOVERY - ADDTL 15 MIN: Performed by: PHYSICAL MEDICINE & REHABILITATION

## 2022-05-26 PROCEDURE — 2500000003 HC RX 250 WO HCPCS: Performed by: PHYSICAL MEDICINE & REHABILITATION

## 2022-05-26 PROCEDURE — 3700000000 HC ANESTHESIA ATTENDED CARE: Performed by: PHYSICAL MEDICINE & REHABILITATION

## 2022-05-26 PROCEDURE — 82962 GLUCOSE BLOOD TEST: CPT

## 2022-05-26 RX ORDER — LIDOCAINE HYDROCHLORIDE 10 MG/ML
INJECTION, SOLUTION EPIDURAL; INFILTRATION; INTRACAUDAL; PERINEURAL PRN
Status: DISCONTINUED | OUTPATIENT
Start: 2022-05-26 | End: 2022-05-26 | Stop reason: ALTCHOICE

## 2022-05-26 RX ORDER — SODIUM CHLORIDE 0.9 % (FLUSH) 0.9 %
5-40 SYRINGE (ML) INJECTION PRN
Status: ACTIVE | OUTPATIENT
Start: 2022-05-26

## 2022-05-26 RX ORDER — DIPHENHYDRAMINE HYDROCHLORIDE 50 MG/ML
12.5 INJECTION INTRAMUSCULAR; INTRAVENOUS
Status: ACTIVE | OUTPATIENT
Start: 2022-05-26 | End: 2022-05-26

## 2022-05-26 RX ORDER — SODIUM CHLORIDE 0.9 % (FLUSH) 0.9 %
5-40 SYRINGE (ML) INJECTION EVERY 12 HOURS SCHEDULED
Status: DISCONTINUED | OUTPATIENT
Start: 2022-05-26 | End: 2022-05-26 | Stop reason: HOSPADM

## 2022-05-26 RX ORDER — FENTANYL CITRATE 0.05 MG/ML
50 INJECTION, SOLUTION INTRAMUSCULAR; INTRAVENOUS EVERY 5 MIN PRN
Status: ACTIVE | OUTPATIENT
Start: 2022-05-26

## 2022-05-26 RX ORDER — SODIUM CHLORIDE 9 MG/ML
INJECTION, SOLUTION INTRAVENOUS PRN
Status: ACTIVE | OUTPATIENT
Start: 2022-05-26

## 2022-05-26 RX ORDER — MEPERIDINE HYDROCHLORIDE 25 MG/ML
12.5 INJECTION INTRAMUSCULAR; INTRAVENOUS; SUBCUTANEOUS EVERY 5 MIN PRN
Status: ACTIVE | OUTPATIENT
Start: 2022-05-26

## 2022-05-26 RX ORDER — SODIUM CHLORIDE, SODIUM LACTATE, POTASSIUM CHLORIDE, CALCIUM CHLORIDE 600; 310; 30; 20 MG/100ML; MG/100ML; MG/100ML; MG/100ML
INJECTION, SOLUTION INTRAVENOUS CONTINUOUS
Status: DISCONTINUED | OUTPATIENT
Start: 2022-05-26 | End: 2022-05-26 | Stop reason: HOSPADM

## 2022-05-26 RX ORDER — SODIUM CHLORIDE 9 MG/ML
INJECTION, SOLUTION INTRAVENOUS PRN
Status: DISCONTINUED | OUTPATIENT
Start: 2022-05-26 | End: 2022-05-26 | Stop reason: HOSPADM

## 2022-05-26 RX ORDER — SODIUM CHLORIDE 0.9 % (FLUSH) 0.9 %
5-40 SYRINGE (ML) INJECTION PRN
Status: DISCONTINUED | OUTPATIENT
Start: 2022-05-26 | End: 2022-05-26 | Stop reason: HOSPADM

## 2022-05-26 RX ORDER — BUPIVACAINE HYDROCHLORIDE 2.5 MG/ML
INJECTION, SOLUTION EPIDURAL; INFILTRATION; INTRACAUDAL PRN
Status: DISCONTINUED | OUTPATIENT
Start: 2022-05-26 | End: 2022-05-26 | Stop reason: ALTCHOICE

## 2022-05-26 RX ORDER — FENTANYL CITRATE 50 UG/ML
INJECTION, SOLUTION INTRAMUSCULAR; INTRAVENOUS PRN
Status: DISCONTINUED | OUTPATIENT
Start: 2022-05-26 | End: 2022-05-26 | Stop reason: SDUPTHER

## 2022-05-26 RX ORDER — MORPHINE SULFATE 2 MG/ML
1 INJECTION, SOLUTION INTRAMUSCULAR; INTRAVENOUS EVERY 5 MIN PRN
Status: ACTIVE | OUTPATIENT
Start: 2022-05-26

## 2022-05-26 RX ORDER — MIDAZOLAM HYDROCHLORIDE 1 MG/ML
INJECTION INTRAMUSCULAR; INTRAVENOUS PRN
Status: DISCONTINUED | OUTPATIENT
Start: 2022-05-26 | End: 2022-05-26 | Stop reason: SDUPTHER

## 2022-05-26 RX ORDER — SODIUM CHLORIDE 0.9 % (FLUSH) 0.9 %
5-40 SYRINGE (ML) INJECTION EVERY 12 HOURS SCHEDULED
Status: ACTIVE | OUTPATIENT
Start: 2022-05-26

## 2022-05-26 RX ADMIN — FENTANYL CITRATE 50 MCG: 50 INJECTION INTRAMUSCULAR; INTRAVENOUS at 07:24

## 2022-05-26 RX ADMIN — FENTANYL CITRATE 25 MCG: 50 INJECTION INTRAMUSCULAR; INTRAVENOUS at 07:25

## 2022-05-26 RX ADMIN — FENTANYL CITRATE 25 MCG: 50 INJECTION INTRAMUSCULAR; INTRAVENOUS at 07:26

## 2022-05-26 RX ADMIN — MIDAZOLAM 2 MG: 1 INJECTION INTRAMUSCULAR; INTRAVENOUS at 07:21

## 2022-05-26 RX ADMIN — SODIUM CHLORIDE, POTASSIUM CHLORIDE, SODIUM LACTATE AND CALCIUM CHLORIDE: 600; 310; 30; 20 INJECTION, SOLUTION INTRAVENOUS at 07:08

## 2022-05-26 ASSESSMENT — PAIN DESCRIPTION - DESCRIPTORS: DESCRIPTORS: ACHING

## 2022-05-26 ASSESSMENT — LIFESTYLE VARIABLES: SMOKING_STATUS: 0

## 2022-05-26 ASSESSMENT — PAIN - FUNCTIONAL ASSESSMENT
PAIN_FUNCTIONAL_ASSESSMENT: PREVENTS OR INTERFERES SOME ACTIVE ACTIVITIES AND ADLS
PAIN_FUNCTIONAL_ASSESSMENT: 0-10

## 2022-05-26 NOTE — H&P
Montse Thorntoncriselda, [address] [facility] Physical Medicine and Rehabilitation  [address] [place] [facility] [place]  Phone: [phone]  Fax: [phone]     PCP: Christina Silva MD  Date of visit: 5/4/22          Chief Complaint   Patient presents with    Back Pain       follow up      Interval:   Patient presents today for follow up visit regarding back pain. I last saw her in October. Lumbar MRI was done and suspicious for osteomyelitis/discitis. She was referred to [address] [place] [address] and CT guided biopsy was done and negative for any growth. I have unfortunately been out on leave and she presents today for follow up to discuss possible injections again. She has constant low back pain. The pain is rated Pain Score:   8, is described as achy, and is located in the low back without radiation to the legs. The symptoms have been worse since onset. The pain is better with rest.  The pain is worse with standing. There is numbness/tingling in her feet- states she has neuropathy. There is occasional weakness. There is no bowel/bladder changes. She is using a rollator for ambulation. She is worried about her weight and states she does not eat right. She is unable to get around due to her pain.      The prior workup has included: Xray, MRI L spine, CT guided biopsy      The prior treatment has included:  PT: yes with no relief   Chiropractic: none . Modalities: yes   OTC Tylenol: yes with no relief   NSAIDS: yes with no relief   Opioids: none   Membrane stabilizers: neurontin, lyrica, cymbalta   Muscle relaxers: zanaflex   Previous injections: yes- Dr. Gladys Grande, epidurals with relief.    Previous surgery at this site: none     No Known Allergies            Current Outpatient Medications   Medication Sig Dispense Refill    insulin glargine (LANTUS SOLOSTAR) 100 UNIT/ML injection pen Inject 55 Units into the skin 2 times daily 36 pen 3    valsartan-hydroCHLOROthiazide (DIOVAN-HCT) 160-25 MG per tablet TAKE 1 TABLET EVERY DAY PER DR JARAMILLO 90 tablet 3    furosemide (LASIX) 20 MG tablet TAKE 1 TABLET (20MG) EVERY OTHER DAY ALTERNATING  WITH 2 TABLETS (40MG) EVERY OTHER  tablet 1    busPIRone (BUSPAR) 5 MG tablet TAKE 1 TABLET THREE TIMES DAILY 270 tablet 3    insulin aspart (NOVOLOG FLEXPEN) 100 UNIT/ML injection pen 20 units tid w/ SSI TID AC: 150-200 2 units; 201-250 4; 251-300 6; 301-350 8; 351-400 10; 401-450 12; 451-500 14; 501+ 16 units 35 mL 3    metFORMIN (GLUCOPHAGE XR) 500 MG extended release tablet Take 1 tablet by mouth daily (with breakfast) 90 tablet 3    cloNIDine (CATAPRES) 0.1 MG tablet Take 1 tablet by mouth 3 times daily 270 tablet 3    atorvastatin (LIPITOR) 80 MG tablet TAKE 1 TABLET EVERY NIGHT 90 tablet 3    vitamin D3 (CHOLECALCIFEROL) 25 MCG (1000 UT) TABS tablet TAKE 2 TABLETS EVERY DAY (PRESCRIBED BY RENAL) 180 tablet 3    DULoxetine (CYMBALTA) 60 MG extended release capsule TAKE 1 CAPSULE EVERY DAY 90 capsule 3    allopurinol (ZYLOPRIM) 100 MG tablet TAKE 1 TABLET TWICE DAILY 180 tablet 3    metoprolol (LOPRESSOR) 100 MG tablet TAKE 1 TABLET TWICE DAILY 180 tablet 3    potassium chloride (KLOR-CON M) 10 MEQ extended release tablet Take 1 tablet by mouth 2 times daily TAKE 1 TABLET TWICE DAILY 180 tablet 3    pregabalin (LYRICA) 75 MG capsule Take 1 capsule by mouth 3 times daily for 90 days.  270 capsule 0    omeprazole (PRILOSEC) 40 MG delayed release capsule TAKE 1 CAPSULE EVERY DAY WITH BREAKFAST 90 capsule 3    TRUEplus Lancets 33G MISC USE  TO  TEST  UP  TO FIVE TIMES DAILY 500 each 3    tiZANidine (ZANAFLEX) 4 MG tablet TAKE 1 TABLET BY MOUTH EVERY 6 HOURS AS NEEDED FOR MUSCLE SPASM 90 tablet 1    acetaminophen (TYLENOL) 500 MG tablet Take 500 mg by mouth every 6 hours as needed for Pain        blood glucose test strips (TRUE METRIX BLOOD GLUCOSE TEST) strip TEST BLOOD SUGAR UP TO 5 TIMES DAILY 500 strip 3    Insulin Pen Needle (B-D UF III MINI PEN NEEDLES) 31G X 5 MM MISC Use to inject insulin 5 times daily 500 each 3      No current facility-administered medications for this visit.              Past Medical History:   Diagnosis Date    Anxiety      Carpal tunnel syndrome on right      Chronic back pain      Chronic kidney disease, stage 3 ([facility]      Depression 10/28/2010    Diabetes mellitus type 2, uncontrolled ([facility] 10/28/2010     with microalbuminuria    Diverticulosis      GERD (gastroesophageal reflux disease) 10/28/2010    Herniated intervertebral disk       neck and lower back    Hypercholesterolemia 10/28/2010    Hypertension 10/28/2010    Kidney stones      Neurofibroma of trunk       irritating mole on the back.  biopsy done 10/21/09    Obesity      Osteoarthritis      PAD (peripheral artery disease) (HCC)      Type II or unspecified type diabetes mellitus without mention of complication, not stated as uncontrolled      Vitamin D insufficiency                 Past Surgical History:   Procedure Laterality Date    COLONOSCOPY N/A 9/8/2021     COLONOSCOPY POLYPECTOMY SNARE/COLD BIOPSY performed by Ajit Castellanos MD at [facility]        CT BIOPSY PERCUTANEOUS DEEP BONE   3/7/2022     CT BIOPSY PERCUTANEOUS DEEP BONE 3/7/2022 Nehal Coffey II, MD [facility]    ECHO COMPLETE   3/19/2012          EYE MUSCLE SURGERY         as a child    HYSTERECTOMY         with salpingoophorectomy    HYSTERECTOMY, TOTAL ABDOMINAL Bilateral 2003     cervix removed per patient    NERVE BLOCK Right 05/01/2018     lumbar transforaminal nerve block #1 with sedation    ND KALIA NOSE/CLEFT LIP/TIP Right 5/1/2018     RIGHT LUMBAR TRANSFORAMINAL NERVE BLOCK #1 L3-4 L4-5 WITH IV SEDATIN performed by Yessica Izquierdo DO at [address]        Family History   Problem Relation Age of Onset    Lung Cancer Mother 71         mesothelioma metastatized to the brain    Diabetes Mother      High Cholesterol Mother      High Blood Pressure Mother      Arthritis Mother      Cancer Mother           lung to brain    Diabetes Father      Arthritis Father      High Blood Pressure Sister      Arthritis Sister      High Cholesterol Sister      High Blood Pressure Brother      Substance Abuse Brother      Breast Cancer Paternal Aunt            at 80y    High Blood Pressure Paternal Aunt      Cancer Maternal Grandmother [de-identified]         colorectal cancer    High Blood Pressure Maternal Grandmother      Heart Failure Maternal Grandmother      Arthritis Maternal Grandmother      High Cholesterol Maternal Grandmother      High Blood Pressure Paternal Grandmother      Stroke Paternal Grandmother      High Blood Pressure Maternal Aunt      High Cholesterol Maternal Aunt      Cancer Maternal Aunt           mesothelioma    Diabetes Maternal Uncle      High Blood Pressure Maternal Uncle      High Cholesterol Maternal Uncle      Substance Abuse Maternal Uncle      High Blood Pressure Paternal Uncle           Social History            Tobacco Use    Smoking status: Never Smoker    Smokeless tobacco: Never Used   Substance Use Topics    Alcohol use: Yes       Alcohol/week: 2.0 standard drinks       Types: 1 Glasses of wine, 1 Cans of beer per week       Comment: 1 time per month    Drug use: No            Functional Status: The patient is able to ambulate and perform activities of daily living with the use of an assistive device. ROS:    Constitutional: Denies fevers, chills, night sweats, unintentional weight loss     Skin: Denies rash or skin changes     Eyes: Denies vision changes    Ears/Nose/Throat: Denies nasal congestion or sore throat     Respiratory: Denies SOB or cough     Cardiovascular: Denies CP, palpitations, edema      Gastrointestinal: Denies abdominal pain,  N/V, constipation, or diarrhea    Genitourinary: Denies urinary symptoms    Neurologic: See HPI.     MSK: See HPI.      Psychiatric: Denies sleep disturbance, anxiety, depression    Hematologic/Lymphatic/Immunologic: Denies bruising         Physical Exam:   Blood pressure (!) 142/84, pulse 80, height 5' 9\" (1.753 m), weight (!) 328 lb (148.8 kg), not currently breastfeeding. General: well developed and well nourished in no acute distress. Body mass index is 48.44 kg/m². HEENT: No rhinorrhea, sneezing, yawning, or lacrimation. No scleral icterus or conjunctival injection. Resp: symmetrical chest expansion, unlabored breathing, respirations unlabored. CV: Heart rate is regular. Peripheral pulses are palpable  Lymph: No visible regional lymphadenopathy. Skin: No rashes or ecchymosis. Normal turgor. Psych: Mood is calm. Affect is normal.   Ext: bilateral lower extremity edema      MSK:   Back/Hip Exam:   Inspection: Pelvis was asymmetric. Lumbar lordotic curvature was increased. There was scoliosis present. No ecchymoses or erythema. Palpation: Palpatory exam revealed tenderness along lumbosacral paraspinals, midline spine, SI joint sulcus, ttp greater trochanters. There was no paraspinal spasms. There were no trigger points. ROM: ROM decreased. +facet loading. Special/provocative testing:   SLR negative        Neurological Exam:  Strength:                     R          L  Hip Flex                       5          5  Knee Ext                     5          5  Ankle dorsi                  5          5  EHL                             5          5  Ankle Plantar               5          5     Sensory:  Intact for light touch in all lower extremity dermatomes.      Reflexes:                     R          L  Patellar                        (2+)      (2+)  Ankle Jerk                   (0)        (0)        Gait: ambulated with rollator. Forward lean, slow.         Impression:   Alcides Romano is a 61 y.o. female     1. Lumbar spondylosis    2. DDD (degenerative disc disease), lumbar    3. Chronic bilateral low back pain without sciatica    4. Lumbar stenosis without neurogenic claudication    5. Morbid obesity with BMI of 45.0-49.9, adult [facility])          Plan:   · All imaging reviewed with patient. Severe facet changes, DDD, stenosis. No growth on biopsy. · Patient would benefit from bilateral L3-4, L4-5, L5-S1 MBB x 2 with intent for RFA. Procedure risks, benefits and alternatives were discussed. Patient would like to proceed. · Will refer to weight management. · Continue medication regimen per prescribing physician      · The patient was educated about the diagnosis, prognosis, indications, risks and benefits of treatment. An opportunity to ask questions was given to the patient and questions were answered.   The patient agreed to proceed with the recommended treatment as described above.      Follow up after MBB      Efren Jaramillo DO, [facility]   Board Certified Physical Medicine and Rehabilitation

## 2022-05-26 NOTE — ANESTHESIA POSTPROCEDURE EVALUATION
Department of Anesthesiology  Postprocedure Note    Patient: Jordin Martinez  MRN: [id number]  YOB: 1955  Date of evaluation: 5/26/2022  Time:  10:00 AM     Procedure Summary     Date: 05/26/22 Room / Location: [address] [place] [address] 04 / [address] [facility]    Anesthesia Start: 3774 Anesthesia Stop: 8263    Procedure: BILATERAL MEDIAL BRANCH BLOCK L3-4 AND L4-5 (CPT 52709) (Bilateral ) Diagnosis:       Lumbar spondylosis      (LUMBAR SPONDYLOSIS)    Surgeons: Cole Buckner DO Responsible Provider: Ana Martinez MD    Anesthesia Type: MAC ASA Status: 3          Anesthesia Type: No value filed. Dany Phase I: Dany Score: 10    Dany Phase II: Dany Score: 10    Last vitals: Reviewed and per EMR flowsheets.        Anesthesia Post Evaluation    Patient location during evaluation: PACU  Patient participation: complete - patient participated  Level of consciousness: awake and alert  Airway patency: patent  Nausea & Vomiting: no nausea and no vomiting  Complications: no  Cardiovascular status: hemodynamically stable  Respiratory status: spontaneous ventilation and room air  Hydration status: stable

## 2022-05-26 NOTE — OP NOTE
Vidhi Whipple    5/26/2022     CHIEF COMPLAINT:  Low back pain. PRE-OPERATIVE DIAGNOSIS:  Lumbar spondylosis, lumbar facet syndrome, lumbosacral osteoarthritis     POST-OPERATIVE DIAGNOSIS:  Lumbar spondylosis, lumbar facet syndrome, lumbosacral osteoarthritis     PROCEDURE:  # 1 Fluoroscopic guided lumbar medial branch blocks at  levels: medial branch level: L3, L4, L5 Joint: Bilateral L4-5, L5-S1. SURGEON:    Robinson Gómez,     ANESTHESIA:   Local.    ESTIMATED BLOOD LOSS:  None.  ______________________________________________________________________  HISTORY & PHYSICAL: See separate H&P. PROCEDURE:  After confirming written and informed consent, Vidhi Whipple was brought to the procedure room and placed in the prone position. Blood pressure, heart rate, O2 saturation, and visual and verbal monitoring were established. A time-out was performed and Cocos name, date of birth, the procedure to be performed, as well as the plan for the location of the needle insertion were confirmed. Fluoroscopy was utilized to delineate the anatomy of the lumbosacral spine. Surface landmarks were identified as well. After prep and drape, an oblique fluoroscopic view was created to optimize visualization of the junction of the transverse process and the pedicle. After infiltration of local, a #22-gauge, 3.5-inch regional block needle was passed to position the tip at the junction of the superior articular process and the transverse process, along the course of the medial branch. Satisfactory needle placement was confirmed by AP and oblique projections. At the sacral alar level, the sacral alar region was visualized and the needle tip was positioned on the sacral alar at the base of the superior articulating process where the medial branch traverses. At each level the patient received 0.75 cc of 0.25% Marcaine.     The above procedure was performed at each of the following levels: Bilateral L4-5, L5-S1. Negative aspiration was noted prior to each injection. The needles were removed intact and Band-Aids were applied. Coco was transferred to the recovery area. She was monitored, reassessed and eventually discharged after an appropriate observatory period. No complications were noted. Following the procedure Coco noted improvement of previous pain symptoms. Plan: Judge Martinez will return to the office as scheduled. She was encouraged to call with questions, concerns or if worsening of symptoms occurs.       Antionette Dixon DO, [facility]   Board Certified Physical Medicine and Rehabilitation

## 2022-05-26 NOTE — ANESTHESIA POSTPROCEDURE EVALUATION
Department of Anesthesiology  Postprocedure Note    Patient: Francisco Riggins  MRN: [id number]  YOB: 1955  Date of evaluation: 5/26/2022  Time:  7:46 AM     Procedure Summary     Date: 05/26/22 Room / Location: [address] [place] [address] / [address] [place] [address]    Anesthesia Start: 6238 Anesthesia Stop: 3557    Procedure: BILATERAL MEDIAL BRANCH BLOCK L3-4 AND L4-5 (CPT 45947) (Bilateral ) Diagnosis:       Lumbar spondylosis      (LUMBAR SPONDYLOSIS)    Surgeons: Jurgen Adams DO Responsible Provider: Myke Olivares MD    Anesthesia Type: MAC ASA Status: 3          Anesthesia Type: No value filed. Dany Phase I: Dany Score: 10    Dany Phase II:      Last vitals: Reviewed and per EMR flowsheets.        Anesthesia Post Evaluation    Patient location during evaluation: PACU  Patient participation: complete - patient participated  Level of consciousness: awake  Pain score: 0  Airway patency: patent  Nausea & Vomiting: no nausea and no vomiting  Complications: no  Cardiovascular status: hemodynamically stable  Respiratory status: acceptable  Hydration status: stable    RON Diallo - KIRSTEN

## 2022-06-21 DIAGNOSIS — E11.65 UNCONTROLLED TYPE 2 DIABETES MELLITUS WITH HYPERGLYCEMIA (HCC): ICD-10-CM

## 2022-06-21 DIAGNOSIS — M48.061 NEURAL FORAMINAL STENOSIS OF LUMBAR SPINE: ICD-10-CM

## 2022-06-22 RX ORDER — GABAPENTIN 800 MG/1
TABLET ORAL
Qty: 270 TABLET | Refills: 1 | OUTPATIENT
Start: 2022-06-22

## 2022-06-24 ENCOUNTER — TELEPHONE (OUTPATIENT)
Dept: PHYSICAL MEDICINE AND REHAB | Age: 67
End: 2022-06-24

## 2022-06-24 DIAGNOSIS — M47.816 LUMBAR SPONDYLOSIS: Primary | ICD-10-CM

## 2022-06-24 NOTE — TELEPHONE ENCOUNTER
Isabel Martin was covering for Jacquie at the surgery center and she called the patient and the patient not states that she wants IV sedation. Can you please put in a new order or advise me otherwise please.

## 2022-06-24 NOTE — PROGRESS NOTES
Started PAT call when patient patient realized she was scheduled as a local and does not want to be a local she does want anesthesia.  Will notify Dr. Alex Benton office

## 2022-06-27 NOTE — TELEPHONE ENCOUNTER
Tamir Persaud and scheduling was notified and changed in [facility].

## 2022-06-29 ENCOUNTER — PREP FOR PROCEDURE (OUTPATIENT)
Dept: PHYSICAL MEDICINE AND REHAB | Age: 67
End: 2022-06-29

## 2022-06-29 ENCOUNTER — ANESTHESIA EVENT (OUTPATIENT)
Dept: OPERATING ROOM | Age: 67
End: 2022-06-29
Payer: MEDICARE

## 2022-06-29 RX ORDER — SODIUM CHLORIDE 0.9 % (FLUSH) 0.9 %
5-40 SYRINGE (ML) INJECTION EVERY 12 HOURS SCHEDULED
Status: CANCELLED | OUTPATIENT
Start: 2022-06-29

## 2022-06-29 RX ORDER — SODIUM CHLORIDE 0.9 % (FLUSH) 0.9 %
5-40 SYRINGE (ML) INJECTION PRN
Status: CANCELLED | OUTPATIENT
Start: 2022-06-29

## 2022-06-29 RX ORDER — SODIUM CHLORIDE 9 MG/ML
INJECTION, SOLUTION INTRAVENOUS PRN
Status: CANCELLED | OUTPATIENT
Start: 2022-06-29

## 2022-06-29 ASSESSMENT — LIFESTYLE VARIABLES: SMOKING_STATUS: 0

## 2022-06-29 NOTE — ANESTHESIA PRE PROCEDURE
Department of Anesthesiology  Preprocedure Note       Name:  Alcides Romano   Age:  61 y.o.  :  1955                                          MRN:  [id number]         Date:  2022      Surgeon: Jose Soto):  Karlos Hale DO    Procedure: Procedure(s):  BILATERAL MEDIAL BRANCH BLOCKS AT L3-4 AND L4-5 (prefers to be last case. son coming from [facility] to be with her)    Medications prior to admission:   Prior to Admission medications    Medication Sig Start Date End Date Taking?  Authorizing Provider   insulin glargine (LANTUS SOLOSTAR) 100 UNIT/ML injection pen Inject 55 Units into the skin 2 times daily  Patient taking differently: Inject 55 Units into the skin 2 times daily Take only 1/2 dose evening before 22   Jenifer Marie MD   valsartan-hydroCHLOROthiazide (DIOVAN-HCT) 160-25 MG per tablet TAKE 1 TABLET EVERY DAY PER DR JARAMILLO 22   Jenifer Marie MD   furosemide (LASIX) 20 MG tablet TAKE 1 TABLET (20MG) EVERY OTHER DAY ALTERNATING  WITH 2 TABLETS (40MG) EVERY OTHER DAY 22   Jenifer Marie MD   busPIRone (BUSPAR) 5 MG tablet TAKE 1 TABLET THREE TIMES DAILY 22   Jenifer Marie MD   insulin aspart (NOVOLOG FLEXPEN) 100 UNIT/ML injection pen 20 units tid w/ SSI TID AC: 150-200 2 units; 201-250 4; 251-300 6; 301-350 8; 351-400 10; 401-450 12; 451-500 14; 501+ 16 units 22   Jenifer Marie MD   metFORMIN (GLUCOPHAGE XR) 500 MG extended release tablet Take 1 tablet by mouth daily (with breakfast) 22   Jenifer Marie MD   cloNIDine (CATAPRES) 0.1 MG tablet Take 1 tablet by mouth 3 times daily 22   Jenifer Marie MD   atorvastatin (LIPITOR) 80 MG tablet TAKE 1 TABLET EVERY NIGHT 22   Jenifer Marie MD   vitamin D3 (CHOLECALCIFEROL) 25 MCG (1000 UT) TABS tablet TAKE 2 TABLETS EVERY DAY (PRESCRIBED BY RENAL) 22   Jenifer Marie MD   DULoxetine (CYMBALTA) 60 MG extended release capsule TAKE 1 CAPSULE EVERY DAY 22 Emelia Orosco MD   allopurinol (ZYLOPRIM) 100 MG tablet TAKE 1 TABLET TWICE DAILY 4/1/22   Emelia Orosco MD   metoprolol (LOPRESSOR) 100 MG tablet TAKE 1 TABLET TWICE DAILY 4/1/22   Emelia Orosco MD   potassium chloride (KLOR-CON M) 10 MEQ extended release tablet Take 1 tablet by mouth 2 times daily TAKE 1 TABLET TWICE DAILY 3/24/22   Emelia Orosco MD   pregabalin (LYRICA) 75 MG capsule Take 1 capsule by mouth 3 times daily for 90 days. 3/24/22 6/22/22  Emelia Orosco MD   omeprazole (PRILOSEC) 40 MG delayed release capsule TAKE 1 CAPSULE EVERY DAY WITH BREAKFAST 3/23/22   Emelia Orosco MD   TRUEplus Lancets 33G MISC USE  TO  TEST  UP  TO FIVE TIMES DAILY 2/22/22   Emelia Orosco MD   tiZANidine (ZANAFLEX) 4 MG tablet TAKE 1 TABLET BY MOUTH EVERY 6 HOURS AS NEEDED FOR MUSCLE SPASM 12/10/21   Emelia Orosco MD   acetaminophen (TYLENOL) 500 MG tablet Take 500 mg by mouth every 6 hours as needed for Pain    Historical Provider, MD   blood glucose test strips (TRUE METRIX BLOOD GLUCOSE TEST) strip TEST BLOOD SUGAR UP TO 5 TIMES DAILY 2/3/21   Emelia Orosco MD   Insulin Pen Needle (B-D UF III MINI PEN NEEDLES) 31G X 5 MM MISC Use to inject insulin 5 times daily 1/8/21   Emelia Orosco MD   insulin lispro (HUMALOG KWIKPEN) 100 UNIT/ML injection Inject 10 Units into the skin 3 times daily (before meals). 5/17/12 5/17/12  Ga Starks MD       Current medications:    No current facility-administered medications for this encounter.      Current Outpatient Medications   Medication Sig Dispense Refill    insulin glargine (LANTUS SOLOSTAR) 100 UNIT/ML injection pen Inject 55 Units into the skin 2 times daily (Patient taking differently: Inject 55 Units into the skin 2 times daily Take only 1/2 dose evening before) 36 pen 3    valsartan-hydroCHLOROthiazide (DIOVAN-HCT) 160-25 MG per tablet TAKE 1 TABLET EVERY DAY PER DR JARAMILLO 90 tablet 3    furosemide (LASIX) 20 MG tablet TAKE 1 TABLET (20MG) EVERY OTHER DAY ALTERNATING  WITH 2 TABLETS (40MG) EVERY OTHER  tablet 1    busPIRone (BUSPAR) 5 MG tablet TAKE 1 TABLET THREE TIMES DAILY 270 tablet 3    insulin aspart (NOVOLOG FLEXPEN) 100 UNIT/ML injection pen 20 units tid w/ SSI TID AC: 150-200 2 units; 201-250 4; 251-300 6; 301-350 8; 351-400 10; 401-450 12; 451-500 14; 501+ 16 units 35 mL 3    metFORMIN (GLUCOPHAGE XR) 500 MG extended release tablet Take 1 tablet by mouth daily (with breakfast) 90 tablet 3    cloNIDine (CATAPRES) 0.1 MG tablet Take 1 tablet by mouth 3 times daily 270 tablet 3    atorvastatin (LIPITOR) 80 MG tablet TAKE 1 TABLET EVERY NIGHT 90 tablet 3    vitamin D3 (CHOLECALCIFEROL) 25 MCG (1000 UT) TABS tablet TAKE 2 TABLETS EVERY DAY (PRESCRIBED BY RENAL) 180 tablet 3    DULoxetine (CYMBALTA) 60 MG extended release capsule TAKE 1 CAPSULE EVERY DAY 90 capsule 3    allopurinol (ZYLOPRIM) 100 MG tablet TAKE 1 TABLET TWICE DAILY 180 tablet 3    metoprolol (LOPRESSOR) 100 MG tablet TAKE 1 TABLET TWICE DAILY 180 tablet 3    potassium chloride (KLOR-CON M) 10 MEQ extended release tablet Take 1 tablet by mouth 2 times daily TAKE 1 TABLET TWICE DAILY 180 tablet 3    pregabalin (LYRICA) 75 MG capsule Take 1 capsule by mouth 3 times daily for 90 days.  270 capsule 0    omeprazole (PRILOSEC) 40 MG delayed release capsule TAKE 1 CAPSULE EVERY DAY WITH BREAKFAST 90 capsule 3    TRUEplus Lancets 33G MISC USE  TO  TEST  UP  TO FIVE TIMES DAILY 500 each 3    tiZANidine (ZANAFLEX) 4 MG tablet TAKE 1 TABLET BY MOUTH EVERY 6 HOURS AS NEEDED FOR MUSCLE SPASM 90 tablet 1    acetaminophen (TYLENOL) 500 MG tablet Take 500 mg by mouth every 6 hours as needed for Pain      blood glucose test strips (TRUE METRIX BLOOD GLUCOSE TEST) strip TEST BLOOD SUGAR UP TO 5 TIMES DAILY 500 strip 3    Insulin Pen Needle (B-D UF III MINI PEN NEEDLES) 31G X 5 MM MISC Use to inject insulin 5 times daily 500 each 3 Facility-Administered Medications Ordered in Other Encounters   Medication Dose Route Frequency Provider Last Rate Last Admin    sodium chloride flush 0.9 % injection 5-40 mL  5-40 mL IntraVENous 2 times per day Jhonny Lujan MD        sodium chloride flush 0.9 % injection 5-40 mL  5-40 mL IntraVENous PRN Koffi Dave MD        0.9 % sodium chloride infusion   IntraVENous PRN Koffi Dave MD        meperidine (DEMEROL) injection 12.5 mg  12.5 mg IntraVENous Q5 Min PRN Koffi Dave MD        morphine (PF) injection 1 mg  1 mg IntraVENous Q5 Min PRN Koffi Dave MD        fentaNYL (SUBLIMAZE) injection 50 mcg  50 mcg IntraVENous Q5 Min PRN Jhonny Lujan MD           Allergies:  No Known Allergies    Problem List:    Patient Active Problem List   Diagnosis Code    Diabetes mellitus type 2, uncontrolled ([address] E11.65    Hypertension I10    Hypercholesterolemia E78.00    GERD (gastroesophageal reflux disease) K21.9    Depression F32. A    Enlarged liver R16.0    Heart murmur R01.1    Chronic lower back pain M54.50, G89.29    Facet joint disease M47.819    Lumbar spondylosis M47.816    Bulging lumbar disc M51.26    Back pain M54.9    Anxiety F41.9    Bilateral leg edema R60.0    Microalbuminuria R80.9    Vitamin D insufficiency E55.9    Chronic kidney disease, stage 3, mod decreased GFR (HCC) N18.30    Senile nuclear cataract H25.10    Fatty liver K76.0    Diabetic peripheral neuropathy (HCC) E11.42    Neural foraminal stenosis of lumbar spine M48.061    Type 2 diabetes mellitus with both eyes affected by mild nonproliferative retinopathy without macular edema, with long-term current use of insulin ([address] U04.9156, Z79.4    Spinal stenosis of lumbar region M48.061    Major depressive disorder, recurrent, in partial remission ([address] F33.41    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    Chronic renal disease, stage III ([address] [256926] N18.30       Past Medical History:        Diagnosis Date    Anxiety     Carpal tunnel syndrome on right     Chronic back pain     Chronic kidney disease, stage 3 [facility]     Depression 10/28/2010    Diabetes mellitus type 2, uncontrolled ([facility] 10/28/2010    with microalbuminuria    Diverticulosis     GERD (gastroesophageal reflux disease) 10/28/2010    Herniated intervertebral disk     neck and lower back    Hypercholesterolemia 10/28/2010    Hypertension 10/28/2010    Kidney stones     Neurofibroma of trunk     irritating mole on the back.  biopsy done 10/21/09    Obesity     Osteoarthritis     PAD (peripheral artery disease) (HCC)     Type II or unspecified type diabetes mellitus without mention of complication, not stated as uncontrolled     Vitamin D insufficiency        Past Surgical History:        Procedure Laterality Date    COLONOSCOPY N/A 9/8/2021    COLONOSCOPY POLYPECTOMY SNARE/COLD BIOPSY performed by Neisha Garcia MD at [address] [facility] CT BIOPSY PERCUTANEOUS DEEP BONE  3/7/2022    CT BIOPSY PERCUTANEOUS DEEP BONE 3/7/2022 Jani Arita II, MD [facility] CT    ECHO COMPLETE  3/19/2012         EYE MUSCLE SURGERY      as a child    HYSTERECTOMY (CERVIX STATUS UNKNOWN)      with salpingoophorectomy    HYSTERECTOMY, TOTAL ABDOMINAL (CERVIX REMOVED) Bilateral 2003    cervix removed per patient    NERVE BLOCK Right 05/01/2018    lumbar transforaminal nerve block #1 with sedation    PAIN MANAGEMENT PROCEDURE Bilateral 5/26/2022    BILATERAL MEDIAL BRANCH BLOCK L3-4 AND L4-5 (CPT 05427) performed by Tiffani Saravia DO at [address] [facility] NOSE/CLEFT LIP/TIP Right 5/1/2018    RIGHT LUMBAR TRANSFORAMINAL NERVE BLOCK #1 L3-4 L4-5 WITH IV SEDATIN performed by Nahomy Deal DO at [address] History:    Social History     Tobacco Use    Smoking status: Never Smoker    Smokeless tobacco: Never Used   Substance Use Topics    Alcohol use: Not Currently     Alcohol/week: 2.0 standard drinks     Types: 1 Glasses of wine, 1 Cans of beer per week     Comment: 1 time per month                                Counseling given: Not Answered      Vital Signs (Current):   Vitals:    06/24/22 1434 06/28/22 1503   Weight: (!) 328 lb (148.8 kg) (!) 328 lb (148.8 kg)   Height: 5' 9\" (1.753 m) 5' 9\" (1.753 m)                                              BP Readings from Last 3 Encounters:   05/26/22 (!) 141/82   05/06/22 130/60   05/04/22 (!) 142/84       NPO Status:                                                                                 BMI:   Wt Readings from Last 3 Encounters:   05/26/22 (!) 326 lb (147.9 kg)   05/06/22 (!) 328 lb (148.8 kg)   05/04/22 (!) 328 lb (148.8 kg)     Body mass index is 48.44 kg/m². CBC:   Lab Results   Component Value Date    WBC 10.5 01/22/2022    RBC 4.52 01/22/2022    HGB 12.3 01/22/2022    HCT 41.2 01/22/2022    MCV 91.2 01/22/2022    RDW 14.2 01/22/2022     03/07/2022       CMP:   Lab Results   Component Value Date     05/06/2022    K 3.1 05/06/2022     05/06/2022    CO2 29 05/06/2022    BUN 28 05/06/2022    CREATININE 1.2 05/06/2022    GFRAA 55 05/06/2022    LABGLOM 55 05/06/2022    GLUCOSE 133 05/06/2022    GLUCOSE 138 05/09/2012    PROT 7.6 01/22/2022    CALCIUM 9.7 05/06/2022    BILITOT 0.6 01/22/2022    ALKPHOS 142 01/22/2022    AST 23 01/22/2022    ALT 18 01/22/2022       POC Tests: No results for input(s): POCGLU, POCNA, POCK, POCCL, POCBUN, POCHEMO, POCHCT in the last 72 hours.     Coags:   Lab Results   Component Value Date    PROTIME 11.7 03/07/2022    INR 1.1 03/07/2022       HCG (If Applicable): No results found for: PREGTESTUR, PREGSERUM, HCG, HCGQUANT     ABGs: No results found for: PHART, PO2ART, UBX9MJG, VOK7UHE, BEART, T2TJKGND     Type & Screen (If Applicable):  No results found for: LABABO, LABRH    Drug/Infectious Status (If Applicable):  No results found for: HIV, HEPCAB    COVID-19 Screening (If Applicable): No results found for: COVID19        Anesthesia Evaluation  Patient summary reviewed and Nursing notes reviewed  Airway:           Dental:          Pulmonary:       (-) not a current smoker                           Cardiovascular:    (+) hypertension:, hyperlipidemia         Beta Blocker:  Dose within 24 Hrs         Neuro/Psych:   (+) neuromuscular disease:, psychiatric history:            GI/Hepatic/Renal:   (+) GERD:, liver disease:, renal disease: CRI and kidney stones, morbid obesity         ROS comment: diverticulosis. Endo/Other:    (+) DiabetesType II DM, using insulin, : arthritis:., .                 Abdominal:             Vascular: Other Findings:           Anesthesia Plan      MAC     ASA 3       Induction: intravenous. continuous noninvasive hemodynamic monitor  MIPS: Prophylactic antiemetics administered. Plan discussed with CRNA.                     Fiorella Miranda MD   6/29/2022

## 2022-06-30 ENCOUNTER — HOSPITAL ENCOUNTER (OUTPATIENT)
Age: 67
Setting detail: OUTPATIENT SURGERY
Discharge: HOME | End: 2022-06-30
Attending: PHYSICAL MEDICINE & REHABILITATION | Admitting: PHYSICAL MEDICINE & REHABILITATION
Payer: MEDICARE

## 2022-06-30 ENCOUNTER — HOSPITAL ENCOUNTER (OUTPATIENT)
Dept: OPERATING ROOM | Age: 67
Setting detail: OUTPATIENT SURGERY
Discharge: HOME | End: 2022-06-30
Attending: PHYSICAL MEDICINE & REHABILITATION
Payer: MEDICARE

## 2022-06-30 ENCOUNTER — ANESTHESIA (OUTPATIENT)
Dept: OPERATING ROOM | Age: 67
End: 2022-06-30
Payer: MEDICARE

## 2022-06-30 VITALS
OXYGEN SATURATION: 94 % | WEIGHT: 293 LBS | TEMPERATURE: 97 F | HEART RATE: 102 BPM | RESPIRATION RATE: 16 BRPM | SYSTOLIC BLOOD PRESSURE: 131 MMHG | BODY MASS INDEX: 43.4 KG/M2 | DIASTOLIC BLOOD PRESSURE: 57 MMHG | HEIGHT: 69 IN

## 2022-06-30 DIAGNOSIS — M47.896 OTHER OSTEOARTHRITIS OF SPINE, LUMBAR REGION: ICD-10-CM

## 2022-06-30 LAB — METER GLUCOSE: 133 MG/DL (ref 74–99)

## 2022-06-30 PROCEDURE — 3600000015 HC SURGERY LEVEL 5 ADDTL 15MIN: Performed by: PHYSICAL MEDICINE & REHABILITATION

## 2022-06-30 PROCEDURE — 82962 GLUCOSE BLOOD TEST: CPT | Performed by: PHYSICAL MEDICINE & REHABILITATION

## 2022-06-30 PROCEDURE — 64493 INJ PARAVERT F JNT L/S 1 LEV: CPT | Performed by: PHYSICAL MEDICINE & REHABILITATION

## 2022-06-30 PROCEDURE — 2580000003 HC RX 258: Performed by: ANESTHESIOLOGY

## 2022-06-30 PROCEDURE — 6360000002 HC RX W HCPCS

## 2022-06-30 PROCEDURE — 3209999900 FLUORO FOR SURGICAL PROCEDURES

## 2022-06-30 PROCEDURE — 2500000003 HC RX 250 WO HCPCS: Performed by: PHYSICAL MEDICINE & REHABILITATION

## 2022-06-30 PROCEDURE — 3600000005 HC SURGERY LEVEL 5 BASE: Performed by: PHYSICAL MEDICINE & REHABILITATION

## 2022-06-30 PROCEDURE — 3700000000 HC ANESTHESIA ATTENDED CARE: Performed by: PHYSICAL MEDICINE & REHABILITATION

## 2022-06-30 PROCEDURE — 7100000011 HC PHASE II RECOVERY - ADDTL 15 MIN: Performed by: PHYSICAL MEDICINE & REHABILITATION

## 2022-06-30 PROCEDURE — 7100000010 HC PHASE II RECOVERY - FIRST 15 MIN: Performed by: PHYSICAL MEDICINE & REHABILITATION

## 2022-06-30 PROCEDURE — 82962 GLUCOSE BLOOD TEST: CPT

## 2022-06-30 PROCEDURE — 3700000001 HC ADD 15 MINUTES (ANESTHESIA): Performed by: PHYSICAL MEDICINE & REHABILITATION

## 2022-06-30 PROCEDURE — 2709999900 HC NON-CHARGEABLE SUPPLY: Performed by: PHYSICAL MEDICINE & REHABILITATION

## 2022-06-30 PROCEDURE — 64494 INJ PARAVERT F JNT L/S 2 LEV: CPT | Performed by: PHYSICAL MEDICINE & REHABILITATION

## 2022-06-30 RX ORDER — SODIUM CHLORIDE 0.9 % (FLUSH) 0.9 %
5-40 SYRINGE (ML) INJECTION EVERY 12 HOURS SCHEDULED
Status: DISCONTINUED | OUTPATIENT
Start: 2022-06-30 | End: 2022-06-30 | Stop reason: HOSPADM

## 2022-06-30 RX ORDER — MIDAZOLAM HYDROCHLORIDE 1 MG/ML
INJECTION INTRAMUSCULAR; INTRAVENOUS PRN
Status: DISCONTINUED | OUTPATIENT
Start: 2022-06-30 | End: 2022-06-30 | Stop reason: SDUPTHER

## 2022-06-30 RX ORDER — PROPOFOL 10 MG/ML
INJECTION, EMULSION INTRAVENOUS PRN
Status: DISCONTINUED | OUTPATIENT
Start: 2022-06-30 | End: 2022-06-30 | Stop reason: SDUPTHER

## 2022-06-30 RX ORDER — SODIUM CHLORIDE, SODIUM LACTATE, POTASSIUM CHLORIDE, CALCIUM CHLORIDE 600; 310; 30; 20 MG/100ML; MG/100ML; MG/100ML; MG/100ML
INJECTION, SOLUTION INTRAVENOUS CONTINUOUS
Status: DISCONTINUED | OUTPATIENT
Start: 2022-06-30 | End: 2022-06-30 | Stop reason: HOSPADM

## 2022-06-30 RX ORDER — FENTANYL CITRATE 50 UG/ML
INJECTION, SOLUTION INTRAMUSCULAR; INTRAVENOUS PRN
Status: DISCONTINUED | OUTPATIENT
Start: 2022-06-30 | End: 2022-06-30 | Stop reason: SDUPTHER

## 2022-06-30 RX ORDER — BUPIVACAINE HYDROCHLORIDE 2.5 MG/ML
INJECTION, SOLUTION EPIDURAL; INFILTRATION; INTRACAUDAL PRN
Status: DISCONTINUED | OUTPATIENT
Start: 2022-06-30 | End: 2022-06-30 | Stop reason: ALTCHOICE

## 2022-06-30 RX ORDER — SODIUM CHLORIDE 9 MG/ML
INJECTION, SOLUTION INTRAVENOUS PRN
Status: DISCONTINUED | OUTPATIENT
Start: 2022-06-30 | End: 2022-06-30 | Stop reason: HOSPADM

## 2022-06-30 RX ORDER — SODIUM CHLORIDE 0.9 % (FLUSH) 0.9 %
5-40 SYRINGE (ML) INJECTION PRN
Status: DISCONTINUED | OUTPATIENT
Start: 2022-06-30 | End: 2022-06-30 | Stop reason: HOSPADM

## 2022-06-30 RX ORDER — LIDOCAINE HYDROCHLORIDE 10 MG/ML
INJECTION, SOLUTION EPIDURAL; INFILTRATION; INTRACAUDAL; PERINEURAL PRN
Status: DISCONTINUED | OUTPATIENT
Start: 2022-06-30 | End: 2022-06-30 | Stop reason: ALTCHOICE

## 2022-06-30 RX ADMIN — PROPOFOL 10 MG: 10 INJECTION, EMULSION INTRAVENOUS at 09:57

## 2022-06-30 RX ADMIN — FENTANYL CITRATE 50 MCG: 50 INJECTION INTRAMUSCULAR; INTRAVENOUS at 09:57

## 2022-06-30 RX ADMIN — PROPOFOL 10 MG: 10 INJECTION, EMULSION INTRAVENOUS at 10:03

## 2022-06-30 RX ADMIN — PROPOFOL 10 MG: 10 INJECTION, EMULSION INTRAVENOUS at 10:05

## 2022-06-30 RX ADMIN — MIDAZOLAM 2 MG: 1 INJECTION INTRAMUSCULAR; INTRAVENOUS at 09:57

## 2022-06-30 RX ADMIN — PROPOFOL 10 MG: 10 INJECTION, EMULSION INTRAVENOUS at 10:02

## 2022-06-30 RX ADMIN — PROPOFOL 10 MG: 10 INJECTION, EMULSION INTRAVENOUS at 10:00

## 2022-06-30 RX ADMIN — SODIUM CHLORIDE, POTASSIUM CHLORIDE, SODIUM LACTATE AND CALCIUM CHLORIDE: 600; 310; 30; 20 INJECTION, SOLUTION INTRAVENOUS at 09:24

## 2022-06-30 RX ADMIN — PROPOFOL 10 MG: 10 INJECTION, EMULSION INTRAVENOUS at 09:56

## 2022-06-30 RX ADMIN — FENTANYL CITRATE 50 MCG: 50 INJECTION INTRAMUSCULAR; INTRAVENOUS at 10:00

## 2022-06-30 ASSESSMENT — PAIN - FUNCTIONAL ASSESSMENT: PAIN_FUNCTIONAL_ASSESSMENT: 0-10

## 2022-06-30 ASSESSMENT — PAIN DESCRIPTION - DESCRIPTORS: DESCRIPTORS: ACHING

## 2022-06-30 ASSESSMENT — PAIN SCALES - GENERAL: PAINLEVEL_OUTOF10: 0

## 2022-06-30 NOTE — ANESTHESIA PRE PROCEDURE
Department of Anesthesiology  Preprocedure Note       Name:  Derek Lang   Age:  61 y.o.  :  1955                                          MRN:  [id number]         Date:  2022      Surgeon: Anabelle Mendez):  Helen Erazo DO    Procedure: Procedure(s):  BILATERAL MEDIAL BRANCH BLOCKS AT L3-4 AND L4-5 (prefers to be last case. son coming from [facility] [place] to be with her)    Medications prior to admission:   Prior to Admission medications    Medication Sig Start Date End Date Taking?  Authorizing Provider   insulin glargine (LANTUS SOLOSTAR) 100 UNIT/ML injection pen Inject 55 Units into the skin 2 times daily  Patient taking differently: Inject 55 Units into the skin 2 times daily Take only 1/2 dose evening before 22   Esa Fishman MD   valsartan-hydroCHLOROthiazide (DIOVAN-HCT) 160-25 MG per tablet TAKE 1 TABLET EVERY DAY PER DR JARAMILLO 22   Esa Fishman MD   furosemide (LASIX) 20 MG tablet TAKE 1 TABLET (20MG) EVERY OTHER DAY ALTERNATING  WITH 2 TABLETS (40MG) EVERY OTHER DAY 22   Esa Fishman MD   busPIRone (BUSPAR) 5 MG tablet TAKE 1 TABLET THREE TIMES DAILY 22   Esa Fishman MD   insulin aspart (NOVOLOG FLEXPEN) 100 UNIT/ML injection pen 20 units tid w/ SSI TID AC: 150-200 2 units; 201-250 4; 251-300 6; 301-350 8; 351-400 10; 401-450 12; 451-500 14; 501+ 16 units 22   Esa Fishman MD   metFORMIN (GLUCOPHAGE XR) 500 MG extended release tablet Take 1 tablet by mouth daily (with breakfast) 22   Esa Fishman MD   cloNIDine (CATAPRES) 0.1 MG tablet Take 1 tablet by mouth 3 times daily 22   Esa Fishman MD   atorvastatin (LIPITOR) 80 MG tablet TAKE 1 TABLET EVERY NIGHT 22   Esa Fishman MD   vitamin D3 (CHOLECALCIFEROL) 25 MCG (1000 UT) TABS tablet TAKE 2 TABLETS EVERY DAY (PRESCRIBED BY RENAL) 22   Esa Fishman MD   DULoxetine (CYMBALTA) 60 MG extended release capsule TAKE 1 CAPSULE EVERY DAY 22 Ainsley Nails MD   allopurinol (ZYLOPRIM) 100 MG tablet TAKE 1 TABLET TWICE DAILY 4/1/22   Ainsley Nails MD   metoprolol (LOPRESSOR) 100 MG tablet TAKE 1 TABLET TWICE DAILY 4/1/22   Ainsley Nails MD   potassium chloride (KLOR-CON M) 10 MEQ extended release tablet Take 1 tablet by mouth 2 times daily TAKE 1 TABLET TWICE DAILY 3/24/22   Ainsley Nails MD   pregabalin (LYRICA) 75 MG capsule Take 1 capsule by mouth 3 times daily for 90 days. 3/24/22 6/22/22  Ainsley Nails MD   omeprazole (PRILOSEC) 40 MG delayed release capsule TAKE 1 CAPSULE EVERY DAY WITH BREAKFAST 3/23/22   Ainsley Nails MD   TRUEplus Lancets 33G MISC USE  TO  TEST  UP  TO FIVE TIMES DAILY 2/22/22   Ainsley Nails MD   tiZANidine (ZANAFLEX) 4 MG tablet TAKE 1 TABLET BY MOUTH EVERY 6 HOURS AS NEEDED FOR MUSCLE SPASM 12/10/21   Ainsley Nails MD   acetaminophen (TYLENOL) 500 MG tablet Take 500 mg by mouth every 6 hours as needed for Pain    Historical Provider, MD   blood glucose test strips (TRUE METRIX BLOOD GLUCOSE TEST) strip TEST BLOOD SUGAR UP TO 5 TIMES DAILY 2/3/21   Ainsley Nails MD   Insulin Pen Needle (B-D UF III MINI PEN NEEDLES) 31G X 5 MM MISC Use to inject insulin 5 times daily 1/8/21   Ainsley Nails MD   insulin lispro (HUMALOG KWIKPEN) 100 UNIT/ML injection Inject 10 Units into the skin 3 times daily (before meals).  5/17/12 5/17/12  Gomez Salazar MD       Current medications:    Current Facility-Administered Medications   Medication Dose Route Frequency Provider Last Rate Last Admin    lactated ringers infusion   IntraVENous Continuous Koffi Dave  mL/hr at 06/30/22 0924 New Bag at 06/30/22 0924    0.9 % sodium chloride infusion   IntraVENous PRN Maria Del Carmen Howard, DO        sodium chloride flush 0.9 % injection 5-40 mL  5-40 mL IntraVENous 2 times per day Maria Del Carmen Howard, DO        sodium chloride flush 0.9 % injection 5-40 mL  5-40 mL IntraVENous PRN Maria Del Carmen Howard DO         Facility-Administered Medications Ordered in Other Encounters   Medication Dose Route Frequency Provider Last Rate Last Admin    sodium chloride flush 0.9 % injection 5-40 mL  5-40 mL IntraVENous 2 times per day Tierra Durand MD        sodium chloride flush 0.9 % injection 5-40 mL  5-40 mL IntraVENous PRN Koffi Dave MD        0.9 % sodium chloride infusion   IntraVENous PRN Koffi Dave MD        meperidine (DEMEROL) injection 12.5 mg  12.5 mg IntraVENous Q5 Min PRN Koffi Dave MD        morphine (PF) injection 1 mg  1 mg IntraVENous Q5 Min PRN Koffi Dave MD        fentaNYL (SUBLIMAZE) injection 50 mcg  50 mcg IntraVENous Q5 Min PRN Tierra Durand MD           Allergies:  No Known Allergies    Problem List:    Patient Active Problem List   Diagnosis Code    Diabetes mellitus type 2, uncontrolled ([facility] E11.65    Hypertension I10    Hypercholesterolemia E78.00    GERD (gastroesophageal reflux disease) K21.9    Depression F32. A    Enlarged liver R16.0    Heart murmur R01.1    Chronic lower back pain M54.50, G89.29    Facet joint disease M47.819    Lumbar spondylosis M47.816    Bulging lumbar disc M51.26    Back pain M54.9    Anxiety F41.9    Bilateral leg edema R60.0    Microalbuminuria R80.9    Vitamin D insufficiency E55.9    Chronic kidney disease, stage 3, mod decreased GFR (HCC) N18.30    Senile nuclear cataract H25.10    Fatty liver K76.0    Diabetic peripheral neuropathy (HCC) E11.42    Neural foraminal stenosis of lumbar spine M48.061    Type 2 diabetes mellitus with both eyes affected by mild nonproliferative retinopathy without macular edema, with long-term current use of insulin ([facility] S13.6635, Z79.4    Spinal stenosis of lumbar region M48.061    Major depressive disorder, recurrent, in partial remission (HCC) F33.41    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    Chronic renal disease, stage III [facility]) [[id number]] N18.30       Past Medical History:        Diagnosis Date    Anxiety     Carpal tunnel syndrome on right     Chronic back pain     Chronic kidney disease, stage 3 ([facility]     Depression 10/28/2010    Diabetes mellitus type 2, uncontrolled ([facility] [address] 10/28/2010    with microalbuminuria    Diverticulosis     GERD (gastroesophageal reflux disease) 10/28/2010    Herniated intervertebral disk     neck and lower back    Hypercholesterolemia 10/28/2010    Hypertension 10/28/2010    Kidney stones     Neurofibroma of trunk     irritating mole on the back.  biopsy done 10/21/09    Obesity     Osteoarthritis     PAD (peripheral artery disease) (HCC)     Type II or unspecified type diabetes mellitus without mention of complication, not stated as uncontrolled     Vitamin D insufficiency        Past Surgical History:        Procedure Laterality Date    COLONOSCOPY N/A 9/8/2021    COLONOSCOPY POLYPECTOMY SNARE/COLD BIOPSY performed by J Luis Orellana MD at [address] [facility] [address] BIOPSY PERCUTANEOUS DEEP BONE  3/7/2022    CT BIOPSY PERCUTANEOUS DEEP BONE 3/7/2022 Kanwal Echeverria II, MD [facility]    ECHO COMPLETE  3/19/2012         EYE MUSCLE SURGERY      as a child    HYSTERECTOMY (CERVIX STATUS UNKNOWN)      with salpingoophorectomy    HYSTERECTOMY, TOTAL ABDOMINAL (CERVIX REMOVED) Bilateral 2003    cervix removed per patient    NERVE BLOCK Right 05/01/2018    lumbar transforaminal nerve block #1 with sedation    PAIN MANAGEMENT PROCEDURE Bilateral 5/26/2022    BILATERAL MEDIAL BRANCH BLOCK L3-4 AND L4-5 (CPT [id number]) performed by Carlos Chavira DO at [address] [facility] NOSE/CLEFT LIP/TIP Right 5/1/2018    RIGHT LUMBAR TRANSFORAMINAL NERVE BLOCK #1 L3-4 L4-5 WITH IV SEDATIN performed by Ila Nagy DO at [address] History:    Social History     Tobacco Use    Smoking status: Never Smoker    Smokeless tobacco: Never Used   Substance Use Topics    Alcohol use: Not Currently     Alcohol/week: 2.0 standard drinks     Types: 1 Glasses of wine, 1 Cans of beer per week     Comment: 1 time per month                                Counseling given: Not Answered      Vital Signs (Current):   Vitals:    06/24/22 1434 06/28/22 1503 06/30/22 0858   BP:   (!) 160/70   Pulse:   77   Resp:   14   Temp:   97.9 °F (36.6 °C)   TempSrc:   Skin   SpO2:   97%   Weight: (!) 328 lb (148.8 kg) (!) 328 lb (148.8 kg) (!) 320 lb (145.2 kg)   Height: 5' 9\" (1.753 m) 5' 9\" (1.753 m) 5' 9\" (1.753 m)                                              BP Readings from Last 3 Encounters:   06/30/22 (!) 160/70   05/26/22 (!) 141/82   05/06/22 130/60       NPO Status: Time of last liquid consumption: 2100                        Time of last solid consumption: 2100                        Date of last liquid consumption: 06/29/22                        Date of last solid food consumption: 06/29/22    BMI:   Wt Readings from Last 3 Encounters:   06/30/22 (!) 320 lb (145.2 kg)   05/26/22 (!) 326 lb (147.9 kg)   05/06/22 (!) 328 lb (148.8 kg)     Body mass index is 47.26 kg/m².     CBC:   Lab Results   Component Value Date/Time    WBC 10.5 01/22/2022 12:59 PM    RBC 4.52 01/22/2022 12:59 PM    HGB 12.3 01/22/2022 12:59 PM    HCT 41.2 01/22/2022 12:59 PM    MCV 91.2 01/22/2022 12:59 PM    RDW 14.2 01/22/2022 12:59 PM     03/07/2022 08:23 AM       CMP:   Lab Results   Component Value Date/Time     05/06/2022 12:00 PM    K 3.1 05/06/2022 12:00 PM     05/06/2022 12:00 PM    CO2 29 05/06/2022 12:00 PM    BUN 28 05/06/2022 12:00 PM    CREATININE 1.2 05/06/2022 12:00 PM    GFRAA 55 05/06/2022 12:00 PM    LABGLOM 55 05/06/2022 12:00 PM    GLUCOSE 133 05/06/2022 12:00 PM    GLUCOSE 138 05/09/2012 08:05 AM    PROT 7.6 01/22/2022 12:59 PM    CALCIUM 9.7 05/06/2022 12:00 PM    BILITOT 0.6 01/22/2022 12:59 PM    ALKPHOS 142 01/22/2022 12:59 PM    AST 23 01/22/2022 12:59 PM    ALT 18 01/22/2022 12:59 PM       POC Tests: No results for input(s): POCGLU, POCNA, POCK, POCCL, POCBUN, POCHEMO, POCHCT in the last 72 hours. Coags:   Lab Results   Component Value Date/Time    PROTIME 11.7 03/07/2022 08:23 AM    INR 1.1 03/07/2022 08:23 AM       HCG (If Applicable): No results found for: PREGTESTUR, PREGSERUM, HCG, HCGQUANT     ABGs: No results found for: PHART, PO2ART, PRO1CMT, OQG9BPM, BEART, M1ZTDLFS     Type & Screen (If Applicable):  No results found for: LABABO, LABRH    Drug/Infectious Status (If Applicable):  No results found for: HIV, HEPCAB    COVID-19 Screening (If Applicable): No results found for: COVID19        Anesthesia Evaluation  Patient summary reviewed  Airway: Mallampati: III  TM distance: >3 FB   Neck ROM: full  Mouth opening: > = 3 FB   Dental: normal exam         Pulmonary: breath sounds clear to auscultation  (+) asthma:                            Cardiovascular:    (+) hypertension:,         Rhythm: regular  Rate: normal           Beta Blocker:  Dose within 24 Hrs         Neuro/Psych:   (+) neuromuscular disease:, psychiatric history:depression/anxiety             GI/Hepatic/Renal:   (+) GERD:, liver disease:, renal disease: CRI, morbid obesity          Endo/Other:    (+) DiabetesType II DM, poorly controlled, , .                 Abdominal:   (+) obese,     Abdomen: soft. Vascular: Other Findings:           Anesthesia Plan      MAC     ASA 3       Induction: intravenous. Anesthetic plan and risks discussed with patient. Plan discussed with CRNA.                     Hortensia Galeazzi, MD   6/30/2022

## 2022-06-30 NOTE — H&P
Gisselllizzy Velazquezdaily, [id number] [facility]  [address] [facility]  Phone: [phone]  Fax: [phone]    PCP: Mendez Nelson MD  Date of visit: 6/30/2022    CC: low back pain       Mona Damon is a 61 y.o. female who presents today for medial branch blocks. Patient complains of low back pain. No red flag symptoms. Consents to proceed with procedure.      No Known Allergies    Current Facility-Administered Medications   Medication Dose Route Frequency Provider Last Rate Last Admin    lactated ringers infusion   IntraVENous Continuous Koffi Dave  mL/hr at 06/30/22 0924 New Bag at 06/30/22 0924    0.9 % sodium chloride infusion   IntraVENous PRN Maria Del Carmen Howard, DO        sodium chloride flush 0.9 % injection 5-40 mL  5-40 mL IntraVENous 2 times per day Maria Del Carmen Howard, DO        sodium chloride flush 0.9 % injection 5-40 mL  5-40 mL IntraVENous PRN Maria Del Carmen Howard, DO         Facility-Administered Medications Ordered in Other Encounters   Medication Dose Route Frequency Provider Last Rate Last Admin    sodium chloride flush 0.9 % injection 5-40 mL  5-40 mL IntraVENous 2 times per day Kevin Cedeño MD        sodium chloride flush 0.9 % injection 5-40 mL  5-40 mL IntraVENous PRN Kevin Cedeño MD        0.9 % sodium chloride infusion   IntraVENous PRN Koffi Dave MD        meperidine (DEMEROL) injection 12.5 mg  12.5 mg IntraVENous Q5 Min PRN Koffi Dave MD        morphine (PF) injection 1 mg  1 mg IntraVENous Q5 Min PRN Koffi Dave MD        fentaNYL (SUBLIMAZE) injection 50 mcg  50 mcg IntraVENous Q5 Min PRN Kevin Cedeño MD           Past Medical History:   Diagnosis Date    Anxiety     Carpal tunnel syndrome on right     Chronic back pain     Chronic kidney disease, stage 3 [facility] [address] [address]     Depression 10/28/2010    Diabetes mellitus type 2, uncontrolled ([facility] [address] 10/28/2010    with microalbuminuria    Diverticulosis     GERD (gastroesophageal reflux disease) 10/28/2010    Herniated intervertebral disk     neck and lower back    Hypercholesterolemia 10/28/2010    Hypertension 10/28/2010    Kidney stones     Neurofibroma of trunk     irritating mole on the back.  biopsy done 10/21/09    Obesity     Osteoarthritis     PAD (peripheral artery disease) (HCC)     Type II or unspecified type diabetes mellitus without mention of complication, not stated as uncontrolled     Vitamin D insufficiency        Past Surgical History:   Procedure Laterality Date    COLONOSCOPY N/A 9/8/2021    COLONOSCOPY POLYPECTOMY SNARE/COLD BIOPSY performed by Patricia Santos MD at [address] [facility] [address] BIOPSY PERCUTANEOUS DEEP BONE  3/7/2022    CT BIOPSY PERCUTANEOUS DEEP BONE 3/7/2022 Irais Whipple II, MD [facility]    ECHO COMPLETE  3/19/2012         EYE MUSCLE SURGERY      as a child    HYSTERECTOMY (CERVIX STATUS UNKNOWN)      with salpingoophorectomy    HYSTERECTOMY, TOTAL ABDOMINAL (CERVIX REMOVED) Bilateral 2003    cervix removed per patient    NERVE BLOCK Right 05/01/2018    lumbar transforaminal nerve block #1 with sedation    PAIN MANAGEMENT PROCEDURE Bilateral 5/26/2022    BILATERAL MEDIAL BRANCH BLOCK L3-4 AND L4-5 (CPT 60111) performed by Ally Verde DO at [address] [facility] NOSE/CLEFT LIP/TIP Right 5/1/2018    RIGHT LUMBAR TRANSFORAMINAL NERVE BLOCK #1 L3-4 L4-5 WITH IV SEDATIN performed by Kathi Pool DO at [facility]         Family History   Problem Relation Age of Onset    Lung Cancer Mother 71        mesothelioma metastatized to the brain    Diabetes Mother     High Cholesterol Mother     High Blood Pressure Mother     Arthritis Mother     Cancer Mother         lung to brain    Diabetes Father     Arthritis Father     High Blood Pressure Sister    [facility] Arthritis Sister     High Cholesterol Sister     High Blood Pressure Brother     Substance Abuse Brother     Breast Cancer Paternal Aunt          at 80y    High Blood Pressure Paternal Aunt     Cancer Maternal Grandmother [de-identified]        colorectal cancer    High Blood Pressure Maternal Grandmother     Heart Failure Maternal Grandmother     Arthritis Maternal Grandmother     High Cholesterol Maternal Grandmother     High Blood Pressure Paternal Grandmother     Stroke Paternal Grandmother     High Blood Pressure Maternal Aunt     High Cholesterol Maternal Aunt     Cancer Maternal Aunt         mesothelioma    Diabetes Maternal Uncle     High Blood Pressure Maternal Uncle     High Cholesterol Maternal Uncle     Substance Abuse Maternal Uncle     High Blood Pressure Paternal Uncle        Social History     Tobacco Use    Smoking status: Never Smoker    Smokeless tobacco: Never Used   Vaping Use    Vaping Use: Never used   Substance Use Topics    Alcohol use: Not Currently     Alcohol/week: 2.0 standard drinks     Types: 1 Glasses of wine, 1 Cans of beer per week     Comment: 1 time per month    Drug use: No              ROS:    Constitutional: Denies fevers, chills, night sweats, unintentional weight loss     Skin: Denies rash or skin changes     Respiratory: Denies SOB or cough     Cardiovascular: Denies CP, palpitations, edema      Neurologic: See HPI.     MSK: See HPI. Hematologic/Lymphatic/Immunologic: Denies bruising       Physical Exam:   Blood pressure (!) 160/70, pulse 77, temperature 97.9 °F (36.6 °C), temperature source Skin, resp. rate 14, height 5' 9\" (1.753 m), weight (!) 320 lb (145.2 kg), SpO2 97 %, not currently breastfeeding. General: well developed and well nourished in no acute distress  Resp: symmetrical chest expansion, unlabored breathing, respirations unlabored. CV: Heart rate is regular. Peripheral pulses are palpable  Skin: No rashes or ecchymosis. Normal turgor. MSK: ttp lumbar psps      Neurological Exam:  No focal sensorimotor deficits.        Impression:     Lumbar spondylosis      Plan:   · Injection as planned today           Maye Melissa DO, [address] [place] [address]   Board Certified Physical Medicine and Rehabilitation

## 2022-06-30 NOTE — OP NOTE
Mona Damon    6/30/2022     CHIEF COMPLAINT:  Low back pain. PRE-OPERATIVE DIAGNOSIS:  Lumbar spondylosis, lumbar facet syndrome, lumbosacral osteoarthritis     POST-OPERATIVE DIAGNOSIS:  Lumbar spondylosis, lumbar facet syndrome, lumbosacral osteoarthritis     PROCEDURE:  # 2 Fluoroscopic guided lumbar medial branch blocks at  levels: medial branch level: L2, L3, L4 Joint: Bilateral L3-4, L4-5. SURGEON:    Gissell Eddy,     ANESTHESIA:   Local.    ESTIMATED BLOOD LOSS:  None.  ______________________________________________________________________  HISTORY & PHYSICAL: See separate H&P. PROCEDURE:  After confirming written and informed consent, Mona Damon was brought to the procedure room and placed in the prone position. Blood pressure, heart rate, O2 saturation, and visual and verbal monitoring were established. A time-out was performed and Margarette name, date of birth, the procedure to be performed, as well as the plan for the location of the needle insertion were confirmed. Fluoroscopy was utilized to delineate the anatomy of the lumbosacral spine. Surface landmarks were identified as well. After prep and drape, an oblique fluoroscopic view was created to optimize visualization of the junction of the transverse process and the pedicle. After infiltration of local, a #22-gauge, 3.5-inch regional block needle was passed to position the tip at the junction of the superior articular process and the transverse process, along the course of the medial branch. Satisfactory needle placement was confirmed by AP and oblique projections. At each level the patient received 0.75 cc of 0.25% Marcaine. The above procedure was performed at each of the following levels: Bilateral L3-4, L4-5. Negative aspiration was noted prior to each injection. The needles were removed intact and Band-Aids were applied. Coco was transferred to the recovery area.   She was monitored, reassessed and eventually discharged after an appropriate observatory period. No complications were noted. Following the procedure Coco noted improvement of previous pain symptoms. Plan: Ganesh Delarosa will return to the office as scheduled. She was encouraged to call with questions, concerns or if worsening of symptoms occurs.       Kellee Monson DO, [facility]   Board Certified Physical Medicine and Rehabilitation

## 2022-06-30 NOTE — ANESTHESIA POSTPROCEDURE EVALUATION
Department of Anesthesiology  Postprocedure Note    Patient: Ward Moctezuma  MRN: [id number]  YOB: 1955  Date of evaluation: 6/30/2022      Procedure Summary     Date: 06/30/22 Room / Location: [address] [place] [address] 04 / [address] [facility]    Anesthesia Start: 0782 Anesthesia Stop: 7312    Procedure: BILATERAL MEDIAL BRANCH BLOCKS AT L3-4 AND L4-5 (prefers to be last case.  son coming from [facility] [employer] to be with her) (Bilateral ) Diagnosis:       Lumbar spondylosis      (Lumbar spondylosis [W63.117])    Surgeons: Naresh Jorge DO Responsible Provider: Dania Patel MD    Anesthesia Type: MAC ASA Status: 3          Anesthesia Type: MAC    Dany Phase I: Dany Score: 10    Dany Phase II:        Anesthesia Post Evaluation    Patient location during evaluation: PACU  Patient participation: complete - patient participated  Level of consciousness: awake and alert  Pain score: 2  Airway patency: patent  Nausea & Vomiting: no nausea  Complications: no  Respiratory status: acceptable  Hydration status: euvolemic

## 2022-07-01 DIAGNOSIS — E11.42 DIABETIC PERIPHERAL NEUROPATHY (HCC): ICD-10-CM

## 2022-07-01 RX ORDER — PREGABALIN 75 MG/1
CAPSULE ORAL
Qty: 270 CAPSULE | Refills: 0 | Status: SHIPPED
Start: 2022-07-01 | End: 2022-08-17

## 2022-07-01 NOTE — TELEPHONE ENCOUNTER
Last Appointment:  5/6/2022  Future Appointments   Date Time Provider Eloise Oshea   7/5/2022 11:00 AM Jannet Herrera MD Surg [facility]   8/17/2022  1:40 PM MD Sharon Neri [facility]

## 2022-07-07 ENCOUNTER — TELEPHONE (OUTPATIENT)
Dept: FAMILY MEDICINE CLINIC | Age: 67
End: 2022-07-07

## 2022-07-07 DIAGNOSIS — M48.061 NEURAL FORAMINAL STENOSIS OF LUMBAR SPINE: Primary | ICD-10-CM

## 2022-07-07 RX ORDER — PREGABALIN 75 MG/1
75 CAPSULE ORAL 3 TIMES DAILY
Qty: 30 CAPSULE | Refills: 0 | Status: SHIPPED
Start: 2022-07-07 | End: 2022-08-17 | Stop reason: ALTCHOICE

## 2022-07-07 NOTE — TELEPHONE ENCOUNTER
Mikayla called in and is stating that [facility] has not shipped her Lyrica as of yet, I did call and verify and they do not know when it will be shipped but it should be within they next 5 days. Can you please send in a script to her local pharmacy for a 10 days supply as she is out and has been out for a few days.     Please advise    Thank you

## 2022-07-26 ENCOUNTER — OFFICE VISIT (OUTPATIENT)
Dept: PHYSICAL MEDICINE AND REHAB | Age: 67
End: 2022-07-26
Payer: MEDICARE

## 2022-07-26 VITALS
HEART RATE: 76 BPM | DIASTOLIC BLOOD PRESSURE: 83 MMHG | BODY MASS INDEX: 43.4 KG/M2 | WEIGHT: 293 LBS | HEIGHT: 69 IN | SYSTOLIC BLOOD PRESSURE: 156 MMHG

## 2022-07-26 DIAGNOSIS — M54.50 CHRONIC BILATERAL LOW BACK PAIN WITHOUT SCIATICA: ICD-10-CM

## 2022-07-26 DIAGNOSIS — G89.29 CHRONIC BILATERAL LOW BACK PAIN WITHOUT SCIATICA: ICD-10-CM

## 2022-07-26 DIAGNOSIS — M47.816 LUMBAR SPONDYLOSIS: Primary | ICD-10-CM

## 2022-07-26 PROCEDURE — 1036F TOBACCO NON-USER: CPT | Performed by: PHYSICAL MEDICINE & REHABILITATION

## 2022-07-26 PROCEDURE — 3017F COLORECTAL CA SCREEN DOC REV: CPT | Performed by: PHYSICAL MEDICINE & REHABILITATION

## 2022-07-26 PROCEDURE — 99214 OFFICE O/P EST MOD 30 MIN: CPT | Performed by: PHYSICAL MEDICINE & REHABILITATION

## 2022-07-26 PROCEDURE — G8417 CALC BMI ABV UP PARAM F/U: HCPCS | Performed by: PHYSICAL MEDICINE & REHABILITATION

## 2022-07-26 PROCEDURE — G8427 DOCREV CUR MEDS BY ELIG CLIN: HCPCS | Performed by: PHYSICAL MEDICINE & REHABILITATION

## 2022-07-26 RX ORDER — SODIUM CHLORIDE 9 MG/ML
INJECTION, SOLUTION INTRAVENOUS PRN
Status: CANCELLED | OUTPATIENT
Start: 2022-07-26

## 2022-07-26 RX ORDER — SODIUM CHLORIDE 0.9 % (FLUSH) 0.9 %
5-40 SYRINGE (ML) INJECTION EVERY 12 HOURS SCHEDULED
Status: CANCELLED | OUTPATIENT
Start: 2022-07-26

## 2022-07-26 RX ORDER — SODIUM CHLORIDE 0.9 % (FLUSH) 0.9 %
5-40 SYRINGE (ML) INJECTION PRN
Status: CANCELLED | OUTPATIENT
Start: 2022-07-26

## 2022-07-26 NOTE — H&P
Marisol Maribelljacob, [address] [facility] Rehabilitation  [address] [facility] [place]  Phone: [phone]  Fax: [phone]    PCP: Sanjana Vargas MD  Date of visit: 7/26/22    Chief Complaint   Patient presents with    Back Pain     Follow up facet joint injection       Interval:   Patient presents today for follow up visit after MBB. She underwent bilateral MBB bilateral L3-4, L4-5 #2 and reports 80% relief. She complains of low back pain that does not radiate. The pain is rated Pain Score:   8, is described as achy, and is located in the low back without radiation to the legs. The pain is better with rest.  The pain is worse with standing. There is numbness/tingling in her feet- states she has neuropathy. There is occasional weakness. There is no bowel/bladder changes. She is using a rollator for ambulation. The prior workup has included: Xray, MRI L spine, CT guided biopsy     The prior treatment has included:  PT: yes with no relief   Chiropractic: none .    Modalities: yes   OTC Tylenol: yes with no relief   NSAIDS: yes with no relief   Opioids: none   Membrane stabilizers: neurontin, lyrica, cymbalta   Muscle relaxers: zanaflex   Previous injections: yes- Dr. Belle Palacios, epidurals with relief, bilateral MBB bilateral L3-4, L4-5 MBB   Previous surgery at this site: none    No Known Allergies    Current Outpatient Medications   Medication Sig Dispense Refill    Tens Unit MISC by Does not apply route 4-lead 1 each 0    pregabalin (LYRICA) 75 MG capsule TAKE 1 CAPSULE THREE TIMES DAILY 270 capsule 0    insulin glargine (LANTUS SOLOSTAR) 100 UNIT/ML injection pen Inject 55 Units into the skin 2 times daily (Patient taking differently: Inject 55 Units into the skin 2 times daily Take only 1/2 dose evening before) 36 pen 3    valsartan-hydroCHLOROthiazide (DIOVAN-HCT) 160-25 MG per tablet TAKE 1 TABLET EVERY DAY PER DR JARAMILLO 90 tablet 3    furosemide (LASIX) 20 MG tablet TAKE 1 TABLET (20MG) EVERY OTHER DAY ALTERNATING  WITH 2 TABLETS (40MG) EVERY OTHER  tablet 1    busPIRone (BUSPAR) 5 MG tablet TAKE 1 TABLET THREE TIMES DAILY 270 tablet 3    insulin aspart (NOVOLOG FLEXPEN) 100 UNIT/ML injection pen 20 units tid w/ SSI TID AC: 150-200 2 units; 201-250 4; 251-300 6; 301-350 8; 351-400 10; 401-450 12; 451-500 14; 501+ 16 units 35 mL 3    metFORMIN (GLUCOPHAGE XR) 500 MG extended release tablet Take 1 tablet by mouth daily (with breakfast) 90 tablet 3    cloNIDine (CATAPRES) 0.1 MG tablet Take 1 tablet by mouth 3 times daily 270 tablet 3    atorvastatin (LIPITOR) 80 MG tablet TAKE 1 TABLET EVERY NIGHT 90 tablet 3    vitamin D3 (CHOLECALCIFEROL) 25 MCG (1000 UT) TABS tablet TAKE 2 TABLETS EVERY DAY (PRESCRIBED BY RENAL) 180 tablet 3    DULoxetine (CYMBALTA) 60 MG extended release capsule TAKE 1 CAPSULE EVERY DAY 90 capsule 3    allopurinol (ZYLOPRIM) 100 MG tablet TAKE 1 TABLET TWICE DAILY 180 tablet 3    metoprolol (LOPRESSOR) 100 MG tablet TAKE 1 TABLET TWICE DAILY 180 tablet 3    potassium chloride (KLOR-CON M) 10 MEQ extended release tablet Take 1 tablet by mouth 2 times daily TAKE 1 TABLET TWICE DAILY 180 tablet 3    omeprazole (PRILOSEC) 40 MG delayed release capsule TAKE 1 CAPSULE EVERY DAY WITH BREAKFAST 90 capsule 3    TRUEplus Lancets 33G MISC USE  TO  TEST  UP  TO FIVE TIMES DAILY 500 each 3    tiZANidine (ZANAFLEX) 4 MG tablet TAKE 1 TABLET BY MOUTH EVERY 6 HOURS AS NEEDED FOR MUSCLE SPASM 90 tablet 1    acetaminophen (TYLENOL) 500 MG tablet Take 500 mg by mouth every 6 hours as needed for Pain      blood glucose test strips (TRUE METRIX BLOOD GLUCOSE TEST) strip TEST BLOOD SUGAR UP TO 5 TIMES DAILY 500 strip 3    Insulin Pen Needle (B-D UF III MINI PEN NEEDLES) 31G X 5 MM MISC Use to inject insulin 5 times daily 500 each 3    pregabalin (LYRICA) 75 MG capsule Take 1 capsule by mouth 3 times daily for 10 days.  30 capsule 0     No current facility-administered with salpingoophorectomy    HYSTERECTOMY, TOTAL ABDOMINAL (CERVIX REMOVED) Bilateral     cervix removed per patient    NERVE BLOCK Right 2018    lumbar transforaminal nerve block #1 with sedation    PAIN MANAGEMENT PROCEDURE Bilateral 2022    BILATERAL MEDIAL BRANCH BLOCK L3-4 AND L4-5 (CPT 73328) performed by Deedee Mauricio DO at [address] Bilateral 2022    BILATERAL MEDIAL BRANCH BLOCKS AT L3-4 AND L4-5 (prefers to be last case.  son coming from [facility] [employer] to be with her) performed by Deedee Mauricio DO at [address] NOSE/CLEFT LIP/TIP Right 2018    RIGHT LUMBAR TRANSFORAMINAL NERVE BLOCK #1 L3-4 L4-5 WITH IV SEDATIN performed by Live Pena DO at [facility] 19         Family History   Problem Relation Age of Onset    Lung Cancer Mother 71        mesothelioma metastatized to the brain    Diabetes Mother     High Cholesterol Mother     High Blood Pressure Mother     Arthritis Mother     Cancer Mother         lung to brain    Diabetes Father     Arthritis Father     High Blood Pressure Sister     Arthritis Sister     High Cholesterol Sister     High Blood Pressure Brother     Substance Abuse Brother     Breast Cancer Paternal Aunt          at 80y    High Blood Pressure Paternal Aunt     Cancer Maternal Grandmother [de-identified]        colorectal cancer    High Blood Pressure Maternal Grandmother     Heart Failure Maternal Grandmother     Arthritis Maternal Grandmother     High Cholesterol Maternal Grandmother     High Blood Pressure Paternal Grandmother     Stroke Paternal Grandmother     High Blood Pressure Maternal Aunt     High Cholesterol Maternal Aunt     Cancer Maternal Aunt         mesothelioma    Diabetes Maternal Uncle     High Blood Pressure Maternal Uncle     High Cholesterol Maternal Uncle     Substance Abuse Maternal Uncle     High Blood Pressure Paternal Uncle        Social History     Tobacco Use    Smoking status: Never    Smokeless tobacco: Never   Vaping Use    Vaping Use: Never used   Substance Use Topics    Alcohol use: Not Currently     Alcohol/week: 2.0 standard drinks     Types: 1 Glasses of wine, 1 Cans of beer per week     Comment: 1 time per month    Drug use: No          Functional Status: The patient is able to ambulate and perform activities of daily living with the use of an assistive device. ROS:    Constitutional: Denies fevers, chills, night sweats, unintentional weight loss     Skin: Denies rash or skin changes     Eyes: Denies vision changes    Ears/Nose/Throat: Denies nasal congestion or sore throat     Respiratory: Denies SOB or cough     Cardiovascular: Denies CP, palpitations, edema      Gastrointestinal: Denies abdominal pain,  N/V, constipation, or diarrhea    Genitourinary: Denies urinary symptoms    Neurologic: See HPI.     MSK: See HPI. Psychiatric: Denies sleep disturbance, anxiety, depression    Hematologic/Lymphatic/Immunologic: Denies bruising       Physical Exam:   Blood pressure (!) 156/83, pulse 76, height 5' 9\" (1.753 m), weight (!) 328 lb 12.8 oz (149.1 kg), not currently breastfeeding. General: well developed and well nourished in no acute distress. Body mass index is 48.56 kg/m². HEENT: No rhinorrhea, sneezing, yawning, or lacrimation. No scleral icterus or conjunctival injection. Resp: symmetrical chest expansion, unlabored breathing, respirations unlabored. CV: Heart rate is regular. Peripheral pulses are palpable  Lymph: No visible regional lymphadenopathy. Skin: No rashes or ecchymosis. Normal turgor. Psych: Mood is calm. Affect is normal.   Ext: bilateral lower extremity edema     MSK:   Back/Hip Exam:   Inspection: Pelvis was asymmetric. Lumbar lordotic curvature was increased. There was scoliosis present. No ecchymoses or erythema.    Palpation: Palpatory exam revealed tenderness along lumbosacral paraspinals, midline spine, SI joint

## 2022-07-26 NOTE — PROGRESS NOTES
Giovanna Tipton, [address] [facility] Physical Medicine and Rehabilitation  [address] [place]  Phone: [phone]  Fax: [phone]    PCP: Darius Alcazar MD  Date of visit: 7/26/22    Chief Complaint   Patient presents with    Back Pain     Follow up facet joint injection       Interval:   Patient presents today for follow up visit after MBB. She underwent bilateral MBB bilateral L3-4, L4-5 #2 and reports 80% relief. She complains of low back pain that does not radiate. The pain is rated Pain Score:   8, is described as achy, and is located in the low back without radiation to the legs. The pain is better with rest.  The pain is worse with standing. There is numbness/tingling in her feet- states she has neuropathy. There is occasional weakness. There is no bowel/bladder changes. She is using a rollator for ambulation. The prior workup has included: Xray, MRI L spine, CT guided biopsy     The prior treatment has included:  PT: yes with no relief   Chiropractic: none .    Modalities: yes   OTC Tylenol: yes with no relief   NSAIDS: yes with no relief   Opioids: none   Membrane stabilizers: neurontin, lyrica, cymbalta   Muscle relaxers: zanaflex   Previous injections: yes- Dr. Amber Grande, epidurals with relief, bilateral MBB bilateral L3-4, L4-5 MBB   Previous surgery at this site: none    No Known Allergies    Current Outpatient Medications   Medication Sig Dispense Refill    Tens Unit MISC by Does not apply route 4-lead 1 each 0    pregabalin (LYRICA) 75 MG capsule TAKE 1 CAPSULE THREE TIMES DAILY 270 capsule 0    insulin glargine (LANTUS SOLOSTAR) 100 UNIT/ML injection pen Inject 55 Units into the skin 2 times daily (Patient taking differently: Inject 55 Units into the skin 2 times daily Take only 1/2 dose evening before) 36 pen 3    valsartan-hydroCHLOROthiazide (DIOVAN-HCT) 160-25 MG per tablet TAKE 1 TABLET EVERY DAY PER DR JARAMILLO 90 tablet 3    furosemide (LASIX) 20 MG tablet TAKE 1 TABLET (20MG) EVERY OTHER DAY ALTERNATING  WITH 2 TABLETS (40MG) EVERY OTHER  tablet 1    busPIRone (BUSPAR) 5 MG tablet TAKE 1 TABLET THREE TIMES DAILY 270 tablet 3    insulin aspart (NOVOLOG FLEXPEN) 100 UNIT/ML injection pen 20 units tid w/ SSI TID AC: 150-200 2 units; 201-250 4; 251-300 6; 301-350 8; 351-400 10; 401-450 12; 451-500 14; 501+ 16 units 35 mL 3    metFORMIN (GLUCOPHAGE XR) 500 MG extended release tablet Take 1 tablet by mouth daily (with breakfast) 90 tablet 3    cloNIDine (CATAPRES) 0.1 MG tablet Take 1 tablet by mouth 3 times daily 270 tablet 3    atorvastatin (LIPITOR) 80 MG tablet TAKE 1 TABLET EVERY NIGHT 90 tablet 3    vitamin D3 (CHOLECALCIFEROL) 25 MCG (1000 UT) TABS tablet TAKE 2 TABLETS EVERY DAY (PRESCRIBED BY RENAL) 180 tablet 3    DULoxetine (CYMBALTA) 60 MG extended release capsule TAKE 1 CAPSULE EVERY DAY 90 capsule 3    allopurinol (ZYLOPRIM) 100 MG tablet TAKE 1 TABLET TWICE DAILY 180 tablet 3    metoprolol (LOPRESSOR) 100 MG tablet TAKE 1 TABLET TWICE DAILY 180 tablet 3    potassium chloride (KLOR-CON M) 10 MEQ extended release tablet Take 1 tablet by mouth 2 times daily TAKE 1 TABLET TWICE DAILY 180 tablet 3    omeprazole (PRILOSEC) 40 MG delayed release capsule TAKE 1 CAPSULE EVERY DAY WITH BREAKFAST 90 capsule 3    TRUEplus Lancets 33G MISC USE  TO  TEST  UP  TO FIVE TIMES DAILY 500 each 3    tiZANidine (ZANAFLEX) 4 MG tablet TAKE 1 TABLET BY MOUTH EVERY 6 HOURS AS NEEDED FOR MUSCLE SPASM 90 tablet 1    acetaminophen (TYLENOL) 500 MG tablet Take 500 mg by mouth every 6 hours as needed for Pain      blood glucose test strips (TRUE METRIX BLOOD GLUCOSE TEST) strip TEST BLOOD SUGAR UP TO 5 TIMES DAILY 500 strip 3    Insulin Pen Needle (B-D UF III MINI PEN NEEDLES) 31G X 5 MM MISC Use to inject insulin 5 times daily 500 each 3    pregabalin (LYRICA) 75 MG capsule Take 1 capsule by mouth 3 times daily for 10 days.  30 capsule 0     No current facility-administered with salpingoophorectomy    HYSTERECTOMY, TOTAL ABDOMINAL (CERVIX REMOVED) Bilateral     cervix removed per patient    NERVE BLOCK Right 2018    lumbar transforaminal nerve block #1 with sedation    PAIN MANAGEMENT PROCEDURE Bilateral 2022    BILATERAL MEDIAL BRANCH BLOCK L3-4 AND L4-5 (CPT 52903) performed by Donis Sultana DO at [address] Bilateral 2022    BILATERAL MEDIAL BRANCH BLOCKS AT L3-4 AND L4-5 (prefers to be last case.  son coming from [facility] [employer] to be with her) performed by Donis Sultana DO at [address] NOSE/CLEFT LIP/TIP Right 2018    RIGHT LUMBAR TRANSFORAMINAL NERVE BLOCK #1 L3-4 L4-5 WITH IV SEDATIN performed by Mitul Shine DO at [facility] 19         Family History   Problem Relation Age of Onset    Lung Cancer Mother 71        mesothelioma metastatized to the brain    Diabetes Mother     High Cholesterol Mother     High Blood Pressure Mother     Arthritis Mother     Cancer Mother         lung to brain    Diabetes Father     Arthritis Father     High Blood Pressure Sister     Arthritis Sister     High Cholesterol Sister     High Blood Pressure Brother     Substance Abuse Brother     Breast Cancer Paternal Aunt          at 80y    High Blood Pressure Paternal Aunt     Cancer Maternal Grandmother [de-identified]        colorectal cancer    High Blood Pressure Maternal Grandmother     Heart Failure Maternal Grandmother     Arthritis Maternal Grandmother     High Cholesterol Maternal Grandmother     High Blood Pressure Paternal Grandmother     Stroke Paternal Grandmother     High Blood Pressure Maternal Aunt     High Cholesterol Maternal Aunt     Cancer Maternal Aunt         mesothelioma    Diabetes Maternal Uncle     High Blood Pressure Maternal Uncle     High Cholesterol Maternal Uncle     Substance Abuse Maternal Uncle     High Blood Pressure Paternal Uncle        Social History     Tobacco Use    Smoking status: Never    Smokeless tobacco: Never   Vaping Use    Vaping Use: Never used   Substance Use Topics    Alcohol use: Not Currently     Alcohol/week: 2.0 standard drinks     Types: 1 Glasses of wine, 1 Cans of beer per week     Comment: 1 time per month    Drug use: No          Functional Status: The patient is able to ambulate and perform activities of daily living with the use of an assistive device. ROS:    Constitutional: Denies fevers, chills, night sweats, unintentional weight loss     Skin: Denies rash or skin changes     Eyes: Denies vision changes    Ears/Nose/Throat: Denies nasal congestion or sore throat     Respiratory: Denies SOB or cough     Cardiovascular: Denies CP, palpitations, edema      Gastrointestinal: Denies abdominal pain,  N/V, constipation, or diarrhea    Genitourinary: Denies urinary symptoms    Neurologic: See HPI.     MSK: See HPI. Psychiatric: Denies sleep disturbance, anxiety, depression    Hematologic/Lymphatic/Immunologic: Denies bruising       Physical Exam:   Blood pressure (!) 156/83, pulse 76, height 5' 9\" (1.753 m), weight (!) 328 lb 12.8 oz (149.1 kg), not currently breastfeeding. General: well developed and well nourished in no acute distress. Body mass index is 48.56 kg/m². HEENT: No rhinorrhea, sneezing, yawning, or lacrimation. No scleral icterus or conjunctival injection. Resp: symmetrical chest expansion, unlabored breathing, respirations unlabored. CV: Heart rate is regular. Peripheral pulses are palpable  Lymph: No visible regional lymphadenopathy. Skin: No rashes or ecchymosis. Normal turgor. Psych: Mood is calm. Affect is normal.   Ext: bilateral lower extremity edema     MSK:   Back/Hip Exam:   Inspection: Pelvis was asymmetric. Lumbar lordotic curvature was increased. There was scoliosis present. No ecchymoses or erythema.    Palpation: Palpatory exam revealed tenderness along lumbosacral paraspinals, midline spine, SI joint sulcus, ttp greater trochanters. There was no paraspinal spasms. There were no trigger points. ROM: ROM decreased. +facet loading. Special/provocative testing:   SLR negative       Neurological Exam:  Strength:   R  L  Hip Flex  5  5  Knee Ext  5  5  Ankle dorsi  5  5  EHL   5 5  Ankle Plantar  5  5    Sensory:  Intact for light touch in all lower extremity dermatomes. Reflexes:   R  L  Patellar  (2+) (2+)  Ankle Jerk  (0) (0)      Gait: ambulated with rollator. Forward lean, slow. Impression:   Latasha Jansen is a 61 y.o. female    1. Lumbar spondylosis    2. Chronic bilateral low back pain without sciatica          Plan:   All imaging reviewed again. Severe facet changes, DDD, stenosis. Positive diagnostic MBB x 2. Will proceed with RFA, right than left, L3-4 and L4-5. Procedure risks, benefits and alternatives were discussed. Patient would like to proceed. TENS trial.   Could consider increasing lyrica to dose of at least 150mg BID as tolerated. The patient was educated about the diagnosis, prognosis, indications, risks and benefits of treatment. An opportunity to ask questions was given to the patient and questions were answered. The patient agreed to proceed with the recommended treatment as described above.      Follow up after RFA     Sara Najera DO, [facility]   Board Certified Physical Medicine and Rehabilitation

## 2022-08-01 DIAGNOSIS — Z76.0 MEDICATION REFILL: ICD-10-CM

## 2022-08-02 RX ORDER — PEN NEEDLE, DIABETIC 31 GX5/16"
NEEDLE, DISPOSABLE MISCELLANEOUS
Qty: 500 EACH | Refills: 3 | Status: SHIPPED | OUTPATIENT
Start: 2022-08-02

## 2022-08-08 SDOH — HEALTH STABILITY: PHYSICAL HEALTH: ON AVERAGE, HOW MANY DAYS PER WEEK DO YOU ENGAGE IN MODERATE TO STRENUOUS EXERCISE (LIKE A BRISK WALK)?: 0 DAYS

## 2022-08-08 SDOH — HEALTH STABILITY: PHYSICAL HEALTH: ON AVERAGE, HOW MANY MINUTES DO YOU ENGAGE IN EXERCISE AT THIS LEVEL?: 0 MIN

## 2022-08-08 ASSESSMENT — LIFESTYLE VARIABLES
HOW OFTEN DO YOU HAVE SIX OR MORE DRINKS ON ONE OCCASION: 1
HOW OFTEN DO YOU HAVE A DRINK CONTAINING ALCOHOL: MONTHLY OR LESS
HOW MANY STANDARD DRINKS CONTAINING ALCOHOL DO YOU HAVE ON A TYPICAL DAY: 1
HOW MANY STANDARD DRINKS CONTAINING ALCOHOL DO YOU HAVE ON A TYPICAL DAY: 1 OR 2
HOW OFTEN DO YOU HAVE A DRINK CONTAINING ALCOHOL: 2

## 2022-08-08 ASSESSMENT — PATIENT HEALTH QUESTIONNAIRE - PHQ9
SUM OF ALL RESPONSES TO PHQ9 QUESTIONS 1 & 2: 1
2. FEELING DOWN, DEPRESSED OR HOPELESS: 1
SUM OF ALL RESPONSES TO PHQ QUESTIONS 1-9: 1
1. LITTLE INTEREST OR PLEASURE IN DOING THINGS: 0
SUM OF ALL RESPONSES TO PHQ QUESTIONS 1-9: 1

## 2022-08-09 ENCOUNTER — PREP FOR PROCEDURE (OUTPATIENT)
Dept: PHYSICAL MEDICINE AND REHAB | Age: 67
End: 2022-08-09

## 2022-08-10 ENCOUNTER — ANESTHESIA EVENT (OUTPATIENT)
Dept: OPERATING ROOM | Age: 67
End: 2022-08-10
Payer: MEDICARE

## 2022-08-10 NOTE — ANESTHESIA PRE PROCEDURE
Department of Anesthesiology  Preprocedure Note       Name:  Paulina Looney   Age:  74 y.o.  :  1955                                          MRN:  [id number]         Date:  8/10/2022      Surgeon: Nick Lagunas):  Librado Banuelos DO    Procedure: Procedure(s): FLUOROSCOPIC-GUIDED RIGHT LUMBAR MEDIAL BRANCH NEUROLYSIS( RADIOFREQUENCY ABLATION)AT LEVELS L3-4,L4-5 WITH IV SEDATION (CPT 38698) Keep last d/t transportation    Medications prior to admission:   Prior to Admission medications    Medication Sig Start Date End Date Taking? Authorizing Provider   Insulin Pen Needle (B-D UF III MINI PEN NEEDLES) 31G X 5 MM MISC Use to inject insulin 5 times daily 22   Luz Maria Mao MD   Tens Unit MISC by Does not apply route 4-lead 22   Maria Del Carmen Howard DO   pregabalin (LYRICA) 75 MG capsule Take 1 capsule by mouth 3 times daily for 10 days.  22  Luz Maria Mao MD   pregabalin (LYRICA) 75 MG capsule TAKE 1 CAPSULE THREE TIMES DAILY 22  Luz Maria Mao MD   insulin glargine (LANTUS SOLOSTAR) 100 UNIT/ML injection pen Inject 55 Units into the skin 2 times daily  Patient taking differently: Inject 55 Units into the skin 2 times daily Take only 1/2 dose evening before pre-op 22   Luz Maria Mao MD   valsartan-hydroCHLOROthiazide (DIOVAN-HCT) 160-25 MG per tablet TAKE 1 TABLET EVERY DAY PER DR JARAMILLO 22   Luz Maria Mao MD   furosemide (LASIX) 20 MG tablet TAKE 1 TABLET (20MG) EVERY OTHER DAY ALTERNATING  WITH 2 TABLETS (40MG) EVERY OTHER DAY 22   Luz Maria Mao MD   busPIRone (BUSPAR) 5 MG tablet TAKE 1 TABLET THREE TIMES DAILY 22   Luz Maria Mao MD   insulin aspart (NOVOLOG FLEXPEN) 100 UNIT/ML injection pen 20 units tid w/ SSI TID AC: 150-200 2 units; 201-250 4; 251-300 6; 301-350 8; 351-400 10; 401-450 12; 451-500 14; 501+ 16 units  Patient taking differently: in the morning and at bedtime 20 units tid w/ SSI TID AC: 150-200 2 units; 201-250 4; 251-300 6; 301-350 8; 351-400 10; 401-450 12; 451-500 14; 501+ 16 units 4/1/22   Alana Hartmann MD   metFORMIN (GLUCOPHAGE XR) 500 MG extended release tablet Take 1 tablet by mouth daily (with breakfast) 4/1/22   Alana Hartmann MD   cloNIDine (CATAPRES) 0.1 MG tablet Take 1 tablet by mouth 3 times daily 4/1/22   Alana Hartmann MD   atorvastatin (LIPITOR) 80 MG tablet TAKE 1 TABLET EVERY NIGHT 4/1/22   Alana Hartmann MD   vitamin D3 (CHOLECALCIFEROL) 25 MCG (1000 UT) TABS tablet TAKE 2 TABLETS EVERY DAY (PRESCRIBED BY RENAL) 4/1/22   Alana Hartmann MD   DULoxetine (CYMBALTA) 60 MG extended release capsule TAKE 1 CAPSULE EVERY DAY 4/1/22   Alana Hartmann MD   allopurinol (ZYLOPRIM) 100 MG tablet TAKE 1 TABLET TWICE DAILY 4/1/22   Alana Hartmann MD   metoprolol (LOPRESSOR) 100 MG tablet TAKE 1 TABLET TWICE DAILY 4/1/22   Alana Hartmann MD   potassium chloride (KLOR-CON M) 10 MEQ extended release tablet Take 1 tablet by mouth 2 times daily TAKE 1 TABLET TWICE DAILY 3/24/22   Alana Hartmann MD   omeprazole (PRILOSEC) 40 MG delayed release capsule TAKE 1 CAPSULE EVERY DAY WITH BREAKFAST 3/23/22   Alana Hartmann MD   TRUEplus Lancets 33G MISC USE  TO  TEST  UP  TO FIVE TIMES DAILY 2/22/22   Alana Hartmann MD   tiZANidine (ZANAFLEX) 4 MG tablet TAKE 1 TABLET BY MOUTH EVERY 6 HOURS AS NEEDED FOR MUSCLE SPASM 12/10/21   Alana Hartmann MD   acetaminophen (TYLENOL) 500 MG tablet Take 500 mg by mouth every 6 hours as needed for Pain    Historical Provider, MD   blood glucose test strips (TRUE METRIX BLOOD GLUCOSE TEST) strip TEST BLOOD SUGAR UP TO 5 TIMES DAILY 2/3/21   Alana Hartmann MD   insulin lispro (HUMALOG KWIKPEN) 100 UNIT/ML injection Inject 10 Units into the skin 3 times daily (before meals).  5/17/12 5/17/12  Ashlee Curiel MD       Current medications:    Current Outpatient Medications   Medication Sig Dispense Refill    Insulin Pen Needle (B-D UF III MINI PEN NEEDLES) 31G X 5 MM MISC Use to inject insulin 5 times daily 500 each 3    Tens Unit MISC by Does not apply route 4-lead 1 each 0    pregabalin (LYRICA) 75 MG capsule Take 1 capsule by mouth 3 times daily for 10 days.  30 capsule 0    pregabalin (LYRICA) 75 MG capsule TAKE 1 CAPSULE THREE TIMES DAILY 270 capsule 0    insulin glargine (LANTUS SOLOSTAR) 100 UNIT/ML injection pen Inject 55 Units into the skin 2 times daily (Patient taking differently: Inject 55 Units into the skin 2 times daily Take only 1/2 dose evening before pre-op) 36 pen 3    valsartan-hydroCHLOROthiazide (DIOVAN-HCT) 160-25 MG per tablet TAKE 1 TABLET EVERY DAY PER DR JARAMILLO 90 tablet 3    furosemide (LASIX) 20 MG tablet TAKE 1 TABLET (20MG) EVERY OTHER DAY ALTERNATING  WITH 2 TABLETS (40MG) EVERY OTHER  tablet 1    busPIRone (BUSPAR) 5 MG tablet TAKE 1 TABLET THREE TIMES DAILY 270 tablet 3    insulin aspart (NOVOLOG FLEXPEN) 100 UNIT/ML injection pen 20 units tid w/ SSI TID AC: 150-200 2 units; 201-250 4; 251-300 6; 301-350 8; 351-400 10; 401-450 12; 451-500 14; 501+ 16 units (Patient taking differently: in the morning and at bedtime 20 units tid w/ SSI TID AC: 150-200 2 units; 201-250 4; 251-300 6; 301-350 8; 351-400 10; 401-450 12; 451-500 14; 501+ 16 units) 35 mL 3    metFORMIN (GLUCOPHAGE XR) 500 MG extended release tablet Take 1 tablet by mouth daily (with breakfast) 90 tablet 3    cloNIDine (CATAPRES) 0.1 MG tablet Take 1 tablet by mouth 3 times daily 270 tablet 3    atorvastatin (LIPITOR) 80 MG tablet TAKE 1 TABLET EVERY NIGHT 90 tablet 3    vitamin D3 (CHOLECALCIFEROL) 25 MCG (1000 UT) TABS tablet TAKE 2 TABLETS EVERY DAY (PRESCRIBED BY RENAL) 180 tablet 3    DULoxetine (CYMBALTA) 60 MG extended release capsule TAKE 1 CAPSULE EVERY DAY 90 capsule 3    allopurinol (ZYLOPRIM) 100 MG tablet TAKE 1 TABLET TWICE DAILY 180 tablet 3    metoprolol (LOPRESSOR) 100 MG tablet TAKE 1 TABLET TWICE DAILY 180 tablet 3    potassium chloride (KLOR-CON M) 10 MEQ extended release tablet Take 1 tablet by mouth 2 times daily TAKE 1 TABLET TWICE DAILY 180 tablet 3    omeprazole (PRILOSEC) 40 MG delayed release capsule TAKE 1 CAPSULE EVERY DAY WITH BREAKFAST 90 capsule 3    TRUEplus Lancets 33G MISC USE  TO  TEST  UP  TO FIVE TIMES DAILY 500 each 3    tiZANidine (ZANAFLEX) 4 MG tablet TAKE 1 TABLET BY MOUTH EVERY 6 HOURS AS NEEDED FOR MUSCLE SPASM 90 tablet 1    acetaminophen (TYLENOL) 500 MG tablet Take 500 mg by mouth every 6 hours as needed for Pain      blood glucose test strips (TRUE METRIX BLOOD GLUCOSE TEST) strip TEST BLOOD SUGAR UP TO 5 TIMES DAILY 500 strip 3     No current facility-administered medications for this visit. Facility-Administered Medications Ordered in Other Visits   Medication Dose Route Frequency Provider Last Rate Last Admin    sodium chloride flush 0.9 % injection 5-40 mL  5-40 mL IntraVENous 2 times per day eKaton Pacheco MD        sodium chloride flush 0.9 % injection 5-40 mL  5-40 mL IntraVENous PRN Koffi Dave MD        0.9 % sodium chloride infusion   IntraVENous PRN Koffi Daev MD        meperidine (DEMEROL) injection 12.5 mg  12.5 mg IntraVENous Q5 Min PRN Koffi Dave MD        morphine (PF) injection 1 mg  1 mg IntraVENous Q5 Min PRN Koffi Dave MD        fentaNYL (SUBLIMAZE) injection 50 mcg  50 mcg IntraVENous Q5 Min PRN Keaton Pacheco MD           Allergies:  No Known Allergies    Problem List:    Patient Active Problem List   Diagnosis Code    Diabetes mellitus type 2, uncontrolled ([address] E11.65    Hypertension I10    Hypercholesterolemia E78.00    GERD (gastroesophageal reflux disease) K21.9    Depression F32. A    Enlarged liver R16.0    Heart murmur R01.1    Chronic lower back pain M54.50, G89.29    Facet joint disease M47.819    Lumbar spondylosis M47.816    Bulging lumbar disc M51.26  Back pain M54.9    Anxiety F41.9    Bilateral leg edema R60.0    Microalbuminuria R80.9    Vitamin D insufficiency E55.9    Chronic kidney disease, stage 3, mod decreased GFR ([facility]) N18.30    Senile nuclear cataract H25.10    Fatty liver K76.0    Diabetic peripheral neuropathy ([facility]) E11.42    Neural foraminal stenosis of lumbar spine M48.061    Type 2 diabetes mellitus with both eyes affected by mild nonproliferative retinopathy without macular edema, with long-term current use of insulin ([facility] W45.4440, Z79.4    Spinal stenosis of lumbar region M48.061    Major depressive disorder, recurrent, in partial remission ([facility]) F33.41    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 Emi Kumar- last seen 1/2022    Depression 10/28/2010    Diabetes mellitus type 2, uncontrolled ([address] 10/28/2010    with microalbuminuria; IIDDM    Diverticulosis     GERD (gastroesophageal reflux disease) 10/28/2010    Herniated intervertebral disk     neck and lower back    Hypercholesterolemia 10/28/2010    Hypertension 10/28/2010    Neurofibroma of trunk     irritating mole on the back.  biopsy done 10/21/09    Obesity     Osteoarthritis     PAD (peripheral artery disease) ([facility])     Type II or unspecified type diabetes mellitus without mention of complication, not stated as uncontrolled     Vitamin D insufficiency        Past Surgical History:        Procedure Laterality Date    COLONOSCOPY N/A 9/8/2021    COLONOSCOPY POLYPECTOMY SNARE/COLD BIOPSY performed by Gael Santiago MD at [address] [facility] [address] BIOPSY PERCUTANEOUS DEEP BONE  3/7/2022    CT BIOPSY PERCUTANEOUS DEEP BONE 3/7/2022 Marta Lane MD [facility]    ECHO COMPLETE  3/19/2012         EYE MUSCLE SURGERY      as a child    HYSTERECTOMY (CERVIX STATUS UNKNOWN)      with salpingoophorectomy    HYSTERECTOMY, TOTAL ABDOMINAL (CERVIX REMOVED) Bilateral 2003    cervix removed per patient    NERVE BLOCK Right 05/01/2018    lumbar transforaminal nerve block #1 with sedation    PAIN MANAGEMENT PROCEDURE Bilateral 5/26/2022    BILATERAL MEDIAL BRANCH BLOCK L3-4 AND L4-5 (CPT [id number]) performed by DO Flip at [address] [facility] Bilateral 6/30/2022    BILATERAL MEDIAL BRANCH BLOCKS AT L3-4 AND L4-5 (prefers to be last case. son coming from [place] to be with her) performed by DO Flip at [address] [facility] NOSE/CLEFT LIP/TIP Right 5/1/2018    RIGHT LUMBAR TRANSFORAMINAL NERVE BLOCK #1 L3-4 L4-5 WITH IV SEDATIN performed by Jalen Ryan DO at [facility]         Social History:    Social History     Tobacco Use    Smoking status: Never    Smokeless tobacco: Never   Substance Use Topics    Alcohol use: Not Currently     Alcohol/week: 2.0 standard drinks     Types: 1 Glasses of wine, 1 Cans of beer per week     Comment: 1 time per month                                Counseling given: Not Answered      Vital Signs (Current): There were no vitals filed for this visit.                                            BP Readings from Last 3 Encounters:   07/26/22 (!) 156/83   06/30/22 (!) 131/57   05/26/22 (!) 141/82       NPO Status:                                                                                 BMI:   Wt Readings from Last 3 Encounters:   07/26/22 (!) 328 lb 12.8 oz (149.1 kg)   06/30/22 (!) 320 lb (145.2 kg)   05/26/22 (!) 326 lb (147.9 kg)     There is no height or weight on file to calculate BMI.    CBC:   Lab Results   Component Value Date/Time    WBC 10.5 01/22/2022 12:59 PM    RBC 4.52 01/22/2022 12:59 PM    HGB 12.3 01/22/2022 12:59 PM    HCT 41.2 01/22/2022 12:59 PM    MCV 91.2 01/22/2022 12:59 PM    RDW 14.2 01/22/2022 12:59 PM     03/07/2022 08:23 AM       CMP:   Lab Results   Component Value Date/Time     05/06/2022 12:00 PM    K 3.1 05/06/2022 12:00 PM     05/06/2022 12:00 PM    CO2 29 05/06/2022 12:00 PM    BUN 28 05/06/2022 12:00 PM    CREATININE 1.2 05/06/2022 12:00 PM    GFRAA 55 05/06/2022 12:00 PM    LABGLOM 55 05/06/2022 12:00 PM    GLUCOSE 133 05/06/2022 12:00 PM    GLUCOSE 138 05/09/2012 08:05 AM    PROT 7.6 01/22/2022 12:59 PM    CALCIUM 9.7 05/06/2022 12:00 PM    BILITOT 0.6 01/22/2022 12:59 PM    ALKPHOS 142 01/22/2022 12:59 PM    AST 23 01/22/2022 12:59 PM    ALT 18 01/22/2022 12:59 PM       POC Tests: No results for input(s): POCGLU, POCNA, POCK, POCCL, POCBUN, POCHEMO, POCHCT in the last 72 hours. Coags:   Lab Results   Component Value Date/Time    PROTIME 11.7 03/07/2022 08:23 AM    INR 1.1 03/07/2022 08:23 AM       HCG (If Applicable): No results found for: PREGTESTUR, PREGSERUM, HCG, HCGQUANT     ABGs: No results found for: PHART, PO2ART, JTC1FHO, PEI0TUT, BEART, K4ZRWQEH     Type & Screen (If Applicable):  No results found for: LABABO, LABRH    Drug/Infectious Status (If Applicable):  No results found for: HIV, HEPCAB    COVID-19 Screening (If Applicable): No results found for: COVID19        Anesthesia Evaluation  Patient summary reviewed and Nursing notes reviewed no history of anesthetic complications:   Airway: Mallampati: III  TM distance: >3 FB   Neck ROM: full  Mouth opening: > = 3 FB   Dental: normal exam         Pulmonary: breath sounds clear to auscultation  (+) asthma:                            Cardiovascular:    (+) hypertension:,         Rhythm: regular  Rate: normal  Echocardiogram reviewed               ROS comment: 2/3/2014  Normal left ventricle size and systolic function   Mild concentric left ventricular hypertrophy   Stage I diastolic dysfunction   The left atrium is moderately dilated. Trace mitral regurgitation   Mild aortic regurgitation   RVSP is 44 mmHg. Neuro/Psych:   (+) neuromuscular disease:, psychiatric history:depression/anxiety             GI/Hepatic/Renal:   (+) GERD:, liver disease:, renal disease: CRI, morbid obesity          Endo/Other:    (+) DiabetesType II DM, poorly controlled, , .                 Abdominal:   (+) obese,           Vascular: Other Findings:             Anesthesia Plan      MAC     ASA 3       Induction: intravenous. continuous noninvasive hemodynamic monitor    Anesthetic plan and risks discussed with patient. Plan discussed with CRNA. Pending day of surgery anesthesiology staff evaluation      Tonny Lopez MD   8/10/2022        Patient seen and examined, chart reviewed, agree with above findings. Anesthetic plan, risks, benefits, alternatives, and personnel involved discussed with patient. Patient verbalized an understanding and agreed to proceed. NPO status confirmed. Anesthetic plan discussed with care team members and agreed upon.     Mike Acosta DO   8/11/2022  10:33 AM

## 2022-08-11 ENCOUNTER — HOSPITAL ENCOUNTER (OUTPATIENT)
Age: 67
Setting detail: OUTPATIENT SURGERY
Discharge: HOME | End: 2022-08-11
Attending: PHYSICAL MEDICINE & REHABILITATION | Admitting: PHYSICAL MEDICINE & REHABILITATION
Payer: MEDICARE

## 2022-08-11 ENCOUNTER — HOSPITAL ENCOUNTER (OUTPATIENT)
Dept: OPERATING ROOM | Age: 67
Setting detail: OUTPATIENT SURGERY
Discharge: HOME | End: 2022-08-11
Attending: PHYSICAL MEDICINE & REHABILITATION
Payer: MEDICARE

## 2022-08-11 ENCOUNTER — ANESTHESIA (OUTPATIENT)
Dept: OPERATING ROOM | Age: 67
End: 2022-08-11
Payer: MEDICARE

## 2022-08-11 VITALS
OXYGEN SATURATION: 96 % | DIASTOLIC BLOOD PRESSURE: 81 MMHG | TEMPERATURE: 97.9 F | BODY MASS INDEX: 43.4 KG/M2 | HEART RATE: 73 BPM | RESPIRATION RATE: 12 BRPM | WEIGHT: 293 LBS | SYSTOLIC BLOOD PRESSURE: 150 MMHG | HEIGHT: 69 IN

## 2022-08-11 DIAGNOSIS — M47.896 OTHER OSTEOARTHRITIS OF SPINE, LUMBAR REGION: ICD-10-CM

## 2022-08-11 LAB — METER GLUCOSE: 127 MG/DL (ref 74–99)

## 2022-08-11 PROCEDURE — 64636 DESTROY L/S FACET JNT ADDL: CPT | Performed by: PHYSICAL MEDICINE & REHABILITATION

## 2022-08-11 PROCEDURE — 3600000015 HC SURGERY LEVEL 5 ADDTL 15MIN: Performed by: PHYSICAL MEDICINE & REHABILITATION

## 2022-08-11 PROCEDURE — 6360000002 HC RX W HCPCS: Performed by: NURSE ANESTHETIST, CERTIFIED REGISTERED

## 2022-08-11 PROCEDURE — 82962 GLUCOSE BLOOD TEST: CPT

## 2022-08-11 PROCEDURE — 3700000000 HC ANESTHESIA ATTENDED CARE: Performed by: PHYSICAL MEDICINE & REHABILITATION

## 2022-08-11 PROCEDURE — 2709999900 HC NON-CHARGEABLE SUPPLY: Performed by: PHYSICAL MEDICINE & REHABILITATION

## 2022-08-11 PROCEDURE — C1713 ANCHOR/SCREW BN/BN,TIS/BN: HCPCS | Performed by: PHYSICAL MEDICINE & REHABILITATION

## 2022-08-11 PROCEDURE — 7100000010 HC PHASE II RECOVERY - FIRST 15 MIN: Performed by: PHYSICAL MEDICINE & REHABILITATION

## 2022-08-11 PROCEDURE — 3700000001 HC ADD 15 MINUTES (ANESTHESIA): Performed by: PHYSICAL MEDICINE & REHABILITATION

## 2022-08-11 PROCEDURE — 3600000005 HC SURGERY LEVEL 5 BASE: Performed by: PHYSICAL MEDICINE & REHABILITATION

## 2022-08-11 PROCEDURE — 64635 DESTROY LUMB/SAC FACET JNT: CPT | Performed by: PHYSICAL MEDICINE & REHABILITATION

## 2022-08-11 PROCEDURE — 2580000003 HC RX 258: Performed by: ANESTHESIOLOGY

## 2022-08-11 PROCEDURE — 7100000011 HC PHASE II RECOVERY - ADDTL 15 MIN: Performed by: PHYSICAL MEDICINE & REHABILITATION

## 2022-08-11 PROCEDURE — 82962 GLUCOSE BLOOD TEST: CPT | Performed by: PHYSICAL MEDICINE & REHABILITATION

## 2022-08-11 PROCEDURE — 2580000003 HC RX 258: Performed by: NURSE ANESTHETIST, CERTIFIED REGISTERED

## 2022-08-11 PROCEDURE — 2500000003 HC RX 250 WO HCPCS: Performed by: PHYSICAL MEDICINE & REHABILITATION

## 2022-08-11 PROCEDURE — 3209999900 FLUORO FOR SURGICAL PROCEDURES

## 2022-08-11 RX ORDER — SODIUM CHLORIDE 0.9 % (FLUSH) 0.9 %
5-40 SYRINGE (ML) INJECTION PRN
Status: CANCELLED | OUTPATIENT
Start: 2022-08-11

## 2022-08-11 RX ORDER — SODIUM CHLORIDE, SODIUM LACTATE, POTASSIUM CHLORIDE, CALCIUM CHLORIDE 600; 310; 30; 20 MG/100ML; MG/100ML; MG/100ML; MG/100ML
INJECTION, SOLUTION INTRAVENOUS CONTINUOUS PRN
Status: DISCONTINUED | OUTPATIENT
Start: 2022-08-11 | End: 2022-08-11 | Stop reason: SDUPTHER

## 2022-08-11 RX ORDER — SODIUM CHLORIDE 9 MG/ML
INJECTION, SOLUTION INTRAVENOUS PRN
Status: CANCELLED | OUTPATIENT
Start: 2022-08-11

## 2022-08-11 RX ORDER — SODIUM CHLORIDE 0.9 % (FLUSH) 0.9 %
5-40 SYRINGE (ML) INJECTION PRN
Status: DISCONTINUED | OUTPATIENT
Start: 2022-08-11 | End: 2022-08-11 | Stop reason: HOSPADM

## 2022-08-11 RX ORDER — SODIUM CHLORIDE 9 MG/ML
INJECTION, SOLUTION INTRAVENOUS PRN
Status: DISCONTINUED | OUTPATIENT
Start: 2022-08-11 | End: 2022-08-11 | Stop reason: HOSPADM

## 2022-08-11 RX ORDER — LIDOCAINE HYDROCHLORIDE 10 MG/ML
INJECTION, SOLUTION EPIDURAL; INFILTRATION; INTRACAUDAL; PERINEURAL PRN
Status: DISCONTINUED | OUTPATIENT
Start: 2022-08-11 | End: 2022-08-11 | Stop reason: ALTCHOICE

## 2022-08-11 RX ORDER — SODIUM CHLORIDE 0.9 % (FLUSH) 0.9 %
5-40 SYRINGE (ML) INJECTION EVERY 12 HOURS SCHEDULED
Status: DISCONTINUED | OUTPATIENT
Start: 2022-08-11 | End: 2022-08-11 | Stop reason: HOSPADM

## 2022-08-11 RX ORDER — SODIUM CHLORIDE, SODIUM LACTATE, POTASSIUM CHLORIDE, CALCIUM CHLORIDE 600; 310; 30; 20 MG/100ML; MG/100ML; MG/100ML; MG/100ML
INJECTION, SOLUTION INTRAVENOUS CONTINUOUS
Status: DISCONTINUED | OUTPATIENT
Start: 2022-08-11 | End: 2022-08-11 | Stop reason: HOSPADM

## 2022-08-11 RX ORDER — MIDAZOLAM HYDROCHLORIDE 1 MG/ML
INJECTION INTRAMUSCULAR; INTRAVENOUS PRN
Status: DISCONTINUED | OUTPATIENT
Start: 2022-08-11 | End: 2022-08-11 | Stop reason: SDUPTHER

## 2022-08-11 RX ORDER — FENTANYL CITRATE 50 UG/ML
INJECTION, SOLUTION INTRAMUSCULAR; INTRAVENOUS PRN
Status: DISCONTINUED | OUTPATIENT
Start: 2022-08-11 | End: 2022-08-11 | Stop reason: SDUPTHER

## 2022-08-11 RX ORDER — PROPOFOL 10 MG/ML
INJECTION, EMULSION INTRAVENOUS PRN
Status: DISCONTINUED | OUTPATIENT
Start: 2022-08-11 | End: 2022-08-11 | Stop reason: SDUPTHER

## 2022-08-11 RX ORDER — LIDOCAINE HYDROCHLORIDE 20 MG/ML
INJECTION, SOLUTION INFILTRATION; PERINEURAL PRN
Status: DISCONTINUED | OUTPATIENT
Start: 2022-08-11 | End: 2022-08-11 | Stop reason: ALTCHOICE

## 2022-08-11 RX ORDER — SODIUM CHLORIDE 0.9 % (FLUSH) 0.9 %
5-40 SYRINGE (ML) INJECTION EVERY 12 HOURS SCHEDULED
Status: CANCELLED | OUTPATIENT
Start: 2022-08-11

## 2022-08-11 RX ADMIN — SODIUM CHLORIDE, POTASSIUM CHLORIDE, SODIUM LACTATE AND CALCIUM CHLORIDE: 600; 310; 30; 20 INJECTION, SOLUTION INTRAVENOUS at 10:20

## 2022-08-11 RX ADMIN — SODIUM CHLORIDE, POTASSIUM CHLORIDE, SODIUM LACTATE AND CALCIUM CHLORIDE: 600; 310; 30; 20 INJECTION, SOLUTION INTRAVENOUS at 10:37

## 2022-08-11 RX ADMIN — FENTANYL CITRATE 50 MCG: 50 INJECTION INTRAMUSCULAR; INTRAVENOUS at 10:40

## 2022-08-11 RX ADMIN — FENTANYL CITRATE 25 MCG: 50 INJECTION INTRAMUSCULAR; INTRAVENOUS at 10:50

## 2022-08-11 RX ADMIN — PROPOFOL 30 MG: 10 INJECTION, EMULSION INTRAVENOUS at 10:43

## 2022-08-11 RX ADMIN — FENTANYL CITRATE 25 MCG: 50 INJECTION INTRAMUSCULAR; INTRAVENOUS at 10:45

## 2022-08-11 RX ADMIN — PROPOFOL 40 MG: 10 INJECTION, EMULSION INTRAVENOUS at 10:46

## 2022-08-11 RX ADMIN — MIDAZOLAM 2 MG: 1 INJECTION INTRAMUSCULAR; INTRAVENOUS at 10:38

## 2022-08-11 RX ADMIN — PROPOFOL 30 MG: 10 INJECTION, EMULSION INTRAVENOUS at 10:41

## 2022-08-11 ASSESSMENT — PAIN DESCRIPTION - LOCATION
LOCATION: BACK
LOCATION: BACK

## 2022-08-11 ASSESSMENT — PAIN DESCRIPTION - DESCRIPTORS
DESCRIPTORS: ACHING

## 2022-08-11 ASSESSMENT — PAIN - FUNCTIONAL ASSESSMENT: PAIN_FUNCTIONAL_ASSESSMENT: 0-10

## 2022-08-11 ASSESSMENT — PAIN SCALES - GENERAL
PAINLEVEL_OUTOF10: 6
PAINLEVEL_OUTOF10: 6

## 2022-08-11 NOTE — ANESTHESIA POSTPROCEDURE EVALUATION
Department of Anesthesiology  Postprocedure Note    Patient: Josep Jones  MRN: [id number]  YOB: 1955  Date of evaluation: 8/11/2022      Procedure Summary     Date: 08/11/22 Room / Location: [address] [place] [address] 04 / [address]    Anesthesia Start: 6342 Anesthesia Stop: 1100    Procedure: FLUOROSCOPIC-GUIDED RIGHT LUMBAR MEDIAL BRANCH NEUROLYSIS( RADIOFREQUENCY ABLATION)AT LEVELS L3-4,L4-5 WITH IV SEDATION (CPT 82405) Keep last d/t transportation (Right) Diagnosis:       Lumbar spondylosis      (Lumbar spondylosis [N22.305])    Surgeons: Suzi Elizabeth DO Responsible Provider: Marisabel Higuera DO    Anesthesia Type: MAC ASA Status: 3          Anesthesia Type: No value filed.     Dany Phase I: Dany Score: 10    Dany Phase II: Dany Score: 10      Anesthesia Post Evaluation    Patient location during evaluation: PACU  Patient participation: complete - patient participated  Level of consciousness: awake and alert  Pain score: 1  Airway patency: patent  Nausea & Vomiting: no nausea and no vomiting  Complications: no  Cardiovascular status: hemodynamically stable  Respiratory status: acceptable  Hydration status: euvolemic

## 2022-08-11 NOTE — DISCHARGE INSTRUCTIONS
Post-op instruction Radiofrequency  Dr. Banks Husbands may apply an ice pack wrapped in a towel to the sore area. However, do not use ice longer than 10 minutes at time. You will probably have soreness at the site where the endoscope was inserted. Keep the surgical site clean and dry at all times. Remove the dressing in 24 hrs and apply a sterile Band-Aid. Go home and rest. When resting, changing positions frequently may help reduce stiffness and soreness. The following day you may resume normal activities providing you listen to you body. Your body will tell you how active or inactive to be. Remember the rule of thumb If it hurts, don't do it.  Increase your activity level slowly. Do not drive a motor vehicle, operate machinery or make important decisions for 24 hours. Resume normal diet and medications. Call your physician if you experience any of the following symptoms: Fever, redness and or swelling at the site, nausea and vomiting, headache, persistent pain, numbness or tingling of legs and/or feet, and bowel or bladder problems. You may experience increased pain during this time for up to 72 hours after the procedure. It can take up to 6-8 weeks for the procedure to be effective. Follow up with Dr. Alen Freeman or Dr. Irais Price within 3 weeks of the procedure for a post-procedure check-up. Nausea and Vomiting After Surgery: Care Instructions  Your Care Instructions     After you've had surgery, you may feel sick to your stomach (nauseated) or you may vomit. Sometimes anesthesia can make you feel sick. It's a common side effect and often doesn't last long. Pain also can make you feel sick or vomit. After the anesthesia wears off, you may feel pain from the incision (cut). That pain could then upset your stomach. Taking pain medicine can also make you feelsick to your stomach. Whatever the cause, you may get medicine that can help.  There are also somethings fluids. You cannot keep down fluids. You have symptoms of dehydration, such as:  Dry eyes and a dry mouth. Passing only a little urine. Feeling thirstier than usual.     Your pain medicine is not helping. You are dizzy or lightheaded, or you feel like you may faint. Watch closely for changes in your health, and be sure to contact your doctor if:    You do not get better as expected. Where can you learn more? Go to [email or web address]://[email or web address]-[email or web address]. org and sign in to your [employer] account. Enter M536 in the [employer] box to learn more about \"Nausea and Vomiting After Surgery: Care Instructions. \"     If you do not have an account, please click on the \"Sign Up Now\" link. Current as of: March 9, 2022               Content Version: 13.3  © 2006-2022 Healthwise, [employer]. Care instructions adapted under license by [facility] ([facility]). If you have questions about a medical condition or this instruction, always ask your healthcare professional. [place] [address] any warranty or liability for your use of this information. Infection After Surgery: Care Instructions  Overview  After surgery, an infection is always possible. It doesn't mean that thesurgery didn't go well. Because an infection can be serious, your doctor has taken steps to manage it. Your doctor checked the infection and cleaned it if necessary. Your doctor may have made an opening in the area so that the pus can drain out. You may have gauze in the cut so that the area will stay open and keep draining. You mayneed antibiotics. You will need to follow up with your doctor to make sure the infection has goneaway. Follow-up care is a key part of your treatment and safety. Be sure to make and go to all appointments, and call your doctor if you are having problems. It's also a good idea to know your test results and keep alist of the medicines you take. How can you care for yourself at home?   Make sure your surgeon knows about the infection, especially if you saw another doctor about your symptoms. If your doctor prescribed antibiotics, take them as directed. Do not stop taking them just because you feel better. You need to take the full course of antibiotics. Ask your doctor if you can take an over-the-counter pain medicine, such as acetaminophen (Tylenol), ibuprofen (Advil, Motrin), or naproxen (Aleve). Be safe with medicines. Read and follow all instructions on the label. Do not take two or more pain medicines at the same time unless the doctor told you to. Many pain medicines have acetaminophen, which is Tylenol. Too much acetaminophen (Tylenol) can be harmful. Prop up the area on a pillow anytime you sit or lie down during the next 3 days. Try to keep it above the level of your heart. This will help reduce swelling. Keep the skin clean and dry. You may have a bandage over the cut (incision). A bandage helps the incision heal and protects it. Your doctor will tell you how to take care of this. Keep it clean and dry. You may have drainage from the wound. If your doctor told you how to care for your incision, follow your doctor's instructions. If you did not get instructions, follow this general advice:  Wash around the incision with clean water 2 times a day. Don't use hydrogen peroxide or alcohol, which can slow healing. When should you call for help? Call your doctor now or seek immediate medical care if:    You have signs that your infection is getting worse, such as: Increased pain, swelling, warmth, or redness in the area. Red streaks leading from the area. Pus draining from the wound. A new or higher fever. Watch closely for changes in your health, and be sure to contact your doctor ifyou have any problems. Where can you learn more? Go to https://EmpowrNetmercedes.[email or web address] org and sign in to your [employer] account.  Enter C340 in the [employer] box to learn more about

## 2022-08-11 NOTE — OP NOTE
Bharath See    8/11/2022      PRE-OPERATIVE DIAGNOSIS:  Lumbar spondylosis,  lumbar facet arthropathy, lumbosacral OA. POST-OPERATIVE DIAGNOSIS:  Lumbar spondylosis, lumbar facet arthropathy, lumbosacral OA. PROCEDURE:  Fluoroscopic-guided Right  lumbar medial branch neurolysis at  levels  L3-4, L4-5,    SURGEON:    Maria Del Carmen Howard DO    ANESTHESIA:   Local and MAC    ESTIMATED BLOOD LOSS:  None.  ______________________________________________________________________    HISTORY & PHYSICAL:   See separate H&P    PROCEDURE:  After confirming written and informed consent, Annie Magana was brought to the procedure room and placed in the prone position. Blood pressure, heart rate, O2 saturation, and visual and verbal monitoring were established. A time-out was performed and her name and date of birth, the procedure to be performed as well as the plan for the location of the needle insertion were confirmed. Fluoroscopy was utilized to delineate the anatomy of the lumbar spine. Surface landmarks were identified as well. After prep and drape, an oblique fluoroscopic view was created to optimize visualization of the junction of the transverse process and the pedicle. After infiltration of local, # 20-gauge 100-mm 10- mm active tip radiofrequency ablation needle was passed to position the tip at the junction of the superior articular process and the transverse process, along the course of the medial branch. Satisfactory needle placement was confirmed by AP, lateral and oblique projections. Sensory and motor testing was then performed. She  then received 0.75 cc of 1% PF xylocaine at each level. Radiofrequency thermal ablation was then performed at 80 degree Celsius for 90 seconds. Coco was comfortable throughout the ablation. No complications were noted. In summary, medial branch neurolysis were performed at the following levels; right L3-4, L4-5.   Negative aspiration was noted prior to each injection. The needle was removed intact and dressings were applied. Coco was transferred to the recovery area. She was monitored, reassessed and discharged after an appropriate observatory period. No complications were noted. Freeman Adams will follow up in the office as scheduled. She was encouraged to call with questions, concerns or if worsening of symptoms occurs.       Giovanna Tipton DO, [facility]   Board Certified Physical Medicine and Rehabilitation

## 2022-08-11 NOTE — H&P
Jayla Acosta, [address] [facility]  [address] [place] [address] [address] [facility]  Phone: [phone]  Fax: [phone]     PCP: Aditya Vizcaino MD  Date of visit: 7/26/22          Chief Complaint   Patient presents with    Back Pain       Follow up facet joint injection         Interval:  Patient presents today for follow up visit after MBB. She underwent bilateral MBB bilateral L3-4, L4-5 #2 and reports 80% relief. She complains of low back pain that does not radiate. The pain is rated Pain Score:   8, is described as achy, and is located in the low back without radiation to the legs. The pain is better with rest.  The pain is worse with standing. There is numbness/tingling in her feet- states she has neuropathy. There is occasional weakness. There is no bowel/bladder changes. She is using a rollator for ambulation. The prior workup has included: Xray, MRI L spine, CT guided biopsy     The prior treatment has included:  PT: yes with no relief  Chiropractic: none .   Modalities: yes  OTC Tylenol: yes with no relief  NSAIDS: yes with no relief  Opioids: none  Membrane stabilizers: neurontin, lyrica, cymbalta  Muscle relaxers: zanaflex  Previous injections: yes- Dr. Joslyn Hatfield, epidurals with relief, bilateral MBB bilateral L3-4, L4-5 MBB  Previous surgery at this site: none     No Known Allergies            Current Outpatient Medications   Medication Sig Dispense Refill    Tens Unit MISC by Does not apply route 4-lead 1 each 0    pregabalin (LYRICA) 75 MG capsule TAKE 1 CAPSULE THREE TIMES DAILY 270 capsule 0    insulin glargine (LANTUS SOLOSTAR) 100 UNIT/ML injection pen Inject 55 Units into the skin 2 times daily (Patient taking differently: Inject 55 Units into the skin 2 times daily Take only 1/2 dose evening before) 36 pen 3    valsartan-hydroCHLOROthiazide (DIOVAN-HCT) 160-25 MG per tablet TAKE 1 TABLET EVERY DAY PER DR JARAMILLO 90 tablet 3    furosemide (LASIX) 20 MG tablet TAKE 1 TABLET (20MG) EVERY OTHER DAY ALTERNATING  WITH 2 TABLETS (40MG) EVERY OTHER  tablet 1    busPIRone (BUSPAR) 5 MG tablet TAKE 1 TABLET THREE TIMES DAILY 270 tablet 3    insulin aspart (NOVOLOG FLEXPEN) 100 UNIT/ML injection pen 20 units tid w/ SSI TID AC: 150-200 2 units; 201-250 4; 251-300 6; 301-350 8; 351-400 10; 401-450 12; 451-500 14; 501+ 16 units 35 mL 3    metFORMIN (GLUCOPHAGE XR) 500 MG extended release tablet Take 1 tablet by mouth daily (with breakfast) 90 tablet 3    cloNIDine (CATAPRES) 0.1 MG tablet Take 1 tablet by mouth 3 times daily 270 tablet 3    atorvastatin (LIPITOR) 80 MG tablet TAKE 1 TABLET EVERY NIGHT 90 tablet 3    vitamin D3 (CHOLECALCIFEROL) 25 MCG (1000 UT) TABS tablet TAKE 2 TABLETS EVERY DAY (PRESCRIBED BY RENAL) 180 tablet 3    DULoxetine (CYMBALTA) 60 MG extended release capsule TAKE 1 CAPSULE EVERY DAY 90 capsule 3    allopurinol (ZYLOPRIM) 100 MG tablet TAKE 1 TABLET TWICE DAILY 180 tablet 3    metoprolol (LOPRESSOR) 100 MG tablet TAKE 1 TABLET TWICE DAILY 180 tablet 3    potassium chloride (KLOR-CON M) 10 MEQ extended release tablet Take 1 tablet by mouth 2 times daily TAKE 1 TABLET TWICE DAILY 180 tablet 3    omeprazole (PRILOSEC) 40 MG delayed release capsule TAKE 1 CAPSULE EVERY DAY WITH BREAKFAST 90 capsule 3    TRUEplus Lancets 33G MISC USE  TO  TEST  UP  TO FIVE TIMES DAILY 500 each 3    tiZANidine (ZANAFLEX) 4 MG tablet TAKE 1 TABLET BY MOUTH EVERY 6 HOURS AS NEEDED FOR MUSCLE SPASM 90 tablet 1    acetaminophen (TYLENOL) 500 MG tablet Take 500 mg by mouth every 6 hours as needed for Pain        blood glucose test strips (TRUE METRIX BLOOD GLUCOSE TEST) strip TEST BLOOD SUGAR UP TO 5 TIMES DAILY 500 strip 3    Insulin Pen Needle (B-D UF III MINI PEN NEEDLES) 31G X 5 MM MISC Use to inject insulin 5 times daily 500 each 3    pregabalin (LYRICA) 75 MG capsule Take 1 capsule by mouth 3 times daily for 10 days.  30 capsule 0      No current facility-administered medications for this visit. Facility-Administered Medications Ordered in Other Visits   Medication Dose Route Frequency Provider Last Rate Last Admin    sodium chloride flush 0.9 % injection 5-40 mL  5-40 mL IntraVENous 2 times per day Silvia Westbrook MD        sodium chloride flush 0.9 % injection 5-40 mL  5-40 mL IntraVENous PRN Silvia Westbrook MD        0.9 % sodium chloride infusion   IntraVENous PRN Koffi Dave MD        meperidine (DEMEROL) injection 12.5 mg  12.5 mg IntraVENous Q5 Min PRN Koffi Dave MD        morphine (PF) injection 1 mg  1 mg IntraVENous Q5 Min PRN Koffi Dave MD        fentaNYL (SUBLIMAZE) injection 50 mcg  50 mcg IntraVENous Q5 Min PRN Silvia Westbrook MD                  Past Medical History:   Diagnosis Date    Anxiety      Carpal tunnel syndrome on right      Chronic back pain      Chronic kidney disease, stage 3 ([address]      Depression 10/28/2010    Diabetes mellitus type 2, uncontrolled ([address] 10/28/2010     with microalbuminuria    Diverticulosis      GERD (gastroesophageal reflux disease) 10/28/2010    Herniated intervertebral disk       neck and lower back    Hypercholesterolemia 10/28/2010    Hypertension 10/28/2010    Kidney stones      Neurofibroma of trunk       irritating mole on the back.  biopsy done 10/21/09    Obesity      Osteoarthritis      PAD (peripheral artery disease) (HCC)      Type II or unspecified type diabetes mellitus without mention of complication, not stated as uncontrolled      Vitamin D insufficiency                 Past Surgical History:   Procedure Laterality Date    COLONOSCOPY N/A 9/8/2021     COLONOSCOPY POLYPECTOMY SNARE/COLD BIOPSY performed by Tejal Mclain MD at [address]        CT BIOPSY PERCUTANEOUS DEEP BONE   3/7/2022     CT BIOPSY PERCUTANEOUS DEEP BONE 3/7/2022 Uriah Hogue MD [facility]    ECHO COMPLETE   3/19/2012          EYE MUSCLE SURGERY         as a child    HYSTERECTOMY (CERVIX STATUS UNKNOWN)         with salpingoophorectomy    HYSTERECTOMY, TOTAL ABDOMINAL (CERVIX REMOVED) Bilateral      cervix removed per patient    NERVE BLOCK Right 2018     lumbar transforaminal nerve block #1 with sedation    PAIN MANAGEMENT PROCEDURE Bilateral 2022     BILATERAL MEDIAL BRANCH BLOCK L3-4 AND L4-5 (CPT [id number]) performed by Sushila Samano DO at [facility] Bilateral 2022     BILATERAL MEDIAL BRANCH BLOCKS AT L3-4 AND L4-5 (prefers to be last case.  son coming from [facility] to be with her) performed by Sushila Samano DO at [address] NOSE/CLEFT LIP/TIP Right 2018     RIGHT LUMBAR TRANSFORAMINAL NERVE BLOCK #1 L3-4 L4-5 WITH IV SEDATIN performed by Christen Butt DO at [facility]                   Family History   Problem Relation Age of Onset    Lung Cancer Mother 71         mesothelioma metastatized to the brain    Diabetes Mother      High Cholesterol Mother      High Blood Pressure Mother      Arthritis Mother      Cancer Mother           lung to brain    Diabetes Father      Arthritis Father      High Blood Pressure Sister      Arthritis Sister      High Cholesterol Sister      High Blood Pressure Brother      Substance Abuse Brother      Breast Cancer Paternal Aunt            at 80y    High Blood Pressure Paternal Aunt      Cancer Maternal Grandmother [de-identified]         colorectal cancer    High Blood Pressure Maternal Grandmother      Heart Failure Maternal Grandmother      Arthritis Maternal Grandmother      High Cholesterol Maternal Grandmother      High Blood Pressure Paternal Grandmother      Stroke Paternal Grandmother      High Blood Pressure Maternal Aunt      High Cholesterol Maternal Aunt      Cancer Maternal Aunt           mesothelioma    Diabetes Maternal Uncle      High Blood Pressure Maternal Uncle      High Cholesterol Maternal Uncle      Substance Abuse Maternal Uncle      High Blood Pressure Paternal Uncle           Social History            Tobacco Use    Smoking status: Never    Smokeless tobacco: Never   Vaping Use    Vaping Use: Never used   Substance Use Topics    Alcohol use: Not Currently       Alcohol/week: 2.0 standard drinks       Types: 1 Glasses of wine, 1 Cans of beer per week       Comment: 1 time per month    Drug use: No            Functional Status: The patient is able to ambulate and perform activities of daily living with the use of an assistive device. ROS:    Constitutional: Denies fevers, chills, night sweats, unintentional weight loss    Skin: Denies rash or skin changes    Eyes: Denies vision changes    Ears/Nose/Throat: Denies nasal congestion or sore throat    Respiratory: Denies SOB or cough    Cardiovascular: Denies CP, palpitations, edema      Gastrointestinal: Denies abdominal pain,  N/V, constipation, or diarrhea    Genitourinary: Denies urinary symptoms    Neurologic: See HPI.    MSK: See HPI. Psychiatric: Denies sleep disturbance, anxiety, depression    Hematologic/Lymphatic/Immunologic: Denies bruising        Physical Exam:  Blood pressure (!) 156/83, pulse 76, height 5' 9\" (1.753 m), weight (!) 328 lb 12.8 oz (149.1 kg), not currently breastfeeding. General: well developed and well nourished in no acute distress. Body mass index is 48.56 kg/m². HEENT: No rhinorrhea, sneezing, yawning, or lacrimation. No scleral icterus or conjunctival injection. Resp: symmetrical chest expansion, unlabored breathing, respirations unlabored. CV: Heart rate is regular. Peripheral pulses are palpable  Lymph: No visible regional lymphadenopathy. Skin: No rashes or ecchymosis. Normal turgor. Psych: Mood is calm. Affect is normal.  Ext: bilateral lower extremity edema     MSK:   Back/Hip Exam:  Inspection: Pelvis was asymmetric. Lumbar lordotic curvature was increased. There was scoliosis present. No ecchymoses or erythema. Palpation: Palpatory exam revealed tenderness along lumbosacral paraspinals, midline spine, SI joint sulcus, ttp greater trochanters. There was no paraspinal spasms. There were no trigger points. ROM: ROM decreased. +facet loading. Special/provocative testing:   SLR negative        Neurological Exam:  Strength:                     R          L  Hip Flex                       5          5  Knee Ext                     5          5  Ankle dorsi                  5          5  EHL                             5          5  Ankle Plantar               5          5     Sensory:  Intact for light touch in all lower extremity dermatomes. Reflexes:                     R          L  Patellar                        (2+)      (2+)  Ankle Jerk                   (0)        (0)        Gait: ambulated with rollator. Forward lean, slow. Impression:  Bebe Esposito is a 61 y.o. female     1. Lumbar spondylosis   2. Chronic bilateral low back pain without sciatica            Plan:   All imaging reviewed again. Severe facet changes, DDD, stenosis. Positive diagnostic MBB x 2. Will proceed with RFA, right than left, L3-4 and L4-5. Procedure risks, benefits and alternatives were discussed. Patient would like to proceed. TENS trial.  Could consider increasing lyrica to dose of at least 150mg BID as tolerated. The patient was educated about the diagnosis, prognosis, indications, risks and benefits of treatment. An opportunity to ask questions was given to the patient and questions were answered. The patient agreed to proceed with the recommended treatment as described above.      Follow up after RFA     Arabella Keller DO, [facility]   Board Certified Physical Medicine and Rehabilitation

## 2022-08-17 ENCOUNTER — OFFICE VISIT (OUTPATIENT)
Dept: FAMILY MEDICINE CLINIC | Age: 67
End: 2022-08-17
Payer: MEDICARE

## 2022-08-17 VITALS
SYSTOLIC BLOOD PRESSURE: 141 MMHG | DIASTOLIC BLOOD PRESSURE: 67 MMHG | RESPIRATION RATE: 18 BRPM | HEART RATE: 66 BPM | WEIGHT: 293 LBS | HEIGHT: 69 IN | OXYGEN SATURATION: 97 % | BODY MASS INDEX: 43.4 KG/M2 | TEMPERATURE: 97 F

## 2022-08-17 DIAGNOSIS — Z71.89 ACP (ADVANCE CARE PLANNING): ICD-10-CM

## 2022-08-17 DIAGNOSIS — Z23 NEED FOR PROPHYLACTIC VACCINATION AGAINST STREPTOCOCCUS PNEUMONIAE (PNEUMOCOCCUS): ICD-10-CM

## 2022-08-17 DIAGNOSIS — Z23 NEED FOR PROPHYLACTIC VACCINATION AND INOCULATION AGAINST VARICELLA: ICD-10-CM

## 2022-08-17 DIAGNOSIS — Z76.0 MEDICATION REFILL: ICD-10-CM

## 2022-08-17 DIAGNOSIS — M48.061 NEURAL FORAMINAL STENOSIS OF LUMBAR SPINE: ICD-10-CM

## 2022-08-17 DIAGNOSIS — E87.6 HYPOKALEMIA: ICD-10-CM

## 2022-08-17 DIAGNOSIS — Z23 NEED FOR PROPHYLACTIC VACCINATION AGAINST DIPHTHERIA-TETANUS-PERTUSSIS (DTP): ICD-10-CM

## 2022-08-17 DIAGNOSIS — Z00.00 MEDICARE ANNUAL WELLNESS VISIT, SUBSEQUENT: Primary | ICD-10-CM

## 2022-08-17 DIAGNOSIS — Z12.31 ENCOUNTER FOR SCREENING MAMMOGRAM FOR MALIGNANT NEOPLASM OF BREAST: ICD-10-CM

## 2022-08-17 DIAGNOSIS — S61.212A LACERATION OF RIGHT MIDDLE FINGER WITHOUT FOREIGN BODY WITHOUT DAMAGE TO NAIL, INITIAL ENCOUNTER: ICD-10-CM

## 2022-08-17 DIAGNOSIS — E11.42 DIABETIC PERIPHERAL NEUROPATHY (HCC): ICD-10-CM

## 2022-08-17 DIAGNOSIS — E11.65 UNCONTROLLED TYPE 2 DIABETES MELLITUS WITH HYPERGLYCEMIA (HCC): Chronic | ICD-10-CM

## 2022-08-17 DIAGNOSIS — Z78.0 ASYMPTOMATIC MENOPAUSAL STATE: ICD-10-CM

## 2022-08-17 LAB
ANION GAP SERPL CALCULATED.3IONS-SCNC: 12 MMOL/L (ref 7–16)
BUN BLDV-MCNC: 19 MG/DL (ref 6–23)
CALCIUM SERPL-MCNC: 10.1 MG/DL (ref 8.6–10.2)
CHLORIDE BLD-SCNC: 107 MMOL/L (ref 98–107)
CO2: 28 MMOL/L (ref 22–29)
CREAT SERPL-MCNC: 1.2 MG/DL (ref 0.5–1)
GFR AFRICAN AMERICAN: 55
GFR NON-AFRICAN AMERICAN: 55 ML/MIN/1.73
GLUCOSE BLD-MCNC: 81 MG/DL (ref 74–99)
HBA1C MFR BLD: 7 %
POTASSIUM SERPL-SCNC: 3.8 MMOL/L (ref 3.5–5)
SODIUM BLD-SCNC: 147 MMOL/L (ref 132–146)

## 2022-08-17 PROCEDURE — G0009 ADMIN PNEUMOCOCCAL VACCINE: HCPCS | Performed by: FAMILY MEDICINE

## 2022-08-17 PROCEDURE — 99396 PREV VISIT EST AGE 40-64: CPT | Performed by: FAMILY MEDICINE

## 2022-08-17 PROCEDURE — 36415 COLL VENOUS BLD VENIPUNCTURE: CPT | Performed by: FAMILY MEDICINE

## 2022-08-17 PROCEDURE — G0439 PPPS, SUBSEQ VISIT: HCPCS | Performed by: FAMILY MEDICINE

## 2022-08-17 PROCEDURE — 90715 TDAP VACCINE 7 YRS/> IM: CPT | Performed by: FAMILY MEDICINE

## 2022-08-17 PROCEDURE — 3051F HG A1C>EQUAL 7.0%<8.0%: CPT | Performed by: FAMILY MEDICINE

## 2022-08-17 PROCEDURE — 1123F ACP DISCUSS/DSCN MKR DOCD: CPT | Performed by: FAMILY MEDICINE

## 2022-08-17 PROCEDURE — G0009 ADMIN PNEUMOCOCCAL VACCINE: HCPCS

## 2022-08-17 PROCEDURE — 90670 PCV13 VACCINE IM: CPT

## 2022-08-17 PROCEDURE — 83036 HEMOGLOBIN GLYCOSYLATED A1C: CPT | Performed by: FAMILY MEDICINE

## 2022-08-17 PROCEDURE — 90715 TDAP VACCINE 7 YRS/> IM: CPT

## 2022-08-17 PROCEDURE — 3017F COLORECTAL CA SCREEN DOC REV: CPT | Performed by: FAMILY MEDICINE

## 2022-08-17 PROCEDURE — 90472 IMMUNIZATION ADMIN EACH ADD: CPT

## 2022-08-17 PROCEDURE — 99497 ADVNCD CARE PLAN 30 MIN: CPT | Performed by: FAMILY MEDICINE

## 2022-08-17 PROCEDURE — 6360000002 HC RX W HCPCS

## 2022-08-17 RX ORDER — PREGABALIN 150 MG/1
150 CAPSULE ORAL 2 TIMES DAILY
Qty: 180 CAPSULE | Refills: 0 | Status: SHIPPED | OUTPATIENT
Start: 2022-09-29 | End: 2022-12-28

## 2022-08-17 RX ORDER — CALCIUM CITRATE/VITAMIN D3 200MG-6.25
TABLET ORAL
Qty: 500 STRIP | Refills: 3 | Status: SHIPPED | OUTPATIENT
Start: 2022-08-17

## 2022-08-17 ASSESSMENT — PATIENT HEALTH QUESTIONNAIRE - PHQ9
6. FEELING BAD ABOUT YOURSELF - OR THAT YOU ARE A FAILURE OR HAVE LET YOURSELF OR YOUR FAMILY DOWN: 0
8. MOVING OR SPEAKING SO SLOWLY THAT OTHER PEOPLE COULD HAVE NOTICED. OR THE OPPOSITE, BEING SO FIGETY OR RESTLESS THAT YOU HAVE BEEN MOVING AROUND A LOT MORE THAN USUAL: 0
SUM OF ALL RESPONSES TO PHQ9 QUESTIONS 1 & 2: 0
4. FEELING TIRED OR HAVING LITTLE ENERGY: 0
9. THOUGHTS THAT YOU WOULD BE BETTER OFF DEAD, OR OF HURTING YOURSELF: 0
10. IF YOU CHECKED OFF ANY PROBLEMS, HOW DIFFICULT HAVE THESE PROBLEMS MADE IT FOR YOU TO DO YOUR WORK, TAKE CARE OF THINGS AT HOME, OR GET ALONG WITH OTHER PEOPLE: 0
SUM OF ALL RESPONSES TO PHQ QUESTIONS 1-9: 0
1. LITTLE INTEREST OR PLEASURE IN DOING THINGS: 0
3. TROUBLE FALLING OR STAYING ASLEEP: 0
7. TROUBLE CONCENTRATING ON THINGS, SUCH AS READING THE NEWSPAPER OR WATCHING TELEVISION: 0
2. FEELING DOWN, DEPRESSED OR HOPELESS: 0
SUM OF ALL RESPONSES TO PHQ QUESTIONS 1-9: 0
SUM OF ALL RESPONSES TO PHQ QUESTIONS 1-9: 0
5. POOR APPETITE OR OVEREATING: 0
SUM OF ALL RESPONSES TO PHQ QUESTIONS 1-9: 0

## 2022-08-17 NOTE — PATIENT INSTRUCTIONS
Learning About Low-Carbohydrate Diets  What is a low-carbohydrate diet? A low-carbohydrate (or \"low-carb\") diet limits foods and drinks that have carbohydrates. This includes grains, fruits, milk and yogurt, and starchy vegetables like potatoes, beans, and corn. It also avoids foods and drinks that have added sugar. Instead, low-carb diets include foods that are high inprotein and fat. Why might you follow a low-carb diet? Low-carb diets may be used for a variety of reasons, such as for weight loss. People who have diabetes may use a low-carb diet to help manage their bloodsugar levels. What should you do before you start the diet? Talk to your doctor before you try any diet. This is even more important if you have health problems like kidney disease, heart disease, or diabetes. Your doctor may suggest that you meet with a registered dietitian. A dietitian canhelp you make an eating plan that works for you. What foods do you eat on a low-carb diet? On a low-carb diet, you choose foods that are high in protein and fat. Examplesof these are:  Meat, poultry, and fish. Eggs. Nuts, such as walnuts, pecans, almonds, and peanuts. Peanut butter and other nut butters. Tofu. Avocado. Alba Stefanie. Non-starchy vegetables like broccoli, cauliflower, green beans, mushrooms, peppers, lettuce, and spinach. Unsweetened non-dairy milks like almond milk and coconut milk. Cheese, cottage cheese, and cream cheese. Where can you learn more? Go to https://Emefcyotiseb.Salespush.com. org and sign in to your [employer] account. Enter C335 in the [facility] box to learn more about \"Learning About Low-Carbohydrate Diets. \"     If you do not have an account, please click on the \"Sign Up Now\" link. Current as of: September 8, 2021               Content Version: 13.3  © 1742-1508 [employer], Inc[employer]. Care instructions adapted under license by [facility] ([facility]).  If you have questions about a medical condition or a bicycle or motorcycle.

## 2022-08-17 NOTE — PROGRESS NOTES
Medicare Annual Wellness Visit    Usha Maldonado is here for Medicare AWV    Assessment & Plan   Medicare annual wellness visit, subsequent  Uncontrolled type 2 diabetes mellitus with hyperglycemia ([facility] [address]  -     POCT glycosylated hemoglobin (Hb A1C)-7.0  ACP (advance care planning)  -     [place] FACE TO [address], 1ST 30MIN [address] [place] [address] Referral to ACP Clinical Specialist  Asymptomatic menopausal state  -     DEXA Bone Density Axial Skeleton; Future  Neural foraminal stenosis of lumbar spine  increase lyrica to 150 mg bid  Follow up with PMR  Hypokalemia  -     Basic Metabolic Panel; Future  Laceration of right middle finger without foreign body without damage to nail, initial encounter  -     Tdap (ADACEL) 5-2-15.5 LF-MCG/0.5 injection; Inject 0.5 mLs into the muscle once for 1 dose, Disp-0.5 mL, R-0Print  Diabetic peripheral neuropathy (HCC)  -     pregabalin (LYRICA) 150 MG capsule; Take 1 capsule by mouth 2 times daily for 90 days. , Disp-180 capsule, R-0Normal  Need for prophylactic vaccination against Streptococcus pneumoniae (pneumococcus)  -     Pneumococcal Conjugate PCV20, PF (Prevnar 20)  Need for prophylactic vaccination against diphtheria-tetanus-pertussis (DTP)  -     Tdap (ADACEL) 5-2-15.5 LF-MCG/0.5 injection; Inject 0.5 mLs into the muscle once for 1 dose, Disp-0.5 mL, R-0Print  Need for prophylactic vaccination and inoculation against varicella  -     zoster recombinant adjuvanted vaccine [facility]) 50 MCG/0.5ML SUSR injection; Inject 0.5 mLs into the muscle once for 1 dose, Disp-0.5 mL, R-0Print  Medication refill  -     blood glucose test strips (TRUE METRIX BLOOD GLUCOSE TEST) strip; TEST BLOOD SUGAR UP TO 5 TIMES DAILY, Disp-500 strip, R-3Normal  Encounter for screening mammogram for malignant neoplasm of breast  -     KAVIN DIGITAL SCREEN BILATERAL PER PROTOCOL;  Future    Recommendations for Preventive Services Due: see orders and patient instructions/AVS.  Recommended screening schedule for the next 5-10 years is provided to the patient in written form: see Patient Instructions/AVS.     Return in about 3 months (around 11/17/2022) for Diabetes. Subjective     Patient is seeing PMR and had injections (no help for back pain) and now s/p rhizotomy which helped some. Has a return appointment and asking for lyrica to be increased. She is trying to exercise more and join silver sneakers. She had a finger laceration but healing. Patient's complete Health Risk Assessment and screening values have been reviewed and are found in Flowsheets. The following problems were reviewed today and where indicated follow up appointments were made and/or referrals ordered.     Positive Risk Factor Screenings with Interventions:    Fall Risk:  Do you feel unsteady or are you worried about falling? : (!) yes  2 or more falls in past year?: (!) yes  Fall with injury in past year?: no   Fall Risk Interventions:    Patient will continue PT            General Health and ACP:  General  In general, how would you say your health is?: (!) Poor  In the past 7 days, have you experienced any of the following: New or Increased Pain, New or Increased Fatigue, Loneliness, Social Isolation, Stress or Anger?: (!) Yes  Select all that apply: (!) New or Increased Pain, Loneliness  Do you get the social and emotional support that you need?: Yes  Do you have a Living Will?: (!) No    Advance Directives       Power of  Living Will ACP-Advance Directive ACP-Power of     Not on File Filed on 10/06/13 Filed Not on File          General Health Risk Interventions:  Loneliness: patient's comments regarding inadequate social support: plans to become more active in [employer]  Social isolation: as above  No Living Will: [address] addressed with patient today, Patient referred to ACP Clinical Specialist, and patient would like her son to be her decision maker if needed  She would like to be a full code but does not want life support if she is terminal. Paperowork given to the patient to complete as well. Health Habits/Nutrition:  Physical Activity: Inactive    Days of Exercise per Week: 0 days    Minutes of Exercise per Session: 0 min     Have you lost any weight without trying in the past 3 months?: No  Body mass index: (!) 47.1  Have you seen the dentist within the past year?: (!) No  Health Habits/Nutrition Interventions:  Dental exam overdue:  patient encouraged to make appointment with his/her dentist             Objective   Vitals:    08/17/22 1349 08/17/22 1359   BP: (!) 142/65 (!) 141/67   Site: Right Upper Arm Right Upper Arm   Position: Sitting Sitting   Cuff Size: Large Adult Large Adult   Pulse: 66    Resp: 18    Temp: 97 °F (36.1 °C)    TempSrc: Temporal    SpO2: 97%    Weight: (!) 319 lb (144.7 kg)    Height: 5' 9\" (1.753 m)       Body mass index is 47.11 kg/m². Gen: NAD   HEENT: NCAT, PERRL, MMM  Neck: supple  CV-RRR no M/R/G   Lungs-CTA b/l no R/R/W  ABD-soft nonttp no masses   Ext-no C/C/E  Rt 3rd digit with approximated laceration  Back scar c/d/i           No Known Allergies  Prior to Visit Medications    Medication Sig Taking? Authorizing Provider   blood glucose test strips (TRUE METRIX BLOOD GLUCOSE TEST) strip TEST BLOOD SUGAR UP TO 5 TIMES DAILY Yes Catalina Aguilar MD   Tdap (ADACEL) 5-2-15.5 LF-MCG/0.5 injection Inject 0.5 mLs into the muscle once for 1 dose Yes Catalina Aguilar MD   zoster recombinant adjuvanted vaccine [facility]) 50 MCG/0.5ML SUSR injection Inject 0.5 mLs into the muscle once for 1 dose Yes Catalina Aguilar MD   pregabalin (LYRICA) 150 MG capsule Take 1 capsule by mouth 2 times daily for 90 days.  Yes Catalina Aguilar MD   Insulin Pen Needle (B-D UF III MINI PEN NEEDLES) 31G X 5 MM MISC Use to inject insulin 5 times daily Yes Catalina Aguilar MD   Tens Unit MISC by Does not apply route 4-lead Yes Sara Najera, DO   insulin glargine (LANTUS SOLOSTAR) 100 UNIT/ML injection pen Inject 55 Units into the skin 2 times daily  Patient taking differently: Inject 55 Units into the skin 2 times daily Take only 1/2 dose evening before pre-op Yes Krystin Newberry MD   valsartan-hydroCHLOROthiazide (DIOVAN-HCT) 160-25 MG per tablet TAKE 1 TABLET EVERY DAY PER DR JARAMILLO Yes Krystin Newberry MD   furosemide (LASIX) 20 MG tablet TAKE 1 TABLET (20MG) EVERY OTHER DAY ALTERNATING  WITH 2 TABLETS (40MG) EVERY OTHER DAY Yes Krystin Newberry MD   busPIRone (BUSPAR) 5 MG tablet TAKE 1 TABLET THREE TIMES DAILY Yes Krystin Newberry MD   insulin aspart (NOVOLOG FLEXPEN) 100 UNIT/ML injection pen 20 units tid w/ SSI TID AC: 150-200 2 units; 201-250 4; 251-300 6; 301-350 8; 351-400 10; 401-450 12; 451-500 14; 501+ 16 units  Patient taking differently: in the morning and at bedtime 20 units tid w/ SSI TID AC: 150-200 2 units; 201-250 4; 251-300 6; 301-350 8; 351-400 10; 401-450 12; 451-500 14; 501+ 16 units Yes Krystin Newberry MD   metFORMIN (GLUCOPHAGE XR) 500 MG extended release tablet Take 1 tablet by mouth daily (with breakfast) Yes Krystin Newberry MD   cloNIDine (CATAPRES) 0.1 MG tablet Take 1 tablet by mouth 3 times daily Yes Krystin Newberry MD   atorvastatin (LIPITOR) 80 MG tablet TAKE 1 TABLET EVERY NIGHT Yes Krystin Newberry MD   vitamin D3 (CHOLECALCIFEROL) 25 MCG (1000 UT) TABS tablet TAKE 2 TABLETS EVERY DAY (PRESCRIBED BY RENAL) Yes Krystin Newberry MD   DULoxetine (CYMBALTA) 60 MG extended release capsule TAKE 1 CAPSULE EVERY DAY Yes Krystin Newberry MD   allopurinol (ZYLOPRIM) 100 MG tablet TAKE 1 TABLET TWICE DAILY Yes Krystin Newberry MD   metoprolol (LOPRESSOR) 100 MG tablet TAKE 1 TABLET TWICE DAILY Yes Krystin Newberry MD   potassium chloride (KLOR-CON M) 10 MEQ extended release tablet Take 1 tablet by mouth 2 times daily TAKE 1 TABLET TWICE DAILY Yes Krystin Newberry MD   omeprazole (PRILOSEC) 40 MG delayed release capsule TAKE 1 CAPSULE EVERY DAY WITH BREAKFAST Yes Sang Aguirre MD   TRUEplus Lancets 33G MISC USE  TO  TEST  UP  TO FIVE TIMES DAILY Yes Sang Aguirre MD   tiZANidine (ZANAFLEX) 4 MG tablet TAKE 1 TABLET BY MOUTH EVERY 6 HOURS AS NEEDED FOR MUSCLE SPASM Yes Sang Aguirre MD   acetaminophen (TYLENOL) 500 MG tablet Take 500 mg by mouth every 6 hours as needed for Pain Yes Lewis Zaldivar MD   insulin lispro (HUMALOG KWIKPEN) 100 UNIT/ML injection Inject 10 Units into the skin 3 times daily (before meals). Vianca Han MD       [facility] (Including outside providers/suppliers regularly involved in providing care):   Patient Care Team:  Sang Aguirre MD as PCP - Cesar Vu MD as PCP - [facility]  Stan Mullen MD as Consulting Physician (Neurosurgery)     Reviewed and updated this visit:  Tobacco  Allergies  Meds  Med Hx  Surg Hx  Soc Hx  Fam Hx               Advance Care Planning   Advanced Care Planning: Discussed the patients choices for care and treatment in case of a health event that adversely affects decision-making abilities. Also discussed the patients long-term treatment options. Reviewed with the patient the appropriate state-specific advance directive documents. Reviewed the process of designating a competent adult as an Agent (or -in-fact) that could take make health care decisions for the patient if incompetent. Patient was asked to complete the declaration forms, either acknowledge the forms by a public notary or an eligible witness and provide a signed copy to the practice office. Time spent (minutes): 10       Cardiovascular Disease Risk Counseling: Assessed the patient's risk to develop cardiovascular disease and reviewed main risk factors.    Reviewed steps to reduce disease risk including:   Quitting tobacco use, reducing amount smoked, or not starting the habit  Making healthy food choices  Being physically active and gradualy increasing activity levels   Reduce weight and determine a healthy BMI goal  Monitor blood pressure and treat if higher than 140/90 mmHg  Maintain blood total cholesterol levels under 5 mmol/l or 190 mg/dl  Maintain LDL cholesterol levels under 3.0 mmol/l or 115 mg/dl   Control blood glucose levels  Consider taking aspirin (75 mg daily), once blood pressure is controlled   Provided a follow up plan. Time spent (minutes): 5    Obesity Counseling: Assessed behavioral health risks and factors affecting choice of behavior. Suggested weight control approaches, including dietary changes behavioral modification and follow up plan. Provided educational and support documentation. Time spent (minutes): 5    8/26/22-recheck sodium at her next visit.

## 2022-08-18 ENCOUNTER — CLINICAL DOCUMENTATION (OUTPATIENT)
Dept: SPIRITUAL SERVICES | Age: 67
End: 2022-08-18

## 2022-08-18 NOTE — ACP (ADVANCE CARE PLANNING)
Advance Care Planning   Ambulatory ACP Specialist Patient Outreach    Date:  8/18/2022  ACP Specialist:  See Maria    Outreach call to patient in follow-up to ACP Specialist referral from: Mendez Nelson MD    [x] PCP  [] Provider   [] Ambulatory Care Management [] Other for Reason:    [x] Advance Directive Assistance  [] Code Status Discussion  [] Complete Portable DNR Order  [] Discuss Goals of Care  [] Complete POST/MOST  [] Early ACP Decision-Making  [] Other    Date Referral Received:8/1712022    Today's Outreach:  [x] First   [] Second  [] Third                               Third outreach made by []  phone  [] email []   Exaptive     Intervention:  [] Spoke with Patient  [x] Left VM requesting return call      Outcome:Called and left message on AS also mailed docs to patient address. Next Step:   [] ACP scheduled conversation  [x] Outreach again in one week               [x] Email / Mail ACP Info Sheets  [x] Email / Mail Advance Directive            [] Close Referral. Routing closure to referring provider/staff and to ACP Specialist . [] Closure Letter mailed to Patient with Invitation to Contact ACP Specialist if/when ready.     Thank you for this referral.

## 2022-08-19 ENCOUNTER — CLINICAL DOCUMENTATION (OUTPATIENT)
Dept: SPIRITUAL SERVICES | Age: 67
End: 2022-08-19

## 2022-08-19 NOTE — ACP (ADVANCE CARE PLANNING)
Advance Care Planning   Ambulatory ACP Specialist Patient Outreach    Date:  8/19/2022  ACP Specialist:  Kandy Ron    Outreach call to patient in follow-up to ACP Specialist referral from: Ban Stanford MD    [x] PCP  [] Provider   [] Ambulatory Care Management [] Other for Reason:    [x] Advance Directive Assistance  [] Code Status Discussion  [] Complete Portable DNR Order  [] Discuss Goals of Care  [] Complete POST/MOST  [] Early ACP Decision-Making  [] Other    Date Referral Received: 8/17/2022    Today's Outreach:  [] First   [x] Second  [] Third                               Third outreach made by []  phone  [] email []   [employer]t     Intervention:  [x] Spoke with Patient  [] Left VM requesting return call      Outcome: Apt. Scheduled for August 23, 2022 at 2 pm with Kandy Ron [address] [place] [address]. Next Step:   [x] ACP scheduled conversation  [] Outreach again in one week               [] Email / Mail ACP Info Sheets  [] Email / Mail Advance Directive            [] Close Referral. Routing closure to referring provider/staff and to ACP Specialist . [] Closure Letter mailed to Patient with Invitation to Contact ACP Specialist if/when ready.     Thank you for this referral.

## 2022-08-22 ENCOUNTER — CLINICAL DOCUMENTATION (OUTPATIENT)
Dept: SPIRITUAL SERVICES | Age: 67
End: 2022-08-22

## 2022-08-22 DIAGNOSIS — M47.816 LUMBAR SPONDYLOSIS: ICD-10-CM

## 2022-08-23 ENCOUNTER — PREP FOR PROCEDURE (OUTPATIENT)
Dept: PHYSICAL MEDICINE AND REHAB | Age: 67
End: 2022-08-23

## 2022-08-23 ENCOUNTER — CLINICAL DOCUMENTATION (OUTPATIENT)
Dept: SPIRITUAL SERVICES | Age: 67
End: 2022-08-23

## 2022-08-23 RX ORDER — SODIUM CHLORIDE 9 MG/ML
INJECTION, SOLUTION INTRAVENOUS PRN
Status: CANCELLED | OUTPATIENT
Start: 2022-08-23

## 2022-08-23 RX ORDER — SODIUM CHLORIDE 0.9 % (FLUSH) 0.9 %
5-40 SYRINGE (ML) INJECTION PRN
Status: CANCELLED | OUTPATIENT
Start: 2022-08-23

## 2022-08-23 RX ORDER — SODIUM CHLORIDE 0.9 % (FLUSH) 0.9 %
5-40 SYRINGE (ML) INJECTION EVERY 12 HOURS SCHEDULED
Status: CANCELLED | OUTPATIENT
Start: 2022-08-23

## 2022-08-23 NOTE — ACP (ADVANCE CARE PLANNING)
Advance Care Planning   Advance Care Planning Note  Ambulatory Spiritual Care Services    Date:  8/23/2022    Received request from [facility] Provider. Consultation conversation participants:   Patient who understands ACP conversation     Goals of ACP Conversation:  Discuss advance care planning documents    Health Care Decision Makers:      Primary Decision Maker: Nayla HermanGaston - Child - [phone]    Secondary Decision Maker: Iman Gilman - Brother/Sister - [phone]    Supplemental (Other) Decision Maker: Anneliese Trujillo - Brother/Sister - [phone]   Summary:  Completed [address] (Patient Wishes)  Currently on file:   None    Assessment:   Family supports patient in her decision making process. Interventions:  Provided education on documents for clarity and greater understanding  Discussed and provided education on state decision maker hierarchy  Assisted in the completion of documents according to patient's wishes at this time  Encouraged ongoing ACP conversation with future decision makers and loved ones    Care Preferences Communicated:     Hospitalization:  If the patient's health worsens and it becomes clear that the chance of recovery is unlikely,     the patient wants hospitalization. Ventilation:   If the patient, in their present state of health, suddenly became very ill and unable to breathe on their own,     the patient would desire the use of a ventilator (breathing machine). If their health worsens and it becomes clear that the change of recovery is unlikely,     the patient would desire the use of a ventilator (breathing machine). Resuscitation:  In the event the patient's heart stopped as a result of an underlying serious health condition, the patient communicates a preference for      resuscitative attempts (CPR).     Outcomes:  New Advance directive completed  Return original documentation to patient, as well as copies for distribution to appointed agents  Copy of advance directives given to staff to scan into medical records. Routed ACP note to attending provider or other IDT member. Teach Back Method used to verify the patients and or Healthcare Decision Maker's understanding of key information in the advance directive documents.      Patient / Healthcare Decision Maker Instructions:  Review completed ACP document(s) and update, if needed, with changes health or future preferences    Electronically signed by Tobin Strange on 8/23/2022 at 2:02 PM.

## 2022-08-25 ENCOUNTER — TELEPHONE (OUTPATIENT)
Dept: PHYSICAL MEDICINE AND REHAB | Age: 67
End: 2022-08-25

## 2022-08-25 ENCOUNTER — PATIENT MESSAGE (OUTPATIENT)
Dept: PHYSICAL MEDICINE AND REHAB | Age: 67
End: 2022-08-25

## 2022-08-25 NOTE — TELEPHONE ENCOUNTER
From: Cale Bañuelos  To: Dr. Jorge Roseet: 8/25/2022 8:02 AM EDT  Subject: Kalpana Manner    Dr. Danilo Carrillo, I am So Sorry For missing my appointment today. I feel so bad. My son over slept. He was still going to come, but I told him I couldn't come late. Again, I am So Sorry. I would like to make another appointment for my procedure. My son apologized also.

## 2022-08-25 NOTE — TELEPHONE ENCOUNTER
Pt called in to r/s her procedure for today, her son over slept and she was going to be late. Yesika Bansal can be reached at [phone].  Thank you

## 2022-08-30 ENCOUNTER — PREP FOR PROCEDURE (OUTPATIENT)
Dept: PHYSICAL MEDICINE AND REHAB | Age: 67
End: 2022-08-30

## 2022-08-30 RX ORDER — SODIUM CHLORIDE 0.9 % (FLUSH) 0.9 %
5-40 SYRINGE (ML) INJECTION PRN
Status: CANCELLED | OUTPATIENT
Start: 2022-08-30

## 2022-08-30 RX ORDER — SODIUM CHLORIDE 9 MG/ML
INJECTION, SOLUTION INTRAVENOUS PRN
Status: CANCELLED | OUTPATIENT
Start: 2022-08-30

## 2022-08-30 RX ORDER — SODIUM CHLORIDE 0.9 % (FLUSH) 0.9 %
5-40 SYRINGE (ML) INJECTION EVERY 12 HOURS SCHEDULED
Status: CANCELLED | OUTPATIENT
Start: 2022-08-30

## 2022-08-31 ENCOUNTER — ANESTHESIA EVENT (OUTPATIENT)
Dept: OPERATING ROOM | Age: 67
End: 2022-08-31
Payer: MEDICARE

## 2022-08-31 NOTE — ANESTHESIA PRE PROCEDURE
Department of Anesthesiology  Preprocedure Note       Name:  Dg Florian   Age:  74 y.o.  :  1955                                          MRN:  [id number]         Date:  2022      Surgeon: Aman Morillo):  Lisa Sanchez DO    Procedure: Procedure(s): FLUOROSCOPIC GUIDED LEFT LUMBAR MEDIAL BRANCH NEUROLYSIS  RADIOFREQUENCY ABLATION  AT LEVELS L3-4,L4-5 WITH IV SEDATION  (CPT 27112)    Medications prior to admission:   Prior to Admission medications    Medication Sig Start Date End Date Taking? Authorizing Provider   blood glucose test strips (TRUE METRIX BLOOD GLUCOSE TEST) strip TEST BLOOD SUGAR UP TO 5 TIMES DAILY 22   Nola Palma MD   pregabalin (LYRICA) 150 MG capsule Take 1 capsule by mouth 2 times daily for 90 days.  22  Nola Palma MD   Insulin Pen Needle (B-D UF III MINI PEN NEEDLES) 31G X 5 MM MISC Use to inject insulin 5 times daily 22   Nola Palma MD   Tens Unit MISC by Does not apply route 4-lead 22   Lisa Sanchez DO   insulin glargine (LANTUS SOLOSTAR) 100 UNIT/ML injection pen Inject 55 Units into the skin 2 times daily  Patient taking differently: Inject 55 Units into the skin 2 times daily Take only 1/2 dose evening before pre-op 22   Nola Palma MD   valsartan-hydroCHLOROthiazide (DIOVAN-HCT) 160-25 MG per tablet TAKE 1 TABLET EVERY DAY PER DR JARAMILLO 22   Nola Palma MD   furosemide (LASIX) 20 MG tablet TAKE 1 TABLET (20MG) EVERY OTHER DAY ALTERNATING  WITH 2 TABLETS (40MG) EVERY OTHER DAY 22   Nola Palma MD   busPIRone (BUSPAR) 5 MG tablet TAKE 1 TABLET THREE TIMES DAILY 22   Nola Palma MD   insulin aspart (NOVOLOG FLEXPEN) 100 UNIT/ML injection pen 20 units tid w/ SSI TID AC: 150-200 2 units; 201-250 4; 251-300 6; 301-350 8; 351-400 10; 401-450 12; 451-500 14; 501+ 16 units  Patient taking differently: in the morning and at bedtime 20 units tid w/ SSI TID AC: 150-200 2 units; 201-250 4; 251-300 6; 301-350 8; 351-400 10; 401-450 12; 451-500 14; 501+ 16 units 4/1/22   Ghanshyam Villarreal MD   metFORMIN (GLUCOPHAGE XR) 500 MG extended release tablet Take 1 tablet by mouth daily (with breakfast) 4/1/22   Ghanshyam Villarreal MD   cloNIDine (CATAPRES) 0.1 MG tablet Take 1 tablet by mouth 3 times daily 4/1/22   Ghanshyam Villarreal MD   atorvastatin (LIPITOR) 80 MG tablet TAKE 1 TABLET EVERY NIGHT 4/1/22   Ghanshyam Villarreal MD   vitamin D3 (CHOLECALCIFEROL) 25 MCG (1000 UT) TABS tablet TAKE 2 TABLETS EVERY DAY (PRESCRIBED BY RENAL) 4/1/22   Ghanshyam Villarreal MD   DULoxetine (CYMBALTA) 60 MG extended release capsule TAKE 1 CAPSULE EVERY DAY 4/1/22   Ghanshyam Villarreal MD   allopurinol (ZYLOPRIM) 100 MG tablet TAKE 1 TABLET TWICE DAILY 4/1/22   Ghanshyam Villarreal MD   metoprolol (LOPRESSOR) 100 MG tablet TAKE 1 TABLET TWICE DAILY 4/1/22   Ghanshyam Villarreal MD   potassium chloride (KLOR-CON M) 10 MEQ extended release tablet Take 1 tablet by mouth 2 times daily TAKE 1 TABLET TWICE DAILY 3/24/22   Ghanshyam Villarreal MD   omeprazole (PRILOSEC) 40 MG delayed release capsule TAKE 1 CAPSULE EVERY DAY WITH BREAKFAST 3/23/22   Ghanshyam Villarreal MD   TRUEplus Lancets 33G MISC USE  TO  TEST  UP  TO FIVE TIMES DAILY 2/22/22   Ghanshyam Villarreal MD   tiZANidine (ZANAFLEX) 4 MG tablet TAKE 1 TABLET BY MOUTH EVERY 6 HOURS AS NEEDED FOR MUSCLE SPASM 12/10/21   Ghanshyam Villarreal MD   acetaminophen (TYLENOL) 500 MG tablet Take 500 mg by mouth every 6 hours as needed for Pain    Historical MD Kayley   insulin lispro (HUMALOG KWIKPEN) 100 UNIT/ML injection Inject 10 Units into the skin 3 times daily (before meals). 5/17/12 5/17/12  Kala Wakefield MD       Current medications:    No current facility-administered medications for this encounter.      Current Outpatient Medications   Medication Sig Dispense Refill    blood glucose test strips (TRUE METRIX BLOOD GLUCOSE TEST) strip TEST BLOOD SUGAR UP TO 5 TIMES DAILY 500 strip 3    [START ON 9/29/2022] pregabalin (LYRICA) 150 MG capsule Take 1 capsule by mouth 2 times daily for 90 days.  180 capsule 0    Insulin Pen Needle (B-D UF III MINI PEN NEEDLES) 31G X 5 MM MISC Use to inject insulin 5 times daily 500 each 3    Tens Unit MISC by Does not apply route 4-lead 1 each 0    insulin glargine (LANTUS SOLOSTAR) 100 UNIT/ML injection pen Inject 55 Units into the skin 2 times daily (Patient taking differently: Inject 55 Units into the skin 2 times daily Take only 1/2 dose evening before pre-op) 36 pen 3    valsartan-hydroCHLOROthiazide (DIOVAN-HCT) 160-25 MG per tablet TAKE 1 TABLET EVERY DAY PER DR JARAMILLO 90 tablet 3    furosemide (LASIX) 20 MG tablet TAKE 1 TABLET (20MG) EVERY OTHER DAY ALTERNATING  WITH 2 TABLETS (40MG) EVERY OTHER  tablet 1    busPIRone (BUSPAR) 5 MG tablet TAKE 1 TABLET THREE TIMES DAILY 270 tablet 3    insulin aspart (NOVOLOG FLEXPEN) 100 UNIT/ML injection pen 20 units tid w/ SSI TID AC: 150-200 2 units; 201-250 4; 251-300 6; 301-350 8; 351-400 10; 401-450 12; 451-500 14; 501+ 16 units (Patient taking differently: in the morning and at bedtime 20 units tid w/ SSI TID AC: 150-200 2 units; 201-250 4; 251-300 6; 301-350 8; 351-400 10; 401-450 12; 451-500 14; 501+ 16 units) 35 mL 3    metFORMIN (GLUCOPHAGE XR) 500 MG extended release tablet Take 1 tablet by mouth daily (with breakfast) 90 tablet 3    cloNIDine (CATAPRES) 0.1 MG tablet Take 1 tablet by mouth 3 times daily 270 tablet 3    atorvastatin (LIPITOR) 80 MG tablet TAKE 1 TABLET EVERY NIGHT 90 tablet 3    vitamin D3 (CHOLECALCIFEROL) 25 MCG (1000 UT) TABS tablet TAKE 2 TABLETS EVERY DAY (PRESCRIBED BY RENAL) 180 tablet 3    DULoxetine (CYMBALTA) 60 MG extended release capsule TAKE 1 CAPSULE EVERY DAY 90 capsule 3    allopurinol (ZYLOPRIM) 100 MG tablet TAKE 1 TABLET TWICE DAILY 180 tablet 3    metoprolol (LOPRESSOR) 100 MG tablet TAKE 1 TABLET TWICE DAILY 180 tablet 3    potassium chloride (KLOR-CON M) 10 MEQ extended release tablet Take 1 tablet by mouth 2 times daily TAKE 1 TABLET TWICE DAILY 180 tablet 3    omeprazole (PRILOSEC) 40 MG delayed release capsule TAKE 1 CAPSULE EVERY DAY WITH BREAKFAST 90 capsule 3    TRUEplus Lancets 33G MISC USE  TO  TEST  UP  TO FIVE TIMES DAILY 500 each 3    tiZANidine (ZANAFLEX) 4 MG tablet TAKE 1 TABLET BY MOUTH EVERY 6 HOURS AS NEEDED FOR MUSCLE SPASM 90 tablet 1    acetaminophen (TYLENOL) 500 MG tablet Take 500 mg by mouth every 6 hours as needed for Pain       Facility-Administered Medications Ordered in Other Encounters   Medication Dose Route Frequency Provider Last Rate Last Admin    sodium chloride flush 0.9 % injection 5-40 mL  5-40 mL IntraVENous 2 times per day Koffi Dave MD        sodium chloride flush 0.9 % injection 5-40 mL  5-40 mL IntraVENous PRN Koffi Dave MD        0.9 % sodium chloride infusion   IntraVENous PRN Koffi Dave MD        meperidine (DEMEROL) injection 12.5 mg  12.5 mg IntraVENous Q5 Min PRN Koffi Dave MD        morphine (PF) injection 1 mg  1 mg IntraVENous Q5 Min PRN Koffi Dave MD        fentaNYL (SUBLIMAZE) injection 50 mcg  50 mcg IntraVENous Q5 Min PRN Lc Loaiza MD           Allergies:  No Known Allergies    Problem List:    Patient Active Problem List   Diagnosis Code    Diabetes mellitus type 2, uncontrolled ([facility] [address] E11.65    Hypertension I10    Hypercholesterolemia E78.00    GERD (gastroesophageal reflux disease) K21.9    Depression F32. A    Enlarged liver R16.0    Heart murmur R01.1    Chronic lower back pain M54.50, G89.29    Facet joint disease M47.819    Lumbar spondylosis M47.816    Bulging lumbar disc M51.26    Back pain M54.9    Anxiety F41.9    Bilateral leg edema R60.0    Microalbuminuria R80.9    Vitamin D insufficiency E55.9    Chronic kidney disease, stage 3, mod decreased GFR 01/22/2022 12:59 PM    HCT 41.2 01/22/2022 12:59 PM    MCV 91.2 01/22/2022 12:59 PM    RDW 14.2 01/22/2022 12:59 PM     03/07/2022 08:23 AM       CMP:   Lab Results   Component Value Date/Time     08/17/2022 03:00 PM    K 3.8 08/17/2022 03:00 PM     08/17/2022 03:00 PM    CO2 28 08/17/2022 03:00 PM    BUN 19 08/17/2022 03:00 PM    CREATININE 1.2 08/17/2022 03:00 PM    GFRAA 55 08/17/2022 03:00 PM    LABGLOM 55 08/17/2022 03:00 PM    GLUCOSE 81 08/17/2022 03:00 PM    GLUCOSE 138 05/09/2012 08:05 AM    PROT 7.6 01/22/2022 12:59 PM    CALCIUM 10.1 08/17/2022 03:00 PM    BILITOT 0.6 01/22/2022 12:59 PM    ALKPHOS 142 01/22/2022 12:59 PM    AST 23 01/22/2022 12:59 PM    ALT 18 01/22/2022 12:59 PM       POC Tests: No results for input(s): POCGLU, POCNA, POCK, POCCL, POCBUN, POCHEMO, POCHCT in the last 72 hours.     Coags:   Lab Results   Component Value Date/Time    PROTIME 11.7 03/07/2022 08:23 AM    INR 1.1 03/07/2022 08:23 AM       HCG (If Applicable): No results found for: PREGTESTUR, PREGSERUM, HCG, HCGQUANT     ABGs: No results found for: PHART, PO2ART, ZRO2SRD, CAQ8CGM, BEART, B2DVXATN     Type & Screen (If Applicable):  No results found for: LABABO, LABRH    Drug/Infectious Status (If Applicable):  No results found for: HIV, HEPCAB    COVID-19 Screening (If Applicable): No results found for: COVID19        Anesthesia Evaluation  Patient summary reviewed  Airway: Mallampati: III  TM distance: >3 FB   Neck ROM: full  Mouth opening: > = 3 FB   Dental: normal exam         Pulmonary: breath sounds clear to auscultation                             Cardiovascular:    (+) hypertension:, hyperlipidemia        Rhythm: regular  Rate: normal           Beta Blocker:  Dose within 24 Hrs         Neuro/Psych:   (+) neuromuscular disease:, psychiatric history:            GI/Hepatic/Renal:   (+) GERD:, liver disease:, renal disease: CRI, morbid obesity          Endo/Other:    (+) DiabetesType II DM, poorly controlled, using insulin, . Abdominal:   (+) obese,     Abdomen: soft. Vascular: Other Findings:           Anesthesia Plan      MAC     ASA 3       Induction: intravenous. Anesthetic plan and risks discussed with patient. Plan discussed with CRNA.               Chart Review:  Chart reviewed per routine on August 31, 2022 at 8:32 AM by Lauri Lindsey MD.  (Final assessment and plan per day of surgery team.)      Lauri Lindsey MD   8/31/2022

## 2022-09-01 ENCOUNTER — HOSPITAL ENCOUNTER (OUTPATIENT)
Age: 67
Setting detail: OUTPATIENT SURGERY
Discharge: HOME | End: 2022-09-01
Attending: PHYSICAL MEDICINE & REHABILITATION | Admitting: PHYSICAL MEDICINE & REHABILITATION
Payer: MEDICARE

## 2022-09-01 ENCOUNTER — ANESTHESIA (OUTPATIENT)
Dept: OPERATING ROOM | Age: 67
End: 2022-09-01
Payer: MEDICARE

## 2022-09-01 ENCOUNTER — HOSPITAL ENCOUNTER (OUTPATIENT)
Dept: OPERATING ROOM | Age: 67
Setting detail: OUTPATIENT SURGERY
Discharge: HOME | End: 2022-09-01
Attending: PHYSICAL MEDICINE & REHABILITATION
Payer: MEDICARE

## 2022-09-01 VITALS
SYSTOLIC BLOOD PRESSURE: 113 MMHG | HEIGHT: 69 IN | TEMPERATURE: 98 F | OXYGEN SATURATION: 95 % | DIASTOLIC BLOOD PRESSURE: 58 MMHG | HEART RATE: 68 BPM | WEIGHT: 293 LBS | BODY MASS INDEX: 43.4 KG/M2 | RESPIRATION RATE: 20 BRPM

## 2022-09-01 DIAGNOSIS — M47.896 OTHER OSTEOARTHRITIS OF SPINE, LUMBAR REGION: ICD-10-CM

## 2022-09-01 LAB — METER GLUCOSE: 90 MG/DL (ref 74–99)

## 2022-09-01 PROCEDURE — 2709999900 HC NON-CHARGEABLE SUPPLY: Performed by: PHYSICAL MEDICINE & REHABILITATION

## 2022-09-01 PROCEDURE — 3700000001 HC ADD 15 MINUTES (ANESTHESIA): Performed by: PHYSICAL MEDICINE & REHABILITATION

## 2022-09-01 PROCEDURE — 64636 DESTROY L/S FACET JNT ADDL: CPT | Performed by: PHYSICAL MEDICINE & REHABILITATION

## 2022-09-01 PROCEDURE — 3600000015 HC SURGERY LEVEL 5 ADDTL 15MIN: Performed by: PHYSICAL MEDICINE & REHABILITATION

## 2022-09-01 PROCEDURE — 82962 GLUCOSE BLOOD TEST: CPT | Performed by: PHYSICAL MEDICINE & REHABILITATION

## 2022-09-01 PROCEDURE — 3600000005 HC SURGERY LEVEL 5 BASE: Performed by: PHYSICAL MEDICINE & REHABILITATION

## 2022-09-01 PROCEDURE — 7100000010 HC PHASE II RECOVERY - FIRST 15 MIN: Performed by: PHYSICAL MEDICINE & REHABILITATION

## 2022-09-01 PROCEDURE — 64635 DESTROY LUMB/SAC FACET JNT: CPT | Performed by: PHYSICAL MEDICINE & REHABILITATION

## 2022-09-01 PROCEDURE — 3209999900 FLUORO FOR SURGICAL PROCEDURES

## 2022-09-01 PROCEDURE — 7100000011 HC PHASE II RECOVERY - ADDTL 15 MIN: Performed by: PHYSICAL MEDICINE & REHABILITATION

## 2022-09-01 PROCEDURE — 6360000002 HC RX W HCPCS: Performed by: NURSE ANESTHETIST, CERTIFIED REGISTERED

## 2022-09-01 PROCEDURE — 82962 GLUCOSE BLOOD TEST: CPT

## 2022-09-01 PROCEDURE — 2500000003 HC RX 250 WO HCPCS: Performed by: PHYSICAL MEDICINE & REHABILITATION

## 2022-09-01 PROCEDURE — 2580000003 HC RX 258: Performed by: ANESTHESIOLOGY

## 2022-09-01 PROCEDURE — 3700000000 HC ANESTHESIA ATTENDED CARE: Performed by: PHYSICAL MEDICINE & REHABILITATION

## 2022-09-01 RX ORDER — LIDOCAINE HYDROCHLORIDE 10 MG/ML
INJECTION, SOLUTION EPIDURAL; INFILTRATION; INTRACAUDAL; PERINEURAL PRN
Status: DISCONTINUED | OUTPATIENT
Start: 2022-09-01 | End: 2022-09-01 | Stop reason: ALTCHOICE

## 2022-09-01 RX ORDER — SODIUM CHLORIDE 9 MG/ML
INJECTION, SOLUTION INTRAVENOUS PRN
Status: DISCONTINUED | OUTPATIENT
Start: 2022-09-01 | End: 2022-09-01 | Stop reason: HOSPADM

## 2022-09-01 RX ORDER — SODIUM CHLORIDE 0.9 % (FLUSH) 0.9 %
5-40 SYRINGE (ML) INJECTION EVERY 12 HOURS SCHEDULED
Status: DISCONTINUED | OUTPATIENT
Start: 2022-09-01 | End: 2022-09-01 | Stop reason: HOSPADM

## 2022-09-01 RX ORDER — PROPOFOL 10 MG/ML
INJECTION, EMULSION INTRAVENOUS PRN
Status: DISCONTINUED | OUTPATIENT
Start: 2022-09-01 | End: 2022-09-01 | Stop reason: SDUPTHER

## 2022-09-01 RX ORDER — SODIUM CHLORIDE 0.9 % (FLUSH) 0.9 %
5-40 SYRINGE (ML) INJECTION PRN
Status: DISCONTINUED | OUTPATIENT
Start: 2022-09-01 | End: 2022-09-01 | Stop reason: HOSPADM

## 2022-09-01 RX ORDER — MIDAZOLAM HYDROCHLORIDE 1 MG/ML
INJECTION INTRAMUSCULAR; INTRAVENOUS PRN
Status: DISCONTINUED | OUTPATIENT
Start: 2022-09-01 | End: 2022-09-01 | Stop reason: SDUPTHER

## 2022-09-01 RX ORDER — SODIUM CHLORIDE, SODIUM LACTATE, POTASSIUM CHLORIDE, CALCIUM CHLORIDE 600; 310; 30; 20 MG/100ML; MG/100ML; MG/100ML; MG/100ML
INJECTION, SOLUTION INTRAVENOUS CONTINUOUS
Status: DISCONTINUED | OUTPATIENT
Start: 2022-09-01 | End: 2022-09-01 | Stop reason: HOSPADM

## 2022-09-01 RX ORDER — FENTANYL CITRATE 50 UG/ML
INJECTION, SOLUTION INTRAMUSCULAR; INTRAVENOUS PRN
Status: DISCONTINUED | OUTPATIENT
Start: 2022-09-01 | End: 2022-09-01 | Stop reason: SDUPTHER

## 2022-09-01 RX ORDER — LIDOCAINE HYDROCHLORIDE 20 MG/ML
INJECTION, SOLUTION EPIDURAL; INFILTRATION; INTRACAUDAL; PERINEURAL PRN
Status: DISCONTINUED | OUTPATIENT
Start: 2022-09-01 | End: 2022-09-01 | Stop reason: ALTCHOICE

## 2022-09-01 RX ADMIN — PROPOFOL 10 MG: 10 INJECTION, EMULSION INTRAVENOUS at 09:30

## 2022-09-01 RX ADMIN — SODIUM CHLORIDE, POTASSIUM CHLORIDE, SODIUM LACTATE AND CALCIUM CHLORIDE: 600; 310; 30; 20 INJECTION, SOLUTION INTRAVENOUS at 08:43

## 2022-09-01 RX ADMIN — PROPOFOL 10 MG: 10 INJECTION, EMULSION INTRAVENOUS at 09:26

## 2022-09-01 RX ADMIN — FENTANYL CITRATE 50 MCG: 50 INJECTION INTRAMUSCULAR; INTRAVENOUS at 09:23

## 2022-09-01 RX ADMIN — FENTANYL CITRATE 50 MCG: 50 INJECTION INTRAMUSCULAR; INTRAVENOUS at 09:26

## 2022-09-01 RX ADMIN — MIDAZOLAM 2 MG: 1 INJECTION INTRAMUSCULAR; INTRAVENOUS at 09:21

## 2022-09-01 ASSESSMENT — PAIN SCALES - GENERAL
PAINLEVEL_OUTOF10: 0
PAINLEVEL_OUTOF10: 0

## 2022-09-01 ASSESSMENT — PAIN DESCRIPTION - DESCRIPTORS: DESCRIPTORS: ACHING

## 2022-09-01 ASSESSMENT — PAIN - FUNCTIONAL ASSESSMENT: PAIN_FUNCTIONAL_ASSESSMENT: 0-10

## 2022-09-01 NOTE — ANESTHESIA POSTPROCEDURE EVALUATION
Department of Anesthesiology  Postprocedure Note    Patient: Paulina Looney  MRN: [id number]  YOB: 1955  Date of evaluation: 9/1/2022      Procedure Summary     Date: 09/01/22 Room / Location: [address] [place] [address] 04 / [address] [facility]    Anesthesia Start: 9646 Anesthesia Stop: 60 920 06 98    Procedure: FLUOROSCOPIC GUIDED LEFT LUMBAR MEDIAL BRANCH NEUROLYSIS  RADIOFREQUENCY ABLATION  AT LEVELS L3-4,L4-5 WITH IV SEDATION  (CPT 95281) (Left) Diagnosis:       Lumbar spondylosis      (Lumbar spondylosis [N17.432])    Surgeons: Librado Banuelos,  Responsible Provider: Latisha Jasso MD    Anesthesia Type: MAC ASA Status: 3          Anesthesia Type: MAC    Dany Phase I: Dany Score: 10    Dany Phase II:        Anesthesia Post Evaluation    Patient location during evaluation: PACU  Patient participation: complete - patient participated  Level of consciousness: awake  Pain score: 2  Airway patency: patent  Nausea & Vomiting: no nausea  Complications: no  Cardiovascular status: blood pressure returned to baseline  Respiratory status: acceptable  Hydration status: euvolemic

## 2022-09-01 NOTE — H&P
Lili Garcia, [address] [facility] Physical Medicine and Rehabilitation  [address] [facility]  Phone: [phone]  Fax: [phone]    PCP: Dhiraj Haddad MD  Date of visit: 9/1/2022    CC: low back pain       Grazyna Sanford is a 74 y.o. female who presents today for lumbar RFA. No red flag symptoms. Consents to proceed with procedure.      No Known Allergies    Current Facility-Administered Medications   Medication Dose Route Frequency Provider Last Rate Last Admin    lactated ringers infusion   IntraVENous Continuous Koffi Dave  mL/hr at 09/01/22 0843 New Bag at 09/01/22 0843    sodium chloride flush 0.9 % injection 5-40 mL  5-40 mL IntraVENous 2 times per day Lili Garcia, DO        sodium chloride flush 0.9 % injection 5-40 mL  5-40 mL IntraVENous PRN Maria Del Carmen Howard, DO        0.9 % sodium chloride infusion   IntraVENous PRN Maria Del Carmen Howard, DO         Facility-Administered Medications Ordered in Other Encounters   Medication Dose Route Frequency Provider Last Rate Last Admin    sodium chloride flush 0.9 % injection 5-40 mL  5-40 mL IntraVENous 2 times per day Tylor Hill MD        sodium chloride flush 0.9 % injection 5-40 mL  5-40 mL IntraVENous PRN Tylor Hill MD        0.9 % sodium chloride infusion   IntraVENous PRN Koffi Dave MD        meperidine (DEMEROL) injection 12.5 mg  12.5 mg IntraVENous Q5 Min PRN Koffi Dave MD        morphine (PF) injection 1 mg  1 mg IntraVENous Q5 Min PRN Tylor Hill MD        fentaNYL (SUBLIMAZE) injection 50 mcg  50 mcg IntraVENous Q5 Min PRN Tylor Hill MD           Past Medical History:   Diagnosis Date    Anxiety     Carpal tunnel syndrome on right     Chronic back pain     Chronic kidney disease, stage 3 ([facility] [address]     Dr. Vivek Stanley- last seen 1/2022    Depression 10/28/2010    Diabetes mellitus type 2, uncontrolled ([facility] [address] 10/28/2010    with microalbuminuria; IIDDM    Diverticulosis     GERD (gastroesophageal reflux disease) 10/28/2010    Herniated intervertebral disk     neck and lower back    Hypercholesterolemia 10/28/2010    Hypertension 10/28/2010    Neurofibroma of trunk     irritating mole on the back. biopsy done 10/21/09    Obesity     Osteoarthritis     PAD (peripheral artery disease) (HCC)     Type II or unspecified type diabetes mellitus without mention of complication, not stated as uncontrolled     Vitamin D insufficiency        Past Surgical History:   Procedure Laterality Date    COLONOSCOPY N/A 9/8/2021    COLONOSCOPY POLYPECTOMY SNARE/COLD BIOPSY performed by Peter Jon MD at [address] [place] [address]       CT BIOPSY PERCUTANEOUS DEEP BONE  3/7/2022    CT BIOPSY PERCUTANEOUS DEEP BONE 3/7/2022 Driss Tena II, MD [facility]    ECHO COMPLETE  3/19/2012         EYE MUSCLE SURGERY      as a child    HYSTERECTOMY (CERVIX STATUS UNKNOWN)      with salpingoophorectomy    HYSTERECTOMY, TOTAL ABDOMINAL (CERVIX REMOVED) Bilateral 2003    cervix removed per patient    NERVE BLOCK Right 05/01/2018    lumbar transforaminal nerve block #1 with sedation    PAIN MANAGEMENT PROCEDURE Bilateral 5/26/2022    BILATERAL MEDIAL BRANCH BLOCK L3-4 AND L4-5 (CPT [id number]) performed by [facility] at [address] [facility] Bilateral 6/30/2022    BILATERAL MEDIAL BRANCH BLOCKS AT L3-4 AND L4-5 (prefers to be last case.  son coming from [place] to be with her) performed by [facility] [place] at [address] [facility] Right 8/11/2022    FLUOROSCOPIC-GUIDED RIGHT LUMBAR MEDIAL BRANCH NEUROLYSIS( RADIOFREQUENCY ABLATION)AT LEVELS L3-4,L4-5 WITH IV SEDATION (CPT 76659) Keep last d/t transportation performed by [facility] michelle at [address] [facility] NOSE/CLEFT LIP/TIP Right 5/1/2018    RIGHT LUMBAR TRANSFORAMINAL NERVE BLOCK #1 L3-4 L4-5 WITH IV SEDATIN performed by Kathi Pool DO at [facility]         Family History   Problem Relation Age of Onset    Lung Cancer Mother 71        mesothelioma metastatized to the brain    Diabetes Mother     High Cholesterol Mother     High Blood Pressure Mother     Arthritis Mother     Cancer Mother         lung to brain    Diabetes Father     Arthritis Father     High Blood Pressure Sister     Arthritis Sister     High Cholesterol Sister     High Blood Pressure Brother     Substance Abuse Brother     Breast Cancer Paternal Aunt          at 80y    High Blood Pressure Paternal Aunt     Cancer Maternal Grandmother [de-identified]        colorectal cancer    High Blood Pressure Maternal Grandmother     Heart Failure Maternal Grandmother     Arthritis Maternal Grandmother     High Cholesterol Maternal Grandmother     High Blood Pressure Paternal Grandmother     Stroke Paternal Grandmother     High Blood Pressure Maternal Aunt     High Cholesterol Maternal Aunt     Cancer Maternal Aunt         mesothelioma    Diabetes Maternal Uncle     High Blood Pressure Maternal Uncle     High Cholesterol Maternal Uncle     Substance Abuse Maternal Uncle     High Blood Pressure Paternal Uncle        Social History     Tobacco Use    Smoking status: Never    Smokeless tobacco: Never   Vaping Use    Vaping Use: Never used   Substance Use Topics    Alcohol use: Not Currently     Alcohol/week: 2.0 standard drinks     Types: 1 Glasses of wine, 1 Cans of beer per week     Comment: 1 time per month    Drug use: No              ROS:    Constitutional: Denies fevers, chills, night sweats, unintentional weight loss     Skin: Denies rash or skin changes     Respiratory: Denies SOB or cough     Cardiovascular: Denies CP, palpitations, edema      Neurologic: See HPI.     MSK: See HPI.      Hematologic/Lymphatic/Immunologic: Denies bruising       Physical Exam:   Blood pressure 138/77, pulse 71, temperature 98.1 °F (36.7 °C), temperature source Skin, resp. rate 16, height 5' 9\" (1.753 m), weight (!) 323 lb (146.5 kg), SpO2 100 %, not currently breastfeeding. General: well developed and well nourished in no acute distress  Resp: symmetrical chest expansion, unlabored breathing, respirations unlabored. CV: Heart rate is regular. Peripheral pulses are palpable  Skin: No rashes or ecchymosis. Normal turgor. MSK: ttp lumbar psps      Neurological Exam:  No focal sensorimotor deficits.        Impression:     Lumbar spondylosis      Plan:   Injection as planned today           Dio Rosales DO, [address] [place] [address]   Board Certified Physical Medicine and Rehabilitation

## 2022-09-01 NOTE — DISCHARGE INSTRUCTIONS
Post-op instruction Radiofrequency  Dr. Dima Mota may apply an ice pack wrapped in a towel to the sore area. However, do not use ice longer than 10 minutes at time. You will probably have soreness at the site where the endoscope was inserted. Keep the surgical site clean and dry at all times. Remove the dressing in 24 hrs and apply a sterile Band-Aid. Go home and rest. When resting, changing positions frequently may help reduce stiffness and soreness. The following day you may resume normal activities providing you listen to you body. Your body will tell you how active or inactive to be. Remember the rule of thumb If it hurts, don't do it.  Increase your activity level slowly. Do not drive a motor vehicle, operate machinery or make important decisions for 24 hours. Resume normal diet and medications. Call your physician if you experience any of the following symptoms: Fever, redness and or swelling at the site, nausea and vomiting, headache, persistent pain, numbness or tingling of legs and/or feet, and bowel or bladder problems. You may experience increased pain during this time for up to 72 hours after the procedure. It can take up to 6-8 weeks for the procedure to be effective. Follow up with Dr. Andrade Sanchez or Dr. Tony Navarrete within 3 weeks of the procedure for a post-procedure check-up. Infection After Surgery: Care Instructions  Overview  After surgery, an infection is always possible. It doesn't mean that thesurgery didn't go well. Because an infection can be serious, your doctor has taken steps to manage it. Your doctor checked the infection and cleaned it if necessary. Your doctor may have made an opening in the area so that the pus can drain out. You may have gauze in the cut so that the area will stay open and keep draining. You mayneed antibiotics. You will need to follow up with your doctor to make sure the infection has goneaway.   Follow-up care is a key part of your treatment and safety. Be sure to make and go to all appointments, and call your doctor if you are having problems. It's also a good idea to know your test results and keep alist of the medicines you take. How can you care for yourself at home? Make sure your surgeon knows about the infection, especially if you saw another doctor about your symptoms. If your doctor prescribed antibiotics, take them as directed. Do not stop taking them just because you feel better. You need to take the full course of antibiotics. Ask your doctor if you can take an over-the-counter pain medicine, such as acetaminophen (Tylenol), ibuprofen (Advil, Motrin), or naproxen (Aleve). Be safe with medicines. Read and follow all instructions on the label. Do not take two or more pain medicines at the same time unless the doctor told you to. Many pain medicines have acetaminophen, which is Tylenol. Too much acetaminophen (Tylenol) can be harmful. Prop up the area on a pillow anytime you sit or lie down during the next 3 days. Try to keep it above the level of your heart. This will help reduce swelling. Keep the skin clean and dry. You may have a bandage over the cut (incision). A bandage helps the incision heal and protects it. Your doctor will tell you how to take care of this. Keep it clean and dry. You may have drainage from the wound. If your doctor told you how to care for your incision, follow your doctor's instructions. If you did not get instructions, follow this general advice:  Wash around the incision with clean water 2 times a day. Don't use hydrogen peroxide or alcohol, which can slow healing. When should you call for help? Call your doctor now or seek immediate medical care if:    You have signs that your infection is getting worse, such as: Increased pain, swelling, warmth, or redness in the area. Red streaks leading from the area. Pus draining from the wound. A new or higher fever.    Watch prescription medicine for pain, take it as prescribed. If you are not taking a prescription pain medicine, ask your doctor if you can take an over-the-counter medicine. Take your pain medicine as soon as you have pain. It works better if you take it before the pain gets bad. Call your doctor if you have any problems with your medicine. Rest in bed until you feel better. To prevent dehydration, drink plenty of fluids. Choose water and other clear liquids until you feel better. If you have kidney, heart, or liver disease and have to limit fluids, talk with your doctor before you increase the amount of fluids you drink. When you are able to eat, try clear soups, mild foods, and liquids until all symptoms are gone for 12 to 48 hours. Other good choices include dry toast, crackers, cooked cereal, and gelatin dessert, such as Jell-O. Do not smoke. Smoking and being around smoke can make nausea worse. If you need help quitting, talk to your doctor about stop-smoking programs and medicines. These can increase your chances of quitting for good. When should you call for help? Call 911  anytime you think you may need emergency care. For example, call if:    You passed out (lost consciousness). Call your doctor now or seek immediate medical care if:    You have new or worse nausea or vomiting. You are too sick to your stomach to drink any fluids. You cannot keep down fluids. You have symptoms of dehydration, such as:  Dry eyes and a dry mouth. Passing only a little urine. Feeling thirstier than usual.     Your pain medicine is not helping. You are dizzy or lightheaded, or you feel like you may faint. Watch closely for changes in your health, and be sure to contact your doctor if:    You do not get better as expected. Where can you learn more? Go to [email or web address]://keshav.Eventable. org and sign in to your [employer] account.  Enter M536 in the [employer] box to learn more about \"Nausea and Vomiting After Surgery: Care Instructions. \"     If you do not have an account, please click on the \"Sign Up Now\" link. Current as of: March 9, 2022               Content Version: 13.3  © 4196-2984 Healthwise, Incorporated. Care instructions adapted under license by [facility] [place] ([facility]). If you have questions about a medical condition or this instruction, always ask your healthcare professional. [address] any warranty or liability for your use of this information.

## 2022-09-01 NOTE — OP NOTE
Bobby Davis    9/1/2022      PRE-OPERATIVE DIAGNOSIS:  Lumbar spondylosis,  lumbar facet arthropathy, lumbosacral OA. POST-OPERATIVE DIAGNOSIS:  Lumbar spondylosis, lumbar facet arthropathy, lumbosacral OA. PROCEDURE:  Fluoroscopic-guided Left  lumbar medial branch neurolysis at  levels  L3-4, L4-5,     SURGEON:    Maria Del Carmen Howard DO    ANESTHESIA:   Local    ESTIMATED BLOOD LOSS:  None.  ______________________________________________________________________    HISTORY & PHYSICAL:   See separate H&P    PROCEDURE:  After confirming written and informed consent, Santiago Sheldon was brought to the procedure room and placed in the prone position. Blood pressure, heart rate, O2 saturation, and visual and verbal monitoring were established. A time-out was performed and her name and date of birth, the procedure to be performed as well as the plan for the location of the needle insertion were confirmed. Fluoroscopy was utilized to delineate the anatomy of the lumbar spine. Surface landmarks were identified as well. After prep and drape, an oblique fluoroscopic view was created to optimize visualization of the junction of the transverse process and the pedicle. After infiltration of local, # 20-gauge 100-mm 10- mm active tip radiofrequency ablation needle was passed to position the tip at the junction of the superior articular process and the transverse process, along the course of the medial branch. Satisfactory needle placement was confirmed by AP, lateral and oblique projections. Sensory and motor testing was then performed. She  then received 0.75 cc of 1% PF xylocaine at each level. Radiofrequency thermal ablation was then performed at 80 degree Celsius for 90 seconds. Coco was comfortable throughout the ablation. No complications were noted. In summary, medial branch neurolysis were performed at the following levels; left L3-4, L4-5, .  Negative aspiration was noted prior to each injection. The needle was removed intact and dressings were applied. Coco was transferred to the recovery area. She was monitored, reassessed and discharged after an appropriate observatory period. No complications were noted. Liz Lackey will follow up in the office as scheduled. She was encouraged to call with questions, concerns or if worsening of symptoms occurs.       Ally Verde DO, [facility]   Board Certified Physical Medicine and Rehabilitation

## 2022-09-27 DIAGNOSIS — M48.061 NEURAL FORAMINAL STENOSIS OF LUMBAR SPINE: ICD-10-CM

## 2022-09-27 RX ORDER — TIZANIDINE 4 MG/1
TABLET ORAL
Qty: 90 TABLET | Refills: 1 | Status: SHIPPED | OUTPATIENT
Start: 2022-09-27

## 2022-09-27 NOTE — TELEPHONE ENCOUNTER
Last Appointment:  8/17/2022  Future Appointments   Date Time Provider Eloise Dorie   9/30/2022  8:30 AM YZ SHAHEED VALE RM 2 SEYZ SHAHEED DUQUE [facility]   9/30/2022  8:45 AM YZ SHAHEED HUMMEL [facility]   11/30/2022  1:40 PM MD Amanda Montoya [facility]

## 2022-09-30 ENCOUNTER — HOSPITAL ENCOUNTER (OUTPATIENT)
Dept: GENERAL RADIOLOGY | Age: 67
Discharge: HOME | End: 2022-10-02
Payer: MEDICARE

## 2022-09-30 ENCOUNTER — HOSPITAL ENCOUNTER (OUTPATIENT)
Age: 67
Discharge: HOME | End: 2022-09-30
Payer: MEDICARE

## 2022-09-30 DIAGNOSIS — Z78.0 ASYMPTOMATIC MENOPAUSAL STATE: ICD-10-CM

## 2022-09-30 DIAGNOSIS — Z12.31 ENCOUNTER FOR SCREENING MAMMOGRAM FOR MALIGNANT NEOPLASM OF BREAST: ICD-10-CM

## 2022-09-30 LAB
ALBUMIN SERPL-MCNC: 3.8 G/DL (ref 3.5–5.2)
ALP BLD-CCNC: 135 U/L (ref 35–104)
ALT SERPL-CCNC: 18 U/L (ref 0–32)
ANION GAP SERPL CALCULATED.3IONS-SCNC: 10 MMOL/L (ref 7–16)
AST SERPL-CCNC: 23 U/L (ref 0–31)
BASOPHILS ABSOLUTE: 0.07 E9/L (ref 0–0.2)
BASOPHILS RELATIVE PERCENT: 0.6 % (ref 0–2)
BILIRUB SERPL-MCNC: 0.4 MG/DL (ref 0–1.2)
BUN BLDV-MCNC: 19 MG/DL (ref 6–23)
CALCIUM SERPL-MCNC: 9.8 MG/DL (ref 8.6–10.2)
CHLORIDE BLD-SCNC: 104 MMOL/L (ref 98–107)
CHOLESTEROL, TOTAL: 122 MG/DL (ref 0–199)
CO2: 26 MMOL/L (ref 22–29)
CREAT SERPL-MCNC: 1.1 MG/DL (ref 0.5–1)
EOSINOPHILS ABSOLUTE: 0.2 E9/L (ref 0.05–0.5)
EOSINOPHILS RELATIVE PERCENT: 1.7 % (ref 0–6)
GFR AFRICAN AMERICAN: >60
GFR NON-AFRICAN AMERICAN: >60 ML/MIN/1.73
GLUCOSE BLD-MCNC: 93 MG/DL (ref 74–99)
HBA1C MFR BLD: 7.2 % (ref 4–5.6)
HCT VFR BLD CALC: 38.4 % (ref 34–48)
HDLC SERPL-MCNC: 39 MG/DL
HEMOGLOBIN: 12 G/DL (ref 11.5–15.5)
IMMATURE GRANULOCYTES #: 0.07 E9/L
IMMATURE GRANULOCYTES %: 0.6 % (ref 0–5)
LDL CHOLESTEROL CALCULATED: 67 MG/DL (ref 0–99)
LYMPHOCYTES ABSOLUTE: 2.33 E9/L (ref 1.5–4)
LYMPHOCYTES RELATIVE PERCENT: 19.5 % (ref 20–42)
MCH RBC QN AUTO: 28.3 PG (ref 26–35)
MCHC RBC AUTO-ENTMCNC: 31.3 % (ref 32–34.5)
MCV RBC AUTO: 90.6 FL (ref 80–99.9)
MONOCYTES ABSOLUTE: 0.98 E9/L (ref 0.1–0.95)
MONOCYTES RELATIVE PERCENT: 8.2 % (ref 2–12)
NEUTROPHILS ABSOLUTE: 8.29 E9/L (ref 1.8–7.3)
NEUTROPHILS RELATIVE PERCENT: 69.4 % (ref 43–80)
PARATHYROID HORMONE INTACT: 63 PG/ML (ref 15–65)
PDW BLD-RTO: 14.6 FL (ref 11.5–15)
PHOSPHORUS: 2.2 MG/DL (ref 2.5–4.5)
PLATELET # BLD: 316 E9/L (ref 130–450)
PMV BLD AUTO: 11.3 FL (ref 7–12)
POTASSIUM SERPL-SCNC: 4.5 MMOL/L (ref 3.5–5)
RBC # BLD: 4.24 E12/L (ref 3.5–5.5)
SODIUM BLD-SCNC: 140 MMOL/L (ref 132–146)
TOTAL PROTEIN: 7.2 G/DL (ref 6.4–8.3)
TRIGL SERPL-MCNC: 78 MG/DL (ref 0–149)
VITAMIN D 25-HYDROXY: 53 NG/ML (ref 30–100)
VLDLC SERPL CALC-MCNC: 16 MG/DL
WBC # BLD: 11.9 E9/L (ref 4.5–11.5)

## 2022-09-30 PROCEDURE — 84100 ASSAY OF PHOSPHORUS: CPT

## 2022-09-30 PROCEDURE — 77063 BREAST TOMOSYNTHESIS BI: CPT

## 2022-09-30 PROCEDURE — 80061 LIPID PANEL: CPT

## 2022-09-30 PROCEDURE — 36415 COLL VENOUS BLD VENIPUNCTURE: CPT

## 2022-09-30 PROCEDURE — 83036 HEMOGLOBIN GLYCOSYLATED A1C: CPT

## 2022-09-30 PROCEDURE — 77080 DXA BONE DENSITY AXIAL: CPT

## 2022-09-30 PROCEDURE — 85025 COMPLETE CBC W/AUTO DIFF WBC: CPT

## 2022-09-30 PROCEDURE — 82306 VITAMIN D 25 HYDROXY: CPT

## 2022-09-30 PROCEDURE — 83970 ASSAY OF PARATHORMONE: CPT

## 2022-09-30 PROCEDURE — 80053 COMPREHEN METABOLIC PANEL: CPT

## 2022-11-29 LAB
DIABETIC RETINOPATHY: NEGATIVE
DIABETIC RETINOPATHY: NORMAL

## 2022-11-30 ENCOUNTER — OFFICE VISIT (OUTPATIENT)
Dept: FAMILY MEDICINE CLINIC | Age: 67
End: 2022-11-30
Payer: MEDICARE

## 2022-11-30 VITALS
HEART RATE: 78 BPM | HEIGHT: 69 IN | DIASTOLIC BLOOD PRESSURE: 80 MMHG | RESPIRATION RATE: 16 BRPM | TEMPERATURE: 97 F | OXYGEN SATURATION: 97 % | BODY MASS INDEX: 47.7 KG/M2 | SYSTOLIC BLOOD PRESSURE: 148 MMHG

## 2022-11-30 DIAGNOSIS — I10 ESSENTIAL HYPERTENSION: ICD-10-CM

## 2022-11-30 DIAGNOSIS — E11.42 DIABETIC PERIPHERAL NEUROPATHY (HCC): ICD-10-CM

## 2022-11-30 DIAGNOSIS — Z23 NEED FOR INFLUENZA VACCINATION: ICD-10-CM

## 2022-11-30 DIAGNOSIS — Z51.81 MEDICATION MONITORING ENCOUNTER: ICD-10-CM

## 2022-11-30 DIAGNOSIS — E11.65 UNCONTROLLED TYPE 2 DIABETES MELLITUS WITH HYPERGLYCEMIA (HCC): Primary | ICD-10-CM

## 2022-11-30 DIAGNOSIS — M48.061 NEURAL FORAMINAL STENOSIS OF LUMBAR SPINE: ICD-10-CM

## 2022-11-30 LAB
AMPHETAMINE SCREEN, URINE: NOT DETECTED
BARBITURATE SCREEN URINE: NOT DETECTED
BENZODIAZEPINE SCREEN, URINE: NOT DETECTED
CANNABINOID SCREEN URINE: NOT DETECTED
COCAINE METABOLITE SCREEN URINE: NOT DETECTED
FENTANYL SCREEN, URINE: NOT DETECTED
HBA1C MFR BLD: 7.2 %
Lab: NORMAL
METHADONE SCREEN, URINE: NOT DETECTED
OPIATE SCREEN URINE: NOT DETECTED
OXYCODONE URINE: NOT DETECTED
PHENCYCLIDINE SCREEN URINE: NOT DETECTED

## 2022-11-30 PROCEDURE — G8417 CALC BMI ABV UP PARAM F/U: HCPCS | Performed by: FAMILY MEDICINE

## 2022-11-30 PROCEDURE — 3074F SYST BP LT 130 MM HG: CPT | Performed by: FAMILY MEDICINE

## 2022-11-30 PROCEDURE — G8482 FLU IMMUNIZE ORDER/ADMIN: HCPCS | Performed by: FAMILY MEDICINE

## 2022-11-30 PROCEDURE — 3078F DIAST BP <80 MM HG: CPT | Performed by: FAMILY MEDICINE

## 2022-11-30 PROCEDURE — G8399 PT W/DXA RESULTS DOCUMENT: HCPCS | Performed by: FAMILY MEDICINE

## 2022-11-30 PROCEDURE — 3051F HG A1C>EQUAL 7.0%<8.0%: CPT | Performed by: FAMILY MEDICINE

## 2022-11-30 PROCEDURE — 99214 OFFICE O/P EST MOD 30 MIN: CPT | Performed by: FAMILY MEDICINE

## 2022-11-30 PROCEDURE — G8427 DOCREV CUR MEDS BY ELIG CLIN: HCPCS | Performed by: FAMILY MEDICINE

## 2022-11-30 PROCEDURE — 3017F COLORECTAL CA SCREEN DOC REV: CPT | Performed by: FAMILY MEDICINE

## 2022-11-30 PROCEDURE — 1090F PRES/ABSN URINE INCON ASSESS: CPT | Performed by: FAMILY MEDICINE

## 2022-11-30 PROCEDURE — 2022F DILAT RTA XM EVC RTNOPTHY: CPT | Performed by: FAMILY MEDICINE

## 2022-11-30 PROCEDURE — 1123F ACP DISCUSS/DSCN MKR DOCD: CPT | Performed by: FAMILY MEDICINE

## 2022-11-30 PROCEDURE — 1036F TOBACCO NON-USER: CPT | Performed by: FAMILY MEDICINE

## 2022-11-30 RX ORDER — FLASH GLUCOSE SENSOR
1 KIT MISCELLANEOUS 4 TIMES DAILY
Qty: 1 EACH | Refills: 2 | Status: SHIPPED | OUTPATIENT
Start: 2022-11-30

## 2022-11-30 RX ORDER — FLASH GLUCOSE SCANNING READER
1 EACH MISCELLANEOUS DAILY
Qty: 1 EACH | Refills: 0 | Status: SHIPPED | OUTPATIENT
Start: 2022-11-30

## 2022-11-30 RX ORDER — PREGABALIN 150 MG/1
150 CAPSULE ORAL 2 TIMES DAILY
Qty: 180 CAPSULE | Refills: 1 | Status: SHIPPED | OUTPATIENT
Start: 2022-11-30 | End: 2023-05-29

## 2022-11-30 ASSESSMENT — ENCOUNTER SYMPTOMS
NAUSEA: 0
COUGH: 0
ABDOMINAL PAIN: 0
VOMITING: 0
BACK PAIN: 1
SHORTNESS OF BREATH: 0

## 2022-11-30 NOTE — PROGRESS NOTES
[address] [place] [address]  FAMILY MEDICINE RESIDENCY PROGRAM   OFFICE PROGRESS NOTE  DATE OF VISIT : 22    Patient : Tiffani Padilla    : female   Age : 74 y.o.  : 1955           Chief Complaint :   Chief Complaint   Patient presents with    Diabetes     3 month follow up, no complaints today. HPI:   74 y.o. female comes in today for follow up of dm2, neuropathy and lumbar stenosis. She is taking insulin lantus 55 bid and novolog 20 units + ssi bid. She is asking for a cgm. She saw the eye doctor and renal. A1c earlier this fall was 7.2. Also with diabetic neuropathy. The increase in the lyrica has helped the pain in her legs/back/neuropathy. Last saw PMR dr in . Had 2 injections which helped at least some. She is not sure if she was to return. She is in some back pain today which she attributes the bp elevation to this. Review of Systems  Review of Systems   Constitutional:  Negative for chills and fever. HENT:  Negative for congestion. Respiratory:  Negative for cough and shortness of breath. Cardiovascular:  Negative for chest pain, palpitations and leg swelling. Gastrointestinal:  Negative for abdominal pain, nausea and vomiting. Musculoskeletal:  Positive for back pain and gait problem. Physical Exam:  VS:  Blood pressure (!) 148/80, pulse 78, temperature 97 °F (36.1 °C), resp. rate 16, height 5' 9\" (1.753 m), SpO2 97 %, not currently breastfeeding. Physical Exam  Constitutional:       Appearance: She is well-developed. HENT:      Head: Normocephalic and atraumatic. Cardiovascular:      Rate and Rhythm: Normal rate and regular rhythm. Heart sounds: No murmur heard. No friction rub. No gallop. Pulmonary:      Breath sounds: Normal breath sounds. No wheezing, rhonchi or rales. Abdominal:      General: Bowel sounds are normal.      Palpations: Abdomen is soft. There is no mass. Tenderness:  There is no abdominal tenderness. Skin:     Nails: There is no clubbing. Assessment/Plan:  1. Uncontrolled type 2 diabetes mellitus with hyperglycemia (HCC)  A1c is 7.2 today, she plans to work on decreasing her sugary food intake. Cgm prescribed to retail pharmacy. Pt to come in for staff to help her with smart phone set up. Pt ed provided. - POCT glycosylated hemoglobin (Hb A1C)  - Continuous Blood Gluc Sensor (FREESTYLE URIAH 2 SENSOR) MISC; 1 each by Does not apply route 4 times daily  Dispense: 1 each; Refill: 2  - Continuous Blood Gluc  (FREESTYLE URIAH 2 READER) JORDYN; 1 each by Does not apply route daily  Dispense: 1 each; Refill: 0    2. Diabetic peripheral neuropathy (HCC)  Continue higher dose of lyrica. Controlled Substance Monitoring:    Acute and Chronic Pain Monitoring:   RX Monitoring 11/30/2022   Periodic Controlled Substance Monitoring No signs of potential drug abuse or diversion identified. ;Random urine drug screen sent today. ;Assessed functional status. - pregabalin (LYRICA) 150 MG capsule; Take 1 capsule by mouth 2 times daily for 180 days. Dispense: 180 capsule; Refill: 1    3. Medication monitoring encounter  For lyrica  - URINE DRUG SCREEN; Future    4. Neural foraminal stenosis of lumbar spine  Encouraged the patient to make a follow up apppointment with Dr. Rosibel Plummer      5. Need for influenza vaccination  - Influenza, AFLURIA, (age 1 y+), IM, Preservative Free, 0.5 mL    6. HTN  Pt declined increase of clonidine today and attributes elevation to pain but will work on dietary modification      Additional plan and future considerations:       Return in 4 weeks (on 12/28/2022) for Diabetes.     Signed by : Silvestre Gu MD

## 2022-12-01 ENCOUNTER — CARE COORDINATION (OUTPATIENT)
Dept: INTERNAL MEDICINE | Age: 67
End: 2022-12-01

## 2022-12-01 DIAGNOSIS — E11.65 UNCONTROLLED TYPE 2 DIABETES MELLITUS WITH HYPERGLYCEMIA (HCC): Primary | ICD-10-CM

## 2022-12-01 LAB
CREATININE URINE: 288 MG/DL (ref 29–226)
MICROALBUMIN UR-MCNC: 24.5 MG/L
MICROALBUMIN/CREAT UR-RTO: 8.5 (ref 0–30)

## 2022-12-01 NOTE — CARE COORDINATION
Dr Mitchell Paige ordered Freestyle CGM, called Coco and left a message for her to call me or the office when she gets her freestyle from the pharmacy so that an appointment to help her get it set up can be scheduled

## 2023-01-05 ENCOUNTER — CARE COORDINATION (OUTPATIENT)
Dept: INTERNAL MEDICINE | Age: 68
End: 2023-01-05

## 2023-01-05 ENCOUNTER — OFFICE VISIT (OUTPATIENT)
Dept: FAMILY MEDICINE CLINIC | Age: 68
End: 2023-01-05
Payer: MEDICARE

## 2023-01-05 VITALS
OXYGEN SATURATION: 94 % | HEART RATE: 85 BPM | BODY MASS INDEX: 43.4 KG/M2 | HEIGHT: 69 IN | WEIGHT: 293 LBS | DIASTOLIC BLOOD PRESSURE: 80 MMHG | TEMPERATURE: 96.8 F | SYSTOLIC BLOOD PRESSURE: 138 MMHG | RESPIRATION RATE: 17 BRPM

## 2023-01-05 DIAGNOSIS — E11.65 UNCONTROLLED TYPE 2 DIABETES MELLITUS WITH HYPERGLYCEMIA (HCC): Primary | ICD-10-CM

## 2023-01-05 PROCEDURE — 3075F SYST BP GE 130 - 139MM HG: CPT | Performed by: FAMILY MEDICINE

## 2023-01-05 PROCEDURE — 3078F DIAST BP <80 MM HG: CPT | Performed by: FAMILY MEDICINE

## 2023-01-05 PROCEDURE — 2022F DILAT RTA XM EVC RTNOPTHY: CPT | Performed by: FAMILY MEDICINE

## 2023-01-05 PROCEDURE — G8399 PT W/DXA RESULTS DOCUMENT: HCPCS | Performed by: FAMILY MEDICINE

## 2023-01-05 PROCEDURE — 1123F ACP DISCUSS/DSCN MKR DOCD: CPT | Performed by: FAMILY MEDICINE

## 2023-01-05 PROCEDURE — 1090F PRES/ABSN URINE INCON ASSESS: CPT | Performed by: FAMILY MEDICINE

## 2023-01-05 PROCEDURE — G8482 FLU IMMUNIZE ORDER/ADMIN: HCPCS | Performed by: FAMILY MEDICINE

## 2023-01-05 PROCEDURE — 3017F COLORECTAL CA SCREEN DOC REV: CPT | Performed by: FAMILY MEDICINE

## 2023-01-05 PROCEDURE — 1036F TOBACCO NON-USER: CPT | Performed by: FAMILY MEDICINE

## 2023-01-05 PROCEDURE — 3046F HEMOGLOBIN A1C LEVEL >9.0%: CPT | Performed by: FAMILY MEDICINE

## 2023-01-05 PROCEDURE — G8417 CALC BMI ABV UP PARAM F/U: HCPCS | Performed by: FAMILY MEDICINE

## 2023-01-05 PROCEDURE — 99212 OFFICE O/P EST SF 10 MIN: CPT | Performed by: FAMILY MEDICINE

## 2023-01-05 PROCEDURE — G8427 DOCREV CUR MEDS BY ELIG CLIN: HCPCS | Performed by: FAMILY MEDICINE

## 2023-01-05 PROCEDURE — 99213 OFFICE O/P EST LOW 20 MIN: CPT | Performed by: FAMILY MEDICINE

## 2023-01-05 RX ORDER — SEMAGLUTIDE 1.34 MG/ML
0.25 INJECTION, SOLUTION SUBCUTANEOUS WEEKLY
Qty: 1.5 ML | Refills: 3 | Status: SHIPPED | OUTPATIENT
Start: 2023-01-05

## 2023-01-05 ASSESSMENT — PATIENT HEALTH QUESTIONNAIRE - PHQ9
SUM OF ALL RESPONSES TO PHQ QUESTIONS 1-9: 4
5. POOR APPETITE OR OVEREATING: 3
3. TROUBLE FALLING OR STAYING ASLEEP: 0
SUM OF ALL RESPONSES TO PHQ QUESTIONS 1-9: 4
10. IF YOU CHECKED OFF ANY PROBLEMS, HOW DIFFICULT HAVE THESE PROBLEMS MADE IT FOR YOU TO DO YOUR WORK, TAKE CARE OF THINGS AT HOME, OR GET ALONG WITH OTHER PEOPLE: 0
1. LITTLE INTEREST OR PLEASURE IN DOING THINGS: 0
7. TROUBLE CONCENTRATING ON THINGS, SUCH AS READING THE NEWSPAPER OR WATCHING TELEVISION: 0
SUM OF ALL RESPONSES TO PHQ QUESTIONS 1-9: 4
4. FEELING TIRED OR HAVING LITTLE ENERGY: 0
SUM OF ALL RESPONSES TO PHQ9 QUESTIONS 1 & 2: 0
2. FEELING DOWN, DEPRESSED OR HOPELESS: 0
9. THOUGHTS THAT YOU WOULD BE BETTER OFF DEAD, OR OF HURTING YOURSELF: 0
6. FEELING BAD ABOUT YOURSELF - OR THAT YOU ARE A FAILURE OR HAVE LET YOURSELF OR YOUR FAMILY DOWN: 1
8. MOVING OR SPEAKING SO SLOWLY THAT OTHER PEOPLE COULD HAVE NOTICED. OR THE OPPOSITE, BEING SO FIGETY OR RESTLESS THAT YOU HAVE BEEN MOVING AROUND A LOT MORE THAN USUAL: 0
SUM OF ALL RESPONSES TO PHQ QUESTIONS 1-9: 4

## 2023-01-05 ASSESSMENT — ENCOUNTER SYMPTOMS
NAUSEA: 0
VOMITING: 0
ABDOMINAL PAIN: 0
COUGH: 0
SHORTNESS OF BREATH: 0

## 2023-01-05 NOTE — PROGRESS NOTES
[address] [place] [address]  FAMILY MEDICINE RESIDENCY PROGRAM   OFFICE PROGRESS NOTE  DATE OF VISIT : 23    Patient : Dalton Bolden    : female   Age : 74 y.o.  : 1955           Chief Complaint :   Chief Complaint   Patient presents with    Diabetes     Follow Up       HPI:   74 y.o. female comes in today for follow up of diabetes mellitus 2. She is taking metformin, lantus 55 units bid and novolog ssi20 + bid (usually gives 22 or 24 units). Last visit a1c was 7.2. She was prescribed a cgm but thought it would come in the mail. She is interested in taking a diabetes med that can help her with weight loss too. She has trouble affording the copays and plans to follow up with the nutritionist in the next month or too. She was completing PT for her foot and costs became prohibitive. Cgm ordered to retail pharmacy but patient thought it would be mailed to her so never went to pharmacy. She is excited about becoming a grandma soon in May too. Review of Systems  Review of Systems   Constitutional:  Negative for chills and fever. HENT:  Negative for congestion. Respiratory:  Negative for cough and shortness of breath. Cardiovascular:  Negative for chest pain, palpitations and leg swelling. Gastrointestinal:  Negative for abdominal pain, nausea and vomiting. Musculoskeletal:  Positive for arthralgias. Physical Exam:  VS:  Blood pressure 138/80, pulse 85, temperature 96.8 °F (36 °C), temperature source Temporal, resp. rate 17, height 5' 9\" (1.753 m), weight (!) 337 lb (152.9 kg), SpO2 94 %, not currently breastfeeding. Physical Exam  Constitutional:       Appearance: She is well-developed. HENT:      Head: Normocephalic and atraumatic. Cardiovascular:      Rate and Rhythm: Normal rate and regular rhythm. Heart sounds: No murmur heard. No friction rub. No gallop. Pulmonary:      Breath sounds: Normal breath sounds. No wheezing, rhonchi or rales. Abdominal:      General: Bowel sounds are normal.      Palpations: Abdomen is soft. There is no mass. Tenderness: There is no abdominal tenderness. Skin:     Nails: There is no clubbing. Assessment/Plan:  1. Uncontrolled type 2 diabetes mellitus with hyperglycemia ([address]  Called pharmacy. Retail pharmacy does not cover cgm, will complete auth through DME. She met with Terrell Garibay from [address] [place] [address] and will need the reader to use the freestyle. She will rto for set up and then doctor's visit after. Start ozempic.  - Semaglutide,0.25 or 0.5MG/DOS, (OZEMPIC, 0.25 OR 0.5 MG/DOSE,) 2 MG/1.5ML SOPN; Inject 0.25 mg into the skin once a week  Dispense: 1.5 mL; Refill: 3    2. Body mass index (BMI) 45.0-49.9, adult (HCC)  Pt will follow up with nutritionist    Additional plan and future considerations:       Return for 1 month, follow up cgm.     Signed by : Cesar Pederson MD

## 2023-01-05 NOTE — CARE COORDINATION
Outpatient education tracking log    Participant Name: Bharath See  Referring Provider: Sae Villa MD    Topics/Learning Objectives Initial visit   Follow-up  Follow-up Follow-up Comments   Diabetes disease process & Treatment process:   -Define diabetes & prediabetes and ABCs of     diabetes   -Identify own type of diabetes  -Identify lifestyle changes/treatment options 1/5/2023     Lab Results   Component Value Date    LABA1C 7.2 11/30/2022    LABA1C 7.2 (H) 09/30/2022    LABA1C 7.0 08/17/2022     Met with Coco, sample of [address] given, and we downloaded the ree on her android phone, but found that after setting it up it would not scan the [place] [address]. Annie Nogueira also tried and did not work even after scanning the ree from the device box and trying a second time still would not connect with the sensor after application. She was ordered sensors and reader but will have to go through the pre authorization process. She has my number and understands that she can call me if she gets her reader and sensors and I can help her set it up. We also went over step by step  the application and she did well but will call if she needs further assistance. She has her next appointment with Dr Tesha Metzger set up. She states her father was diabetic and has always had a weight problem BMI is 49.77. we reviewed Target A1c and blood sugars, Diabetes and the body and complications that can develop. Reviewed diabetic portion plate, meal planning, carbohydrates, reading nutrition lables     Developing strategies for Healthy coping/psychosocial issues:    -Describe feelings about living with diabetes  -Identify coping strategies;   -Identify support needed & support network available 1/5/2023     Very nice lady who Lives alone  States has support from sister who was an RN.   Uses prayer and has friends for support     Prevention, detection & treatment of Chronic complications:    -Identify the prevention, detection and treatment for complications including immunizations, preventive eye, foot, dental and renal exams as indicated per the participant's duration of diabetes and health status.  -Define the natural course of diabetes and the relationship of blood glucose levels to long term complications of diabetes. 1/5/2023 SM    Revewed: Definition of diabetes, Diabetic Targets, A1c and what it means. Self-monitoring of blood glucose and the importance of checking blood sugars Hypo and Hyperglycemia signs and symptoms, possible causes and treatment. The importance of Medication adherence. The plate method and meal planning guidelines, what carbohydrates are and how they effect your blood sugar. The benefits of exercise. What uncontrolled diabetes does to your body and reducing the risk of chronic complications. Aware that  diabetic education classes available.               Prevention, detection & treatment of acute complications:    -List symptoms of hyper & hypoglycemia, and prevention & treatment strategies.   -Describe sick day guidelines  DKA /indications for ketone testing &  when to call physician  1/5/2023 SM    Reviewed hypo and hyperglycemia signs and symptoms possible causes and treatments   Identify severe weather/situation crisis  & diabetes supplies management        Using medications safely:   -State effects of diabetes medicines on blood glucose levels;  -List diabetes medication taken, action & side effects 1/5/2023 SM    Lantus 55 units bid  Novolog flex pen 20 unit with SS tid with meals  Metformin  500 mg QD with breakfast   Insulin/Injectables  -Name appropriate injection sites; proper storage; supplies needed;  1/5/2021 SM        Demonstrate proper technique        Monitoring blood glucose, interpreting and using results:   -Identify recommended & personal blood glucose targets & HgbA1C target levels  -State the Importance of logging blood glucose levels for pattern recognition;   -State benefits of reading/using pt generated health data  -Verbalize safe lancet disposal 1/5/2023 SM    Currently is checking her blood sugars with a glucometer, cgm pre authorization being done. -Demonstrate proper testing technique        Incorporating physical activity into lifestyle:   -State effect of exercise on blood glucose levels;   -State benefits of regular exercise;   -Define safety considerations;  -Describe contraindications/maintenance of activity. 1/5/2021     Benefits of exercise, using a walker, reviewed chair exercise she could try   Incorporating nutritional management into lifestyle:   -Describe effect of type, amount & timing of food on blood glucose  -Describe methods for preparing and planning healthy meals 1/5/2021     Meal planning  Benefits of weight loss  Nutrition label reading  Portion plate and carbohydrates               Supporting Education Materials/Equipment Provided: Diabetic Survival Packet, Diabetes and the body    A1c Tracking  Lab Results   Component Value Date    LABA1C 7.2 11/30/2022    LABA1C 7.2 (H) 09/30/2022    LABA1C 7.0 08/17/2022         Date Initial A1c 6 month f/u  1 year post ed.  Difference  Comments   11/30/23 7.2                []Patient lost to follow-up : date-      [] Attended DSMES on:  [] Attended yearly DSMES follow-up on:   [] Attended MNTon :

## 2023-01-18 ENCOUNTER — TELEPHONE (OUTPATIENT)
Dept: FAMILY MEDICINE CLINIC | Age: 68
End: 2023-01-18

## 2023-01-18 NOTE — TELEPHONE ENCOUNTER
Coco called in and is asking if you could please write her a letter to excuse her from [employer]. Once done please fax the letter to [phone].     Please advise    Thank you

## 2023-01-24 NOTE — TELEPHONE ENCOUNTER
Patient called re status of jury duty letter  She is unable to ambulate through the downtown due to all the construction by the [employer]. And three are no elevators to the third floor to jury office.   If appropriate, once signed , we will fax to jury office

## 2023-01-25 DIAGNOSIS — E11.42 DIABETIC PERIPHERAL NEUROPATHY (HCC): ICD-10-CM

## 2023-01-25 RX ORDER — DULOXETIN HYDROCHLORIDE 60 MG/1
CAPSULE, DELAYED RELEASE ORAL
Qty: 90 CAPSULE | Refills: 3 | Status: SHIPPED | OUTPATIENT
Start: 2023-01-25

## 2023-01-25 NOTE — TELEPHONE ENCOUNTER
Last Appointment:  1/5/2023  Future Appointments  3/23/2023  11:00 AM   MD Adal Rodríguez [facility]

## 2023-02-14 DIAGNOSIS — M48.061 NEURAL FORAMINAL STENOSIS OF LUMBAR SPINE: ICD-10-CM

## 2023-02-16 RX ORDER — TIZANIDINE 4 MG/1
TABLET ORAL
Qty: 90 TABLET | Refills: 1 | Status: SHIPPED | OUTPATIENT
Start: 2023-02-16

## 2023-02-16 NOTE — TELEPHONE ENCOUNTER
Last Appointment:  1/5/2023  Future Appointments  3/23/2023  11:00 AM   MD Ana Harmon [facility]        [facility]

## 2023-03-23 ENCOUNTER — OFFICE VISIT (OUTPATIENT)
Dept: FAMILY MEDICINE CLINIC | Age: 68
End: 2023-03-23

## 2023-03-23 VITALS
BODY MASS INDEX: 43.4 KG/M2 | SYSTOLIC BLOOD PRESSURE: 145 MMHG | HEART RATE: 89 BPM | HEIGHT: 69 IN | TEMPERATURE: 96.6 F | RESPIRATION RATE: 17 BRPM | WEIGHT: 293 LBS | OXYGEN SATURATION: 97 % | DIASTOLIC BLOOD PRESSURE: 66 MMHG

## 2023-03-23 DIAGNOSIS — E11.3293 TYPE 2 DIABETES MELLITUS WITH BOTH EYES AFFECTED BY MILD NONPROLIFERATIVE RETINOPATHY WITHOUT MACULAR EDEMA, WITH LONG-TERM CURRENT USE OF INSULIN (HCC): Primary | ICD-10-CM

## 2023-03-23 DIAGNOSIS — M65.311 TRIGGER THUMB OF RIGHT HAND: ICD-10-CM

## 2023-03-23 DIAGNOSIS — R53.83 EASY FATIGABILITY: ICD-10-CM

## 2023-03-23 DIAGNOSIS — M65.4 DE QUERVAIN'S TENOSYNOVITIS, RIGHT: ICD-10-CM

## 2023-03-23 DIAGNOSIS — Z79.4 TYPE 2 DIABETES MELLITUS WITH BOTH EYES AFFECTED BY MILD NONPROLIFERATIVE RETINOPATHY WITHOUT MACULAR EDEMA, WITH LONG-TERM CURRENT USE OF INSULIN (HCC): Primary | ICD-10-CM

## 2023-03-23 DIAGNOSIS — I10 PRIMARY HYPERTENSION: ICD-10-CM

## 2023-03-23 DIAGNOSIS — G56.01 RIGHT CARPAL TUNNEL SYNDROME: ICD-10-CM

## 2023-03-23 DIAGNOSIS — N18.31 STAGE 3A CHRONIC KIDNEY DISEASE (HCC): ICD-10-CM

## 2023-03-23 LAB — HBA1C MFR BLD: 6.8 %

## 2023-03-23 SDOH — ECONOMIC STABILITY: INCOME INSECURITY: HOW HARD IS IT FOR YOU TO PAY FOR THE VERY BASICS LIKE FOOD, HOUSING, MEDICAL CARE, AND HEATING?: NOT HARD AT ALL

## 2023-03-23 SDOH — ECONOMIC STABILITY: FOOD INSECURITY: WITHIN THE PAST 12 MONTHS, YOU WORRIED THAT YOUR FOOD WOULD RUN OUT BEFORE YOU GOT MONEY TO BUY MORE.: NEVER TRUE

## 2023-03-23 SDOH — ECONOMIC STABILITY: FOOD INSECURITY: WITHIN THE PAST 12 MONTHS, THE FOOD YOU BOUGHT JUST DIDN'T LAST AND YOU DIDN'T HAVE MONEY TO GET MORE.: NEVER TRUE

## 2023-03-23 SDOH — ECONOMIC STABILITY: HOUSING INSECURITY
IN THE LAST 12 MONTHS, WAS THERE A TIME WHEN YOU DID NOT HAVE A STEADY PLACE TO SLEEP OR SLEPT IN A SHELTER (INCLUDING NOW)?: NO

## 2023-03-23 NOTE — PROGRESS NOTES
History:   Procedure Laterality Date    COLONOSCOPY N/A 9/8/2021    COLONOSCOPY POLYPECTOMY SNARE/COLD BIOPSY performed by Bi Martinez MD at [address] [facility]      CT BIOPSY PERCUTANEOUS DEEP BONE  3/7/2022    CT BIOPSY PERCUTANEOUS DEEP BONE 3/7/2022 Renu Moran II, MD [facility]    ECHO COMPLETE  3/19/2012         EYE MUSCLE SURGERY      as a child    HYSTERECTOMY (CERVIX STATUS UNKNOWN)      with salpingoophorectomy    HYSTERECTOMY, TOTAL ABDOMINAL (CERVIX REMOVED) Bilateral 2003    cervix removed per patient    NERVE BLOCK Right 05/01/2018    lumbar transforaminal nerve block #1 with sedation    PAIN MANAGEMENT PROCEDURE Bilateral 5/26/2022    BILATERAL MEDIAL BRANCH BLOCK L3-4 AND L4-5 (CPT F5886920) performed by Donny Cortez DO at [address] Bilateral 6/30/2022    BILATERAL MEDIAL BRANCH BLOCKS AT L3-4 AND L4-5 (prefers to be last case.  son coming from [place] to be with her) performed by Donny Cortez DO at [address] Right 8/11/2022    FLUOROSCOPIC-GUIDED RIGHT LUMBAR MEDIAL BRANCH NEUROLYSIS( RADIOFREQUENCY ABLATION)AT LEVELS L3-4,L4-5 WITH IV SEDATION (CPT 97280) Keep last d/t transportation performed by Donny Cortez DO at [address] Left 9/1/2022    FLUOROSCOPIC GUIDED LEFT LUMBAR MEDIAL BRANCH NEUROLYSIS  RADIOFREQUENCY ABLATION  AT LEVELS L3-4,L4-5 WITH IV SEDATION  (CPT 25765) performed by Donny Cortez DO at [address] [facility]COL[address] TIP ONLY Right 5/1/2018    RIGHT LUMBAR TRANSFORAMINAL NERVE BLOCK #1 L3-4 L4-5 WITH IV SEDATIN performed by Nadira Davis DO at [address] History     Tobacco Use    Smoking status: Never    Smokeless tobacco: Never   Vaping Use    Vaping Use: Never used   Substance Use Topics    Alcohol use: Not Currently     Alcohol/week: 2.0 standard drinks

## 2023-04-03 DIAGNOSIS — I10 ESSENTIAL HYPERTENSION: ICD-10-CM

## 2023-04-03 DIAGNOSIS — E11.65 UNCONTROLLED TYPE 2 DIABETES MELLITUS WITH HYPERGLYCEMIA (HCC): Chronic | ICD-10-CM

## 2023-04-03 DIAGNOSIS — K21.9 GASTROESOPHAGEAL REFLUX DISEASE WITHOUT ESOPHAGITIS: ICD-10-CM

## 2023-04-03 NOTE — TELEPHONE ENCOUNTER
Last Appointment:  3/23/2023  Future Appointments  7/12/2023  1:40 PM    MD Alana Campo [facility]        [facility]

## 2023-04-04 RX ORDER — ATORVASTATIN CALCIUM 80 MG/1
TABLET, FILM COATED ORAL
Qty: 90 TABLET | Refills: 3 | Status: SHIPPED | OUTPATIENT
Start: 2023-04-04

## 2023-04-04 RX ORDER — ALLOPURINOL 100 MG/1
TABLET ORAL
Qty: 180 TABLET | Refills: 3 | Status: SHIPPED | OUTPATIENT
Start: 2023-04-04

## 2023-04-04 RX ORDER — MELATONIN
Qty: 180 TABLET | Refills: 3 | Status: SHIPPED | OUTPATIENT
Start: 2023-04-04

## 2023-04-04 RX ORDER — OMEPRAZOLE 40 MG/1
CAPSULE, DELAYED RELEASE ORAL
Qty: 90 CAPSULE | Refills: 3 | Status: SHIPPED | OUTPATIENT
Start: 2023-04-04

## 2023-04-04 RX ORDER — POTASSIUM CHLORIDE 750 MG/1
TABLET, EXTENDED RELEASE ORAL
Qty: 180 TABLET | Refills: 3 | Status: SHIPPED | OUTPATIENT
Start: 2023-04-04

## 2023-04-04 RX ORDER — METOPROLOL TARTRATE 100 MG/1
TABLET ORAL
Qty: 180 TABLET | Refills: 3 | Status: SHIPPED | OUTPATIENT
Start: 2023-04-04

## 2023-04-05 DIAGNOSIS — E11.65 UNCONTROLLED TYPE 2 DIABETES MELLITUS WITH HYPERGLYCEMIA (HCC): ICD-10-CM

## 2023-04-06 DIAGNOSIS — E11.65 UNCONTROLLED TYPE 2 DIABETES MELLITUS WITH HYPERGLYCEMIA (HCC): Primary | ICD-10-CM

## 2023-04-06 RX ORDER — SEMAGLUTIDE 1.34 MG/ML
0.25 INJECTION, SOLUTION SUBCUTANEOUS WEEKLY
Qty: 1.5 ML | Refills: 3 | OUTPATIENT
Start: 2023-04-06

## 2023-04-06 RX ORDER — SEMAGLUTIDE 0.68 MG/ML
INJECTION, SOLUTION SUBCUTANEOUS
Qty: 6 ML | Refills: 1 | Status: SHIPPED | OUTPATIENT
Start: 2023-04-06

## 2023-05-09 DIAGNOSIS — E11.65 UNCONTROLLED TYPE 2 DIABETES MELLITUS WITH HYPERGLYCEMIA (HCC): Chronic | ICD-10-CM

## 2023-05-09 RX ORDER — INSULIN GLARGINE 100 [IU]/ML
INJECTION, SOLUTION SUBCUTANEOUS
Qty: 105 ML | Refills: 1 | Status: SHIPPED | OUTPATIENT
Start: 2023-05-09

## 2023-05-09 NOTE — TELEPHONE ENCOUNTER
Last Appointment:  3/23/2023  Future Appointments  8/9/2023   2:00 PM    MD Marie Cheung [facility]        [facility]

## 2023-05-12 DIAGNOSIS — E11.65 UNCONTROLLED TYPE 2 DIABETES MELLITUS WITH HYPERGLYCEMIA (HCC): ICD-10-CM

## 2023-05-15 RX ORDER — SEMAGLUTIDE 0.68 MG/ML
INJECTION, SOLUTION SUBCUTANEOUS
Qty: 6 ML | Refills: 1 | Status: SHIPPED | OUTPATIENT
Start: 2023-05-15

## 2023-05-15 NOTE — TELEPHONE ENCOUNTER
Last Appointment:  3/23/2023  Future Appointments   Date Time Provider Eloise Oshea   8/9/2023  2:00 PM MD Yousuf Helton [facility]

## 2023-06-10 DIAGNOSIS — I10 ESSENTIAL HYPERTENSION: ICD-10-CM

## 2023-06-10 DIAGNOSIS — E11.42 DIABETIC PERIPHERAL NEUROPATHY (HCC): ICD-10-CM

## 2023-06-10 DIAGNOSIS — F41.9 ANXIETY: ICD-10-CM

## 2023-06-12 NOTE — TELEPHONE ENCOUNTER
Last Appointment:  3/23/2023  Future Appointments  8/9/2023   2:00 PM    MD Ameirca Michel [facility]        [facility]

## 2023-06-13 RX ORDER — VALSARTAN AND HYDROCHLOROTHIAZIDE 160; 25 MG/1; MG/1
TABLET ORAL
Qty: 90 TABLET | Refills: 3 | Status: SHIPPED | OUTPATIENT
Start: 2023-06-13

## 2023-06-13 RX ORDER — PREGABALIN 150 MG/1
CAPSULE ORAL
Qty: 180 CAPSULE | Refills: 1 | Status: SHIPPED | OUTPATIENT
Start: 2023-06-13 | End: 2023-12-10

## 2023-06-13 RX ORDER — CLONIDINE HYDROCHLORIDE 0.1 MG/1
TABLET ORAL
Qty: 270 TABLET | Refills: 3 | Status: SHIPPED | OUTPATIENT
Start: 2023-06-13

## 2023-06-13 RX ORDER — BUSPIRONE HYDROCHLORIDE 5 MG/1
TABLET ORAL
Qty: 270 TABLET | Refills: 3 | Status: SHIPPED | OUTPATIENT
Start: 2023-06-13

## 2023-06-13 RX ORDER — FUROSEMIDE 20 MG/1
TABLET ORAL
Qty: 135 TABLET | Refills: 3 | Status: SHIPPED | OUTPATIENT
Start: 2023-06-13

## 2023-07-27 DIAGNOSIS — Z76.0 MEDICATION REFILL: ICD-10-CM

## 2023-07-28 RX ORDER — CALCIUM CITRATE/VITAMIN D3 200MG-6.25
TABLET ORAL
Qty: 450 STRIP | Refills: 2 | Status: SHIPPED | OUTPATIENT
Start: 2023-07-28

## 2023-07-28 NOTE — TELEPHONE ENCOUNTER
Last Appointment:  3/23/2023  Future Appointments  8/9/2023   2:00 PM    MD Ish Houston [facility]        [facility]

## 2023-08-02 ENCOUNTER — HOSPITAL ENCOUNTER (OUTPATIENT)
Age: 68
Discharge: HOME | End: 2023-08-02
Payer: MEDICARE

## 2023-08-02 LAB
25(OH)D3 SERPL-MCNC: 37.8 NG/ML (ref 30–100)
ALBUMIN SERPL-MCNC: 4 G/DL (ref 3.5–5.2)
ALP SERPL-CCNC: 156 U/L (ref 35–104)
ALT SERPL-CCNC: 20 U/L (ref 0–32)
ANION GAP SERPL CALCULATED.3IONS-SCNC: 10 MMOL/L (ref 7–16)
AST SERPL-CCNC: 23 U/L (ref 0–31)
BASOPHILS # BLD: 0.07 K/UL (ref 0–0.2)
BASOPHILS NFR BLD: 1 % (ref 0–2)
BILIRUB SERPL-MCNC: 0.6 MG/DL (ref 0–1.2)
BUN SERPL-MCNC: 16 MG/DL (ref 6–23)
CALCIUM SERPL-MCNC: 10.4 MG/DL (ref 8.6–10.2)
CHLORIDE SERPL-SCNC: 106 MMOL/L (ref 98–107)
CHOLEST SERPL-MCNC: 135 MG/DL
CO2 SERPL-SCNC: 29 MMOL/L (ref 22–29)
CREAT SERPL-MCNC: 1.1 MG/DL (ref 0.5–1)
CREAT UR-MCNC: 237 MG/DL (ref 29–226)
EOSINOPHIL # BLD: 0.21 K/UL (ref 0.05–0.5)
EOSINOPHILS RELATIVE PERCENT: 2 % (ref 0–6)
ERYTHROCYTE [DISTWIDTH] IN BLOOD BY AUTOMATED COUNT: 14.7 % (ref 11.5–15)
GFR SERPL CREATININE-BSD FRML MDRD: 56 ML/MIN/1.73M2
GLUCOSE SERPL-MCNC: 63 MG/DL (ref 74–99)
HBA1C MFR BLD: 6.7 % (ref 4–5.6)
HCT VFR BLD AUTO: 39.4 % (ref 34–48)
HDLC SERPL-MCNC: 39 MG/DL
HGB BLD-MCNC: 12 G/DL (ref 11.5–15.5)
IMM GRANULOCYTES # BLD AUTO: 0.04 K/UL (ref 0–0.58)
IMM GRANULOCYTES NFR BLD: 0 % (ref 0–5)
LDLC SERPL CALC-MCNC: 73 MG/DL
LYMPHOCYTES NFR BLD: 2.46 K/UL (ref 1.5–4)
LYMPHOCYTES RELATIVE PERCENT: 23 % (ref 20–42)
MCH RBC QN AUTO: 27.5 PG (ref 26–35)
MCHC RBC AUTO-ENTMCNC: 30.5 G/DL (ref 32–34.5)
MCV RBC AUTO: 90.2 FL (ref 80–99.9)
MICROALBUMIN UR-MCNC: 15 MG/L (ref 0–19)
MICROALBUMIN/CREAT UR-RTO: 6 MCG/MG CREAT (ref 0–30)
MONOCYTES NFR BLD: 0.91 K/UL (ref 0.1–0.95)
MONOCYTES NFR BLD: 8 % (ref 2–12)
NEUTROPHILS NFR BLD: 66 % (ref 43–80)
NEUTS SEG NFR BLD: 7.12 K/UL (ref 1.8–7.3)
PHOSPHATE SERPL-MCNC: 2 MG/DL (ref 2.5–4.5)
PLATELET # BLD AUTO: 323 K/UL (ref 130–450)
PMV BLD AUTO: 11.3 FL (ref 7–12)
POTASSIUM SERPL-SCNC: 3.6 MMOL/L (ref 3.5–5)
PROT SERPL-MCNC: 7.4 G/DL (ref 6.4–8.3)
PTH-INTACT SERPL-MCNC: 49.6 PG/ML (ref 15–65)
RBC # BLD AUTO: 4.37 M/UL (ref 3.5–5.5)
SODIUM SERPL-SCNC: 145 MMOL/L (ref 132–146)
TRIGL SERPL-MCNC: 114 MG/DL
VLDLC SERPL CALC-MCNC: 23 MG/DL
WBC OTHER # BLD: 10.8 K/UL (ref 4.5–11.5)

## 2023-08-02 PROCEDURE — 80053 COMPREHEN METABOLIC PANEL: CPT

## 2023-08-02 PROCEDURE — 85025 COMPLETE CBC W/AUTO DIFF WBC: CPT

## 2023-08-02 PROCEDURE — 83970 ASSAY OF PARATHORMONE: CPT

## 2023-08-02 PROCEDURE — 80061 LIPID PANEL: CPT

## 2023-08-02 PROCEDURE — 84100 ASSAY OF PHOSPHORUS: CPT

## 2023-08-02 PROCEDURE — 83036 HEMOGLOBIN GLYCOSYLATED A1C: CPT

## 2023-08-02 PROCEDURE — 82043 UR ALBUMIN QUANTITATIVE: CPT

## 2023-08-02 PROCEDURE — 82306 VITAMIN D 25 HYDROXY: CPT

## 2023-08-02 PROCEDURE — 82570 ASSAY OF URINE CREATININE: CPT

## 2023-08-02 PROCEDURE — 36415 COLL VENOUS BLD VENIPUNCTURE: CPT

## 2023-08-09 ENCOUNTER — OFFICE VISIT (OUTPATIENT)
Dept: FAMILY MEDICINE CLINIC | Age: 68
End: 2023-08-09
Payer: MEDICARE

## 2023-08-09 VITALS
SYSTOLIC BLOOD PRESSURE: 132 MMHG | RESPIRATION RATE: 17 BRPM | TEMPERATURE: 97.2 F | HEART RATE: 79 BPM | OXYGEN SATURATION: 97 % | DIASTOLIC BLOOD PRESSURE: 63 MMHG | WEIGHT: 293 LBS | HEIGHT: 69 IN | BODY MASS INDEX: 43.4 KG/M2

## 2023-08-09 DIAGNOSIS — E11.3293 TYPE 2 DIABETES MELLITUS WITH BOTH EYES AFFECTED BY MILD NONPROLIFERATIVE RETINOPATHY WITHOUT MACULAR EDEMA, WITH LONG-TERM CURRENT USE OF INSULIN (HCC): Primary | ICD-10-CM

## 2023-08-09 DIAGNOSIS — N18.31 STAGE 3A CHRONIC KIDNEY DISEASE (HCC): ICD-10-CM

## 2023-08-09 DIAGNOSIS — Z79.4 TYPE 2 DIABETES MELLITUS WITH BOTH EYES AFFECTED BY MILD NONPROLIFERATIVE RETINOPATHY WITHOUT MACULAR EDEMA, WITH LONG-TERM CURRENT USE OF INSULIN (HCC): Primary | ICD-10-CM

## 2023-08-09 DIAGNOSIS — Z76.0 MEDICATION REFILL: ICD-10-CM

## 2023-08-09 DIAGNOSIS — M48.061 NEURAL FORAMINAL STENOSIS OF LUMBAR SPINE: ICD-10-CM

## 2023-08-09 DIAGNOSIS — S21.209A WOUND OF BACK, UNSPECIFIED LATERALITY, INITIAL ENCOUNTER: ICD-10-CM

## 2023-08-09 PROCEDURE — 1036F TOBACCO NON-USER: CPT | Performed by: FAMILY MEDICINE

## 2023-08-09 PROCEDURE — 1123F ACP DISCUSS/DSCN MKR DOCD: CPT | Performed by: FAMILY MEDICINE

## 2023-08-09 PROCEDURE — 3075F SYST BP GE 130 - 139MM HG: CPT | Performed by: FAMILY MEDICINE

## 2023-08-09 PROCEDURE — 1090F PRES/ABSN URINE INCON ASSESS: CPT | Performed by: FAMILY MEDICINE

## 2023-08-09 PROCEDURE — 99214 OFFICE O/P EST MOD 30 MIN: CPT | Performed by: FAMILY MEDICINE

## 2023-08-09 PROCEDURE — G8417 CALC BMI ABV UP PARAM F/U: HCPCS | Performed by: FAMILY MEDICINE

## 2023-08-09 PROCEDURE — 3044F HG A1C LEVEL LT 7.0%: CPT | Performed by: FAMILY MEDICINE

## 2023-08-09 PROCEDURE — G8428 CUR MEDS NOT DOCUMENT: HCPCS | Performed by: FAMILY MEDICINE

## 2023-08-09 PROCEDURE — 3017F COLORECTAL CA SCREEN DOC REV: CPT | Performed by: FAMILY MEDICINE

## 2023-08-09 PROCEDURE — 2022F DILAT RTA XM EVC RTNOPTHY: CPT | Performed by: FAMILY MEDICINE

## 2023-08-09 PROCEDURE — 3078F DIAST BP <80 MM HG: CPT | Performed by: FAMILY MEDICINE

## 2023-08-09 PROCEDURE — G8399 PT W/DXA RESULTS DOCUMENT: HCPCS | Performed by: FAMILY MEDICINE

## 2023-08-09 RX ORDER — SEMAGLUTIDE 0.68 MG/ML
INJECTION, SOLUTION SUBCUTANEOUS
Qty: 9 ML | Refills: 1 | Status: SHIPPED | OUTPATIENT
Start: 2023-08-09

## 2023-08-09 RX ORDER — GLUCOSAM/CHON-MSM1/C/MANG/BOSW 500-416.6
TABLET ORAL
Qty: 500 EACH | Refills: 3 | Status: SHIPPED | OUTPATIENT
Start: 2023-08-09

## 2023-08-09 RX ORDER — TIZANIDINE 4 MG/1
TABLET ORAL
Qty: 90 TABLET | Refills: 1 | Status: SHIPPED | OUTPATIENT
Start: 2023-08-09

## 2023-08-09 RX ORDER — AMMONIUM LACTATE 12 G/100G
CREAM TOPICAL
Qty: 45 G | Refills: 0 | Status: SHIPPED | OUTPATIENT
Start: 2023-08-09

## 2023-08-09 RX ORDER — METFORMIN HYDROCHLORIDE 500 MG/1
500 TABLET, EXTENDED RELEASE ORAL
Qty: 90 TABLET | Refills: 3 | Status: SHIPPED | OUTPATIENT
Start: 2023-08-09

## 2023-08-09 RX ORDER — SEMAGLUTIDE 0.68 MG/ML
INJECTION, SOLUTION SUBCUTANEOUS
Qty: 6 ML | Refills: 1 | Status: CANCELLED | OUTPATIENT
Start: 2023-08-09

## 2023-08-09 RX ORDER — FLURBIPROFEN SODIUM 0.3 MG/ML
SOLUTION/ DROPS OPHTHALMIC
Qty: 500 EACH | Refills: 3 | Status: SHIPPED | OUTPATIENT
Start: 2023-08-09

## 2023-08-09 RX ORDER — INSULIN ASPART 100 [IU]/ML
INJECTION, SOLUTION INTRAVENOUS; SUBCUTANEOUS
Qty: 35 ML | Refills: 3 | Status: SHIPPED | OUTPATIENT
Start: 2023-08-09

## 2023-08-09 ASSESSMENT — ENCOUNTER SYMPTOMS
SHORTNESS OF BREATH: 0
NAUSEA: 0
VOMITING: 0
BACK PAIN: 1
ABDOMINAL PAIN: 0
COUGH: 0

## 2023-08-09 NOTE — PROGRESS NOTES
[facility]  FAMILY MEDICINE RESIDENCY PROGRAM   OFFICE PROGRESS NOTE  DATE OF VISIT : 23    Patient : Noy Garcia    : female   Age : 74 y.o.  : 1955           Chief Complaint :   Chief Complaint   Patient presents with    Diabetes     Follow Up    Discuss Medications     Ozempic     Abrasion     On back       HPI:   74 y.o. female comes in today for follow up of dm2, htn, back pain. She is checking her bg 4-5 x per day. Fasting bg usually 108-150, but has had highs to 260 and some lows in the 60s with symptoms. Gives herself lantus 55 bid and lispro bid +ssi. Notes weight loss (15 pounds) in 5 months on ozempic. To see pmr dr for back. She has some sciatica on the right side. Abrasion on the back with crusting.     Did not take lasix today    Patient Active Problem List   Diagnosis    Diabetes mellitus type 2, uncontrolled    Hypertension    Hypercholesterolemia    GERD (gastroesophageal reflux disease)    Depression    Enlarged liver    Heart murmur    Chronic lower back pain    Facet joint disease    Lumbar spondylosis    Bulging lumbar disc    Back pain    Anxiety    Bilateral leg edema    Microalbuminuria    Vitamin D insufficiency    Chronic kidney disease, stage 3, mod decreased GFR (HCC)    Senile nuclear cataract    Fatty liver    Diabetic peripheral neuropathy (HCC)    Neural foraminal stenosis of lumbar spine    Type 2 diabetes mellitus with both eyes affected by mild nonproliferative retinopathy without macular edema, with long-term current use of insulin ([address])    Spinal stenosis of lumbar region    Major depressive disorder, recurrent, in partial remission ([address])    Ganglioneuroma    Chronic renal disease, stage III ([address]) [[id number]]       Past Medical History:   Diagnosis Date    Anxiety     Carpal tunnel syndrome on right     Chronic back pain     Chronic kidney disease, stage 3 ([address])     Dr. Lakesha Lemus- last seen 2022    Depression 10/28/2010

## 2023-09-03 DIAGNOSIS — E11.3293 TYPE 2 DIABETES MELLITUS WITH BOTH EYES AFFECTED BY MILD NONPROLIFERATIVE RETINOPATHY WITHOUT MACULAR EDEMA, WITH LONG-TERM CURRENT USE OF INSULIN (HCC): ICD-10-CM

## 2023-09-03 DIAGNOSIS — Z79.4 TYPE 2 DIABETES MELLITUS WITH BOTH EYES AFFECTED BY MILD NONPROLIFERATIVE RETINOPATHY WITHOUT MACULAR EDEMA, WITH LONG-TERM CURRENT USE OF INSULIN (HCC): ICD-10-CM

## 2023-09-05 NOTE — TELEPHONE ENCOUNTER
Last Appointment:  8/9/2023  Future Appointments   Date Time Provider [address]   11/29/2023  2:00 PM MD Leidy Xavier [facility]

## 2023-09-06 RX ORDER — SEMAGLUTIDE 0.68 MG/ML
INJECTION, SOLUTION SUBCUTANEOUS
Qty: 6 ML | Refills: 0 | Status: SHIPPED | OUTPATIENT
Start: 2023-09-06

## 2023-09-06 NOTE — TELEPHONE ENCOUNTER
[facility] - how is she tolerating ozempic and does she want to stayo n same dose for 4 more weeks ? Waiting for cb.

## 2023-09-06 NOTE — TELEPHONE ENCOUNTER
Patient returned phone call  tolerating medication well, would like the same dose send into the pharmacy.     Please advise    Thank you

## 2023-11-13 RX ORDER — FUROSEMIDE 20 MG/1
TABLET ORAL
Qty: 135 TABLET | Refills: 3 | Status: SHIPPED | OUTPATIENT
Start: 2023-11-13

## 2023-11-13 NOTE — TELEPHONE ENCOUNTER
Last Appointment:  8/9/2023  Future Appointments  11/29/2023 2:00 PM    Robina Green MD    [facility] Shanique Adena [facility]

## 2023-11-29 ENCOUNTER — OFFICE VISIT (OUTPATIENT)
Dept: FAMILY MEDICINE CLINIC | Age: 68
End: 2023-11-29
Payer: MEDICARE

## 2023-11-29 VITALS
BODY MASS INDEX: 43.4 KG/M2 | SYSTOLIC BLOOD PRESSURE: 183 MMHG | HEIGHT: 69 IN | RESPIRATION RATE: 20 BRPM | WEIGHT: 293 LBS | OXYGEN SATURATION: 92 % | TEMPERATURE: 97.6 F | HEART RATE: 90 BPM | DIASTOLIC BLOOD PRESSURE: 86 MMHG

## 2023-11-29 DIAGNOSIS — S21.209D WOUND OF BACK, UNSPECIFIED LATERALITY, SUBSEQUENT ENCOUNTER: ICD-10-CM

## 2023-11-29 DIAGNOSIS — E11.42 DIABETIC PERIPHERAL NEUROPATHY (HCC): ICD-10-CM

## 2023-11-29 DIAGNOSIS — Z23 NEED FOR INFLUENZA VACCINATION: ICD-10-CM

## 2023-11-29 DIAGNOSIS — Z00.00 MEDICARE ANNUAL WELLNESS VISIT, SUBSEQUENT: Primary | ICD-10-CM

## 2023-11-29 DIAGNOSIS — F33.41 MAJOR DEPRESSIVE DISORDER, RECURRENT, IN PARTIAL REMISSION (HCC): ICD-10-CM

## 2023-11-29 DIAGNOSIS — Z79.4 TYPE 2 DIABETES MELLITUS WITH BOTH EYES AFFECTED BY MILD NONPROLIFERATIVE RETINOPATHY WITHOUT MACULAR EDEMA, WITH LONG-TERM CURRENT USE OF INSULIN (HCC): ICD-10-CM

## 2023-11-29 DIAGNOSIS — M65.4 DE QUERVAIN'S TENOSYNOVITIS, BILATERAL: ICD-10-CM

## 2023-11-29 DIAGNOSIS — E11.3293 TYPE 2 DIABETES MELLITUS WITH BOTH EYES AFFECTED BY MILD NONPROLIFERATIVE RETINOPATHY WITHOUT MACULAR EDEMA, WITH LONG-TERM CURRENT USE OF INSULIN (HCC): ICD-10-CM

## 2023-11-29 DIAGNOSIS — Z71.89 ACP (ADVANCE CARE PLANNING): ICD-10-CM

## 2023-11-29 LAB — HBA1C MFR BLD: 5.9 %

## 2023-11-29 PROCEDURE — G0008 ADMIN INFLUENZA VIRUS VAC: HCPCS | Performed by: FAMILY MEDICINE

## 2023-11-29 PROCEDURE — 3077F SYST BP >= 140 MM HG: CPT | Performed by: FAMILY MEDICINE

## 2023-11-29 PROCEDURE — 1123F ACP DISCUSS/DSCN MKR DOCD: CPT | Performed by: FAMILY MEDICINE

## 2023-11-29 PROCEDURE — 3079F DIAST BP 80-89 MM HG: CPT | Performed by: FAMILY MEDICINE

## 2023-11-29 PROCEDURE — G0439 PPPS, SUBSEQ VISIT: HCPCS | Performed by: FAMILY MEDICINE

## 2023-11-29 PROCEDURE — G8484 FLU IMMUNIZE NO ADMIN: HCPCS | Performed by: FAMILY MEDICINE

## 2023-11-29 PROCEDURE — 83036 HEMOGLOBIN GLYCOSYLATED A1C: CPT | Performed by: FAMILY MEDICINE

## 2023-11-29 PROCEDURE — 3017F COLORECTAL CA SCREEN DOC REV: CPT | Performed by: FAMILY MEDICINE

## 2023-11-29 PROCEDURE — 90694 VACC AIIV4 NO PRSRV 0.5ML IM: CPT | Performed by: FAMILY MEDICINE

## 2023-11-29 PROCEDURE — 3044F HG A1C LEVEL LT 7.0%: CPT | Performed by: FAMILY MEDICINE

## 2023-11-29 RX ORDER — PREGABALIN 150 MG/1
150 CAPSULE ORAL 2 TIMES DAILY
Qty: 180 CAPSULE | Refills: 1 | Status: SHIPPED | OUTPATIENT
Start: 2023-11-29 | End: 2024-05-27

## 2023-11-29 RX ORDER — INSULIN GLARGINE 100 [IU]/ML
50 INJECTION, SOLUTION SUBCUTANEOUS 2 TIMES DAILY
Qty: 100 ML | Refills: 1 | Status: SHIPPED | OUTPATIENT
Start: 2023-11-29

## 2023-11-29 RX ORDER — DULOXETIN HYDROCHLORIDE 60 MG/1
60 CAPSULE, DELAYED RELEASE ORAL DAILY
Qty: 90 CAPSULE | Refills: 3 | Status: SHIPPED | OUTPATIENT
Start: 2023-11-29

## 2023-11-29 SDOH — ECONOMIC STABILITY: INCOME INSECURITY: HOW HARD IS IT FOR YOU TO PAY FOR THE VERY BASICS LIKE FOOD, HOUSING, MEDICAL CARE, AND HEATING?: NOT HARD AT ALL

## 2023-11-29 SDOH — ECONOMIC STABILITY: FOOD INSECURITY: WITHIN THE PAST 12 MONTHS, THE FOOD YOU BOUGHT JUST DIDN'T LAST AND YOU DIDN'T HAVE MONEY TO GET MORE.: NEVER TRUE

## 2023-11-29 SDOH — ECONOMIC STABILITY: FOOD INSECURITY: WITHIN THE PAST 12 MONTHS, YOU WORRIED THAT YOUR FOOD WOULD RUN OUT BEFORE YOU GOT MONEY TO BUY MORE.: NEVER TRUE

## 2023-11-29 ASSESSMENT — COLUMBIA-SUICIDE SEVERITY RATING SCALE - C-SSRS
5. HAVE YOU STARTED TO WORK OUT OR WORKED OUT THE DETAILS OF HOW TO KILL YOURSELF? DO YOU INTEND TO CARRY OUT THIS PLAN?: NO
7. DID THIS OCCUR IN THE LAST THREE MONTHS: NO
3. HAVE YOU BEEN THINKING ABOUT HOW YOU MIGHT KILL YOURSELF?: NO
4. HAVE YOU HAD THESE THOUGHTS AND HAD SOME INTENTION OF ACTING ON THEM?: NO

## 2023-11-29 ASSESSMENT — PATIENT HEALTH QUESTIONNAIRE - PHQ9
SUM OF ALL RESPONSES TO PHQ QUESTIONS 1-9: 0
SUM OF ALL RESPONSES TO PHQ9 QUESTIONS 1 & 2: 0
SUM OF ALL RESPONSES TO PHQ QUESTIONS 1-9: 0
SUM OF ALL RESPONSES TO PHQ QUESTIONS 1-9: 0
8. MOVING OR SPEAKING SO SLOWLY THAT OTHER PEOPLE COULD HAVE NOTICED. OR THE OPPOSITE, BEING SO FIGETY OR RESTLESS THAT YOU HAVE BEEN MOVING AROUND A LOT MORE THAN USUAL: 0
3. TROUBLE FALLING OR STAYING ASLEEP: 0
SUM OF ALL RESPONSES TO PHQ QUESTIONS 1-9: 0
SUM OF ALL RESPONSES TO PHQ QUESTIONS 1-9: 0
9. THOUGHTS THAT YOU WOULD BE BETTER OFF DEAD, OR OF HURTING YOURSELF: 0
SUM OF ALL RESPONSES TO PHQ QUESTIONS 1-9: 0
2. FEELING DOWN, DEPRESSED OR HOPELESS: 0
SUM OF ALL RESPONSES TO PHQ9 QUESTIONS 1 & 2: 0
5. POOR APPETITE OR OVEREATING: 0
10. IF YOU CHECKED OFF ANY PROBLEMS, HOW DIFFICULT HAVE THESE PROBLEMS MADE IT FOR YOU TO DO YOUR WORK, TAKE CARE OF THINGS AT HOME, OR GET ALONG WITH OTHER PEOPLE: 0
7. TROUBLE CONCENTRATING ON THINGS, SUCH AS READING THE NEWSPAPER OR WATCHING TELEVISION: 0
1. LITTLE INTEREST OR PLEASURE IN DOING THINGS: 0
1. LITTLE INTEREST OR PLEASURE IN DOING THINGS: 0
SUM OF ALL RESPONSES TO PHQ QUESTIONS 1-9: 0
2. FEELING DOWN, DEPRESSED OR HOPELESS: 0
6. FEELING BAD ABOUT YOURSELF - OR THAT YOU ARE A FAILURE OR HAVE LET YOURSELF OR YOUR FAMILY DOWN: 0
4. FEELING TIRED OR HAVING LITTLE ENERGY: 0
SUM OF ALL RESPONSES TO PHQ QUESTIONS 1-9: 0

## 2023-11-29 ASSESSMENT — LIFESTYLE VARIABLES: HOW OFTEN DO YOU HAVE A DRINK CONTAINING ALCOHOL: NEVER

## 2023-11-29 NOTE — PROGRESS NOTES
Medicare Annual Wellness Visit    Shakira Mercer is here for Check-Up (Wound on back from back scratcher)    Assessment & Plan   Medicare annual wellness visit, subsequent  Has living will. Type 2 diabetes mellitus with both eyes affected by mild nonproliferative retinopathy without macular edema, with long-term current use of insulin (HCC)  -     POCT glycosylated hemoglobin (Hb A1C)-down to 5.9 from 6.7 and lost 7 pounds since her last visit. Will increase from 0.5 mg to 1mg of semaglutide and decrease lantus to 50 units bid from 55 bid. -     Semaglutide, 1 MG/DOSE, 4 MG/3ML SOPN; Inject1 mg SQ weekly, Disp-9 mL, R-1Normal  -     insulin glargine (LANTUS SOLOSTAR) 100 UNIT/ML injection pen; Inject 50 Units into the skin 2 times daily, Disp-100 mL, R-1Normal  Major depressive disorder, recurrent, in partial remission (HCC)  Phq-9 is 0  ACP (advance care planning)\  Has living will. Will bring in copy. Son is decision maker if needed and full code, unless she is terminal (no change from her previous amwv)  De Quervain's tenosynovitis, bilateral  Has thumb spica splints at home that she will use  Wound of back, unspecified laterality, subsequent encounter  Has ointment for healing wounds, will apply  Diabetic peripheral neuropathy (HCC)  -     DULoxetine (CYMBALTA) 60 MG extended release capsule; Take 1 capsule by mouth daily, Disp-90 capsule, R-3Normal  -     pregabalin (LYRICA) 150 MG capsule; Take 1 capsule by mouth 2 times daily for 180 days. , Disp-180 capsule, R-1Normal  Need for influenza vaccination  -     Influenza, FLUAD, (age 72 y+), IM, Preservative Free, 0.5 mL    Recommendations for Preventive Services Due: see orders and patient instructions/AVS.  Recommended screening schedule for the next 5-10 years is provided to the patient in written form: see Patient Instructions/AVS.     Return for 2-3 months for dm; Medicare Annual Wellness Visit in 1 year.      Subjective       She has b/l hand numbness

## 2023-11-29 NOTE — PATIENT INSTRUCTIONS
macular degeneration, and other eye disorders. A preventive dental visit is recommended every 6 months. Try to get at least 150 minutes of exercise per week or 10,000 steps per day on a pedometer . Order or download the FREE \"Exercise & Physical Activity: Your Everyday Guide\" from [employer]. Call 3-719.853.4247 or search [employer] online. You need 4358-8887 mg of calcium and 8530-0020 IU of vitamin D per day. It is possible to meet your calcium requirement with diet alone, but a vitamin D supplement is usually necessary to meet this goal.  When exposed to the sun, use a sunscreen that protects against both UVA and UVB radiation with an SPF of 30 or greater. Reapply every 2 to 3 hours or after sweating, drying off with a towel, or swimming. Always wear a seat belt when traveling in a car. Always wear a helmet when riding a bicycle or motorcycle.

## 2023-12-26 DIAGNOSIS — E11.65 UNCONTROLLED TYPE 2 DIABETES MELLITUS WITH HYPERGLYCEMIA (HCC): Chronic | ICD-10-CM

## 2023-12-26 RX ORDER — ALLOPURINOL 100 MG/1
100 TABLET ORAL 2 TIMES DAILY
Qty: 180 TABLET | Refills: 3 | Status: SHIPPED | OUTPATIENT
Start: 2023-12-26

## 2023-12-26 RX ORDER — MELATONIN
Qty: 180 TABLET | Refills: 3 | Status: SHIPPED | OUTPATIENT
Start: 2023-12-26

## 2023-12-26 RX ORDER — ATORVASTATIN CALCIUM 80 MG/1
80 TABLET, FILM COATED ORAL NIGHTLY
Qty: 90 TABLET | Refills: 3 | Status: SHIPPED | OUTPATIENT
Start: 2023-12-26

## 2023-12-26 NOTE — TELEPHONE ENCOUNTER
Last Appointment:  11/29/2023  Future Appointments  2/28/2024  1:40 PM    [facility]        [facility]

## 2024-01-04 DIAGNOSIS — E11.65 UNCONTROLLED TYPE 2 DIABETES MELLITUS WITH HYPERGLYCEMIA (HCC): Chronic | ICD-10-CM

## 2024-01-04 DIAGNOSIS — F41.9 ANXIETY: ICD-10-CM

## 2024-01-04 DIAGNOSIS — I10 ESSENTIAL HYPERTENSION: ICD-10-CM

## 2024-01-04 DIAGNOSIS — K21.9 GASTROESOPHAGEAL REFLUX DISEASE WITHOUT ESOPHAGITIS: ICD-10-CM

## 2024-01-05 RX ORDER — ATORVASTATIN CALCIUM 80 MG/1
80 TABLET, FILM COATED ORAL NIGHTLY
Qty: 90 TABLET | Refills: 3 | Status: SHIPPED | OUTPATIENT
Start: 2024-01-05

## 2024-01-05 RX ORDER — METOPROLOL TARTRATE 100 MG/1
100 TABLET ORAL 2 TIMES DAILY
Qty: 180 TABLET | Refills: 3 | Status: SHIPPED | OUTPATIENT
Start: 2024-01-05

## 2024-01-05 RX ORDER — BUSPIRONE HYDROCHLORIDE 5 MG/1
5 TABLET ORAL 3 TIMES DAILY
Qty: 270 TABLET | Refills: 3 | Status: SHIPPED | OUTPATIENT
Start: 2024-01-05

## 2024-01-05 RX ORDER — MELATONIN
Qty: 180 TABLET | Refills: 3 | Status: SHIPPED | OUTPATIENT
Start: 2024-01-05

## 2024-01-05 RX ORDER — OMEPRAZOLE 40 MG/1
CAPSULE, DELAYED RELEASE ORAL
Qty: 90 CAPSULE | Refills: 3 | Status: SHIPPED | OUTPATIENT
Start: 2024-01-05

## 2024-01-05 RX ORDER — VALSARTAN AND HYDROCHLOROTHIAZIDE 160; 25 MG/1; MG/1
TABLET ORAL
Qty: 90 TABLET | Refills: 3 | Status: SHIPPED | OUTPATIENT
Start: 2024-01-05

## 2024-01-05 RX ORDER — ALLOPURINOL 100 MG/1
100 TABLET ORAL 2 TIMES DAILY
Qty: 180 TABLET | Refills: 3 | Status: SHIPPED | OUTPATIENT
Start: 2024-01-05

## 2024-01-05 RX ORDER — CLONIDINE HYDROCHLORIDE 0.1 MG/1
0.1 TABLET ORAL 3 TIMES DAILY
Qty: 270 TABLET | Refills: 3 | Status: SHIPPED | OUTPATIENT
Start: 2024-01-05

## 2024-01-26 ENCOUNTER — TELEPHONE (OUTPATIENT)
Dept: FAMILY MEDICINE CLINIC | Age: 69
End: 2024-01-26

## 2024-01-26 DIAGNOSIS — I10 ESSENTIAL HYPERTENSION: ICD-10-CM

## 2024-01-26 RX ORDER — POTASSIUM CHLORIDE 750 MG/1
10 TABLET, EXTENDED RELEASE ORAL 2 TIMES DAILY
Qty: 180 TABLET | Refills: 3 | Status: SHIPPED | OUTPATIENT
Start: 2024-01-26

## 2024-01-26 NOTE — TELEPHONE ENCOUNTER
----- Message from Lilian Cruz sent at 1/26/2024  3:13 PM EST -----  Subject: Refill Request    QUESTIONS  Name of Medication? potassium chloride (KLOR-CON M) 10 MEQ extended   release tablet  Patient-reported dosage and instructions? Take 1 tablet twice daily   How many days do you have left? 0  Preferred Pharmacy? [facility]  Pharmacy phone number (if available)? [phone]  ---------------------------------------------------------------------------  --------------  CALL BACK INFO  What is the best way for the office to contact you? OK to leave message on   voicemail  Preferred Call Back Phone Number? [phone]  ---------------------------------------------------------------------------  --------------  SCRIPT ANSWERS  Relationship to Patient? Self

## 2024-02-12 ENCOUNTER — HOSPITAL ENCOUNTER (OUTPATIENT)
Age: 69
Discharge: HOME | End: 2024-02-12
Payer: MEDICARE

## 2024-02-12 LAB
25(OH)D3 SERPL-MCNC: 35.7 NG/ML (ref 30–100)
ALBUMIN SERPL-MCNC: 4 G/DL (ref 3.5–5.2)
ALP SERPL-CCNC: 154 U/L (ref 35–104)
ALT SERPL-CCNC: 17 U/L (ref 0–32)
ANION GAP SERPL CALCULATED.3IONS-SCNC: 9 MMOL/L (ref 7–16)
AST SERPL-CCNC: 21 U/L (ref 0–31)
BASOPHILS # BLD: 0.07 K/UL (ref 0–0.2)
BASOPHILS NFR BLD: 1 % (ref 0–2)
BILIRUB SERPL-MCNC: 0.3 MG/DL (ref 0–1.2)
BUN SERPL-MCNC: 13 MG/DL (ref 6–23)
CALCIUM SERPL-MCNC: 9.4 MG/DL (ref 8.6–10.2)
CHLORIDE SERPL-SCNC: 103 MMOL/L (ref 98–107)
CHOLEST SERPL-MCNC: 123 MG/DL
CO2 SERPL-SCNC: 31 MMOL/L (ref 22–29)
CREAT SERPL-MCNC: 1.1 MG/DL (ref 0.5–1)
EOSINOPHIL # BLD: 0.28 K/UL (ref 0.05–0.5)
EOSINOPHILS RELATIVE PERCENT: 3 % (ref 0–6)
ERYTHROCYTE [DISTWIDTH] IN BLOOD BY AUTOMATED COUNT: 14.4 % (ref 11.5–15)
GFR SERPL CREATININE-BSD FRML MDRD: 58 ML/MIN/1.73M2
GLUCOSE SERPL-MCNC: 86 MG/DL (ref 74–99)
HCT VFR BLD AUTO: 38 % (ref 34–48)
HDLC SERPL-MCNC: 38 MG/DL
HGB BLD-MCNC: 11.6 G/DL (ref 11.5–15.5)
IMM GRANULOCYTES # BLD AUTO: <0.03 K/UL (ref 0–0.58)
IMM GRANULOCYTES NFR BLD: 0 % (ref 0–5)
LDLC SERPL CALC-MCNC: 62 MG/DL
LYMPHOCYTES NFR BLD: 2.13 K/UL (ref 1.5–4)
LYMPHOCYTES RELATIVE PERCENT: 22 % (ref 20–42)
MAGNESIUM SERPL-MCNC: 1.6 MG/DL (ref 1.6–2.6)
MCH RBC QN AUTO: 27.6 PG (ref 26–35)
MCHC RBC AUTO-ENTMCNC: 30.5 G/DL (ref 32–34.5)
MCV RBC AUTO: 90.5 FL (ref 80–99.9)
MONOCYTES NFR BLD: 0.85 K/UL (ref 0.1–0.95)
MONOCYTES NFR BLD: 9 % (ref 2–12)
NEUTROPHILS NFR BLD: 66 % (ref 43–80)
NEUTS SEG NFR BLD: 6.46 K/UL (ref 1.8–7.3)
PHOSPHATE SERPL-MCNC: 2 MG/DL (ref 2.5–4.5)
PLATELET # BLD AUTO: 328 K/UL (ref 130–450)
PMV BLD AUTO: 10.9 FL (ref 7–12)
POTASSIUM SERPL-SCNC: 3.9 MMOL/L (ref 3.5–5)
PROT SERPL-MCNC: 7.1 G/DL (ref 6.4–8.3)
PTH-INTACT SERPL-MCNC: 133.6 PG/ML (ref 15–65)
RBC # BLD AUTO: 4.2 M/UL (ref 3.5–5.5)
SODIUM SERPL-SCNC: 143 MMOL/L (ref 132–146)
TRIGL SERPL-MCNC: 113 MG/DL
VLDLC SERPL CALC-MCNC: 23 MG/DL
WBC OTHER # BLD: 9.8 K/UL (ref 4.5–11.5)

## 2024-02-12 PROCEDURE — 80053 COMPREHEN METABOLIC PANEL: CPT

## 2024-02-12 PROCEDURE — 85025 COMPLETE CBC W/AUTO DIFF WBC: CPT

## 2024-02-12 PROCEDURE — 82306 VITAMIN D 25 HYDROXY: CPT

## 2024-02-12 PROCEDURE — 84100 ASSAY OF PHOSPHORUS: CPT

## 2024-02-12 PROCEDURE — 83735 ASSAY OF MAGNESIUM: CPT

## 2024-02-12 PROCEDURE — 36415 COLL VENOUS BLD VENIPUNCTURE: CPT

## 2024-02-12 PROCEDURE — 80061 LIPID PANEL: CPT

## 2024-02-12 PROCEDURE — 83970 ASSAY OF PARATHORMONE: CPT

## 2024-02-16 ENCOUNTER — OFFICE VISIT (OUTPATIENT)
Dept: FAMILY MEDICINE CLINIC | Age: 69
End: 2024-02-16

## 2024-02-16 VITALS
RESPIRATION RATE: 16 BRPM | HEART RATE: 99 BPM | BODY MASS INDEX: 43.4 KG/M2 | WEIGHT: 293 LBS | HEIGHT: 69 IN | TEMPERATURE: 97 F | DIASTOLIC BLOOD PRESSURE: 62 MMHG | OXYGEN SATURATION: 99 % | SYSTOLIC BLOOD PRESSURE: 108 MMHG

## 2024-02-16 DIAGNOSIS — L30.9 DERMATITIS: Primary | ICD-10-CM

## 2024-02-16 RX ORDER — KETOCONAZOLE 20 MG/G
CREAM TOPICAL
Qty: 30 G | Refills: 0 | Status: SHIPPED | OUTPATIENT
Start: 2024-02-16

## 2024-02-16 NOTE — PROGRESS NOTES
S: 68 y.o. female here for back wound 2/2 back scratcher. Months ago. Using ammonium lactate. Still hurts.     O: VS: /62   Pulse 99   Temp 97 °F (36.1 °C) (Temporal)   Resp 16   Ht 1.753 m (5' 9\")   Wt (!) 143.3 kg (316 lb)   SpO2 99%   BMI 46.67 kg/m²    General: NAD, alert and interacting appropriately.    Skin: per media image. No e/o infxn. Slight ttp.     Impression: skin wound, slow healing.   Plan:   Topical ketoconazole.   Rtc 1 mo for Bx if needed.     Attending Physician Statement  I have discussed the case, including pertinent history and exam findings with the resident.  I agree with the documented assessment and plan.      
MG tablet Take 1 tablet by mouth 2 times daily 180 tablet 3    omeprazole (PRILOSEC) 40 MG delayed release capsule TAKE 1 CAPSULE EVERY DAY WITH BREAKFAST 90 capsule 3    cloNIDine (CATAPRES) 0.1 MG tablet Take 1 tablet by mouth 3 times daily 270 tablet 3    valsartan-hydroCHLOROthiazide (DIOVAN-HCT) 160-25 MG per tablet TAKE 1 TABLET EVERY DAY PER DR JARAMILLO 90 tablet 3    busPIRone (BUSPAR) 5 MG tablet Take 1 tablet by mouth 3 times daily 270 tablet 3    vitamin D3 (CHOLECALCIFEROL) 25 MCG (1000 UT) TABS tablet TAKE 2 TABLETS EVERY DAY (PRESCRIBED BY RENAL) 180 tablet 3    atorvastatin (LIPITOR) 80 MG tablet Take 1 tablet by mouth nightly 90 tablet 3    allopurinol (ZYLOPRIM) 100 MG tablet Take 1 tablet by mouth 2 times daily 180 tablet 3    Semaglutide, 1 MG/DOSE, 4 MG/3ML SOPN Inject1 mg SQ weekly 9 mL 1    DULoxetine (CYMBALTA) 60 MG extended release capsule Take 1 capsule by mouth daily 90 capsule 3    insulin glargine (LANTUS SOLOSTAR) 100 UNIT/ML injection pen Inject 50 Units into the skin 2 times daily 100 mL 1    pregabalin (LYRICA) 150 MG capsule Take 1 capsule by mouth 2 times daily for 180 days. 180 capsule 1    furosemide (LASIX) 20 MG tablet TAKE 1 TABLET (20MG) EVERY OTHER DAY ALTERNATING  WITH 2 TABLETS (40MG) EVERY OTHER  tablet 3    Insulin Pen Needle (B-D UF III MINI PEN NEEDLES) 31G X 5 MM MISC Use to inject insulin 5 times daily 500 each 3    insulin aspart (NOVOLOG FLEXPEN) 100 UNIT/ML injection pen 15 units bid w/ SSI : 150-200 2 units; 201-250 4; 251-300 6; 301-350 8; 351-400 10; 401-450 12; 451-500 14; 501+ 16 units 35 mL 3    tiZANidine (ZANAFLEX) 4 MG tablet TAKE 1 TABLET EVERY 6 HOURS FOR MUSCLE SPASM AS NEEDED 90 tablet 1    TRUEplus Lancets 33G MISC USE  TO  TEST  UP  TO FIVE TIMES DAILY 500 each 3    metFORMIN (GLUCOPHAGE XR) 500 MG extended release tablet Take 1 tablet by mouth daily (with breakfast) 90 tablet 3    TRUE METRIX BLOOD GLUCOSE TEST strip TEST BLOOD SUGAR UP TO 5

## 2024-03-19 ENCOUNTER — OFFICE VISIT (OUTPATIENT)
Dept: FAMILY MEDICINE CLINIC | Age: 69
End: 2024-03-19

## 2024-03-19 VITALS
SYSTOLIC BLOOD PRESSURE: 113 MMHG | TEMPERATURE: 97.3 F | RESPIRATION RATE: 18 BRPM | OXYGEN SATURATION: 98 % | HEART RATE: 94 BPM | HEIGHT: 69 IN | DIASTOLIC BLOOD PRESSURE: 60 MMHG | BODY MASS INDEX: 43.4 KG/M2 | WEIGHT: 293 LBS

## 2024-03-19 DIAGNOSIS — N18.31 STAGE 3A CHRONIC KIDNEY DISEASE (HCC): ICD-10-CM

## 2024-03-19 DIAGNOSIS — Z79.4 TYPE 2 DIABETES MELLITUS WITH DIABETIC AUTONOMIC NEUROPATHY, WITH LONG-TERM CURRENT USE OF INSULIN (HCC): Primary | ICD-10-CM

## 2024-03-19 DIAGNOSIS — L30.9 DERMATITIS: ICD-10-CM

## 2024-03-19 DIAGNOSIS — E11.3293 TYPE 2 DIABETES MELLITUS WITH BOTH EYES AFFECTED BY MILD NONPROLIFERATIVE RETINOPATHY WITHOUT MACULAR EDEMA, WITH LONG-TERM CURRENT USE OF INSULIN (HCC): ICD-10-CM

## 2024-03-19 DIAGNOSIS — E11.43 TYPE 2 DIABETES MELLITUS WITH DIABETIC AUTONOMIC NEUROPATHY, WITH LONG-TERM CURRENT USE OF INSULIN (HCC): Primary | ICD-10-CM

## 2024-03-19 DIAGNOSIS — E66.01 CLASS 3 SEVERE OBESITY DUE TO EXCESS CALORIES WITH SERIOUS COMORBIDITY AND BODY MASS INDEX (BMI) OF 45.0 TO 49.9 IN ADULT (HCC): ICD-10-CM

## 2024-03-19 DIAGNOSIS — Z79.4 TYPE 2 DIABETES MELLITUS WITH BOTH EYES AFFECTED BY MILD NONPROLIFERATIVE RETINOPATHY WITHOUT MACULAR EDEMA, WITH LONG-TERM CURRENT USE OF INSULIN (HCC): ICD-10-CM

## 2024-03-19 LAB — HBA1C MFR BLD: 5.9 %

## 2024-03-19 RX ORDER — INSULIN GLARGINE 100 [IU]/ML
45 INJECTION, SOLUTION SUBCUTANEOUS 2 TIMES DAILY
Qty: 100 ML | Refills: 0 | Status: SHIPPED | OUTPATIENT
Start: 2024-03-19

## 2024-03-19 SDOH — ECONOMIC STABILITY: FOOD INSECURITY: WITHIN THE PAST 12 MONTHS, YOU WORRIED THAT YOUR FOOD WOULD RUN OUT BEFORE YOU GOT MONEY TO BUY MORE.: OFTEN TRUE

## 2024-03-19 SDOH — ECONOMIC STABILITY: INCOME INSECURITY: HOW HARD IS IT FOR YOU TO PAY FOR THE VERY BASICS LIKE FOOD, HOUSING, MEDICAL CARE, AND HEATING?: VERY HARD

## 2024-03-19 SDOH — ECONOMIC STABILITY: FOOD INSECURITY: WITHIN THE PAST 12 MONTHS, THE FOOD YOU BOUGHT JUST DIDN'T LAST AND YOU DIDN'T HAVE MONEY TO GET MORE.: OFTEN TRUE

## 2024-03-19 NOTE — PROGRESS NOTES
[facility]  Precepting Note    Subjective:  Follow up   T2DM- HbA1c 5.9. On lantus, Humalog, and Metformin. Occasional hypoglycemia at night, asymptomatic. Dexcom  Excoriation from scratch on back, improving       ROS otherwise negative     Past medical, surgical, family and social history were reviewed, non-contributory, and unchanged unless otherwise stated.    Objective:    /60 (Site: Right Upper Arm, Position: Sitting, Cuff Size: Large Adult)   Pulse 94   Temp 97.3 °F (36.3 °C) (Temporal)   Resp 18   Ht 1.753 m (5' 9\")   Wt (!) 141.1 kg (311 lb)   SpO2 98%   BMI 45.93 kg/m²     Exam is as noted by resident with the following changes, additions or corrections:    General:  NAD; alert & oriented x 3   Heart:  RRR, no murmurs, gallops, or rubs.  Lungs:  CTA bilaterally, no wheeze, rales or rhonchi  Abd: bowel sounds present, nontender, nondistended, no masses  Extrem:  No clubbing, cyanosis, or edema    Assessment/Plan:  Back wound- resolved  T2DM- decrease Lantus to 45u BID due to episodic hypoglycemia. Likely increase Ozempic  Follow up 3 months      Attending Physician Statement  I have reviewed the chart, including any radiology or labs. I have discussed the case, including pertinent history and exam findings with the resident.  I agree with the assessment, plan and orders as documented by the resident.  Please refer to the resident note for additional information.      Electronically signed by Mari Sherman MD on 3/19/2024 at 2:18 PM    
A, MD            ______________________________________________________________________    Physical Exam :    Vitals: /60 (Site: Right Upper Arm, Position: Sitting, Cuff Size: Large Adult)   Pulse 94   Temp 97.3 °F (36.3 °C) (Temporal)   Resp 18   Ht 1.753 m (5' 9\")   Wt (!) 141.1 kg (311 lb)   SpO2 98%   BMI 45.93 kg/m²   General Appearance: Well developed, awake, alert, oriented, and in NAD  HEENT: NCAT, MMM, no pallor or icterus.   Neck: Symmetrical, trachea midline.  Chest wall/Lung: CTAB, respirations unlabored. No ronchi/wheezing/rales   Heart: RRR, normal S1 and S2, no murmurs, rubs or gallops.   Abdomen: SNTND  Extremities: Extremities normal, atraumatic, no cyanosis, clubbing.  1-2+ pitting edema in BLE.  Skin: healing linear scab, dry, nontender, no drainage.   Musculokeletal: ROM grossly normal in all joints of extremities, no obvious joint swelling.  Lymphnodes: No lymph node enlargement appreciated  Neurologic: Alert&Oriented x3. No focal motor deficits detected.   Psychiatric: Normal mood. Normal affect. Normal behavior.   ______________________________________________________________________    Assessment & Plan :    1.  Slow healing wound  - can stop ketoconazole    2. T2DM   - inc to 2mg of ozempic as the 0.25 dosage not covered by insurance. Dec lantus to 45 u BID. Call with hpyoglycemic episodes     3. BMI 45  - Inc ozempic as above. Encouraged dietary mod and increase physical activity. Patient is losing adequate weight on ozempic.    Return to Office: Return in about 3 months (around 6/19/2024) for weight loss, diabetes.    Giulia Pinedo MD  Case discussed with Dr. Sherman

## 2024-03-19 NOTE — PATIENT INSTRUCTIONS
Monday-Friday ($14/day); hot dinner Monday-Friday ($9/day)  Phone number: [phone]  [employer] [facility]:  What they offer: Delivers refrigerated meals to heat. Special diets. Private pay, government funded programs and some insurance plans.  Phone Number: [phone]  Website: [employer]  [employer] [place] ([facility]):  What they offer: Monday-Friday delivered hot and cold meal, $6.00/day(must live in [facility])  Phone Number: [phone]    "[employer]," PROGRAM:  What they offer: Delivers hot meals to homebound individuals living in the [place] and [facility]. Monday-Friday. If eligible and funds available, two frozen meals for Saturday and Sunday.  Phone Number: [phone]  [facility] SERVICES-MEALS:  What they offer: Home delivered to [facility] residents. Donation for meals.   Phone Number: [phone]  [place] [facility]*  (Call 211 if need more resources.)     [facility]  What they offer: Housing for elderly, disabled, handicapped, moderate and low-income families; rental assistance under [address] 8 program (Housing Choice Voucher Program). Call for application information.  [facility] Phone number: [phone]  Website: [employer]  [facility] Phone Number: [phone]  Website: [employer]  [facility] Phone Number: [phone]  Website: [employer]    [facility] [facility]:  What they offer: shelter for victims of domestic violence in [facility]  Phone Number: [phone]  Website: [employer]    [facility] DOMESTIC VIOLENCE SERVICES:  What they offer: shelter for victims of domestic violence in [facility]  Phone Number: [phone]  Website: [employer]    HOMELESS PROGRAM [facility]  What they offer: Assists homeless persons or families that lack a fixed or regular nighttime residence; shelter houses homeless from 3-30 days.

## 2024-03-24 PROBLEM — I12.9 BENIGN HYPERTENSIVE KIDNEY DISEASE WITH CHRONIC KIDNEY DISEASE: Status: ACTIVE | Noted: 2022-01-05

## 2024-03-24 PROBLEM — N18.31 STAGE 3A CHRONIC KIDNEY DISEASE (HCC): Status: ACTIVE | Noted: 2022-01-05

## 2024-05-21 ENCOUNTER — HOSPITAL ENCOUNTER (OUTPATIENT)
Dept: GENERAL RADIOLOGY | Age: 69
Discharge: HOME | End: 2024-05-23
Payer: MEDICARE

## 2024-05-21 VITALS — WEIGHT: 293 LBS | HEIGHT: 69 IN | BODY MASS INDEX: 43.4 KG/M2

## 2024-05-21 DIAGNOSIS — Z12.31 ENCOUNTER FOR SCREENING MAMMOGRAM FOR MALIGNANT NEOPLASM OF BREAST: ICD-10-CM

## 2024-05-21 PROCEDURE — 77063 BREAST TOMOSYNTHESIS BI: CPT

## 2024-05-23 ENCOUNTER — CLINICAL DOCUMENTATION (OUTPATIENT)
Dept: GENERAL RADIOLOGY | Age: 69
End: 2024-05-23

## 2024-05-23 NOTE — PROGRESS NOTES
Mammogram clinical report provided to patient. Report sent to Dr. Robina Green as per patient's request.

## 2024-05-27 DIAGNOSIS — Z79.4 TYPE 2 DIABETES MELLITUS WITH DIABETIC AUTONOMIC NEUROPATHY, WITH LONG-TERM CURRENT USE OF INSULIN (HCC): ICD-10-CM

## 2024-05-27 DIAGNOSIS — E11.43 TYPE 2 DIABETES MELLITUS WITH DIABETIC AUTONOMIC NEUROPATHY, WITH LONG-TERM CURRENT USE OF INSULIN (HCC): ICD-10-CM

## 2024-05-28 RX ORDER — SEMAGLUTIDE 2.68 MG/ML
INJECTION, SOLUTION SUBCUTANEOUS
Qty: 9 ML | Refills: 3 | Status: SHIPPED | OUTPATIENT
Start: 2024-05-28

## 2024-05-28 NOTE — TELEPHONE ENCOUNTER
Last Appointment:  3/19/2024  Future Appointments   Date Time Provider Department Center   6/14/2024 11:00 AM Giulia Pinedo MD [facility] [place] [facility]

## 2024-07-01 DIAGNOSIS — I10 ESSENTIAL HYPERTENSION: ICD-10-CM

## 2024-07-01 DIAGNOSIS — K21.9 GASTROESOPHAGEAL REFLUX DISEASE WITHOUT ESOPHAGITIS: ICD-10-CM

## 2024-07-01 DIAGNOSIS — F41.9 ANXIETY: ICD-10-CM

## 2024-07-01 DIAGNOSIS — M48.061 NEURAL FORAMINAL STENOSIS OF LUMBAR SPINE: ICD-10-CM

## 2024-07-01 DIAGNOSIS — E11.42 DIABETIC PERIPHERAL NEUROPATHY (HCC): ICD-10-CM

## 2024-07-01 RX ORDER — METOPROLOL TARTRATE 100 MG/1
100 TABLET ORAL 2 TIMES DAILY
Qty: 180 TABLET | Refills: 0 | Status: SHIPPED | OUTPATIENT
Start: 2024-07-01

## 2024-07-01 RX ORDER — OMEPRAZOLE 40 MG/1
CAPSULE, DELAYED RELEASE ORAL
Qty: 90 CAPSULE | Refills: 0 | Status: SHIPPED | OUTPATIENT
Start: 2024-07-01

## 2024-07-01 RX ORDER — PREGABALIN 150 MG/1
150 CAPSULE ORAL 2 TIMES DAILY
Qty: 180 CAPSULE | Refills: 0 | Status: SHIPPED | OUTPATIENT
Start: 2024-07-01 | End: 2024-12-28

## 2024-07-01 RX ORDER — BUSPIRONE HYDROCHLORIDE 5 MG/1
5 TABLET ORAL 3 TIMES DAILY
Qty: 270 TABLET | Refills: 0 | Status: SHIPPED | OUTPATIENT
Start: 2024-07-01

## 2024-07-01 RX ORDER — VALSARTAN AND HYDROCHLOROTHIAZIDE 160; 25 MG/1; MG/1
TABLET ORAL
Qty: 90 TABLET | Refills: 0 | Status: SHIPPED | OUTPATIENT
Start: 2024-07-01

## 2024-07-01 RX ORDER — POTASSIUM CHLORIDE 750 MG/1
10 TABLET, EXTENDED RELEASE ORAL 2 TIMES DAILY
Qty: 180 TABLET | Refills: 0 | Status: SHIPPED | OUTPATIENT
Start: 2024-07-01

## 2024-07-01 RX ORDER — MELATONIN
Qty: 180 TABLET | Refills: 0 | Status: SHIPPED | OUTPATIENT
Start: 2024-07-01

## 2024-07-01 RX ORDER — CLONIDINE HYDROCHLORIDE 0.1 MG/1
0.1 TABLET ORAL 3 TIMES DAILY
Qty: 270 TABLET | Refills: 3 | Status: SHIPPED | OUTPATIENT
Start: 2024-07-01

## 2024-07-01 RX ORDER — TIZANIDINE 4 MG/1
TABLET ORAL
Qty: 90 TABLET | Refills: 0 | Status: SHIPPED | OUTPATIENT
Start: 2024-07-01

## 2024-07-01 NOTE — TELEPHONE ENCOUNTER
Please call Coco and schedule her a follow up appointment with me in the next 90 days for additional refills. Thank you!

## 2024-07-18 ENCOUNTER — OFFICE VISIT (OUTPATIENT)
Dept: FAMILY MEDICINE CLINIC | Age: 69
End: 2024-07-18

## 2024-07-18 VITALS
SYSTOLIC BLOOD PRESSURE: 96 MMHG | TEMPERATURE: 97.1 F | HEART RATE: 98 BPM | RESPIRATION RATE: 18 BRPM | WEIGHT: 293 LBS | OXYGEN SATURATION: 95 % | HEIGHT: 69 IN | DIASTOLIC BLOOD PRESSURE: 53 MMHG | BODY MASS INDEX: 43.4 KG/M2

## 2024-07-18 DIAGNOSIS — I10 ESSENTIAL HYPERTENSION: ICD-10-CM

## 2024-07-18 DIAGNOSIS — Z00.00 MEDICARE ANNUAL WELLNESS VISIT, SUBSEQUENT: Primary | ICD-10-CM

## 2024-07-18 DIAGNOSIS — Z79.4 TYPE 2 DIABETES MELLITUS WITH DIABETIC AUTONOMIC NEUROPATHY, WITH LONG-TERM CURRENT USE OF INSULIN (HCC): ICD-10-CM

## 2024-07-18 DIAGNOSIS — E11.43 TYPE 2 DIABETES MELLITUS WITH DIABETIC AUTONOMIC NEUROPATHY, WITH LONG-TERM CURRENT USE OF INSULIN (HCC): ICD-10-CM

## 2024-07-18 DIAGNOSIS — R06.83 SNORING: ICD-10-CM

## 2024-07-18 DIAGNOSIS — Z71.89 ACP (ADVANCE CARE PLANNING): ICD-10-CM

## 2024-07-18 DIAGNOSIS — K21.9 GASTROESOPHAGEAL REFLUX DISEASE WITHOUT ESOPHAGITIS: ICD-10-CM

## 2024-07-18 DIAGNOSIS — G47.19 OTHER HYPERSOMNIA: ICD-10-CM

## 2024-07-18 DIAGNOSIS — Z59.9 FINANCIAL DIFFICULTIES: ICD-10-CM

## 2024-07-18 DIAGNOSIS — F41.9 ANXIETY: ICD-10-CM

## 2024-07-18 DIAGNOSIS — R53.83 OTHER FATIGUE: ICD-10-CM

## 2024-07-18 DIAGNOSIS — F33.41 MAJOR DEPRESSIVE DISORDER, RECURRENT, IN PARTIAL REMISSION (HCC): ICD-10-CM

## 2024-07-18 DIAGNOSIS — M48.061 NEURAL FORAMINAL STENOSIS OF LUMBAR SPINE: ICD-10-CM

## 2024-07-18 DIAGNOSIS — E66.01 MORBID OBESITY (HCC): ICD-10-CM

## 2024-07-18 LAB
ALBUMIN: 4.2 G/DL (ref 3.5–5.2)
ALP BLD-CCNC: 159 U/L (ref 35–104)
ALT SERPL-CCNC: 24 U/L (ref 0–32)
ANION GAP SERPL CALCULATED.3IONS-SCNC: 18 MMOL/L (ref 7–16)
AST SERPL-CCNC: 30 U/L (ref 0–31)
BASOPHILS ABSOLUTE: 0.06 K/UL (ref 0–0.2)
BASOPHILS RELATIVE PERCENT: 1 % (ref 0–2)
BILIRUB SERPL-MCNC: 0.8 MG/DL (ref 0–1.2)
BUN BLDV-MCNC: 18 MG/DL (ref 6–23)
CALCIUM SERPL-MCNC: 10 MG/DL (ref 8.6–10.2)
CHLORIDE BLD-SCNC: 99 MMOL/L (ref 98–107)
CHP ED QC CHECK: NORMAL
CO2: 26 MMOL/L (ref 22–29)
CREAT SERPL-MCNC: 1.9 MG/DL (ref 0.5–1)
EOSINOPHILS ABSOLUTE: 0.12 K/UL (ref 0.05–0.5)
EOSINOPHILS RELATIVE PERCENT: 1 % (ref 0–6)
GFR, ESTIMATED: 29 ML/MIN/1.73M2
GLUCOSE BLD-MCNC: 125 MG/DL
GLUCOSE BLD-MCNC: 131 MG/DL (ref 74–99)
HBA1C MFR BLD: 6.1 %
HCT VFR BLD CALC: 42 % (ref 34–48)
HEMOGLOBIN: 12.7 G/DL (ref 11.5–15.5)
IMMATURE GRANULOCYTES %: 0 % (ref 0–5)
IMMATURE GRANULOCYTES ABSOLUTE: 0.05 K/UL (ref 0–0.58)
LYMPHOCYTES ABSOLUTE: 1.64 K/UL (ref 1.5–4)
LYMPHOCYTES RELATIVE PERCENT: 14 % (ref 20–42)
MCH RBC QN AUTO: 27.6 PG (ref 26–35)
MCHC RBC AUTO-ENTMCNC: 30.2 G/DL (ref 32–34.5)
MCV RBC AUTO: 91.3 FL (ref 80–99.9)
MONOCYTES ABSOLUTE: 0.92 K/UL (ref 0.1–0.95)
MONOCYTES RELATIVE PERCENT: 8 % (ref 2–12)
NEUTROPHILS ABSOLUTE: 9.32 K/UL (ref 1.8–7.3)
NEUTROPHILS RELATIVE PERCENT: 77 % (ref 43–80)
PDW BLD-RTO: 14.5 % (ref 11.5–15)
PLATELET # BLD: 376 K/UL (ref 130–450)
PMV BLD AUTO: 11.4 FL (ref 7–12)
POTASSIUM SERPL-SCNC: 3.9 MMOL/L (ref 3.5–5)
RBC # BLD: 4.6 M/UL (ref 3.5–5.5)
SODIUM BLD-SCNC: 143 MMOL/L (ref 132–146)
TOTAL PROTEIN: 7.8 G/DL (ref 6.4–8.3)
TSH SERPL DL<=0.05 MIU/L-ACNC: 3.54 UIU/ML (ref 0.27–4.2)
WBC # BLD: 12.1 K/UL (ref 4.5–11.5)

## 2024-07-18 RX ORDER — BUSPIRONE HYDROCHLORIDE 5 MG/1
5 TABLET ORAL 3 TIMES DAILY
Qty: 270 TABLET | Refills: 0 | Status: SHIPPED | OUTPATIENT
Start: 2024-07-18

## 2024-07-18 RX ORDER — TIZANIDINE 4 MG/1
TABLET ORAL
Qty: 90 TABLET | Refills: 0 | Status: SHIPPED | OUTPATIENT
Start: 2024-07-18

## 2024-07-18 RX ORDER — METOPROLOL TARTRATE 100 MG/1
100 TABLET ORAL 2 TIMES DAILY
Qty: 180 TABLET | Refills: 0 | Status: SHIPPED | OUTPATIENT
Start: 2024-07-18

## 2024-07-18 RX ORDER — MELATONIN
Qty: 180 TABLET | Refills: 0 | Status: SHIPPED | OUTPATIENT
Start: 2024-07-18

## 2024-07-18 RX ORDER — OMEPRAZOLE 40 MG/1
CAPSULE, DELAYED RELEASE ORAL
Qty: 90 CAPSULE | Refills: 0 | Status: SHIPPED | OUTPATIENT
Start: 2024-07-18

## 2024-07-18 RX ORDER — POTASSIUM CHLORIDE 750 MG/1
10 TABLET, EXTENDED RELEASE ORAL 2 TIMES DAILY
Qty: 180 TABLET | Refills: 0 | Status: SHIPPED | OUTPATIENT
Start: 2024-07-18

## 2024-07-18 RX ORDER — VALSARTAN AND HYDROCHLOROTHIAZIDE 160; 25 MG/1; MG/1
TABLET ORAL
Qty: 90 TABLET | Refills: 0 | Status: SHIPPED | OUTPATIENT
Start: 2024-07-18

## 2024-07-18 SDOH — ECONOMIC STABILITY - INCOME SECURITY: PROBLEM RELATED TO HOUSING AND ECONOMIC CIRCUMSTANCES, UNSPECIFIED: Z59.9

## 2024-07-18 ASSESSMENT — PATIENT HEALTH QUESTIONNAIRE - PHQ9
8. MOVING OR SPEAKING SO SLOWLY THAT OTHER PEOPLE COULD HAVE NOTICED. OR THE OPPOSITE, BEING SO FIGETY OR RESTLESS THAT YOU HAVE BEEN MOVING AROUND A LOT MORE THAN USUAL: NOT AT ALL
5. POOR APPETITE OR OVEREATING: NEARLY EVERY DAY
6. FEELING BAD ABOUT YOURSELF - OR THAT YOU ARE A FAILURE OR HAVE LET YOURSELF OR YOUR FAMILY DOWN: NOT AT ALL
7. TROUBLE CONCENTRATING ON THINGS, SUCH AS READING THE NEWSPAPER OR WATCHING TELEVISION: NOT AT ALL
SUM OF ALL RESPONSES TO PHQ QUESTIONS 1-9: 10
1. LITTLE INTEREST OR PLEASURE IN DOING THINGS: MORE THAN HALF THE DAYS
SUM OF ALL RESPONSES TO PHQ QUESTIONS 1-9: 10
SUM OF ALL RESPONSES TO PHQ QUESTIONS 1-9: 10
2. FEELING DOWN, DEPRESSED OR HOPELESS: MORE THAN HALF THE DAYS
SUM OF ALL RESPONSES TO PHQ9 QUESTIONS 1 & 2: 4
10. IF YOU CHECKED OFF ANY PROBLEMS, HOW DIFFICULT HAVE THESE PROBLEMS MADE IT FOR YOU TO DO YOUR WORK, TAKE CARE OF THINGS AT HOME, OR GET ALONG WITH OTHER PEOPLE: SOMEWHAT DIFFICULT
3. TROUBLE FALLING OR STAYING ASLEEP: NEARLY EVERY DAY
4. FEELING TIRED OR HAVING LITTLE ENERGY: NOT AT ALL
SUM OF ALL RESPONSES TO PHQ QUESTIONS 1-9: 10
9. THOUGHTS THAT YOU WOULD BE BETTER OFF DEAD, OR OF HURTING YOURSELF: NOT AT ALL

## 2024-07-18 ASSESSMENT — LIFESTYLE VARIABLES
HOW OFTEN DO YOU HAVE A DRINK CONTAINING ALCOHOL: NEVER
HOW MANY STANDARD DRINKS CONTAINING ALCOHOL DO YOU HAVE ON A TYPICAL DAY: PATIENT DOES NOT DRINK

## 2024-07-18 NOTE — PROGRESS NOTES
Size: Large Adult Large Adult Large Adult Large Adult   Pulse:  80 79 98   Resp:       Temp:       TempSrc:       SpO2:       Weight:       Height:          Body mass index is 43.86 kg/m².      Gen: NAD   HEENT: NCAT, PERRL, MMM  Neck: supple  CV-RRR no M/R/G   Lungs-CTA b/l no R/R/W  ABD-soft nonttp no masses   Ext-no C/C; b/l lower legs with tr-1+ edema b/l  Able to stand but too worried to try to walk without rollator  Able to safely ambulate with rollator              No Known Allergies  Prior to Visit Medications    Medication Sig Taking? Authorizing Provider   busPIRone (BUSPAR) 5 MG tablet Take 1 tablet by mouth 3 times daily Yes Robina Green MD   Continuous Glucose Sensor (FREESTYLE URIAH 2 SENSOR) MISC 1 each by Does not apply route 4 times daily Yes Robina Green MD   metoprolol (LOPRESSOR) 100 MG tablet Take 1 tablet by mouth 2 times daily Yes Robina Green MD   omeprazole (PRILOSEC) 40 MG delayed release capsule TAKE 1 CAPSULE EVERY DAY WITH BREAKFAST Yes Robina Green MD   potassium chloride (KLOR-CON M) 10 MEQ extended release tablet Take 1 tablet by mouth 2 times daily Yes Robina Green MD   tiZANidine (ZANAFLEX) 4 MG tablet TAKE 1 TABLET EVERY 6 HOURS FOR MUSCLE SPASM AS NEEDED Yes Robina Green MD   valsartan-hydroCHLOROthiazide (DIOVAN-HCT) 160-25 MG per tablet TAKE 1 TABLET EVERY DAY PER DR JARAMILLO Yes Robina Green MD   vitamin D3 (CHOLECALCIFEROL) 25 MCG (1000 UT) TABS tablet TAKE 2 TABLETS EVERY DAY (PRESCRIBED BY RENAL) Yes Robina Green MD   acetaminophen (TYLENOL) 500 MG tablet Take 1 tablet by mouth every 6 hours as needed for Pain Yes Provider, MD Lewis   pregabalin (LYRICA) 150 MG capsule Take 1 capsule by mouth 2 times daily for 180 days.  Robina Green MD   semaglutide, 2 MG/DOSE, (OZEMPIC, 2 MG/DOSE,) 8 MG/3ML SOPN sc injection INJECT 2MG UNDER THE SKIN ONE TIME WEEKLY (EVERY 7 DAYS)  Giulia Pinedo MD   Continuous Blood

## 2024-07-18 NOTE — PATIENT INSTRUCTIONS
out with your strong side first. Have a phone or medical alert device in the bathroom with you.   Where can you learn more?  Go to https://www.[employer]erra[employer].net/patientEd and enter G117 to learn more about \"Preventing Falls: Care Instructions.\"  Current as of: July 17, 2023  Content Version: 14.1  © 1227-1021 [employer].   Care instructions adapted under license by [employer]. If you have questions about a medical condition or this instruction, always ask your healthcare professional. [employer] disclaims any warranty or liability for your use of this information.           Learning About Mindfulness for Stress  What are mindfulness and stress?     Stress is your body's response to a hard situation. Your body can have a physical, emotional, or mental response. A lot of things can cause stress. You may feel stress when you go on a job interview, take a test, or run a race. This kind of short-term stress is normal and even useful. It can help you if you need to work hard or react quickly.  Stress also can last a long time. Long-term stress is caused by stressful situations or events. Examples of long-term stress include long-term health problems, ongoing problems at work, and conflicts in your family. Long-term stress can harm your health.  Mindfulness is a focus only on things happening in the present moment. It's a process of purposefully paying attention to and being aware of your surroundings, your emotions, your thoughts, and how your body feels. You are aware of these things, but you aren't judging these experiences as \"good\" or \"bad.\" Mindfulness can help you learn to calm your mind and body to help you cope with illness, pain, and stress.  How does mindfulness help to relieve stress?  Mindfulness can help quiet your mind and relax your body. Studies show that it can help some people sleep better, feel less anxious, and bring their blood pressure down. And it's been shown to help some

## 2024-07-19 ENCOUNTER — CARE COORDINATION (OUTPATIENT)
Dept: CARE COORDINATION | Age: 69
End: 2024-07-19

## 2024-07-19 ENCOUNTER — TELEPHONE (OUTPATIENT)
Dept: FAMILY MEDICINE CLINIC | Age: 69
End: 2024-07-19

## 2024-07-19 DIAGNOSIS — L29.9 ITCHING: ICD-10-CM

## 2024-07-19 DIAGNOSIS — G47.19 OTHER HYPERSOMNIA: Primary | ICD-10-CM

## 2024-07-19 DIAGNOSIS — I10 PRIMARY HYPERTENSION: Chronic | ICD-10-CM

## 2024-07-19 DIAGNOSIS — N17.9 ACUTE KIDNEY INJURY SUPERIMPOSED ON CKD (HCC): ICD-10-CM

## 2024-07-19 DIAGNOSIS — R06.83 SNORING: ICD-10-CM

## 2024-07-19 DIAGNOSIS — N18.9 ACUTE KIDNEY INJURY SUPERIMPOSED ON CKD (HCC): ICD-10-CM

## 2024-07-19 RX ORDER — CETIRIZINE HYDROCHLORIDE 10 MG/1
10 TABLET ORAL DAILY
Qty: 30 TABLET | Refills: 0 | Status: SHIPPED | OUTPATIENT
Start: 2024-07-19

## 2024-07-19 RX ORDER — CLONIDINE HYDROCHLORIDE 0.1 MG/1
0.1 TABLET ORAL 3 TIMES DAILY
Qty: 90 TABLET | Refills: 0 | Status: SHIPPED | OUTPATIENT
Start: 2024-07-19

## 2024-07-19 NOTE — CARE COORDINATION
[facility] received a referral from the PCP regarding financial difficulties for Coco.      [facility] placed initial call but there was no answer.  A voice message was left with [facility] information and call back request.     [facility] to follow with a second outreach attempt.

## 2024-07-19 NOTE — TELEPHONE ENCOUNTER
Cr 1.9. Left message for pt to hold diovan hctz and potassium until lab recheck on 7/23. Check bp and if high, restart clonidine. Hydrate. Asked pt to call back to the office today so that I can discuss with her.

## 2024-07-19 NOTE — TELEPHONE ENCOUNTER
Spoke with the patient. She will hold diovan hctz, potassium, allopurinol, and lasix. She will obtain labs on 7/23. If bp greater than 150, restart clonidine. Sent zyrtec.

## 2024-07-24 ENCOUNTER — LAB (OUTPATIENT)
Dept: FAMILY MEDICINE CLINIC | Age: 69
End: 2024-07-24
Payer: MEDICARE

## 2024-07-24 ENCOUNTER — TELEPHONE (OUTPATIENT)
Dept: FAMILY MEDICINE CLINIC | Age: 69
End: 2024-07-24

## 2024-07-24 DIAGNOSIS — N17.9 ACUTE KIDNEY INJURY SUPERIMPOSED ON CKD (HCC): ICD-10-CM

## 2024-07-24 DIAGNOSIS — N18.9 ACUTE KIDNEY INJURY SUPERIMPOSED ON CKD (HCC): ICD-10-CM

## 2024-07-24 LAB
ANION GAP SERPL CALCULATED.3IONS-SCNC: 14 MMOL/L (ref 7–16)
BUN BLDV-MCNC: 13 MG/DL (ref 6–23)
CALCIUM SERPL-MCNC: 9.6 MG/DL (ref 8.6–10.2)
CHLORIDE BLD-SCNC: 106 MMOL/L (ref 98–107)
CO2: 22 MMOL/L (ref 22–29)
CREAT SERPL-MCNC: 1.2 MG/DL (ref 0.5–1)
GFR, ESTIMATED: 50 ML/MIN/1.73M2
GLUCOSE BLD-MCNC: 175 MG/DL (ref 74–99)
POTASSIUM SERPL-SCNC: 4.2 MMOL/L (ref 3.5–5)
SODIUM BLD-SCNC: 142 MMOL/L (ref 132–146)

## 2024-07-24 PROCEDURE — 36415 COLL VENOUS BLD VENIPUNCTURE: CPT | Performed by: FAMILY MEDICINE

## 2024-07-24 NOTE — TELEPHONE ENCOUNTER
Patient was in office for blood draw and wanted to report blood pressure reading from home. Result was 135/87

## 2024-07-25 ENCOUNTER — TELEPHONE (OUTPATIENT)
Dept: FAMILY MEDICINE CLINIC | Age: 69
End: 2024-07-25

## 2024-07-25 ENCOUNTER — CARE COORDINATION (OUTPATIENT)
Dept: CARE COORDINATION | Age: 69
End: 2024-07-25

## 2024-07-25 DIAGNOSIS — N17.9 AKI (ACUTE KIDNEY INJURY) (HCC): Primary | ICD-10-CM

## 2024-07-25 NOTE — CARE COORDINATION
[facility] placed a second outreach call to [facility] today.  There was no answer.  A voice message was left with [facility] information and call back request.    [facility] to follow with a third outreach attempt.

## 2024-07-25 NOTE — TELEPHONE ENCOUNTER
Labs reviewed. Cr 1.2    Advise restart diovan hctz, potassium, and lasix. Hold allopurinol . Monitor bp at home.     Advised patient to call back tomorrow to verify that she received the message left on her voicemail.     Recheck bmp in 1 week

## 2024-08-02 ENCOUNTER — CARE COORDINATION (OUTPATIENT)
Dept: CARE COORDINATION | Age: 69
End: 2024-08-02

## 2024-08-02 NOTE — CARE COORDINATION
[facility] placed a third and final outreach attempt to [facility].   There was no answer.  A voice message was left with [facility] information.     No return calls received from previous 's left 7/19 and 7/25.    [facility] to sign off today.

## 2024-08-05 ENCOUNTER — TELEPHONE (OUTPATIENT)
Dept: FAMILY MEDICINE CLINIC | Age: 69
End: 2024-08-05

## 2024-08-05 NOTE — TELEPHONE ENCOUNTER
Spoke with the patient. She said she self started the clonidine back and dizziness resolved. Her bp now is 153/106 and pulse 86.  She did not take her meds today. She will take now and obtain logs, send to me via EMR or drop off with labs in 1-2 days.

## 2024-08-05 NOTE — TELEPHONE ENCOUNTER
Pt states her heart has been racing.  BP on 8/4/24 was 157/94 pulse 104, BP on 8/1/24 146/96 pulse 130.  Pt also complaining of little dizziness.

## 2024-08-19 ENCOUNTER — TELEPHONE (OUTPATIENT)
Dept: FAMILY MEDICINE CLINIC | Age: 69
End: 2024-08-19

## 2024-08-20 ENCOUNTER — NURSE ONLY (OUTPATIENT)
Dept: FAMILY MEDICINE CLINIC | Age: 69
End: 2024-08-20
Payer: MEDICARE

## 2024-08-20 DIAGNOSIS — N17.9 AKI (ACUTE KIDNEY INJURY) (HCC): ICD-10-CM

## 2024-08-20 LAB
ANION GAP SERPL CALCULATED.3IONS-SCNC: 9 MMOL/L (ref 7–16)
BUN BLDV-MCNC: 22 MG/DL (ref 6–23)
CALCIUM SERPL-MCNC: 9.7 MG/DL (ref 8.6–10.2)
CHLORIDE BLD-SCNC: 105 MMOL/L (ref 98–107)
CO2: 29 MMOL/L (ref 22–29)
CREAT SERPL-MCNC: 1.2 MG/DL (ref 0.5–1)
GFR, ESTIMATED: 50 ML/MIN/1.73M2
GLUCOSE BLD-MCNC: 119 MG/DL (ref 74–99)
POTASSIUM SERPL-SCNC: 4 MMOL/L (ref 3.5–5)
SODIUM BLD-SCNC: 143 MMOL/L (ref 132–146)

## 2024-08-20 PROCEDURE — 36415 COLL VENOUS BLD VENIPUNCTURE: CPT | Performed by: FAMILY MEDICINE

## 2024-08-22 DIAGNOSIS — E11.43 TYPE 2 DIABETES MELLITUS WITH DIABETIC AUTONOMIC NEUROPATHY, WITH LONG-TERM CURRENT USE OF INSULIN (HCC): ICD-10-CM

## 2024-08-22 DIAGNOSIS — Z79.4 TYPE 2 DIABETES MELLITUS WITH DIABETIC AUTONOMIC NEUROPATHY, WITH LONG-TERM CURRENT USE OF INSULIN (HCC): ICD-10-CM

## 2024-09-16 ENCOUNTER — OFFICE VISIT (OUTPATIENT)
Dept: FAMILY MEDICINE CLINIC | Age: 69
End: 2024-09-16
Payer: MEDICARE

## 2024-09-16 VITALS
DIASTOLIC BLOOD PRESSURE: 78 MMHG | HEIGHT: 69 IN | BODY MASS INDEX: 43.4 KG/M2 | OXYGEN SATURATION: 95 % | SYSTOLIC BLOOD PRESSURE: 133 MMHG | TEMPERATURE: 97.2 F | HEART RATE: 84 BPM | WEIGHT: 293 LBS | RESPIRATION RATE: 18 BRPM

## 2024-09-16 DIAGNOSIS — E11.3293 TYPE 2 DIABETES MELLITUS WITH BOTH EYES AFFECTED BY MILD NONPROLIFERATIVE RETINOPATHY WITHOUT MACULAR EDEMA, WITH LONG-TERM CURRENT USE OF INSULIN (HCC): ICD-10-CM

## 2024-09-16 DIAGNOSIS — N18.32 STAGE 3B CHRONIC KIDNEY DISEASE (HCC): ICD-10-CM

## 2024-09-16 DIAGNOSIS — E11.43 TYPE 2 DIABETES MELLITUS WITH DIABETIC AUTONOMIC NEUROPATHY, WITH LONG-TERM CURRENT USE OF INSULIN (HCC): Primary | ICD-10-CM

## 2024-09-16 DIAGNOSIS — I10 ESSENTIAL HYPERTENSION: ICD-10-CM

## 2024-09-16 DIAGNOSIS — I10 PRIMARY HYPERTENSION: Chronic | ICD-10-CM

## 2024-09-16 DIAGNOSIS — L29.9 ITCHING: ICD-10-CM

## 2024-09-16 DIAGNOSIS — F41.9 ANXIETY: ICD-10-CM

## 2024-09-16 DIAGNOSIS — Z79.4 TYPE 2 DIABETES MELLITUS WITH BOTH EYES AFFECTED BY MILD NONPROLIFERATIVE RETINOPATHY WITHOUT MACULAR EDEMA, WITH LONG-TERM CURRENT USE OF INSULIN (HCC): ICD-10-CM

## 2024-09-16 DIAGNOSIS — R01.1 HEART MURMUR: ICD-10-CM

## 2024-09-16 DIAGNOSIS — Z79.4 TYPE 2 DIABETES MELLITUS WITH DIABETIC AUTONOMIC NEUROPATHY, WITH LONG-TERM CURRENT USE OF INSULIN (HCC): Primary | ICD-10-CM

## 2024-09-16 DIAGNOSIS — E11.42 DIABETIC PERIPHERAL NEUROPATHY (HCC): ICD-10-CM

## 2024-09-16 DIAGNOSIS — K21.9 GASTROESOPHAGEAL REFLUX DISEASE WITHOUT ESOPHAGITIS: ICD-10-CM

## 2024-09-16 DIAGNOSIS — R60.0 BILATERAL LEG EDEMA: ICD-10-CM

## 2024-09-16 DIAGNOSIS — Z23 NEED FOR INFLUENZA VACCINATION: ICD-10-CM

## 2024-09-16 DIAGNOSIS — M48.061 NEURAL FORAMINAL STENOSIS OF LUMBAR SPINE: ICD-10-CM

## 2024-09-16 PROCEDURE — 1036F TOBACCO NON-USER: CPT | Performed by: FAMILY MEDICINE

## 2024-09-16 PROCEDURE — 3075F SYST BP GE 130 - 139MM HG: CPT | Performed by: FAMILY MEDICINE

## 2024-09-16 PROCEDURE — G8428 CUR MEDS NOT DOCUMENT: HCPCS | Performed by: FAMILY MEDICINE

## 2024-09-16 PROCEDURE — 99214 OFFICE O/P EST MOD 30 MIN: CPT | Performed by: FAMILY MEDICINE

## 2024-09-16 PROCEDURE — G8417 CALC BMI ABV UP PARAM F/U: HCPCS | Performed by: FAMILY MEDICINE

## 2024-09-16 PROCEDURE — 2022F DILAT RTA XM EVC RTNOPTHY: CPT | Performed by: FAMILY MEDICINE

## 2024-09-16 PROCEDURE — 90653 IIV ADJUVANT VACCINE IM: CPT | Performed by: FAMILY MEDICINE

## 2024-09-16 PROCEDURE — 3078F DIAST BP <80 MM HG: CPT | Performed by: FAMILY MEDICINE

## 2024-09-16 PROCEDURE — 1090F PRES/ABSN URINE INCON ASSESS: CPT | Performed by: FAMILY MEDICINE

## 2024-09-16 PROCEDURE — 1123F ACP DISCUSS/DSCN MKR DOCD: CPT | Performed by: FAMILY MEDICINE

## 2024-09-16 PROCEDURE — G8399 PT W/DXA RESULTS DOCUMENT: HCPCS | Performed by: FAMILY MEDICINE

## 2024-09-16 PROCEDURE — G0008 ADMIN INFLUENZA VIRUS VAC: HCPCS | Performed by: FAMILY MEDICINE

## 2024-09-16 PROCEDURE — 3044F HG A1C LEVEL LT 7.0%: CPT | Performed by: FAMILY MEDICINE

## 2024-09-16 PROCEDURE — 3017F COLORECTAL CA SCREEN DOC REV: CPT | Performed by: FAMILY MEDICINE

## 2024-09-16 RX ORDER — CETIRIZINE HYDROCHLORIDE 10 MG/1
10 TABLET ORAL DAILY
Qty: 30 TABLET | Refills: 0 | Status: SHIPPED | OUTPATIENT
Start: 2024-09-16

## 2024-09-16 RX ORDER — CLONIDINE HYDROCHLORIDE 0.1 MG/1
0.1 TABLET ORAL 3 TIMES DAILY
Qty: 90 TABLET | Refills: 1 | Status: SHIPPED | OUTPATIENT
Start: 2024-09-16

## 2024-09-16 RX ORDER — VALSARTAN AND HYDROCHLOROTHIAZIDE 160; 25 MG/1; MG/1
TABLET ORAL
Qty: 90 TABLET | Refills: 3 | Status: SHIPPED | OUTPATIENT
Start: 2024-09-16

## 2024-09-16 RX ORDER — SEMAGLUTIDE 2.68 MG/ML
2 INJECTION, SOLUTION SUBCUTANEOUS
Qty: 9 ML | Refills: 3 | Status: SHIPPED | OUTPATIENT
Start: 2024-09-16

## 2024-09-16 RX ORDER — POTASSIUM CHLORIDE 750 MG/1
10 TABLET, EXTENDED RELEASE ORAL 2 TIMES DAILY
Qty: 180 TABLET | Refills: 3 | Status: SHIPPED | OUTPATIENT
Start: 2024-09-16

## 2024-09-16 RX ORDER — CHOLECALCIFEROL (VITAMIN D3) 25 MCG
TABLET ORAL
Qty: 180 TABLET | Refills: 3 | Status: SHIPPED | OUTPATIENT
Start: 2024-09-16

## 2024-09-16 RX ORDER — METOPROLOL TARTRATE 100 MG
100 TABLET ORAL 2 TIMES DAILY
Qty: 180 TABLET | Refills: 3 | Status: SHIPPED | OUTPATIENT
Start: 2024-09-16

## 2024-09-16 RX ORDER — OMEPRAZOLE 40 MG/1
CAPSULE, DELAYED RELEASE ORAL
Qty: 90 CAPSULE | Refills: 3 | Status: SHIPPED | OUTPATIENT
Start: 2024-09-16

## 2024-09-16 RX ORDER — PREGABALIN 150 MG/1
150 CAPSULE ORAL 2 TIMES DAILY
Qty: 180 CAPSULE | Refills: 1 | Status: SHIPPED | OUTPATIENT
Start: 2024-09-16 | End: 2025-03-15

## 2024-09-16 RX ORDER — CALCIUM CITRATE/VITAMIN D3 200MG-6.25
TABLET ORAL
Qty: 450 STRIP | Refills: 3 | Status: SHIPPED | OUTPATIENT
Start: 2024-09-16

## 2024-09-16 RX ORDER — INSULIN ASPART 100 [IU]/ML
INJECTION, SOLUTION INTRAVENOUS; SUBCUTANEOUS
Qty: 35 ML | Refills: 3 | Status: SHIPPED | OUTPATIENT
Start: 2024-09-16

## 2024-09-16 RX ORDER — ATORVASTATIN CALCIUM 80 MG/1
80 TABLET, FILM COATED ORAL NIGHTLY
Qty: 90 TABLET | Refills: 3 | Status: SHIPPED | OUTPATIENT
Start: 2024-09-16

## 2024-09-16 RX ORDER — FUROSEMIDE 20 MG
TABLET ORAL
Qty: 135 TABLET | Refills: 3 | Status: SHIPPED | OUTPATIENT
Start: 2024-09-16

## 2024-09-16 RX ORDER — GLUCOSAM/CHON-MSM1/C/MANG/BOSW 500-416.6
TABLET ORAL
Qty: 500 EACH | Refills: 3 | Status: SHIPPED | OUTPATIENT
Start: 2024-09-16

## 2024-09-16 RX ORDER — FLURBIPROFEN SODIUM 0.3 MG/ML
SOLUTION/ DROPS OPHTHALMIC
Qty: 500 EACH | Refills: 3 | Status: SHIPPED | OUTPATIENT
Start: 2024-09-16

## 2024-09-16 RX ORDER — DULOXETIN HYDROCHLORIDE 60 MG/1
60 CAPSULE, DELAYED RELEASE ORAL DAILY
Qty: 90 CAPSULE | Refills: 3 | Status: SHIPPED | OUTPATIENT
Start: 2024-09-16

## 2024-09-16 RX ORDER — BUSPIRONE HYDROCHLORIDE 5 MG/1
5 TABLET ORAL 3 TIMES DAILY
Qty: 270 TABLET | Refills: 3 | Status: SHIPPED | OUTPATIENT
Start: 2024-09-16

## 2024-09-16 RX ORDER — INSULIN GLARGINE 100 [IU]/ML
40 INJECTION, SOLUTION SUBCUTANEOUS 2 TIMES DAILY
Qty: 100 ML | Refills: 3 | Status: SHIPPED | OUTPATIENT
Start: 2024-09-16

## 2024-09-16 ASSESSMENT — ENCOUNTER SYMPTOMS
SHORTNESS OF BREATH: 0
ABDOMINAL PAIN: 0
VOMITING: 0
BACK PAIN: 1
COUGH: 0

## 2024-09-30 DIAGNOSIS — Z79.4 TYPE 2 DIABETES MELLITUS WITH BOTH EYES AFFECTED BY MILD NONPROLIFERATIVE RETINOPATHY WITHOUT MACULAR EDEMA, WITH LONG-TERM CURRENT USE OF INSULIN (HCC): ICD-10-CM

## 2024-09-30 DIAGNOSIS — F41.9 ANXIETY: ICD-10-CM

## 2024-09-30 DIAGNOSIS — I10 ESSENTIAL HYPERTENSION: ICD-10-CM

## 2024-09-30 DIAGNOSIS — M48.061 NEURAL FORAMINAL STENOSIS OF LUMBAR SPINE: ICD-10-CM

## 2024-09-30 DIAGNOSIS — E11.42 DIABETIC PERIPHERAL NEUROPATHY (HCC): ICD-10-CM

## 2024-09-30 DIAGNOSIS — E11.3293 TYPE 2 DIABETES MELLITUS WITH BOTH EYES AFFECTED BY MILD NONPROLIFERATIVE RETINOPATHY WITHOUT MACULAR EDEMA, WITH LONG-TERM CURRENT USE OF INSULIN (HCC): ICD-10-CM

## 2024-09-30 DIAGNOSIS — R60.0 BILATERAL LEG EDEMA: ICD-10-CM

## 2024-09-30 DIAGNOSIS — K21.9 GASTROESOPHAGEAL REFLUX DISEASE WITHOUT ESOPHAGITIS: ICD-10-CM

## 2024-10-01 ENCOUNTER — OFFICE VISIT (OUTPATIENT)
Dept: PHYSICAL MEDICINE AND REHAB | Age: 69
End: 2024-10-01
Payer: MEDICARE

## 2024-10-01 VITALS
DIASTOLIC BLOOD PRESSURE: 82 MMHG | HEIGHT: 69 IN | WEIGHT: 293 LBS | BODY MASS INDEX: 43.4 KG/M2 | SYSTOLIC BLOOD PRESSURE: 140 MMHG | HEART RATE: 85 BPM

## 2024-10-01 DIAGNOSIS — M47.819 FACET ARTHROPATHY: ICD-10-CM

## 2024-10-01 DIAGNOSIS — G89.29 CHRONIC BILATERAL LOW BACK PAIN WITHOUT SCIATICA: Primary | ICD-10-CM

## 2024-10-01 DIAGNOSIS — M54.50 CHRONIC BILATERAL LOW BACK PAIN WITHOUT SCIATICA: Primary | ICD-10-CM

## 2024-10-01 DIAGNOSIS — M47.816 LUMBAR SPONDYLOSIS: ICD-10-CM

## 2024-10-01 DIAGNOSIS — E66.01 MORBID OBESITY WITH BMI OF 40.0-44.9, ADULT: ICD-10-CM

## 2024-10-01 PROCEDURE — 1123F ACP DISCUSS/DSCN MKR DOCD: CPT | Performed by: PHYSICAL MEDICINE & REHABILITATION

## 2024-10-01 PROCEDURE — G8399 PT W/DXA RESULTS DOCUMENT: HCPCS | Performed by: PHYSICAL MEDICINE & REHABILITATION

## 2024-10-01 PROCEDURE — 1036F TOBACCO NON-USER: CPT | Performed by: PHYSICAL MEDICINE & REHABILITATION

## 2024-10-01 PROCEDURE — 3079F DIAST BP 80-89 MM HG: CPT | Performed by: PHYSICAL MEDICINE & REHABILITATION

## 2024-10-01 PROCEDURE — 1090F PRES/ABSN URINE INCON ASSESS: CPT | Performed by: PHYSICAL MEDICINE & REHABILITATION

## 2024-10-01 PROCEDURE — 3077F SYST BP >= 140 MM HG: CPT | Performed by: PHYSICAL MEDICINE & REHABILITATION

## 2024-10-01 PROCEDURE — 3017F COLORECTAL CA SCREEN DOC REV: CPT | Performed by: PHYSICAL MEDICINE & REHABILITATION

## 2024-10-01 PROCEDURE — G8427 DOCREV CUR MEDS BY ELIG CLIN: HCPCS | Performed by: PHYSICAL MEDICINE & REHABILITATION

## 2024-10-01 PROCEDURE — G8482 FLU IMMUNIZE ORDER/ADMIN: HCPCS | Performed by: PHYSICAL MEDICINE & REHABILITATION

## 2024-10-01 PROCEDURE — 99214 OFFICE O/P EST MOD 30 MIN: CPT | Performed by: PHYSICAL MEDICINE & REHABILITATION

## 2024-10-01 PROCEDURE — G8417 CALC BMI ABV UP PARAM F/U: HCPCS | Performed by: PHYSICAL MEDICINE & REHABILITATION

## 2024-10-01 RX ORDER — PREGABALIN 150 MG/1
150 CAPSULE ORAL 2 TIMES DAILY
Qty: 180 CAPSULE | Refills: 1 | OUTPATIENT
Start: 2024-10-01 | End: 2025-03-30

## 2024-10-01 RX ORDER — CLONIDINE HYDROCHLORIDE 0.1 MG/1
0.1 TABLET ORAL 3 TIMES DAILY
Qty: 90 TABLET | Refills: 1 | Status: SHIPPED | OUTPATIENT
Start: 2024-10-01

## 2024-10-01 RX ORDER — FUROSEMIDE 20 MG
TABLET ORAL
Qty: 135 TABLET | Refills: 3 | Status: SHIPPED | OUTPATIENT
Start: 2024-10-01

## 2024-10-01 RX ORDER — OMEPRAZOLE 40 MG/1
CAPSULE, DELAYED RELEASE ORAL
Qty: 90 CAPSULE | Refills: 3 | Status: SHIPPED | OUTPATIENT
Start: 2024-10-01

## 2024-10-01 RX ORDER — CHOLECALCIFEROL (VITAMIN D3) 25 MCG
TABLET ORAL
Qty: 180 TABLET | Refills: 3 | Status: SHIPPED | OUTPATIENT
Start: 2024-10-01

## 2024-10-01 RX ORDER — METOPROLOL TARTRATE 100 MG
100 TABLET ORAL 2 TIMES DAILY
Qty: 180 TABLET | Refills: 3 | Status: SHIPPED | OUTPATIENT
Start: 2024-10-01

## 2024-10-01 RX ORDER — VALSARTAN AND HYDROCHLOROTHIAZIDE 160; 25 MG/1; MG/1
TABLET ORAL
Qty: 90 TABLET | Refills: 3 | Status: SHIPPED | OUTPATIENT
Start: 2024-10-01

## 2024-10-01 RX ORDER — FLURBIPROFEN SODIUM 0.3 MG/ML
SOLUTION/ DROPS OPHTHALMIC
Qty: 500 EACH | Refills: 3 | Status: SHIPPED | OUTPATIENT
Start: 2024-10-01

## 2024-10-01 RX ORDER — ATORVASTATIN CALCIUM 80 MG/1
80 TABLET, FILM COATED ORAL NIGHTLY
Qty: 90 TABLET | Refills: 3 | Status: SHIPPED | OUTPATIENT
Start: 2024-10-01

## 2024-10-01 RX ORDER — BUSPIRONE HYDROCHLORIDE 5 MG/1
5 TABLET ORAL 3 TIMES DAILY
Qty: 270 TABLET | Refills: 3 | Status: SHIPPED | OUTPATIENT
Start: 2024-10-01

## 2024-10-01 RX ORDER — INSULIN GLARGINE 100 [IU]/ML
40 INJECTION, SOLUTION SUBCUTANEOUS 2 TIMES DAILY
Qty: 100 ML | Refills: 3 | Status: SHIPPED | OUTPATIENT
Start: 2024-10-01

## 2024-10-01 RX ORDER — POTASSIUM CHLORIDE 750 MG/1
10 TABLET, EXTENDED RELEASE ORAL 2 TIMES DAILY
Qty: 180 TABLET | Refills: 3 | Status: SHIPPED | OUTPATIENT
Start: 2024-10-01

## 2024-10-01 NOTE — PROGRESS NOTES
Blood Pressure Maternal Grandmother     Heart Failure Maternal Grandmother     Arthritis Maternal Grandmother     High Cholesterol Maternal Grandmother     High Blood Pressure Paternal Grandmother     Stroke Paternal Grandmother     Breast Cancer Maternal Aunt     High Blood Pressure Maternal Aunt     High Cholesterol Maternal Aunt     Cancer Maternal Aunt         mesothelioma    Diabetes Maternal Uncle     High Blood Pressure Maternal Uncle     High Cholesterol Maternal Uncle     Substance Abuse Maternal Uncle     Breast Cancer Paternal Aunt          at 92y    High Blood Pressure Paternal Aunt     High Blood Pressure Paternal Uncle     Breast Cancer Maternal Cousin 66    Breast Cancer Maternal Cousin 35       Social History     Tobacco Use    Smoking status: Never    Smokeless tobacco: Never   Vaping Use    Vaping status: Never Used   Substance Use Topics    Alcohol use: Not Currently     Alcohol/week: 2.0 standard drinks of alcohol     Types: 1 Glasses of wine, 1 Cans of beer per week     Comment: 1 time per month    Drug use: No          Functional Status:   The patient is able to ambulate and perform activities of daily living with the use of an assistive device.         ROS:    Constitutional: Denies fevers, chills, night sweats, unintentional weight loss     Skin: Denies rash or skin changes     Eyes: Denies vision changes    Ears/Nose/Throat: Denies nasal congestion or sore throat     Respiratory: Denies SOB or cough     Cardiovascular: Denies CP, palpitations, edema      Gastrointestinal: Denies abdominal pain,  N/V, constipation, or diarrhea    Genitourinary: Denies urinary symptoms    Neurologic: See HPI.     MSK: See HPI.     Psychiatric: Denies sleep disturbance, anxiety, depression    Hematologic/Lymphatic/Immunologic: Denies bruising       Physical Exam:   Blood pressure (!) 140/82, pulse 85, height 1.753 m (5' 9\"), weight (!) 137.9 kg (304 lb), not currently breastfeeding.   General: well

## 2024-10-16 DIAGNOSIS — E11.43 TYPE 2 DIABETES MELLITUS WITH DIABETIC AUTONOMIC NEUROPATHY, WITH LONG-TERM CURRENT USE OF INSULIN (HCC): ICD-10-CM

## 2024-10-16 DIAGNOSIS — Z79.4 TYPE 2 DIABETES MELLITUS WITH DIABETIC AUTONOMIC NEUROPATHY, WITH LONG-TERM CURRENT USE OF INSULIN (HCC): ICD-10-CM

## 2024-10-16 RX ORDER — CHOLECALCIFEROL (VITAMIN D3) 25 MCG
TABLET ORAL
Qty: 180 TABLET | Refills: 3 | Status: SHIPPED | OUTPATIENT
Start: 2024-10-16

## 2024-10-30 ENCOUNTER — TELEPHONE (OUTPATIENT)
Dept: CARDIOLOGY | Age: 69
End: 2024-10-30

## 2024-10-30 ENCOUNTER — HOSPITAL ENCOUNTER (OUTPATIENT)
Age: 69
Discharge: HOME | End: 2024-10-30
Payer: MEDICARE

## 2024-10-30 LAB
25(OH)D3 SERPL-MCNC: 36.2 NG/ML (ref 30–100)
ALBUMIN SERPL-MCNC: 3.9 G/DL (ref 3.5–5.2)
ALP SERPL-CCNC: 170 U/L (ref 35–104)
ALT SERPL-CCNC: 42 U/L (ref 0–32)
ANION GAP SERPL CALCULATED.3IONS-SCNC: 10 MMOL/L (ref 7–16)
AST SERPL-CCNC: 40 U/L (ref 0–31)
BASOPHILS # BLD: 0.05 K/UL (ref 0–0.2)
BASOPHILS NFR BLD: 1 % (ref 0–2)
BILIRUB SERPL-MCNC: 0.4 MG/DL (ref 0–1.2)
BUN SERPL-MCNC: 17 MG/DL (ref 6–23)
CALCIUM SERPL-MCNC: 9.7 MG/DL (ref 8.6–10.2)
CHLORIDE SERPL-SCNC: 106 MMOL/L (ref 98–107)
CHOLEST SERPL-MCNC: 140 MG/DL
CO2 SERPL-SCNC: 30 MMOL/L (ref 22–29)
CREAT SERPL-MCNC: 1.4 MG/DL (ref 0.5–1)
EOSINOPHIL # BLD: 0.21 K/UL (ref 0.05–0.5)
EOSINOPHILS RELATIVE PERCENT: 2 % (ref 0–6)
ERYTHROCYTE [DISTWIDTH] IN BLOOD BY AUTOMATED COUNT: 13.6 % (ref 11.5–15)
GFR, ESTIMATED: 41 ML/MIN/1.73M2
GLUCOSE SERPL-MCNC: 116 MG/DL (ref 74–99)
HBA1C MFR BLD: 6.5 % (ref 4–5.6)
HCT VFR BLD AUTO: 38.7 % (ref 34–48)
HDLC SERPL-MCNC: 42 MG/DL
HGB BLD-MCNC: 11.8 G/DL (ref 11.5–15.5)
IMM GRANULOCYTES # BLD AUTO: 0.03 K/UL (ref 0–0.58)
IMM GRANULOCYTES NFR BLD: 0 % (ref 0–5)
LDLC SERPL CALC-MCNC: 65 MG/DL
LYMPHOCYTES NFR BLD: 1.97 K/UL (ref 1.5–4)
LYMPHOCYTES RELATIVE PERCENT: 22 % (ref 20–42)
MCH RBC QN AUTO: 27.7 PG (ref 26–35)
MCHC RBC AUTO-ENTMCNC: 30.5 G/DL (ref 32–34.5)
MCV RBC AUTO: 90.8 FL (ref 80–99.9)
MONOCYTES NFR BLD: 0.96 K/UL (ref 0.1–0.95)
MONOCYTES NFR BLD: 11 % (ref 2–12)
NEUTROPHILS NFR BLD: 64 % (ref 43–80)
NEUTS SEG NFR BLD: 5.77 K/UL (ref 1.8–7.3)
PHOSPHATE SERPL-MCNC: 3.1 MG/DL (ref 2.5–4.5)
PLATELET # BLD AUTO: 290 K/UL (ref 130–450)
PMV BLD AUTO: 11.3 FL (ref 7–12)
POTASSIUM SERPL-SCNC: 4 MMOL/L (ref 3.5–5)
PROT SERPL-MCNC: 6.9 G/DL (ref 6.4–8.3)
PTH-INTACT SERPL-MCNC: 97.4 PG/ML (ref 15–65)
RBC # BLD AUTO: 4.26 M/UL (ref 3.5–5.5)
SODIUM SERPL-SCNC: 146 MMOL/L (ref 132–146)
TRIGL SERPL-MCNC: 166 MG/DL
VLDLC SERPL CALC-MCNC: 33 MG/DL
WBC OTHER # BLD: 9 K/UL (ref 4.5–11.5)

## 2024-10-30 PROCEDURE — 84100 ASSAY OF PHOSPHORUS: CPT

## 2024-10-30 PROCEDURE — 82306 VITAMIN D 25 HYDROXY: CPT

## 2024-10-30 PROCEDURE — 85025 COMPLETE CBC W/AUTO DIFF WBC: CPT

## 2024-10-30 PROCEDURE — 80061 LIPID PANEL: CPT

## 2024-10-30 PROCEDURE — 83036 HEMOGLOBIN GLYCOSYLATED A1C: CPT

## 2024-10-30 PROCEDURE — 83970 ASSAY OF PARATHORMONE: CPT

## 2024-10-30 PROCEDURE — 80053 COMPREHEN METABOLIC PANEL: CPT

## 2024-10-30 PROCEDURE — 36415 COLL VENOUS BLD VENIPUNCTURE: CPT

## 2024-10-30 NOTE — TELEPHONE ENCOUNTER
CALLED PATIENT 1X AND LEFT MESSAGE TO SCHEDULE ECHO    Electronically signed by Dang Lora on 10/30/2024 at 12:04 PM

## 2024-11-04 ENCOUNTER — TELEPHONE (OUTPATIENT)
Dept: CARDIOLOGY | Age: 69
End: 2024-11-04

## 2024-11-04 NOTE — TELEPHONE ENCOUNTER
Called patient and l/m to schedule echo    Electronically signed by Tessie Cantu on 11/4/2024 at 12:14 PM

## 2024-11-11 ENCOUNTER — OFFICE VISIT (OUTPATIENT)
Dept: FAMILY MEDICINE CLINIC | Age: 69
End: 2024-11-11

## 2024-11-11 VITALS
HEIGHT: 69 IN | WEIGHT: 293 LBS | DIASTOLIC BLOOD PRESSURE: 76 MMHG | BODY MASS INDEX: 43.4 KG/M2 | HEART RATE: 91 BPM | RESPIRATION RATE: 18 BRPM | TEMPERATURE: 97.1 F | SYSTOLIC BLOOD PRESSURE: 125 MMHG | OXYGEN SATURATION: 95 %

## 2024-11-11 DIAGNOSIS — E11.3293 TYPE 2 DIABETES MELLITUS WITH BOTH EYES AFFECTED BY MILD NONPROLIFERATIVE RETINOPATHY WITHOUT MACULAR EDEMA, WITH LONG-TERM CURRENT USE OF INSULIN (HCC): ICD-10-CM

## 2024-11-11 DIAGNOSIS — I10 ESSENTIAL HYPERTENSION: ICD-10-CM

## 2024-11-11 DIAGNOSIS — E11.43 TYPE 2 DIABETES MELLITUS WITH DIABETIC AUTONOMIC NEUROPATHY, WITH LONG-TERM CURRENT USE OF INSULIN (HCC): Primary | ICD-10-CM

## 2024-11-11 DIAGNOSIS — E11.43 TYPE 2 DIABETES MELLITUS WITH DIABETIC AUTONOMIC NEUROPATHY, WITH LONG-TERM CURRENT USE OF INSULIN (HCC): ICD-10-CM

## 2024-11-11 DIAGNOSIS — Z79.4 TYPE 2 DIABETES MELLITUS WITH BOTH EYES AFFECTED BY MILD NONPROLIFERATIVE RETINOPATHY WITHOUT MACULAR EDEMA, WITH LONG-TERM CURRENT USE OF INSULIN (HCC): ICD-10-CM

## 2024-11-11 DIAGNOSIS — Z79.4 TYPE 2 DIABETES MELLITUS WITH DIABETIC AUTONOMIC NEUROPATHY, WITH LONG-TERM CURRENT USE OF INSULIN (HCC): Primary | ICD-10-CM

## 2024-11-11 DIAGNOSIS — Z79.4 TYPE 2 DIABETES MELLITUS WITH DIABETIC AUTONOMIC NEUROPATHY, WITH LONG-TERM CURRENT USE OF INSULIN (HCC): ICD-10-CM

## 2024-11-11 DIAGNOSIS — R07.81 RIB PAIN ON RIGHT SIDE: ICD-10-CM

## 2024-11-11 DIAGNOSIS — M48.061 NEURAL FORAMINAL STENOSIS OF LUMBAR SPINE: Chronic | ICD-10-CM

## 2024-11-11 DIAGNOSIS — R60.0 BILATERAL LEG EDEMA: ICD-10-CM

## 2024-11-11 LAB
CREATININE URINE: 260.4 MG/DL (ref 29–226)
MICROALBUMIN/CREAT 24H UR: 34 MG/L (ref 0–19)
MICROALBUMIN/CREAT UR-RTO: 13 MCG/MG CREAT (ref 0–30)

## 2024-11-11 RX ORDER — BLOOD-GLUCOSE SENSOR
EACH MISCELLANEOUS
Qty: 8 EACH | Refills: 2 | Status: SHIPPED | OUTPATIENT
Start: 2024-11-11

## 2024-11-11 RX ORDER — CLONIDINE HYDROCHLORIDE 0.1 MG/1
0.1 TABLET ORAL 3 TIMES DAILY
Qty: 90 TABLET | Refills: 1 | Status: SHIPPED | OUTPATIENT
Start: 2024-11-11

## 2024-11-11 RX ORDER — INSULIN GLARGINE 100 [IU]/ML
38 INJECTION, SOLUTION SUBCUTANEOUS 2 TIMES DAILY
Qty: 100 ML | Refills: 3 | Status: SHIPPED | OUTPATIENT
Start: 2024-11-11

## 2024-11-11 ASSESSMENT — ENCOUNTER SYMPTOMS
VOMITING: 0
ABDOMINAL PAIN: 0
BACK PAIN: 1
NAUSEA: 0
SHORTNESS OF BREATH: 0
COUGH: 0

## 2024-11-11 NOTE — PROGRESS NOTES
[facility]  FAMILY MEDICINE RESIDENCY PROGRAM   OFFICE PROGRESS NOTE  DATE OF VISIT : 24    Patient : Coco Murray    : female   Age : 69 y.o.    : 1955           Chief Complaint :   Chief Complaint   Patient presents with    Fall    Abdominal Pain       HPI:   69 y.o. female comes in today for follow up of dm2, back pain, ckd3, murmur and peripheral edema.     Patient is taking 40 units bid of lantus with novolog ssi and ozempic.  She has not been using her cgm because she cannot get the Vera 2 sensors as she owes the pharmacy. Reports that the lowest bg was 69. She checks with her bgm.  She does not get hypoglycemic symptoms. She has an eye appmt scheduled.    She had another fall since her last appointment. She was sitting in a chair and fell asleep. Then fell out of the chair onto the floor. Since then, she had had some ruq abdominal pain unrelated to food/eating.    Recently saw Dr. Alexander Samaniego who ordered an MRI and advised she return to PT.  They will consider ablation/injections.     Doing ok off of allopurinol. She saw Dr. Benedict with no changes to her regimen.     Her blood pressure is doing well on clonidine in addition to her other meds.     She continues to take lasix 20 mg alternating with 40 mg for her leg edema.     Patient Active Problem List   Diagnosis    Hypertension    Hypercholesterolemia    GERD (gastroesophageal reflux disease)    Depression    Enlarged liver    Heart murmur    Chronic lower back pain    Facet joint disease    Lumbar spondylosis    Bulging lumbar disc    Back pain    Anxiety    Bilateral leg edema    Microalbuminuria    Vitamin D insufficiency    Nuclear senile cataract    Fatty liver    Diabetic peripheral neuropathy (HCC)    Neural foraminal stenosis of lumbar spine    Type 2 diabetes mellitus with both eyes affected by mild nonproliferative retinopathy without macular edema, with long-term current use of insulin (HCC)

## 2024-11-12 ENCOUNTER — TELEPHONE (OUTPATIENT)
Dept: PHYSICAL MEDICINE AND REHAB | Age: 69
End: 2024-11-12

## 2024-11-12 ENCOUNTER — HOSPITAL ENCOUNTER (OUTPATIENT)
Age: 69
Discharge: HOME | End: 2024-11-14
Payer: MEDICARE

## 2024-11-12 ENCOUNTER — HOSPITAL ENCOUNTER (OUTPATIENT)
Dept: GENERAL RADIOLOGY | Age: 69
Discharge: HOME | End: 2024-11-14
Payer: MEDICARE

## 2024-11-12 DIAGNOSIS — G89.29 CHRONIC BILATERAL LOW BACK PAIN WITHOUT SCIATICA: ICD-10-CM

## 2024-11-12 DIAGNOSIS — M54.50 CHRONIC BILATERAL LOW BACK PAIN WITHOUT SCIATICA: ICD-10-CM

## 2024-11-12 DIAGNOSIS — R07.81 RIB PAIN ON RIGHT SIDE: ICD-10-CM

## 2024-11-12 DIAGNOSIS — M47.816 LUMBAR SPONDYLOSIS: Primary | ICD-10-CM

## 2024-11-12 PROCEDURE — 71100 X-RAY EXAM RIBS UNI 2 VIEWS: CPT

## 2024-11-12 NOTE — TELEPHONE ENCOUNTER
Left voicemail for patient to call office back to schedule injection.  Approved thru [employer] portal. Approval dates 11/14/24 to 12/26/24   Tracking number [id number]

## 2024-11-12 NOTE — TELEPHONE ENCOUNTER
----- Message from Dr. Maria Del Carmen Howard, DO sent at 11/12/2024  9:11 AM EST -----  Please call patient. Lumbar MRI reviewed. Degenerative changes in the spine. Severe stenosis.   Okay to proceed with the lumbar MBB as we discussed in the office.   I will place an order if she would like.

## 2024-11-14 DIAGNOSIS — R07.81 RIB PAIN ON RIGHT SIDE: ICD-10-CM

## 2024-11-19 ENCOUNTER — TELEPHONE (OUTPATIENT)
Dept: SLEEP MEDICINE | Age: 69
End: 2024-11-19

## 2024-11-19 NOTE — TELEPHONE ENCOUNTER
Received a message from pt stating she is unable to make it to her appointment d/t family emergency with her sister.  Ret call and LMOM letting pt know appt was cancelled and for her to call back to reschedule

## 2024-11-19 NOTE — TELEPHONE ENCOUNTER
Pt ret call while I was with patient.  She LMOM wanting to resched her appt.  Ret call and LMOM asking pt to please call on 11/20 as I am leaving the office for the day.  She can speak with Tamara as I will be out of office for the rest of the week.

## 2024-11-21 ENCOUNTER — PREP FOR PROCEDURE (OUTPATIENT)
Dept: PHYSICAL MEDICINE AND REHAB | Age: 69
End: 2024-11-21

## 2024-12-04 ENCOUNTER — TELEPHONE (OUTPATIENT)
Dept: PHYSICAL MEDICINE AND REHAB | Age: 69
End: 2024-12-04

## 2024-12-04 RX ORDER — SODIUM CHLORIDE 0.9 % (FLUSH) 0.9 %
5-40 SYRINGE (ML) INJECTION PRN
Status: CANCELLED | OUTPATIENT
Start: 2024-12-04

## 2024-12-04 RX ORDER — SODIUM CHLORIDE 9 MG/ML
INJECTION, SOLUTION INTRAVENOUS PRN
Status: CANCELLED | OUTPATIENT
Start: 2024-12-04

## 2024-12-04 RX ORDER — SODIUM CHLORIDE 0.9 % (FLUSH) 0.9 %
5-40 SYRINGE (ML) INJECTION EVERY 12 HOURS SCHEDULED
Status: CANCELLED | OUTPATIENT
Start: 2024-12-04

## 2024-12-04 NOTE — TELEPHONE ENCOUNTER
Patient called and stated that she does not have a for her procedure tomorrow and need to reschedule her to 12-12-24.  Called and spoke with Lilian to move the patient appointment to 12-12-24.

## 2024-12-12 ENCOUNTER — HOSPITAL ENCOUNTER (OUTPATIENT)
Age: 69
Setting detail: OUTPATIENT SURGERY
Discharge: HOME | End: 2024-12-12
Attending: PHYSICAL MEDICINE & REHABILITATION | Admitting: PHYSICAL MEDICINE & REHABILITATION
Payer: MEDICARE

## 2024-12-12 ENCOUNTER — HOSPITAL ENCOUNTER (OUTPATIENT)
Dept: OPERATING ROOM | Age: 69
Setting detail: OUTPATIENT SURGERY
Discharge: HOME | End: 2024-12-12
Attending: PHYSICAL MEDICINE & REHABILITATION
Payer: MEDICARE

## 2024-12-12 VITALS
TEMPERATURE: 97.2 F | HEIGHT: 69 IN | OXYGEN SATURATION: 98 % | BODY MASS INDEX: 43.4 KG/M2 | WEIGHT: 293 LBS | RESPIRATION RATE: 20 BRPM | HEART RATE: 82 BPM | DIASTOLIC BLOOD PRESSURE: 74 MMHG | SYSTOLIC BLOOD PRESSURE: 143 MMHG

## 2024-12-12 DIAGNOSIS — M47.896 OTHER OSTEOARTHRITIS OF SPINE, LUMBAR REGION: ICD-10-CM

## 2024-12-12 PROCEDURE — 6360000002 HC RX W HCPCS: Performed by: PHYSICAL MEDICINE & REHABILITATION

## 2024-12-12 PROCEDURE — 3600000005 HC SURGERY LEVEL 5 BASE: Performed by: PHYSICAL MEDICINE & REHABILITATION

## 2024-12-12 PROCEDURE — 64494 INJ PARAVERT F JNT L/S 2 LEV: CPT | Performed by: PHYSICAL MEDICINE & REHABILITATION

## 2024-12-12 PROCEDURE — 2709999900 HC NON-CHARGEABLE SUPPLY: Performed by: PHYSICAL MEDICINE & REHABILITATION

## 2024-12-12 PROCEDURE — 7100000011 HC PHASE II RECOVERY - ADDTL 15 MIN: Performed by: PHYSICAL MEDICINE & REHABILITATION

## 2024-12-12 PROCEDURE — 64493 INJ PARAVERT F JNT L/S 1 LEV: CPT | Performed by: PHYSICAL MEDICINE & REHABILITATION

## 2024-12-12 PROCEDURE — 3600000015 HC SURGERY LEVEL 5 ADDTL 15MIN: Performed by: PHYSICAL MEDICINE & REHABILITATION

## 2024-12-12 PROCEDURE — 7100000010 HC PHASE II RECOVERY - FIRST 15 MIN: Performed by: PHYSICAL MEDICINE & REHABILITATION

## 2024-12-12 RX ORDER — LIDOCAINE HYDROCHLORIDE 10 MG/ML
INJECTION, SOLUTION EPIDURAL; INFILTRATION; INTRACAUDAL; PERINEURAL PRN
Status: DISCONTINUED | OUTPATIENT
Start: 2024-12-12 | End: 2024-12-12 | Stop reason: ALTCHOICE

## 2024-12-12 ASSESSMENT — PAIN DESCRIPTION - DESCRIPTORS
DESCRIPTORS: ACHING;BURNING;NAGGING
DESCRIPTORS: ACHING
DESCRIPTORS: ACHING

## 2024-12-12 NOTE — OP NOTE
Coco Murray    12/12/2024     CHIEF COMPLAINT:  Low back pain.    PRE-OPERATIVE DIAGNOSIS:  Lumbar spondylosis, lumbar facet syndrome, lumbosacral osteoarthritis     POST-OPERATIVE DIAGNOSIS:  Lumbar spondylosis, lumbar facet syndrome, lumbosacral osteoarthritis     PROCEDURE:  # 1 Fluoroscopic guided lumbar medial branch blocks at  levels: medial branch level: L3, L4, L5 Joint: Bilateral L4-5, L5-S1.    SURGEON:    Maria Del Carmen Howard, DO    ANESTHESIA:   Local.    ESTIMATED BLOOD LOSS:  None.  ______________________________________________________________________  HISTORY & PHYSICAL: See separate H&P.     PROCEDURE:  After confirming written and informed consent, Coco Murray was brought to the procedure room and placed in the prone position.  Blood pressure, heart rate, O2 saturation, and visual and verbal monitoring were established.   A time-out was performed and Coco’s name, date of birth, the procedure to be performed, as well as the plan for the location of the needle insertion were confirmed.      Fluoroscopy was utilized to delineate the anatomy of the lumbosacral spine. Surface landmarks were identified as well.  After prep and drape, an oblique fluoroscopic view was created to optimize visualization of the junction of the transverse process and the pedicle.    After infiltration of local, a #22-gauge, 5-inch regional block needle was passed to position the tip at the junction of the superior articular process and the transverse process, along the course of the medial branch.  Satisfactory needle placement was confirmed by AP and oblique projections.    At the sacral alar level, the sacral alar region was visualized and the needle tip was positioned on the sacral alar at the base of the superior articulating process where the medial branch traverses.      At each level the patient received 0.75 cc of 0.25% Marcaine.    The above procedure was performed at each of the following

## 2024-12-12 NOTE — DISCHARGE INSTRUCTIONS
Post-op instruction Block  Dr. Howard  [phone]      Rest 12-24 hours following procedure. DO NOT DRIVE till the following day.    Keep dressing clean and dry. Do not bathe, shower, or sit in hot tub the day of procedure .You may remove the dressing following A.M.    You may return to work/school tomorrow.    Drink extra fluids for the next 24 hours.    Resume your regular diet.    Resume previously prescribed medications. Resume Coumadin, Plavix, NSAIDS, Ibuprofen products 24 hours after procedure.    You need to have a responsible adult stay with you this evening.    If you experience any discomfort relating to this procedure, you may take Tylenol 2 tablets every 4 hrs as needed for pain and/or apply ice to the affected area.    Please follow up with Dr. Howard or Dr. Gates. If you were a hip injection follow up with your orthopedic Doctor.    If you experience any unusual symptoms or problems, call your physician’s answering service at [phone] or go directly to [facility]’s Emergency Department.

## 2024-12-12 NOTE — H&P
Maria Del Carmen Howard, [facility]  [address] [place] [address]  Phone: [phone]  Fax: [phone]    PCP: Robina Green MD  Date of visit: 12/12/2024    CC: low back pain       Coco Murray is a 69 y.o. female who presents today for medial branch blocks.   No red flag symptoms. Consents to proceed with procedure.     No Known Allergies    No current facility-administered medications for this encounter.     Facility-Administered Medications Ordered in Other Encounters   Medication Dose Route Frequency Provider Last Rate Last Admin    sodium chloride flush 0.9 % injection 5-40 mL  5-40 mL IntraVENous 2 times per day Koffi Dave MD        sodium chloride flush 0.9 % injection 5-40 mL  5-40 mL IntraVENous PRN Koffi Dave MD        0.9 % sodium chloride infusion   IntraVENous PRN Koffi Dave MD        meperidine (DEMEROL) injection 12.5 mg  12.5 mg IntraVENous Q5 Min PRN Koffi Dave MD        morphine (PF) injection 1 mg  1 mg IntraVENous Q5 Min PRN Koffi Dave MD        fentaNYL (SUBLIMAZE) injection 50 mcg  50 mcg IntraVENous Q5 Min PRN Koffi Dave MD           Past Medical History:   Diagnosis Date    Anxiety     BMI 45.0-49.9, adult     Obesity    Carpal tunnel syndrome on right     Chronic back pain     Chronic kidney disease, stage 3 (HCC)     Dr. Benedict- last seen 1/2022    Depression 10/28/2010    Diverticulosis     GERD (gastroesophageal reflux disease) 10/28/2010    Herniated intervertebral disk     neck and lower back    Hypercholesterolemia 10/28/2010    Hypertension 10/28/2010    Neurofibroma of trunk     irritating mole on the back. biopsy done 10/21/09    Osteoarthritis     PAD (peripheral artery disease) (HCC)     Type 2 diabetes mellitus (HCC)     Vitamin D insufficiency        Past Surgical History:   Procedure Laterality Date    COLONOSCOPY N/A

## 2025-01-06 DIAGNOSIS — M47.816 LUMBAR SPONDYLOSIS: ICD-10-CM

## 2025-01-17 DIAGNOSIS — M48.061 NEURAL FORAMINAL STENOSIS OF LUMBAR SPINE: ICD-10-CM

## 2025-02-17 ENCOUNTER — TELEPHONE (OUTPATIENT)
Dept: CARDIOLOGY | Age: 70
End: 2025-02-17

## 2025-03-11 SDOH — ECONOMIC STABILITY: FOOD INSECURITY: WITHIN THE PAST 12 MONTHS, YOU WORRIED THAT YOUR FOOD WOULD RUN OUT BEFORE YOU GOT MONEY TO BUY MORE.: SOMETIMES TRUE

## 2025-03-11 SDOH — ECONOMIC STABILITY: FOOD INSECURITY: WITHIN THE PAST 12 MONTHS, THE FOOD YOU BOUGHT JUST DIDN'T LAST AND YOU DIDN'T HAVE MONEY TO GET MORE.: OFTEN TRUE

## 2025-03-11 SDOH — ECONOMIC STABILITY: INCOME INSECURITY: IN THE LAST 12 MONTHS, WAS THERE A TIME WHEN YOU WERE NOT ABLE TO PAY THE MORTGAGE OR RENT ON TIME?: NO

## 2025-03-11 SDOH — ECONOMIC STABILITY: TRANSPORTATION INSECURITY
IN THE PAST 12 MONTHS, HAS LACK OF TRANSPORTATION KEPT YOU FROM MEETINGS, WORK, OR FROM GETTING THINGS NEEDED FOR DAILY LIVING?: NO

## 2025-03-11 SDOH — ECONOMIC STABILITY: TRANSPORTATION INSECURITY
IN THE PAST 12 MONTHS, HAS THE LACK OF TRANSPORTATION KEPT YOU FROM MEDICAL APPOINTMENTS OR FROM GETTING MEDICATIONS?: NO

## 2025-03-11 ASSESSMENT — PATIENT HEALTH QUESTIONNAIRE - PHQ9
4. FEELING TIRED OR HAVING LITTLE ENERGY: SEVERAL DAYS
3. TROUBLE FALLING OR STAYING ASLEEP: MORE THAN HALF THE DAYS
7. TROUBLE CONCENTRATING ON THINGS, SUCH AS READING THE NEWSPAPER OR WATCHING TELEVISION: NOT AT ALL
9. THOUGHTS THAT YOU WOULD BE BETTER OFF DEAD, OR OF HURTING YOURSELF: NOT AT ALL
SUM OF ALL RESPONSES TO PHQ QUESTIONS 1-9: 3
1. LITTLE INTEREST OR PLEASURE IN DOING THINGS: NOT AT ALL
9. THOUGHTS THAT YOU WOULD BE BETTER OFF DEAD, OR OF HURTING YOURSELF: NOT AT ALL
8. MOVING OR SPEAKING SO SLOWLY THAT OTHER PEOPLE COULD HAVE NOTICED. OR THE OPPOSITE, BEING SO FIGETY OR RESTLESS THAT YOU HAVE BEEN MOVING AROUND A LOT MORE THAN USUAL: NOT AT ALL
3. TROUBLE FALLING OR STAYING ASLEEP: MORE THAN HALF THE DAYS
2. FEELING DOWN, DEPRESSED OR HOPELESS: NOT AT ALL
SUM OF ALL RESPONSES TO PHQ QUESTIONS 1-9: 3
SUM OF ALL RESPONSES TO PHQ QUESTIONS 1-9: 3
10. IF YOU CHECKED OFF ANY PROBLEMS, HOW DIFFICULT HAVE THESE PROBLEMS MADE IT FOR YOU TO DO YOUR WORK, TAKE CARE OF THINGS AT HOME, OR GET ALONG WITH OTHER PEOPLE: SOMEWHAT DIFFICULT
1. LITTLE INTEREST OR PLEASURE IN DOING THINGS: NOT AT ALL
2. FEELING DOWN, DEPRESSED OR HOPELESS: NOT AT ALL
4. FEELING TIRED OR HAVING LITTLE ENERGY: SEVERAL DAYS
8. MOVING OR SPEAKING SO SLOWLY THAT OTHER PEOPLE COULD HAVE NOTICED. OR THE OPPOSITE - BEING SO FIDGETY OR RESTLESS THAT YOU HAVE BEEN MOVING AROUND A LOT MORE THAN USUAL: NOT AT ALL
10. IF YOU CHECKED OFF ANY PROBLEMS, HOW DIFFICULT HAVE THESE PROBLEMS MADE IT FOR YOU TO DO YOUR WORK, TAKE CARE OF THINGS AT HOME, OR GET ALONG WITH OTHER PEOPLE: SOMEWHAT DIFFICULT
SUM OF ALL RESPONSES TO PHQ QUESTIONS 1-9: 3
6. FEELING BAD ABOUT YOURSELF - OR THAT YOU ARE A FAILURE OR HAVE LET YOURSELF OR YOUR FAMILY DOWN: NOT AT ALL
SUM OF ALL RESPONSES TO PHQ QUESTIONS 1-9: 3
5. POOR APPETITE OR OVEREATING: NOT AT ALL
6. FEELING BAD ABOUT YOURSELF - OR THAT YOU ARE A FAILURE OR HAVE LET YOURSELF OR YOUR FAMILY DOWN: NOT AT ALL
7. TROUBLE CONCENTRATING ON THINGS, SUCH AS READING THE NEWSPAPER OR WATCHING TELEVISION: NOT AT ALL
5. POOR APPETITE OR OVEREATING: NOT AT ALL

## 2025-03-11 ASSESSMENT — LIFESTYLE VARIABLES
HOW MANY STANDARD DRINKS CONTAINING ALCOHOL DO YOU HAVE ON A TYPICAL DAY: PATIENT DOES NOT DRINK
HOW OFTEN DO YOU HAVE A DRINK CONTAINING ALCOHOL: NEVER

## 2025-03-13 ENCOUNTER — RESULTS FOLLOW-UP (OUTPATIENT)
Dept: FAMILY MEDICINE CLINIC | Age: 70
End: 2025-03-13

## 2025-03-13 ENCOUNTER — TELEPHONE (OUTPATIENT)
Dept: FAMILY MEDICINE CLINIC | Age: 70
End: 2025-03-13

## 2025-03-13 ENCOUNTER — OFFICE VISIT (OUTPATIENT)
Dept: FAMILY MEDICINE CLINIC | Age: 70
End: 2025-03-13
Payer: MEDICARE

## 2025-03-13 VITALS
HEART RATE: 96 BPM | SYSTOLIC BLOOD PRESSURE: 133 MMHG | DIASTOLIC BLOOD PRESSURE: 71 MMHG | BODY MASS INDEX: 43.4 KG/M2 | HEIGHT: 69 IN | WEIGHT: 293 LBS | OXYGEN SATURATION: 99 % | TEMPERATURE: 97.8 F | RESPIRATION RATE: 20 BRPM

## 2025-03-13 DIAGNOSIS — E11.3293 TYPE 2 DIABETES MELLITUS WITH BOTH EYES AFFECTED BY MILD NONPROLIFERATIVE RETINOPATHY WITHOUT MACULAR EDEMA, WITH LONG-TERM CURRENT USE OF INSULIN: ICD-10-CM

## 2025-03-13 DIAGNOSIS — N18.32 STAGE 3B CHRONIC KIDNEY DISEASE (HCC): ICD-10-CM

## 2025-03-13 DIAGNOSIS — R74.01 ELEVATION OF LEVELS OF LIVER TRANSAMINASE LEVELS: ICD-10-CM

## 2025-03-13 DIAGNOSIS — E66.813 OBESITY, CLASS 3: ICD-10-CM

## 2025-03-13 DIAGNOSIS — I10 PRIMARY HYPERTENSION: ICD-10-CM

## 2025-03-13 DIAGNOSIS — R74.8 ELEVATED ALKALINE PHOSPHATASE LEVEL: Primary | ICD-10-CM

## 2025-03-13 DIAGNOSIS — Z79.4 TYPE 2 DIABETES MELLITUS WITH BOTH EYES AFFECTED BY MILD NONPROLIFERATIVE RETINOPATHY WITHOUT MACULAR EDEMA, WITH LONG-TERM CURRENT USE OF INSULIN (HCC): Primary | ICD-10-CM

## 2025-03-13 DIAGNOSIS — R74.9 ABNORMAL SERUM ENZYME LEVEL, UNSPECIFIED: ICD-10-CM

## 2025-03-13 DIAGNOSIS — R06.83 SNORING: ICD-10-CM

## 2025-03-13 DIAGNOSIS — M48.061 SPINAL STENOSIS OF LUMBAR REGION, UNSPECIFIED WHETHER NEUROGENIC CLAUDICATION PRESENT: ICD-10-CM

## 2025-03-13 DIAGNOSIS — E11.3293 TYPE 2 DIABETES MELLITUS WITH BOTH EYES AFFECTED BY MILD NONPROLIFERATIVE RETINOPATHY WITHOUT MACULAR EDEMA, WITH LONG-TERM CURRENT USE OF INSULIN (HCC): ICD-10-CM

## 2025-03-13 DIAGNOSIS — R79.89 ELEVATED LFTS: ICD-10-CM

## 2025-03-13 DIAGNOSIS — K76.0 FATTY LIVER: ICD-10-CM

## 2025-03-13 DIAGNOSIS — R79.89 ELEVATED LIVER FUNCTION TESTS: ICD-10-CM

## 2025-03-13 DIAGNOSIS — Z59.9 FINANCIAL DIFFICULTIES: ICD-10-CM

## 2025-03-13 DIAGNOSIS — Z79.4 TYPE 2 DIABETES MELLITUS WITH BOTH EYES AFFECTED BY MILD NONPROLIFERATIVE RETINOPATHY WITHOUT MACULAR EDEMA, WITH LONG-TERM CURRENT USE OF INSULIN: ICD-10-CM

## 2025-03-13 DIAGNOSIS — E11.3293 TYPE 2 DIABETES MELLITUS WITH BOTH EYES AFFECTED BY MILD NONPROLIFERATIVE RETINOPATHY WITHOUT MACULAR EDEMA, WITH LONG-TERM CURRENT USE OF INSULIN (HCC): Primary | ICD-10-CM

## 2025-03-13 DIAGNOSIS — Z79.4 TYPE 2 DIABETES MELLITUS WITH BOTH EYES AFFECTED BY MILD NONPROLIFERATIVE RETINOPATHY WITHOUT MACULAR EDEMA, WITH LONG-TERM CURRENT USE OF INSULIN (HCC): ICD-10-CM

## 2025-03-13 LAB
ALBUMIN: 4.1 G/DL (ref 3.5–5.2)
ALP BLD-CCNC: 190 U/L (ref 35–104)
ALT SERPL-CCNC: 72 U/L (ref 0–32)
ANION GAP SERPL CALCULATED.3IONS-SCNC: 20 MMOL/L (ref 7–16)
AST SERPL-CCNC: 68 U/L (ref 0–31)
BASOPHILS ABSOLUTE: 0.07 K/UL (ref 0–0.2)
BASOPHILS RELATIVE PERCENT: 1 % (ref 0–2)
BILIRUB SERPL-MCNC: 0.6 MG/DL (ref 0–1.2)
BUN BLDV-MCNC: 21 MG/DL (ref 6–23)
CALCIUM SERPL-MCNC: 10 MG/DL (ref 8.6–10.2)
CHLORIDE BLD-SCNC: 103 MMOL/L (ref 98–107)
CO2: 21 MMOL/L (ref 22–29)
CREAT SERPL-MCNC: 1.4 MG/DL (ref 0.5–1)
EOSINOPHILS ABSOLUTE: 0.18 K/UL (ref 0.05–0.5)
EOSINOPHILS RELATIVE PERCENT: 2 % (ref 0–6)
GFR, ESTIMATED: 43 ML/MIN/1.73M2
GLUCOSE BLD-MCNC: 114 MG/DL (ref 74–99)
HBA1C MFR BLD: 6.8 %
HCT VFR BLD CALC: 40.1 % (ref 34–48)
HEMOGLOBIN: 12.6 G/DL (ref 11.5–15.5)
IMMATURE GRANULOCYTES %: 0 % (ref 0–5)
IMMATURE GRANULOCYTES ABSOLUTE: 0.03 K/UL (ref 0–0.58)
LYMPHOCYTES ABSOLUTE: 1.82 K/UL (ref 1.5–4)
LYMPHOCYTES RELATIVE PERCENT: 17 % (ref 20–42)
MCH RBC QN AUTO: 28.3 PG (ref 26–35)
MCHC RBC AUTO-ENTMCNC: 31.4 G/DL (ref 32–34.5)
MCV RBC AUTO: 90.1 FL (ref 80–99.9)
MONOCYTES ABSOLUTE: 1.03 K/UL (ref 0.1–0.95)
MONOCYTES RELATIVE PERCENT: 10 % (ref 2–12)
NEUTROPHILS ABSOLUTE: 7.39 K/UL (ref 1.8–7.3)
NEUTROPHILS RELATIVE PERCENT: 70 % (ref 43–80)
PDW BLD-RTO: 13.1 % (ref 11.5–15)
PLATELET # BLD: 255 K/UL (ref 130–450)
PMV BLD AUTO: 12 FL (ref 7–12)
POTASSIUM SERPL-SCNC: 3.9 MMOL/L (ref 3.5–5)
RBC # BLD: 4.45 M/UL (ref 3.5–5.5)
SODIUM BLD-SCNC: 144 MMOL/L (ref 132–146)
TOTAL PROTEIN: 7.7 G/DL (ref 6.4–8.3)
WBC # BLD: 10.5 K/UL (ref 4.5–11.5)

## 2025-03-13 PROCEDURE — 83036 HEMOGLOBIN GLYCOSYLATED A1C: CPT | Performed by: FAMILY MEDICINE

## 2025-03-13 PROCEDURE — 1036F TOBACCO NON-USER: CPT | Performed by: FAMILY MEDICINE

## 2025-03-13 PROCEDURE — G8428 CUR MEDS NOT DOCUMENT: HCPCS | Performed by: FAMILY MEDICINE

## 2025-03-13 PROCEDURE — 1123F ACP DISCUSS/DSCN MKR DOCD: CPT | Performed by: FAMILY MEDICINE

## 2025-03-13 PROCEDURE — 2022F DILAT RTA XM EVC RTNOPTHY: CPT | Performed by: FAMILY MEDICINE

## 2025-03-13 PROCEDURE — 3044F HG A1C LEVEL LT 7.0%: CPT | Performed by: FAMILY MEDICINE

## 2025-03-13 PROCEDURE — 3078F DIAST BP <80 MM HG: CPT | Performed by: FAMILY MEDICINE

## 2025-03-13 PROCEDURE — 3075F SYST BP GE 130 - 139MM HG: CPT | Performed by: FAMILY MEDICINE

## 2025-03-13 PROCEDURE — 99214 OFFICE O/P EST MOD 30 MIN: CPT | Performed by: FAMILY MEDICINE

## 2025-03-13 PROCEDURE — 3017F COLORECTAL CA SCREEN DOC REV: CPT | Performed by: FAMILY MEDICINE

## 2025-03-13 PROCEDURE — 1090F PRES/ABSN URINE INCON ASSESS: CPT | Performed by: FAMILY MEDICINE

## 2025-03-13 PROCEDURE — G8417 CALC BMI ABV UP PARAM F/U: HCPCS | Performed by: FAMILY MEDICINE

## 2025-03-13 PROCEDURE — G8399 PT W/DXA RESULTS DOCUMENT: HCPCS | Performed by: FAMILY MEDICINE

## 2025-03-13 SDOH — ECONOMIC STABILITY - INCOME SECURITY: PROBLEM RELATED TO HOUSING AND ECONOMIC CIRCUMSTANCES, UNSPECIFIED: Z59.9

## 2025-03-13 ASSESSMENT — ENCOUNTER SYMPTOMS
ABDOMINAL PAIN: 0
VOMITING: 0
SHORTNESS OF BREATH: 0
NAUSEA: 0
COUGH: 0

## 2025-03-13 NOTE — PATIENT INSTRUCTIONS
Please call and reschedule your appointment with sleep medicine for evaluation for sleep apnea.     [phone]

## 2025-03-13 NOTE — PROGRESS NOTES
[facility]  FAMILY MEDICINE RESIDENCY PROGRAM   OFFICE PROGRESS NOTE  DATE OF VISIT : 3/13/25    Patient : Coco Murray    : female   Age : 69 y.o.    : 1955           Chief Complaint :   Chief Complaint   Patient presents with    Check-Up       HPI:   69 y.o. female comes in today for follow up of dm2, htn, lumbar stenosis, spondylosis, morbid obesity and ckd3.     She reports that she fell in the past few weeks after sitting upright and falling alseep. She denies hitting her head and was able to get up off of the floor.      She had to cancel her appointment with the sleep doctor for lora evaluation. She reports that she falls asleep a lot during the day and has reported snoring in the past.     She plans to see Dr. Benedict in .    She is following with Dr. Alexander Samaniego and had an injection which she reports has helped her back pain. She reports she has eased off of getting physically active and plans to try to join [employer]. Gained 7 pounds since her last visit here.    She has had problems with affording a cgm but this looks like it could be possible in the future. She would like to acquire Medicaid but lost the number to the .      Review of Systems  Review of Systems   Constitutional:  Negative for chills and fever.   HENT:  Negative for congestion.    Respiratory:  Negative for cough and shortness of breath.    Cardiovascular:  Negative for chest pain, palpitations and leg swelling.   Gastrointestinal:  Negative for abdominal pain, nausea and vomiting.   Frequent falls and sleepiness    Physical Exam:  VS:  Blood pressure 133/71, pulse 96, temperature 97.8 °F (36.6 °C), temperature source Temporal, resp. rate 20, height 1.753 m (5' 9\"), weight (!) 139.3 kg (307 lb), SpO2 99%, not currently breastfeeding.  Physical Exam  Constitutional:       Appearance: She is well-developed.   HENT:      Head: Normocephalic and atraumatic.   Cardiovascular:

## 2025-03-13 NOTE — TELEPHONE ENCOUNTER
liver tests are a little high. I think this is due to fatty liver. I have placed orders to have drawn fasting in the next few weeks and also to check her liver with an ultrasound.

## 2025-03-14 ENCOUNTER — CARE COORDINATION (OUTPATIENT)
Dept: CARE COORDINATION | Age: 70
End: 2025-03-14

## 2025-03-14 NOTE — CARE COORDINATION
[facility] made call to initiate SW referral, unable to reach, left message requesting call back to this [facility].    Arabella Greer MSW, LSW   Care Coordinator  [phone]

## 2025-03-17 ENCOUNTER — CARE COORDINATION (OUTPATIENT)
Dept: CARE COORDINATION | Age: 70
End: 2025-03-17

## 2025-03-17 NOTE — CARE COORDINATION
Ambulatory Care Coordination Note     3/17/2025 2:39 PM     Patient outreach attempt by this ACM today to offer care management services. ACM was unable to reach the patient by telephone today;   left voice message requesting a return phone call to this ACM.  Entrechart message sent requesting patient to contact this ACM.     ACM: Kari Matthews RN     Care Summary Note: will attempt to contact again.    PCP/Specialist follow up:   Future Appointments         Provider Specialty Dept Phone    4/7/2025 11:00 AM [facility] 1 Cardiology [phone]    6/19/2025 10:00 AM Robina Green MD Family Medicine [phone]            Follow Up:   Plan for next AC outreach in approximately 1-2 days  to complete:  - outreach attempt to offer care management services.

## 2025-03-17 NOTE — CARE COORDINATION
phone number for [place] [employer]. Provided phone number to [facility] for any Medicare questions. Pt does not need assistance with ADLs. Pt receives $23/month in food assistance. No other questions reported. [facility] to f/u.  Arabella DIMAS, IMANW   Care Coordinator  [phone]

## 2025-03-20 ENCOUNTER — CARE COORDINATION (OUTPATIENT)
Dept: CARE COORDINATION | Age: 70
End: 2025-03-20

## 2025-03-20 NOTE — CARE COORDINATION
CC made f/u call to pt, regarding her julio césar censors cost, unable to reach, left message requesting call back to this [facility].  Arabella Greer MSW, LSW   Care Coordinator  [phone]

## 2025-03-21 DIAGNOSIS — E11.42 DIABETIC PERIPHERAL NEUROPATHY (HCC): ICD-10-CM

## 2025-03-21 DIAGNOSIS — I10 ESSENTIAL HYPERTENSION: ICD-10-CM

## 2025-03-24 NOTE — TELEPHONE ENCOUNTER
Name of Medication(s) Requested:  Requested Prescriptions     Pending Prescriptions Disp Refills    cloNIDine (CATAPRES) 0.1 MG tablet [Pharmacy Med Name: cloNIDine HCl Oral Tablet 0.1 MG] 270 tablet 3     Sig: TAKE 1 TABLET THREE TIMES DAILY    pregabalin (LYRICA) 150 MG capsule [Pharmacy Med Name: Pregabalin Oral Capsule 150 MG] 180 capsule      Sig: Take 1 capsule by mouth 2 times daily.       Medication is on current medication list Yes    Dosage and directions were verified? Yes    Quantity verified: 90 day supply     Pharmacy Verified?  Yes    Last Appointment:  3/13/2025    Future appts:  Future Appointments   Date Time Provider Department Center   4/3/2025 12:00 PM [facility] [place] US 1 [facility] US/AUS [facility] [place]   4/7/2025 11:00 AM [facility] 1 [facility] CARDIO [facility] Rad/Car   6/19/2025 10:00 AM Robina Green MD [facility] [place] [facility] ECC DEP        (If no appt send self scheduling link. .REFILLAPPT)  Scheduling request sent?     [] Yes  [x] No    Does patient need updated?  [] Yes  [x] No

## 2025-03-25 RX ORDER — PREGABALIN 150 MG/1
150 CAPSULE ORAL 2 TIMES DAILY
Qty: 180 CAPSULE | Refills: 1 | Status: SHIPPED | OUTPATIENT
Start: 2025-03-25 | End: 2025-09-21

## 2025-03-25 RX ORDER — CLONIDINE HYDROCHLORIDE 0.1 MG/1
0.1 TABLET ORAL 3 TIMES DAILY
Qty: 270 TABLET | Refills: 3 | Status: SHIPPED | OUTPATIENT
Start: 2025-03-25

## 2025-03-28 ENCOUNTER — CARE COORDINATION (OUTPATIENT)
Dept: CARE COORDINATION | Age: 70
End: 2025-03-28

## 2025-03-28 NOTE — CARE COORDINATION
[facility] made f/u call to pt, unable to reach, left message requesting call back.  Arabella Greer MSW, W   Care Coordinator  [phone]

## 2025-04-04 ENCOUNTER — CARE COORDINATION (OUTPATIENT)
Dept: CARE COORDINATION | Age: 70
End: 2025-04-04

## 2025-04-04 NOTE — CARE COORDINATION
[facility] made final outreach attempt, unable to reach pt, left message requesting call back to this [facility].    Received call back from pt, reviewed purpose of this call to f/u on libre2 censors. Pt reports she reports she has made her last payment she owed from the censors in the past.  Made 3way call to PCP office, left message for Jesusita to call pt.    Arabella Greer MSW, LSW   Care Coordinator  [phone]

## 2025-04-07 ENCOUNTER — CARE COORDINATION (OUTPATIENT)
Dept: CARE COORDINATION | Age: 70
End: 2025-04-07

## 2025-04-07 NOTE — CARE COORDINATION
[facility] made f/u call to pt, unable to reach, left message requesting call back.    Arabella Greer MSW, W   Care Coordinator  [phone]

## 2025-04-09 ENCOUNTER — CARE COORDINATION (OUTPATIENT)
Dept: CARE COORDINATION | Age: 70
End: 2025-04-09

## 2025-04-09 NOTE — CARE COORDINATION
[facility] received call back from pt, she reports she has not heard back from PCP office regarding julio césar 2 censors.   [facility] to reach out to PCP and f/u with pt.  Made call to PCP office, unable to reach, [facility] to try to reach again tomorrow.  Arabella Greer MSW, LSW   Care Coordinator  [phone]

## 2025-04-10 ENCOUNTER — CARE COORDINATION (OUTPATIENT)
Dept: CARE COORDINATION | Age: 70
End: 2025-04-10

## 2025-04-10 NOTE — CARE COORDINATION
[facility] made call to PCP office, asked for pari and was informed she is gone for the day. [facility] to try again tomorrow.    Arabella Greer MSW, LSW   Care Coordinator  [phone]

## 2025-04-11 ENCOUNTER — CARE COORDINATION (OUTPATIENT)
Dept: CARE COORDINATION | Age: 70
End: 2025-04-11

## 2025-04-11 NOTE — CARE COORDINATION
[facility] made 2 attempts to call PCP office, unable to reach or leave message.    Arabella DIMAS, LSW   Care Coordinator  [phone]

## 2025-04-16 ENCOUNTER — CARE COORDINATION (OUTPATIENT)
Dept: CARE COORDINATION | Age: 70
End: 2025-04-16

## 2025-04-17 ENCOUNTER — HOSPITAL ENCOUNTER (OUTPATIENT)
Dept: ULTRASOUND IMAGING | Age: 70
Discharge: HOME | End: 2025-04-19
Attending: FAMILY MEDICINE
Payer: MEDICARE

## 2025-04-17 ENCOUNTER — CARE COORDINATION (OUTPATIENT)
Dept: CARE COORDINATION | Age: 70
End: 2025-04-17

## 2025-04-17 ENCOUNTER — RESULTS FOLLOW-UP (OUTPATIENT)
Dept: FAMILY MEDICINE CLINIC | Age: 70
End: 2025-04-17

## 2025-04-17 DIAGNOSIS — R79.89 ELEVATED LIVER FUNCTION TESTS: ICD-10-CM

## 2025-04-17 DIAGNOSIS — K76.0 FATTY LIVER: ICD-10-CM

## 2025-04-17 PROCEDURE — 76705 ECHO EXAM OF ABDOMEN: CPT

## 2025-04-17 NOTE — CARE COORDINATION
Spoke with PCP office, Jesusita regarding julio césar 2 censor order. Jesusita explained that pt's insurance uses [facility] for DME. Jesusita has faxed order for julio césar 2 censors to [facility]. Jesusita reports it may take some time but they usually f/u with pt regarding order. [facility] to f/u with pt and inform.  Made call to pt, unable to reach, left message with above info.  Arabella Greer MSW, LSW   Care Coordinator  [phone]

## 2025-04-23 ENCOUNTER — CARE COORDINATION (OUTPATIENT)
Dept: CARE COORDINATION | Age: 70
End: 2025-04-23

## 2025-04-23 NOTE — CARE COORDINATION
CC made final outreach call to pt, unable to reach, left message requesting call back to this CC.  If no return call, [facility] to sign off.  Arabella DIMAS, W   Care Coordinator  [phone]

## 2025-04-25 ENCOUNTER — CARE COORDINATION (OUTPATIENT)
Dept: CARE COORDINATION | Age: 70
End: 2025-04-25

## 2025-04-25 NOTE — CARE COORDINATION
Received call from pt, [facility] reviewed that this [facility] spoke with PCP office and they informed that her insurance uses [facility] for her julio césar senors. Pt reports she has not heard from [facility]. [facility] did not know their phone number and pt did not have their contact info, but found number on internet to call. [facility] made 3way call to [facility], their wait time was an hour and 40 minutes. Pt requested to call back on her own at later time, [facility] provided phone number. [facility] to f/u.  Aarbella Greer MSW, LSW   Care Coordinator  [phone]

## 2025-04-29 ENCOUNTER — CARE COORDINATION (OUTPATIENT)
Dept: CARE COORDINATION | Age: 70
End: 2025-04-29

## 2025-04-29 NOTE — CARE COORDINATION
[facility] made call to pt, unable to reach, left message requesting call back to this [facility].  Arabella Greer MSW, LSW   Care Coordinator  [phone]

## 2025-04-30 ENCOUNTER — CARE COORDINATION (OUTPATIENT)
Dept: CARE COORDINATION | Age: 70
End: 2025-04-30

## 2025-04-30 NOTE — CARE COORDINATION
CC received missed call back from pt yesterday 4/29. Returned call to pt, unable to reach left message requesting call back.  Arabella Greer MSW, LSW   Care Coordinator  [phone]

## 2025-05-05 ENCOUNTER — CARE COORDINATION (OUTPATIENT)
Dept: CARE COORDINATION | Age: 70
End: 2025-05-05

## 2025-05-05 NOTE — CARE COORDINATION
[facility] made f/u call to pt, she has not heard from [facility] regarding the julio césar censors. Made 3way call to [facility], there is a 30 minute wait time. Pt did not wish to remain on hold, [facility] to call pt and try at later time. [facility] sent pt phone number for [facility]. [facility] made call to PCP office, unable to reach, left message requesting call back.  Arabella Greer MSW, LSW   Care Coordinator  [phone]

## 2025-05-07 ENCOUNTER — CARE COORDINATION (OUTPATIENT)
Dept: CARE COORDINATION | Age: 70
End: 2025-05-07

## 2025-05-07 NOTE — CARE COORDINATION
[facility] made call to PCP office, unable to reach or leave message.  Made call to pt, unable to reach, left message requesting call back to this CC.  Arabella DIMAS, W   Care Coordinator  [phone]

## 2025-05-08 ENCOUNTER — CARE COORDINATION (OUTPATIENT)
Dept: CARE COORDINATION | Age: 70
End: 2025-05-08

## 2025-05-08 NOTE — CARE COORDINATION
Ambulatory Care Coordination Note     2025 3:08 PM     Patient Current Location:  Home: [address] [place], [place] [address]  [address]  [place] [id number]     This patient was received as a referral from Provider.    Patient contacted the ACM by telephone. Verified name and  with patient as identifiers. Provided introduction to self, and explanation of the ACM role.   Patient accepted care management services at this time.          ACM: Kari Matthews RN     Challenges to be reviewed by the provider   Additional needs identified to be addressed with provider Yes  RPM monitoring for BP  and review plan of care               Method of communication with provider: chart routing.    Utilization: Initial Call - N/A    Care Summary Note:   ACM contacted Coco to complete enrollment in care coordination. She reports she is independent with ADL's. She states she is working with  and currently trying to obtain the sensors for her CGM. She states she is checking her blood sugars   She is active with Dr Benedict nephrology.  Future appointments were reviewed.    Offered patient enrollment in the Remote Patient Monitoring (RPM) program for in-home monitoring: Yes, patient enrolled today:     Remote Patient Monitoring Enrollment Note    Date/Time:  2025 10:42 AM    Offered patient enrollment in the [facility] Remote Patient Monitoring (RPM) program for in home monitoring for Kidney Disease; condition managed by PCP Dr Green. HTN; condition managed by PCP Dr Green.  Patient accepted.    Patient will be monitoring the following daily:  Blood Pressure    ACM reviewed the information below with the patient:    Emergency Contact (name and contact number): Salty Murphy [phone]    [x]  A member from the care coordination team will reach out to notify the patient once the RPM kit is ordered.  [x]  Once the kit is delivered, the HRS team will contact the patient after [employer] delivers to assist with set up.  [x]

## 2025-05-08 NOTE — CARE COORDINATION
Ambulatory Care Coordination Note     5/8/2025 10:13 AM     Patient outreach attempt by this AC today to offer care management services. AC was unable to reach the patient by telephone today;   left voice message requesting a return phone call to this ACM.     ACM: Kari Matthews RN     Care Summary Note: will attempt to contact again    PCP/Specialist follow up:   Future Appointments         Provider Specialty Dept Phone    5/23/2025 12:30 PM [facility] 1 Cardiology [phone]    6/19/2025 10:00 AM Robina Green MD Family Medicine [phone]            Follow Up:   Plan for next ACM outreach in approximately 1 week to complete:  - outreach attempt to offer care management services.

## 2025-05-09 ENCOUNTER — CARE COORDINATION (OUTPATIENT)
Dept: CARE COORDINATION | Age: 70
End: 2025-05-09

## 2025-05-09 DIAGNOSIS — I10 PRIMARY HYPERTENSION: Primary | Chronic | ICD-10-CM

## 2025-05-09 DIAGNOSIS — N18.31 STAGE 3A CHRONIC KIDNEY DISEASE (HCC): ICD-10-CM

## 2025-05-09 NOTE — CARE COORDINATION
[facility] made f/u call to pt, she still has not heard from [facility] regarding her julio césar censors. Made 3way call to PCP office, d/t not hearing back from PCP office. Spoke with Jacqueline regarding pt not hearing from [facility] regarding julio césar censors (order was faxed 4/4/25). Jacqueline reports she will refax over order to [facility]. Made call to [facility], there wait time is over an hour. CC to f/u next week and try again to reach [facility] on a 3way call.    Arabella Greer MSW, LSW   Care Coordinator  [phone]

## 2025-05-09 NOTE — PROGRESS NOTES
Remote Patient Monitoring Treatment Plan    Received request from [facility]/Kari Vargas RN   to order remote patient monitoring for in home monitoring of Kidney Disease; Condition managed by PCP.  HTN; Condition managed by PCP.  and order completed.     Patient will be monitoring blood pressure .      Patient will engage in Remote Patient Monitoring each day to develop the skills necessary for self management.       RPM Care Team Responsibilities:   Alerts will be reviewed daily and addressed within 2-4 hours during operational hours (Monday -Friday 9 am-4 pm)  Alert response and intervention documented in patient medical record  Alert response escalated to PCP per protocol and documented in patient medical record  Patient monitored over approximately  days  Discharge from program based on self-management readiness    See care coordination encounters for additional details.

## 2025-05-12 ENCOUNTER — CARE COORDINATION (OUTPATIENT)
Dept: PRIMARY CARE CLINIC | Age: 70
End: 2025-05-12

## 2025-05-12 ENCOUNTER — CARE COORDINATION (OUTPATIENT)
Dept: CARE COORDINATION | Age: 70
End: 2025-05-12

## 2025-05-12 NOTE — CARE COORDINATION
[facility] made call to [facility], was on hold, and then spoke with representative to f/u on pt's julio césar censor order. [facility] informed that pt has been unable to reach, they have made many attempts to reach pt. [facility] reports they have the order and pt info but need to speak with pt to move forward. Made 3way call to pt, unable to reach, left message with above info and phone number for [facility] [phone].  Arabella Greer MSW, LSW   Care Coordinator  [phone]

## 2025-05-15 ENCOUNTER — CARE COORDINATION (OUTPATIENT)
Dept: CARE COORDINATION | Age: 70
End: 2025-05-15

## 2025-05-15 SDOH — ECONOMIC STABILITY: FOOD INSECURITY: WITHIN THE PAST 12 MONTHS, YOU WORRIED THAT YOUR FOOD WOULD RUN OUT BEFORE YOU GOT MONEY TO BUY MORE.: SOMETIMES TRUE

## 2025-05-15 SDOH — ECONOMIC STABILITY: FOOD INSECURITY: WITHIN THE PAST 12 MONTHS, THE FOOD YOU BOUGHT JUST DIDN'T LAST AND YOU DIDN'T HAVE MONEY TO GET MORE.: SOMETIMES TRUE

## 2025-05-15 SDOH — HEALTH STABILITY: PHYSICAL HEALTH: ON AVERAGE, HOW MANY MINUTES DO YOU ENGAGE IN EXERCISE AT THIS LEVEL?: 0 MIN

## 2025-05-15 SDOH — HEALTH STABILITY: PHYSICAL HEALTH: ON AVERAGE, HOW MANY DAYS PER WEEK DO YOU ENGAGE IN MODERATE TO STRENUOUS EXERCISE (LIKE A BRISK WALK)?: 0 DAYS

## 2025-05-15 SDOH — ECONOMIC STABILITY: FOOD INSECURITY: HOW HARD IS IT FOR YOU TO PAY FOR THE VERY BASICS LIKE FOOD, HOUSING, MEDICAL CARE, AND HEATING?: SOMEWHAT HARD

## 2025-05-15 SDOH — ECONOMIC STABILITY: TRANSPORTATION INSECURITY: IN THE PAST 12 MONTHS, HAS LACK OF TRANSPORTATION KEPT YOU FROM MEDICAL APPOINTMENTS OR FROM GETTING MEDICATIONS?: NO

## 2025-05-15 ASSESSMENT — SOCIAL DETERMINANTS OF HEALTH (SDOH)
HOW OFTEN DO YOU GET TOGETHER WITH FRIENDS OR RELATIVES?: ONCE A WEEK
HOW OFTEN DO YOU ATTENT MEETINGS OF THE CLUB OR ORGANIZATION YOU BELONG TO?: MORE THAN 4 TIMES PER YEAR
HOW OFTEN DO YOU ATTEND CHURCH OR RELIGIOUS SERVICES?: 1 TO 4 TIMES PER YEAR
IN A TYPICAL WEEK, HOW MANY TIMES DO YOU TALK ON THE PHONE WITH FAMILY, FRIENDS, OR NEIGHBORS?: MORE THAN THREE TIMES A WEEK
DO YOU BELONG TO ANY CLUBS OR ORGANIZATIONS SUCH AS CHURCH GROUPS UNIONS, FRATERNAL OR ATHLETIC GROUPS, OR SCHOOL GROUPS?: YES

## 2025-05-15 ASSESSMENT — ACTIVITIES OF DAILY LIVING (ADL): LACK_OF_TRANSPORTATION: NO

## 2025-05-15 NOTE — CARE COORDINATION
Ambulatory Care Coordination Note     5/15/2025 10:20 AM     Patient outreach attempt by this ACM today to perform care management follow up . ACM was unable to reach the patient by telephone today;   left voice message requesting a return phone call to this ACM.     ACM: Kari Matthews RN     Care Summary Note: will attempt to contact again.    PCP/Specialist follow up:   Future Appointments         Provider Specialty Dept Phone    5/23/2025 12:30 PM [facility] Cardiology [phone]    6/19/2025 10:00 AM Robina Green MD Family Medicine [phone]            Follow Up:   Plan for next AC outreach in approximately 1-2 days  to complete:  - RPM  Complete enrollment .

## 2025-05-15 NOTE — CARE COORDINATION
pressures over a month period of time.  I will notify my provider of any changes in blood pressure associated with symptoms of dizziness, falls, passing out, headache, confusion/change in mental status.    5/9/2025  Patient Reported Blood Pressure     Barriers: impairment:  mental health: depression, overwhelmed by complexity of regimen, and lack of education  Plan for overcoming my barriers: RPM, care coordination  Confidence: 9/10  Anticipated Goal Completion Date: 9/9/25                 PCP/Specialist follow up:   Future Appointments         Provider Specialty Dept Phone    5/23/2025 12:30 PM [facility] Cardiology [phone]    6/19/2025 10:00 AM Robina Green MD Family Medicine [phone]            Follow Up:   Plan for next ACM outreach in approximately 1 week to complete:  - goal progression  - education   - RPM.   Patient  is agreeable to this plan.

## 2025-05-16 ENCOUNTER — CARE COORDINATION (OUTPATIENT)
Dept: CASE MANAGEMENT | Age: 70
End: 2025-05-16

## 2025-05-16 NOTE — CARE COORDINATION
Remote Patient Monitoring Welcome Call 1st Attempt  Date/Time:  2025 2:02 PM  Patient Current Location: [place]  Verified patients name and  as identifiers.      Attempted to reach patient for Welcome Call 1st Attempt.   Left HIPAA Compliant message on Voice Mail to Call Back. Number left on Voice mail.   Will continue to follow.     Carmela Bowen LPN    [phone]  [facility] / [facility] Coordinator

## 2025-05-19 ENCOUNTER — CARE COORDINATION (OUTPATIENT)
Dept: CARE COORDINATION | Age: 70
End: 2025-05-19

## 2025-05-19 ENCOUNTER — CARE COORDINATION (OUTPATIENT)
Dept: CASE MANAGEMENT | Age: 70
End: 2025-05-19

## 2025-05-19 NOTE — CARE COORDINATION
Hello, Please see an important message from your RPM Team:    This is a reminder that we have not received your BP readings for 3 days Please enter them as soon as possible.     Please do not respond to this message;     Carmela Bowen LPN    [phone]  Ian Navas / [facility] Coordinator / RPM

## 2025-05-19 NOTE — CARE COORDINATION
Ambulatory Care Coordination Note     5/19/2025 4:51 PM     Patient outreach attempt by this ACM today to perform care management follow up . ACM was unable to reach the patient by telephone today;   See below     ACM: Kari Matthews RN     Care Summary Note: ACM notified by RPM team welcome call complete.    PCP/Specialist follow up:   Future Appointments         Provider Specialty Dept Phone    5/23/2025 12:30 PM [facility] Cardiology [phone]    6/19/2025 10:00 AM Robina Green MD Family Medicine [phone]            Follow Up:   Plan for next ACM outreach in approximately 1-2 days  to complete:  - goal progression  - education   - RPM  Complete enrollment .

## 2025-05-19 NOTE — CARE COORDINATION
Remote Patient Monitoring Welcome Note  Date/Time:  2025 2:30 PM  Patient Current Location: [place]  Verified patients name and  as identifiers.   ACM Notified    Completed and confirmed the following:    [x] Patient received all RPM equipment (tablet, scale, blood pressure device and cuff, and pulse oximeter)  NOT MONITORING WEIGHT        Cuff Size: large (13.8\"-19.68\")    Weight Scale:  none        NOT MONITORING WEIGHT                   [x] Instructed patient keep box for use when returning equipment                                                          [x] Reviewed Patient Welcome Letter with patient    [x]  Reviewed Consent Form  Copy of consent form in chart.                 [x] Reviewed expectations for patient and care team  Monitoring hours M-F 9-4pm  It is important to take your vitals every day, even on the weekends,to keep your care team aware of how you are doing every day of the week.  Completing monitoring by 12pm on  so that alerts can be responded to in the same day  Patient weighs self at same time every day (or after urinating and waking up)     NOT MONITORING WEIGHT        Take blood pressure 1-2 hrs after medications   RPM team may have different phone area code (including [place], [place], [place] or [place])                              [x] Instructed patient to keep scale on flat surface             NOT MONITORING WEIGHT                                            [x] Instructed patient to keep tablet plugged in at all times                         [x] Instructed how to contact IT support  ([phone])  [x] Provided Remote Patient Monitoring care  information     Emergency Contact Verified: Noel Garrison               All questions answered at this time.  Yes    Carmela Bowen LPN    [phone]  [facility] / [facility] Coordinator / RPM

## 2025-05-20 ENCOUNTER — CARE COORDINATION (OUTPATIENT)
Dept: CARE COORDINATION | Age: 70
End: 2025-05-20

## 2025-05-20 NOTE — CARE COORDINATION
Received missed call and message from pt, stating she spoke with [facility] and she has to pay a month fee she cannot afford.  Returned call to pt, unable to reach or leave message.

## 2025-05-22 ENCOUNTER — CARE COORDINATION (OUTPATIENT)
Dept: CARE COORDINATION | Age: 70
End: 2025-05-22

## 2025-05-22 NOTE — CARE COORDINATION
Process  Home Care Waiver: Not Started  Senior Services: Not Started (Comment: ESCOBAR)  Social Work: Completed (Comment: CGM sensors)  Other Services: In Process (Comment: RPM)  Zone Management Tools: In Process (Comment: diabetes)  Other Services or Interventions: Life Alert      ,   Diabetes Assessment    Medic Alert ID: No  Meal Planning: Avoidance of concentrated sweets, Calorie counting   How often do you test your blood sugar?: Meals, Bedtime   Do you have barriers with adherence to non-pharmacologic self-management interventions? (Nutrition/Exercise/Self-Monitoring): No   Have you ever had to go to the ED for symptoms of low blood sugar?: No             ,   Hypertension - Encounter Level    Symptom course: stable      ,   General Assessment    Do you have any symptoms that are causing concern?: No          Medications Reviewed:   Patient denies any changes with medications and reports taking all medications as prescribed. and Completed during a previous call     Advance Care Planning:   Reviewed and current     Care Planning:   Education Documentation  Oral Hypoglycemic Agents, taught by Kari Matthews RN at 5/22/2025  4:53 PM.  Learner: Patient  Readiness: Acceptance  Method: Explanation, Teachback  Response: Verbalizes Understanding    Insulin, taught by Kari Matthews RN at 5/22/2025  4:53 PM.  Learner: Patient  Readiness: Acceptance  Method: Explanation, Teachback  Response: Verbalizes Understanding    Giving Insulin Injection, taught by Kari Matthews RN at 5/22/2025  4:53 PM.  Learner: Patient  Readiness: Acceptance  Method: Explanation, Teachback  Response: Verbalizes Understanding    Educate medication management, taught by Kari Matthews, RN at 5/22/2025  4:53 PM.  Learner: Patient  Readiness: Acceptance  Method: Explanation, Teachback  Response: Verbalizes Understanding    Drawing up Insulin, taught by Kari Matthews RN at 5/22/2025  4:53 PM.  Learner: Patient  Readiness:

## 2025-05-23 VITALS — DIASTOLIC BLOOD PRESSURE: 76 MMHG | SYSTOLIC BLOOD PRESSURE: 127 MMHG | HEART RATE: 73 BPM

## 2025-05-27 ENCOUNTER — CARE COORDINATION (OUTPATIENT)
Dept: CARE COORDINATION | Age: 70
End: 2025-05-27

## 2025-05-27 ENCOUNTER — CARE COORDINATION (OUTPATIENT)
Dept: CASE MANAGEMENT | Age: 70
End: 2025-05-27

## 2025-05-27 NOTE — CARE COORDINATION
[facility] made f/u call to pt, she has spoken with [facility] and worked it out, was able to get 6 sensors and pt can pay monthly $35. Asked if pt ever applied for Medicaid after discussing resources at initial outreach in March. Pt denied applying to Medicaid and reports she is still interested in applying. [facility] to mail application, confirmed mailing address and also provided pt with the phone number for [place] benefits. No other questions reported. [facility] to f/u in a few weeks.  Arabella Greer MSW, LSW   Care Coordinator  [phone]

## 2025-05-27 NOTE — CARE COORDINATION
Coco Murray  is currently enrolled in Remote Patient Monitoring (RPM) and has not entered vitals in 5 days. The RPM team has Left A Message your patient to discuss adherence in RPM.   ACM Notified    Please attempt to outreach your patient and discuss adherence with RPM. If patient is no longer interested in participating please send a dis-enrollment request to the RPM pool for processing.     Thank You,      Carmela Bowen LPN    [phone]  Ian Navas / [facility] Coordinator / [facility]

## 2025-05-29 ENCOUNTER — CARE COORDINATION (OUTPATIENT)
Dept: CARE COORDINATION | Age: 70
End: 2025-05-29

## 2025-05-29 NOTE — CARE COORDINATION
Ambulatory Care Coordination Note     5/29/2025 1:42 PM     Patient outreach attempt by this ACM today to perform care management follow up . ACM was unable to reach the patient by telephone today;   left voice message requesting a return phone call to this ACM.     ACM: Kari Matthews RN     Care Summary Note: will attempt to contact    PCP/Specialist follow up:   Future Appointments         Provider Specialty Dept Phone    6/4/2025 1:00 PM Tessie Barkley, PhD Psychology [phone]    6/19/2025 10:00 AM Robina Green MD Family Medicine [phone]            Follow Up:   Plan for next AC outreach in approximately 1 week to complete:  - disease specific assessments  - goal progression  - education   - RPM.

## 2025-06-01 ASSESSMENT — PATIENT HEALTH QUESTIONNAIRE - PHQ9
3. TROUBLE FALLING OR STAYING ASLEEP: SEVERAL DAYS
9. THOUGHTS THAT YOU WOULD BE BETTER OFF DEAD, OR OF HURTING YOURSELF: NOT AT ALL
7. TROUBLE CONCENTRATING ON THINGS, SUCH AS READING THE NEWSPAPER OR WATCHING TELEVISION: NOT AT ALL
10. IF YOU CHECKED OFF ANY PROBLEMS, HOW DIFFICULT HAVE THESE PROBLEMS MADE IT FOR YOU TO DO YOUR WORK, TAKE CARE OF THINGS AT HOME, OR GET ALONG WITH OTHER PEOPLE: NOT DIFFICULT AT ALL
1. LITTLE INTEREST OR PLEASURE IN DOING THINGS: SEVERAL DAYS
6. FEELING BAD ABOUT YOURSELF - OR THAT YOU ARE A FAILURE OR HAVE LET YOURSELF OR YOUR FAMILY DOWN: NOT AT ALL
SUM OF ALL RESPONSES TO PHQ QUESTIONS 1-9: 3
10. IF YOU CHECKED OFF ANY PROBLEMS, HOW DIFFICULT HAVE THESE PROBLEMS MADE IT FOR YOU TO DO YOUR WORK, TAKE CARE OF THINGS AT HOME, OR GET ALONG WITH OTHER PEOPLE: NOT DIFFICULT AT ALL
2. FEELING DOWN, DEPRESSED OR HOPELESS: NOT AT ALL
SUM OF ALL RESPONSES TO PHQ QUESTIONS 1-9: 3
1. LITTLE INTEREST OR PLEASURE IN DOING THINGS: SEVERAL DAYS
5. POOR APPETITE OR OVEREATING: SEVERAL DAYS
SUM OF ALL RESPONSES TO PHQ QUESTIONS 1-9: 3
9. THOUGHTS THAT YOU WOULD BE BETTER OFF DEAD, OR OF HURTING YOURSELF: NOT AT ALL
5. POOR APPETITE OR OVEREATING: SEVERAL DAYS
8. MOVING OR SPEAKING SO SLOWLY THAT OTHER PEOPLE COULD HAVE NOTICED. OR THE OPPOSITE, BEING SO FIGETY OR RESTLESS THAT YOU HAVE BEEN MOVING AROUND A LOT MORE THAN USUAL: NOT AT ALL
4. FEELING TIRED OR HAVING LITTLE ENERGY: NOT AT ALL
4. FEELING TIRED OR HAVING LITTLE ENERGY: NOT AT ALL
SUM OF ALL RESPONSES TO PHQ QUESTIONS 1-9: 3
SUM OF ALL RESPONSES TO PHQ QUESTIONS 1-9: 3
2. FEELING DOWN, DEPRESSED OR HOPELESS: NOT AT ALL
3. TROUBLE FALLING OR STAYING ASLEEP: SEVERAL DAYS
7. TROUBLE CONCENTRATING ON THINGS, SUCH AS READING THE NEWSPAPER OR WATCHING TELEVISION: NOT AT ALL
8. MOVING OR SPEAKING SO SLOWLY THAT OTHER PEOPLE COULD HAVE NOTICED. OR THE OPPOSITE - BEING SO FIDGETY OR RESTLESS THAT YOU HAVE BEEN MOVING AROUND A LOT MORE THAN USUAL: NOT AT ALL
6. FEELING BAD ABOUT YOURSELF - OR THAT YOU ARE A FAILURE OR HAVE LET YOURSELF OR YOUR FAMILY DOWN: NOT AT ALL

## 2025-06-01 ASSESSMENT — ANXIETY QUESTIONNAIRES
3. WORRYING TOO MUCH ABOUT DIFFERENT THINGS: SEVERAL DAYS
5. BEING SO RESTLESS THAT IT IS HARD TO SIT STILL: NOT AT ALL
GAD7 TOTAL SCORE: 1
1. FEELING NERVOUS, ANXIOUS, OR ON EDGE: NOT AT ALL
1. FEELING NERVOUS, ANXIOUS, OR ON EDGE: NOT AT ALL
6. BECOMING EASILY ANNOYED OR IRRITABLE: NOT AT ALL
4. TROUBLE RELAXING: NOT AT ALL
7. FEELING AFRAID AS IF SOMETHING AWFUL MIGHT HAPPEN: NOT AT ALL
IF YOU CHECKED OFF ANY PROBLEMS ON THIS QUESTIONNAIRE, HOW DIFFICULT HAVE THESE PROBLEMS MADE IT FOR YOU TO DO YOUR WORK, TAKE CARE OF THINGS AT HOME, OR GET ALONG WITH OTHER PEOPLE: NOT DIFFICULT AT ALL
5. BEING SO RESTLESS THAT IT IS HARD TO SIT STILL: NOT AT ALL
2. NOT BEING ABLE TO STOP OR CONTROL WORRYING: NOT AT ALL
4. TROUBLE RELAXING: NOT AT ALL
2. NOT BEING ABLE TO STOP OR CONTROL WORRYING: NOT AT ALL
IF YOU CHECKED OFF ANY PROBLEMS ON THIS QUESTIONNAIRE, HOW DIFFICULT HAVE THESE PROBLEMS MADE IT FOR YOU TO DO YOUR WORK, TAKE CARE OF THINGS AT HOME, OR GET ALONG WITH OTHER PEOPLE: NOT DIFFICULT AT ALL
6. BECOMING EASILY ANNOYED OR IRRITABLE: NOT AT ALL
7. FEELING AFRAID AS IF SOMETHING AWFUL MIGHT HAPPEN: NOT AT ALL
3. WORRYING TOO MUCH ABOUT DIFFERENT THINGS: SEVERAL DAYS

## 2025-06-01 ASSESSMENT — LIFESTYLE VARIABLES
ALCOHOL_DAYS_PER_WEEK: 0
PAST THREE MONTHS WHAT IS THE LARGEST AMOUNT OF ALCOHOLIC DRINKS YOU HAVE CONSUMED IN ONE DAY: 0
HISTORY_ALCOHOL_USE: NO
HAVE YOU EVER RECEIVED ALCOHOL OR OTHER DRUG ABUSE TREATMENT: NO

## 2025-06-04 ENCOUNTER — OFFICE VISIT (OUTPATIENT)
Dept: PSYCHOLOGY | Age: 70
End: 2025-06-04
Payer: MEDICARE

## 2025-06-04 DIAGNOSIS — Z59.9 FINANCIAL DIFFICULTIES: ICD-10-CM

## 2025-06-04 DIAGNOSIS — F33.41 MAJOR DEPRESSIVE DISORDER, RECURRENT, IN PARTIAL REMISSION: Primary | ICD-10-CM

## 2025-06-04 PROCEDURE — 1036F TOBACCO NON-USER: CPT | Performed by: PSYCHOLOGIST

## 2025-06-04 PROCEDURE — 1123F ACP DISCUSS/DSCN MKR DOCD: CPT | Performed by: PSYCHOLOGIST

## 2025-06-04 PROCEDURE — 90791 PSYCH DIAGNOSTIC EVALUATION: CPT | Performed by: PSYCHOLOGIST

## 2025-06-04 SDOH — ECONOMIC STABILITY - INCOME SECURITY: PROBLEM RELATED TO HOUSING AND ECONOMIC CIRCUMSTANCES, UNSPECIFIED: Z59.9

## 2025-06-04 NOTE — PATIENT INSTRUCTIONS
Consider 3-6 month goals for counseling.    Keep sleep diary.       TIPS TO IMPROVE SLEEP    Use your bed for sleeping and sex only.  Avoid spending time in bed when not sleeping.  Do not use screens or read in bed.    2.  Get out of bed if you cannot fall asleep within 30 minutes. Also get out of bed if you wake up and cannot fall back to sleep within 30 minutes.     3.  Go to bed only when you are very tired and ready to fall asleep.    4.   Do a wind down routine before bed.  This routine will help train your brain to know it is time to go to sleep. This routine can include bathing/showering, meditation, and a lightly mentally stimulating task (see #5 below).    5.  Eye strain helps to create sleepiness.  Some options include light reading, puzzles (crossword, word search, Sudoku), and solitare. Remember to avoid screen use.    6. Limit caffeine.  Best to cut off caffeine at least 8 hours prior to bed time.    7. Do not smoke.  If you do smoke, cut off at least 4 hours prior to bed time.    8. Stay active during the day. Daytime activity will help make you tired by the end of the day.    9. Exercise during the day.  Avoid exercising at least 2 hours before bedtime.    10. Racing/stressful thoughts can often interfere with sleep.  Before you go into your bedroom, write down any stressful thoughts on your mind along with a short-term plan. Keep the list outside of your bedroom.  This process will train your brain to \"leave stress out of the bedroom.\" It will also serve as a reminder and a plan for steps you can use later to solve problems.       Keeping a sleep chart is a very helpful way to keep track of your sleep habits, and find opportunities for improvement.      Follow this link for a useful sleep chart:  roscoee-[email or web address]://sleepeducation.org/wp-content/uploads/2021/04/sleep-diary-form.pdf

## 2025-06-04 NOTE — PROGRESS NOTES
Behavioral Health Assessment   [facility]    Time Start: 1:15 p.m.  Time End:  2:20 p.m.  Date of Service:  6/4/2025    REFERRAL INFORMATION  Referred by: Robina Green MD; Kari Matthews RN (Care Coordination)    Reason for referral:  Insomnia, loss of interest, coping with chronic pain    BASIS OF EVALUATION:  Patient Interview, Record Review, Referring Provider Consultation, PHQ-9, KIP-7, and Insomnia Severity Index      PRESENTING CONCERNS AND HISTORY OF PRESENT CONDITION   Ms. Murray reports primary concerns of sleep pattern disturbance, loss of interest, and poor focus.    Sleep has been worse for approximately 1 month.  Attributes to increased worry about her sister who lives in [place] and has mobility problems secondary to failed back surgery. Ms. Murray has been staying up all night using TV/phone.  Slept 4 hrs last night (5am to 9am). Falling asleep during the day.  Falls asleep sitting up. Acknowledges excessive daytime screen use as well.     She has chronic back pain since age 21, which has progressively worsened and limited her mobility.   In pain daily, frustrated by condition. Tries not to let others know how much pain she is in.  See below for other chronic medical conditions.      Regarding loss of interest. Used to enjoy going out to dinner with friends, does not go out as much.  Keeps up with her housework, however not to her prior standards.  Feels housework is more of a drain than in the past.     Some disappointment with not being able to see her son Bladimir (41) and grandchildren as much as she would like.  They have good relationships, and the reduced contact is due to his busy work and parenting schedule, as well as living out of town ([place]). Ms. Murray generally has to initiate phone calls with him. Despite her symptoms, she does have an optimistic viewpoint.        Symptoms of depression and anxiety: anhedonia, insomnia, fatigue, difficulty concentrating,

## 2025-06-05 ENCOUNTER — CARE COORDINATION (OUTPATIENT)
Dept: CARE COORDINATION | Age: 70
End: 2025-06-05

## 2025-06-05 NOTE — CARE COORDINATION
Ambulatory Care Coordination Note     6/5/2025 11:02 AM     Patient outreach attempt by this ACM today to perform care management follow up . ACM was unable to reach the patient by telephone today;   left voice message requesting a return phone call to this ACM.     ACM: Kari Matthews RN     Care Summary Note: will attempt to contact again    PCP/Specialist follow up:   Future Appointments         Provider Specialty Dept Phone    6/19/2025 10:00 AM Robina Green MD Family Medicine [phone]    7/3/2025 1:00 PM Tessie Barkley, PhD Psychology [phone]            Follow Up:   Plan for next AC outreach in approximately 1 week to complete:  - disease specific assessments  - goal progression  - education   - RPM.

## 2025-06-05 NOTE — CARE COORDINATION
Ambulatory Care Coordination Note     2025 12:03 PM     Patient Current Location:  Home: [address]  [address]  [place] [address]     ACM contacted the patient by telephone. Verified name and  with patient as identifiers.         ACM: Kari Matthews RN     Challenges to be reviewed by the provider   Additional needs identified to be addressed with provider Yes  Hand and leg cramping               Method of communication with provider: chart routing.    Utilization: Patient has not had any utilization since our last call.     Care Summary Note:   [facility] contacted Coco for care coordination follow up. She reports her RPM BP monitor is not transmitting. Coco states she has received 3 BP monitors. [facility] placed 3 way call to trouble shoot with Danilo. After 20 minutes of trouble shooting it was determined that she may have a faulty tablet. Danilo states he is expediting a ticket to support and will contact Coco with the results.  Coco states she continues with her CGM and blood sugars have been good.   She reports cramping in her hands and legs. [facility] discussed water intake and Coco states she has not been drinking as much water as usual. She states she is taking her potassium as prescribed. She denies any chest pain or shortness of breath. She states she did have an episode this week were she felt she may faint, but did not.  She states she was scheduled for an echo but had to cancel and was not contacted to reschedule. She declined assistance scheduling and [facility] provided contact number for [facility]. She states she will call to schedule.  She states she completed her appointment with Dr Barkley and is scheduled for a follow up.  Future appointments were reviewed.    Offered patient enrollment in the Remote Patient Monitoring (RPM) program for in-home monitoring: Yes, patient enrolled; current status is activated and monitoring.     Assessments Completed:   Care

## 2025-06-12 DIAGNOSIS — R74.8 ELEVATED ALKALINE PHOSPHATASE LEVEL: ICD-10-CM

## 2025-06-12 DIAGNOSIS — R74.9 ABNORMAL SERUM ENZYME LEVEL, UNSPECIFIED: ICD-10-CM

## 2025-06-12 DIAGNOSIS — Z79.4 TYPE 2 DIABETES MELLITUS WITH BOTH EYES AFFECTED BY MILD NONPROLIFERATIVE RETINOPATHY WITHOUT MACULAR EDEMA, WITH LONG-TERM CURRENT USE OF INSULIN (HCC): ICD-10-CM

## 2025-06-12 DIAGNOSIS — R74.01 ELEVATION OF LEVELS OF LIVER TRANSAMINASE LEVELS: ICD-10-CM

## 2025-06-12 DIAGNOSIS — R79.89 ELEVATED LIVER FUNCTION TESTS: ICD-10-CM

## 2025-06-12 DIAGNOSIS — E11.3293 TYPE 2 DIABETES MELLITUS WITH BOTH EYES AFFECTED BY MILD NONPROLIFERATIVE RETINOPATHY WITHOUT MACULAR EDEMA, WITH LONG-TERM CURRENT USE OF INSULIN (HCC): ICD-10-CM

## 2025-06-12 LAB
ALBUMIN: 3.9 G/DL (ref 3.5–5.2)
ALP BLD-CCNC: 208 U/L (ref 35–104)
ALT SERPL-CCNC: 80 U/L (ref 0–35)
ANION GAP SERPL CALCULATED.3IONS-SCNC: 15 MMOL/L (ref 7–16)
AST SERPL-CCNC: 82 U/L (ref 0–35)
BILIRUB SERPL-MCNC: 0.5 MG/DL (ref 0–1.2)
BUN BLDV-MCNC: 16 MG/DL (ref 8–23)
CALCIUM SERPL-MCNC: 9.8 MG/DL (ref 8.8–10.2)
CHLORIDE BLD-SCNC: 106 MMOL/L (ref 98–107)
CO2: 24 MMOL/L (ref 22–29)
CREAT SERPL-MCNC: 1.2 MG/DL (ref 0.5–1)
GFR, ESTIMATED: 52 ML/MIN/1.73M2
GGT, 20027: 54 U/L (ref 5–36)
GLUCOSE BLD-MCNC: 146 MG/DL (ref 74–99)
HAV IGM SER IA-ACNC: NONREACTIVE
HEP B S AGB SURF AG: NONREACTIVE
HEPATITIS B CORE IGM ANTIBODY: NONREACTIVE
HEPATITIS C ANTIBODY: NONREACTIVE
IRON % SATURATION: 17 % (ref 15–50)
IRON: 43 UG/DL (ref 37–145)
POTASSIUM SERPL-SCNC: 3.8 MMOL/L (ref 3.5–5.1)
SODIUM BLD-SCNC: 144 MMOL/L (ref 136–145)
TOTAL IRON BINDING CAPACITY: 249 UG/DL (ref 250–450)
TOTAL PROTEIN: 7.7 G/DL (ref 6.4–8.3)
TSH SERPL DL<=0.05 MIU/L-ACNC: 2.53 UIU/ML (ref 0.27–4.2)

## 2025-06-13 ENCOUNTER — RESULTS FOLLOW-UP (OUTPATIENT)
Dept: FAMILY MEDICINE CLINIC | Age: 70
End: 2025-06-13

## 2025-06-13 ENCOUNTER — CARE COORDINATION (OUTPATIENT)
Dept: CARE COORDINATION | Age: 70
End: 2025-06-13

## 2025-06-13 NOTE — CARE COORDINATION
status.    6/13/2025  Patient Reported Blood Pressure  116/77 HR 87    Barriers: impairment:  mental health: depression, overwhelmed by complexity of regimen, and lack of education  Plan for overcoming my barriers: RPM, care coordination  Confidence: 9/10  Anticipated Goal Completion Date: 9/9/25                 PCP/Specialist follow up:   Future Appointments         Provider Specialty Dept Phone    6/19/2025 10:00 AM Robina Green MD Family Medicine [phone]    7/1/2025 1:15 PM (Arrive by 1:00 PM) [facility]NOLOGIST 3; [facility] RM 3 Radiology [phone]    7/3/2025 1:00 PM Tessie Barkley, PhD Psychology [phone]            Follow Up:   Plan for next AC outreach in approximately 1 week to complete:  - disease specific assessments  - goal progression  - education   - echo?.   Patient  is agreeable to this plan.

## 2025-06-15 LAB
ALK PHOS BONE SPECIFIC: 71 U/L (ref 0–55)
ALK PHOS OTHER CALC: 0 U/L
ALK PHOSPHATASE: 221 U/L (ref 40–120)
ALKALINE PHOSPHATASE LIVER FRACTION: 150 U/L (ref 0–94)

## 2025-06-16 SDOH — HEALTH STABILITY: PHYSICAL HEALTH: ON AVERAGE, HOW MANY MINUTES DO YOU ENGAGE IN EXERCISE AT THIS LEVEL?: 0 MIN

## 2025-06-16 SDOH — HEALTH STABILITY: PHYSICAL HEALTH: ON AVERAGE, HOW MANY DAYS PER WEEK DO YOU ENGAGE IN MODERATE TO STRENUOUS EXERCISE (LIKE A BRISK WALK)?: 0 DAYS

## 2025-06-16 ASSESSMENT — LIFESTYLE VARIABLES
HOW MANY STANDARD DRINKS CONTAINING ALCOHOL DO YOU HAVE ON A TYPICAL DAY: PATIENT DOES NOT DRINK
HOW MANY STANDARD DRINKS CONTAINING ALCOHOL DO YOU HAVE ON A TYPICAL DAY: 0
HOW OFTEN DO YOU HAVE A DRINK CONTAINING ALCOHOL: NEVER
HOW OFTEN DO YOU HAVE A DRINK CONTAINING ALCOHOL: 1
HOW OFTEN DO YOU HAVE SIX OR MORE DRINKS ON ONE OCCASION: 1

## 2025-06-16 ASSESSMENT — PATIENT HEALTH QUESTIONNAIRE - PHQ9
2. FEELING DOWN, DEPRESSED OR HOPELESS: NOT AT ALL
1. LITTLE INTEREST OR PLEASURE IN DOING THINGS: NOT AT ALL
SUM OF ALL RESPONSES TO PHQ QUESTIONS 1-9: 0

## 2025-06-18 ENCOUNTER — CARE COORDINATION (OUTPATIENT)
Dept: CARE COORDINATION | Age: 70
End: 2025-06-18

## 2025-06-18 NOTE — CARE COORDINATION
[facility] made final outreach to pt, she has received Medicaid application, filled it out, just needs to mail it in. Pt denied any questions, or needs. Pt has diabetes supplies/sensors. CC to close out but encouraged call back to this CC with any SW needs that may occur.

## 2025-06-19 ENCOUNTER — OFFICE VISIT (OUTPATIENT)
Dept: FAMILY MEDICINE CLINIC | Age: 70
End: 2025-06-19

## 2025-06-19 VITALS
RESPIRATION RATE: 18 BRPM | OXYGEN SATURATION: 96 % | SYSTOLIC BLOOD PRESSURE: 151 MMHG | TEMPERATURE: 97.4 F | HEART RATE: 78 BPM | HEIGHT: 69 IN | WEIGHT: 293 LBS | BODY MASS INDEX: 43.4 KG/M2 | DIASTOLIC BLOOD PRESSURE: 79 MMHG

## 2025-06-19 DIAGNOSIS — E11.42 DIABETIC PERIPHERAL NEUROPATHY (HCC): ICD-10-CM

## 2025-06-19 DIAGNOSIS — Z00.00 MEDICARE ANNUAL WELLNESS VISIT, SUBSEQUENT: Primary | ICD-10-CM

## 2025-06-19 DIAGNOSIS — R60.0 BILATERAL LEG EDEMA: ICD-10-CM

## 2025-06-19 DIAGNOSIS — R55 PRE-SYNCOPE: ICD-10-CM

## 2025-06-19 DIAGNOSIS — R79.89 ELEVATED LIVER FUNCTION TESTS: ICD-10-CM

## 2025-06-19 DIAGNOSIS — R07.81 RIB PAIN ON RIGHT SIDE: ICD-10-CM

## 2025-06-19 DIAGNOSIS — F41.9 ANXIETY: ICD-10-CM

## 2025-06-19 DIAGNOSIS — E11.3293 TYPE 2 DIABETES MELLITUS WITH BOTH EYES AFFECTED BY MILD NONPROLIFERATIVE RETINOPATHY WITHOUT MACULAR EDEMA, WITH LONG-TERM CURRENT USE OF INSULIN (HCC): ICD-10-CM

## 2025-06-19 DIAGNOSIS — M48.061 NEURAL FORAMINAL STENOSIS OF LUMBAR SPINE: ICD-10-CM

## 2025-06-19 DIAGNOSIS — Z71.89 ADVANCED CARE PLANNING/COUNSELING DISCUSSION: ICD-10-CM

## 2025-06-19 DIAGNOSIS — R94.31 ABNORMAL EKG: ICD-10-CM

## 2025-06-19 DIAGNOSIS — E11.43 TYPE 2 DIABETES MELLITUS WITH DIABETIC AUTONOMIC NEUROPATHY, WITH LONG-TERM CURRENT USE OF INSULIN (HCC): ICD-10-CM

## 2025-06-19 DIAGNOSIS — Z79.4 TYPE 2 DIABETES MELLITUS WITH DIABETIC AUTONOMIC NEUROPATHY, WITH LONG-TERM CURRENT USE OF INSULIN (HCC): ICD-10-CM

## 2025-06-19 DIAGNOSIS — Z12.31 ENCOUNTER FOR SCREENING MAMMOGRAM FOR MALIGNANT NEOPLASM OF BREAST: ICD-10-CM

## 2025-06-19 DIAGNOSIS — Z79.4 TYPE 2 DIABETES MELLITUS WITH BOTH EYES AFFECTED BY MILD NONPROLIFERATIVE RETINOPATHY WITHOUT MACULAR EDEMA, WITH LONG-TERM CURRENT USE OF INSULIN (HCC): ICD-10-CM

## 2025-06-19 DIAGNOSIS — R00.2 PALPITATIONS: ICD-10-CM

## 2025-06-19 LAB — HBA1C MFR BLD: 6.9 %

## 2025-06-19 RX ORDER — GLUCOSAM/CHON-MSM1/C/MANG/BOSW 500-416.6
TABLET ORAL
Qty: 500 EACH | Refills: 3 | Status: SHIPPED | OUTPATIENT
Start: 2025-06-19

## 2025-06-19 RX ORDER — BUSPIRONE HYDROCHLORIDE 5 MG/1
5 TABLET ORAL 3 TIMES DAILY
Qty: 270 TABLET | Refills: 3 | Status: SHIPPED | OUTPATIENT
Start: 2025-06-19

## 2025-06-19 RX ORDER — FUROSEMIDE 20 MG/1
TABLET ORAL
Qty: 135 TABLET | Refills: 3 | Status: SHIPPED | OUTPATIENT
Start: 2025-06-19

## 2025-06-19 RX ORDER — INSULIN ASPART 100 [IU]/ML
INJECTION, SOLUTION INTRAVENOUS; SUBCUTANEOUS
Qty: 35 ML | Refills: 3 | Status: SHIPPED | OUTPATIENT
Start: 2025-06-19

## 2025-06-19 RX ORDER — FLURBIPROFEN SODIUM 0.3 MG/ML
SOLUTION/ DROPS OPHTHALMIC
Qty: 500 EACH | Refills: 3 | Status: SHIPPED | OUTPATIENT
Start: 2025-06-19

## 2025-06-19 RX ORDER — ATORVASTATIN CALCIUM 80 MG/1
80 TABLET, FILM COATED ORAL NIGHTLY
Qty: 90 TABLET | Refills: 3 | Status: SHIPPED | OUTPATIENT
Start: 2025-06-19

## 2025-06-19 RX ORDER — DULOXETIN HYDROCHLORIDE 60 MG/1
60 CAPSULE, DELAYED RELEASE ORAL DAILY
Qty: 90 CAPSULE | Refills: 3 | Status: SHIPPED | OUTPATIENT
Start: 2025-06-19

## 2025-06-19 RX ORDER — SEMAGLUTIDE 2.68 MG/ML
2 INJECTION, SOLUTION SUBCUTANEOUS
Qty: 9 ML | Refills: 3 | Status: SHIPPED | OUTPATIENT
Start: 2025-06-19

## 2025-06-19 SDOH — ECONOMIC STABILITY: FOOD INSECURITY: WITHIN THE PAST 12 MONTHS, THE FOOD YOU BOUGHT JUST DIDN'T LAST AND YOU DIDN'T HAVE MONEY TO GET MORE.: NEVER TRUE

## 2025-06-19 SDOH — ECONOMIC STABILITY: FOOD INSECURITY: WITHIN THE PAST 12 MONTHS, YOU WORRIED THAT YOUR FOOD WOULD RUN OUT BEFORE YOU GOT MONEY TO BUY MORE.: SOMETIMES TRUE

## 2025-06-19 ASSESSMENT — LIFESTYLE VARIABLES: HOW MANY STANDARD DRINKS CONTAINING ALCOHOL DO YOU HAVE ON A TYPICAL DAY: PATIENT DOES NOT DRINK

## 2025-06-19 NOTE — PROGRESS NOTES
Medicare Annual Wellness Visit    Coco Murray is here for Medicare AWV    Assessment & Plan   Medicare annual wellness visit, subsequent  Adv care planning, mammogram  Palpitations  -     EKG 12 lead; Future-nsr 88 lae, old anterior infarct, tw abnlty including twi in 1 and avl , will order holter and with abnormal EKG, stress test  -     Extended cardiac holter monitor (3 days-14 day); Future  -     Nuclear stress test with myocardial perfusion; Future  Type 2 diabetes mellitus with both eyes affected by mild nonproliferative retinopathy without macular edema, with long-term current use of insulin (HCC)  -A1c 6.9, stable continue current regimen  -     atorvastatin (LIPITOR) 80 MG tablet; Take 1 tablet by mouth nightly, Disp-90 tablet, R-3Normal  -     insulin aspart (NOVOLOG FLEXPEN) 100 UNIT/ML injection pen; 15 units bid w/ SSI : 150-200 2 units; 201-250 4; 251-300 6; 301-350 8; 351-400 10; 401-450 12; 451-500 14; 501+ 16 units, Disp-35 mL, R-3Normal  -     Insulin Pen Needle (B-D UF III MINI PEN NEEDLES) 31G X 5 MM MISC; Disp-500 each, R-3, NormalUse to inject insulin 5 times daily  Type 2 diabetes mellitus with diabetic autonomic neuropathy, with long-term current use of insulin (HCC)  -     semaglutide, 2 MG/DOSE, (OZEMPIC, 2 MG/DOSE,) 8 MG/3ML SOPN sc injection; Inject 2 mg into the skin every 7 days, Disp-9 mL, R-3Normal  -     TRUEplus Lancets 33G MISC; Disp-500 each, R-3, NormalUSE  TO  TEST  UP  TO FIVE TIMES DAILY  Diabetic peripheral neuropathy (HCC)  -     Stable  -     DULoxetine (CYMBALTA) 60 MG extended release capsule; Take 1 capsule by mouth daily, Disp-90 capsule, R-3Normal  Elevated liver function tests  -     pt may need AMA testing, liver evaluation.  Suspect fatty liver although not mentioned on recent liver u/s  -Korina Donnelly, CNP, Gastroenterology, [place]  Abnormal EKG  -     Nuclear stress test with myocardial perfusion; Future  Anxiety  -     following with

## 2025-06-19 NOTE — PATIENT INSTRUCTIONS
Sleep recommendations:  Work on spending at least 2 hours outside of the bedroom per day.  Can be split up.  Focus on household tasks (laundry, dusting, cooking, etc).  Ok to sit while doing tasks.  Take breaks as needed.    Keep track of how much time spent outside the bedroom.       Preventing Falls: Care Instructions  Injuries and health problems such as trouble walking or poor eyesight can increase your risk of falling. So can some medicines. But there are things you can do to help prevent falls. You can exercise to get stronger. You can also arrange your home to make it safer.    Talk to your doctor about the medicines you take. Ask if any of them increase the risk of falls and whether they can be changed or stopped.   Try to exercise regularly. It can help improve your strength and balance. This can help lower your risk of falling.         Practice fall safety and prevention.   Wear low-heeled shoes that fit well and give your feet good support. Talk to your doctor if you have foot problems that make this hard.  Carry a cellphone or wear a medical alert device that you can use to call for help.  Use stepladders instead of chairs to reach high objects. Don't climb if you're at risk for falls. Ask for help, if needed.  Wear the correct eyeglasses, if you need them.        Make your home safer.   Remove rugs, cords, clutter, and furniture from walkways.  Keep your house well lit. Use night-lights in hallways and bathrooms.  Install and use sturdy handrails on stairways.  Wear nonskid footwear, even inside. Don't walk barefoot or in socks without shoes.        Be safe outside.   Use handrails, curb cuts, and ramps whenever possible.  Keep your hands free by using a shoulder bag or backpack.  Try to walk in well-lit areas. Watch out for uneven ground, changes in pavement, and debris.  Be careful in the winter. Walk on the grass or gravel when sidewalks are slippery. Use de-icer on steps and walkways. Add non-slip

## 2025-06-19 NOTE — PROGRESS NOTES
Orthostatic BP:  From seated to supina no dizziness  From supine to seated patient reports a little lightheadedness  From seated to standing seated to standing no dizziness or lightheadedness

## 2025-06-20 ENCOUNTER — CLINICAL DOCUMENTATION (OUTPATIENT)
Age: 70
End: 2025-06-20

## 2025-06-20 ENCOUNTER — CARE COORDINATION (OUTPATIENT)
Dept: CASE MANAGEMENT | Age: 70
End: 2025-06-20

## 2025-06-20 NOTE — ACP (ADVANCE CARE PLANNING)
Advance Care Planning   Ambulatory ACP Specialist Patient Outreach    Date:  6/20/2025    ACP Specialist:  Jonna Franklin MA    Outreach call to patient in follow-up to ACP Specialist referral from:Robina Green MD    [x] PCP  [] Provider   [] Ambulatory Care Management [] Other     For:                  [x] Advance Directive Assistance              [] Complete Portable DNR order              [] Complete POST/POLST/MOST              [] Code Status Discussion             [] Discuss Goals of Care             [] Early ACP Decision-Making              [] Other (Specify)    Date Referral Received: 6/19/2025    Next Step:   [x] ACP scheduled conversation  [] Outreach again in one week               [x] Email / Mail ACP Info Sheets  [x] Email / Mail Advance Directive   [] Closing referral.  Routing closure to referring provider/staff and to ACP Specialist .    [] Closure letter mailed to patient with invitation to contact ACP Specialist if / when ready.   [] Other (Specify here):       [x] At this time, Healthcare Decision Maker Is:      Primary Decision Maker: Bladimir Murray Jr. - Child - [phone]    Secondary Decision Maker: Salty Murphy - Brother/Sister - [phone]    Supplemental (Other) Decision Maker: Radha Sandoval - Brother/Sister - [phone]      [] Primary agent named in scanned advance directive.    [x] Legal Next of Kin.     [] Unable to determine legal decision maker at this time.    Outreaches:       [x] 1st -  Date:  6/20/2025               Intervention:  [] Spoke with Patient   [x] Left Voice mail [] Email / Mail    [] [employer]  [] Other (Specify) :     Outcomes:Writer attempted ACP outreach and placed call to number on file for both home and mobile ([phone]) and there was no answer. Writer left voicemail with ACP Coordinator's contact information and encouraged patient to return call. If no return call received, will attempt another outreach in about one

## 2025-06-20 NOTE — PROGRESS NOTES
Pt seen briefly during PCP visit for co-treatment regarding depression and insomnia. Pt seen by this provider on 6/4/25.  States she is working on incorporating behavioral sleep changes, but is struggling.  States she spends most of the time that she is at home in her bed/bedroom.  Discussed that gradually working on staying out of her bedroom until ready to fall asleep will help her insomnia.  Set goal to spend at least 2 hours outside of her bedroom per day.  Will follow up for full psychotherapy visit on 7/3/25.

## 2025-06-20 NOTE — CARE COORDINATION
Hello, Please see an important message from your RPM Team:  This is a reminder that we have not received your readings for two days please enter them as soon as possible.    Please do not respond to this message as the messages are not monitored by the remote patient monitoring team. Please log on to your tablet and enter your vitals as you are able. The goal is to have these entered in each day prior to noon.     Carmela Bowen LPN    [phone]  Ian Navas / [facility] Coordinator / RPM

## 2025-06-26 ENCOUNTER — HOSPITAL ENCOUNTER (OUTPATIENT)
Age: 70
Discharge: HOME | End: 2025-06-26
Payer: MEDICARE

## 2025-06-26 LAB
25(OH)D3 SERPL-MCNC: 38.7 NG/ML (ref 30–100)
ALBUMIN SERPL-MCNC: 3.6 G/DL (ref 3.5–5.2)
ALP SERPL-CCNC: 169 U/L (ref 35–104)
ALT SERPL-CCNC: 161 U/L (ref 0–35)
ANION GAP SERPL CALCULATED.3IONS-SCNC: 11 MMOL/L (ref 7–16)
AST SERPL-CCNC: 167 U/L (ref 0–35)
BASOPHILS # BLD: 0.06 K/UL (ref 0–0.2)
BASOPHILS NFR BLD: 1 % (ref 0–2)
BILIRUB SERPL-MCNC: 0.8 MG/DL (ref 0–1.2)
BUN SERPL-MCNC: 19 MG/DL (ref 8–23)
CALCIUM SERPL-MCNC: 9.7 MG/DL (ref 8.8–10.2)
CHLORIDE SERPL-SCNC: 106 MMOL/L (ref 98–107)
CHOLEST SERPL-MCNC: 121 MG/DL
CO2 SERPL-SCNC: 27 MMOL/L (ref 22–29)
CREAT SERPL-MCNC: 1.3 MG/DL (ref 0.5–1)
EOSINOPHIL # BLD: 0.12 K/UL (ref 0.05–0.5)
EOSINOPHILS RELATIVE PERCENT: 1 % (ref 0–6)
ERYTHROCYTE [DISTWIDTH] IN BLOOD BY AUTOMATED COUNT: 13.7 % (ref 11.5–15)
GFR, ESTIMATED: 46 ML/MIN/1.73M2
GLUCOSE SERPL-MCNC: 70 MG/DL (ref 74–99)
HBA1C MFR BLD: 7.2 % (ref 4–5.6)
HCT VFR BLD AUTO: 39.5 % (ref 34–48)
HDLC SERPL-MCNC: 40 MG/DL
HGB BLD-MCNC: 12.7 G/DL (ref 11.5–15.5)
IMM GRANULOCYTES # BLD AUTO: <0.03 K/UL (ref 0–0.58)
IMM GRANULOCYTES NFR BLD: 0 % (ref 0–5)
LDLC SERPL CALC-MCNC: 63 MG/DL
LYMPHOCYTES NFR BLD: 1.32 K/UL (ref 1.5–4)
LYMPHOCYTES RELATIVE PERCENT: 15 % (ref 20–42)
MCH RBC QN AUTO: 28.9 PG (ref 26–35)
MCHC RBC AUTO-ENTMCNC: 32.2 G/DL (ref 32–34.5)
MCV RBC AUTO: 90 FL (ref 80–99.9)
MONOCYTES NFR BLD: 0.8 K/UL (ref 0.1–0.95)
MONOCYTES NFR BLD: 9 % (ref 2–12)
NEUTROPHILS NFR BLD: 73 % (ref 43–80)
NEUTS SEG NFR BLD: 6.3 K/UL (ref 1.8–7.3)
PHOSPHATE SERPL-MCNC: 2.5 MG/DL (ref 2.5–4.5)
PLATELET # BLD AUTO: 253 K/UL (ref 130–450)
PMV BLD AUTO: 11.7 FL (ref 7–12)
POTASSIUM SERPL-SCNC: 3.9 MMOL/L (ref 3.5–5.1)
PROT SERPL-MCNC: 7.2 G/DL (ref 6.4–8.3)
PTH-INTACT SERPL-MCNC: 76 PG/ML (ref 15–65)
RBC # BLD AUTO: 4.39 M/UL (ref 3.5–5.5)
SODIUM SERPL-SCNC: 145 MMOL/L (ref 136–145)
TRIGL SERPL-MCNC: 88 MG/DL
VLDLC SERPL CALC-MCNC: 18 MG/DL
WBC OTHER # BLD: 8.6 K/UL (ref 4.5–11.5)

## 2025-06-26 PROCEDURE — 80061 LIPID PANEL: CPT

## 2025-06-26 PROCEDURE — 82306 VITAMIN D 25 HYDROXY: CPT

## 2025-06-26 PROCEDURE — 85025 COMPLETE CBC W/AUTO DIFF WBC: CPT

## 2025-06-26 PROCEDURE — 36415 COLL VENOUS BLD VENIPUNCTURE: CPT

## 2025-06-26 PROCEDURE — 83036 HEMOGLOBIN GLYCOSYLATED A1C: CPT

## 2025-06-26 PROCEDURE — 83970 ASSAY OF PARATHORMONE: CPT

## 2025-06-26 PROCEDURE — 84100 ASSAY OF PHOSPHORUS: CPT

## 2025-06-26 PROCEDURE — 80053 COMPREHEN METABOLIC PANEL: CPT

## 2025-07-01 ENCOUNTER — RESULTS FOLLOW-UP (OUTPATIENT)
Dept: FAMILY MEDICINE CLINIC | Age: 70
End: 2025-07-01

## 2025-07-01 ENCOUNTER — HOSPITAL ENCOUNTER (OUTPATIENT)
Dept: GENERAL RADIOLOGY | Age: 70
Discharge: HOME | End: 2025-07-03
Payer: MEDICARE

## 2025-07-01 ENCOUNTER — TELEPHONE (OUTPATIENT)
Dept: FAMILY MEDICINE CLINIC | Age: 70
End: 2025-07-01

## 2025-07-01 VITALS — WEIGHT: 293 LBS | BODY MASS INDEX: 43.86 KG/M2

## 2025-07-01 DIAGNOSIS — R79.89 ELEVATED LIVER FUNCTION TESTS: Primary | ICD-10-CM

## 2025-07-01 DIAGNOSIS — Z12.31 ENCOUNTER FOR SCREENING MAMMOGRAM FOR MALIGNANT NEOPLASM OF BREAST: ICD-10-CM

## 2025-07-01 PROCEDURE — 77063 BREAST TOMOSYNTHESIS BI: CPT

## 2025-07-01 NOTE — TELEPHONE ENCOUNTER
Discussed that recent lfts have alk phos better (she decreased tylenol from 4 pills daily to 2), but her alt and ast have doubled. Advised her to hold her zanaflex and lipitor for now. Recheck lfts in a month. Consider holding diovan hct and cymbalta and completely holding tylenol. She has a Gi appmt in September.

## 2025-07-02 ENCOUNTER — CARE COORDINATION (OUTPATIENT)
Dept: CASE MANAGEMENT | Age: 70
End: 2025-07-02

## 2025-07-02 DIAGNOSIS — E11.3293 TYPE 2 DIABETES MELLITUS WITH BOTH EYES AFFECTED BY MILD NONPROLIFERATIVE RETINOPATHY WITHOUT MACULAR EDEMA, WITH LONG-TERM CURRENT USE OF INSULIN (HCC): ICD-10-CM

## 2025-07-02 DIAGNOSIS — E11.42 DIABETIC PERIPHERAL NEUROPATHY (HCC): ICD-10-CM

## 2025-07-02 DIAGNOSIS — R07.81 RIB PAIN ON RIGHT SIDE: ICD-10-CM

## 2025-07-02 DIAGNOSIS — Z79.4 TYPE 2 DIABETES MELLITUS WITH BOTH EYES AFFECTED BY MILD NONPROLIFERATIVE RETINOPATHY WITHOUT MACULAR EDEMA, WITH LONG-TERM CURRENT USE OF INSULIN (HCC): ICD-10-CM

## 2025-07-02 DIAGNOSIS — I10 ESSENTIAL HYPERTENSION: ICD-10-CM

## 2025-07-02 ASSESSMENT — ANXIETY QUESTIONNAIRES
1. FEELING NERVOUS, ANXIOUS, OR ON EDGE: NOT AT ALL
7. FEELING AFRAID AS IF SOMETHING AWFUL MIGHT HAPPEN: NOT AT ALL
2. NOT BEING ABLE TO STOP OR CONTROL WORRYING: NOT AT ALL
3. WORRYING TOO MUCH ABOUT DIFFERENT THINGS: NOT AT ALL
GAD7 TOTAL SCORE: 1
6. BECOMING EASILY ANNOYED OR IRRITABLE: NOT AT ALL
4. TROUBLE RELAXING: SEVERAL DAYS
2. NOT BEING ABLE TO STOP OR CONTROL WORRYING: NOT AT ALL
5. BEING SO RESTLESS THAT IT IS HARD TO SIT STILL: NOT AT ALL
5. BEING SO RESTLESS THAT IT IS HARD TO SIT STILL: NOT AT ALL
4. TROUBLE RELAXING: SEVERAL DAYS
IF YOU CHECKED OFF ANY PROBLEMS ON THIS QUESTIONNAIRE, HOW DIFFICULT HAVE THESE PROBLEMS MADE IT FOR YOU TO DO YOUR WORK, TAKE CARE OF THINGS AT HOME, OR GET ALONG WITH OTHER PEOPLE: SOMEWHAT DIFFICULT
3. WORRYING TOO MUCH ABOUT DIFFERENT THINGS: NOT AT ALL
7. FEELING AFRAID AS IF SOMETHING AWFUL MIGHT HAPPEN: NOT AT ALL
IF YOU CHECKED OFF ANY PROBLEMS ON THIS QUESTIONNAIRE, HOW DIFFICULT HAVE THESE PROBLEMS MADE IT FOR YOU TO DO YOUR WORK, TAKE CARE OF THINGS AT HOME, OR GET ALONG WITH OTHER PEOPLE: SOMEWHAT DIFFICULT
1. FEELING NERVOUS, ANXIOUS, OR ON EDGE: NOT AT ALL
6. BECOMING EASILY ANNOYED OR IRRITABLE: NOT AT ALL

## 2025-07-02 ASSESSMENT — PATIENT HEALTH QUESTIONNAIRE - PHQ9
5. POOR APPETITE OR OVEREATING: NOT AT ALL
4. FEELING TIRED OR HAVING LITTLE ENERGY: SEVERAL DAYS
7. TROUBLE CONCENTRATING ON THINGS, SUCH AS READING THE NEWSPAPER OR WATCHING TELEVISION: NOT AT ALL
8. MOVING OR SPEAKING SO SLOWLY THAT OTHER PEOPLE COULD HAVE NOTICED. OR THE OPPOSITE, BEING SO FIGETY OR RESTLESS THAT YOU HAVE BEEN MOVING AROUND A LOT MORE THAN USUAL: NOT AT ALL
9. THOUGHTS THAT YOU WOULD BE BETTER OFF DEAD, OR OF HURTING YOURSELF: NOT AT ALL
10. IF YOU CHECKED OFF ANY PROBLEMS, HOW DIFFICULT HAVE THESE PROBLEMS MADE IT FOR YOU TO DO YOUR WORK, TAKE CARE OF THINGS AT HOME, OR GET ALONG WITH OTHER PEOPLE: NOT DIFFICULT AT ALL
9. THOUGHTS THAT YOU WOULD BE BETTER OFF DEAD, OR OF HURTING YOURSELF: NOT AT ALL
3. TROUBLE FALLING OR STAYING ASLEEP: SEVERAL DAYS
SUM OF ALL RESPONSES TO PHQ QUESTIONS 1-9: 3
7. TROUBLE CONCENTRATING ON THINGS, SUCH AS READING THE NEWSPAPER OR WATCHING TELEVISION: NOT AT ALL
10. IF YOU CHECKED OFF ANY PROBLEMS, HOW DIFFICULT HAVE THESE PROBLEMS MADE IT FOR YOU TO DO YOUR WORK, TAKE CARE OF THINGS AT HOME, OR GET ALONG WITH OTHER PEOPLE: NOT DIFFICULT AT ALL
8. MOVING OR SPEAKING SO SLOWLY THAT OTHER PEOPLE COULD HAVE NOTICED. OR THE OPPOSITE - BEING SO FIDGETY OR RESTLESS THAT YOU HAVE BEEN MOVING AROUND A LOT MORE THAN USUAL: NOT AT ALL
6. FEELING BAD ABOUT YOURSELF - OR THAT YOU ARE A FAILURE OR HAVE LET YOURSELF OR YOUR FAMILY DOWN: NOT AT ALL
2. FEELING DOWN, DEPRESSED OR HOPELESS: NOT AT ALL
3. TROUBLE FALLING OR STAYING ASLEEP: SEVERAL DAYS
2. FEELING DOWN, DEPRESSED OR HOPELESS: NOT AT ALL
6. FEELING BAD ABOUT YOURSELF - OR THAT YOU ARE A FAILURE OR HAVE LET YOURSELF OR YOUR FAMILY DOWN: NOT AT ALL
1. LITTLE INTEREST OR PLEASURE IN DOING THINGS: SEVERAL DAYS
SUM OF ALL RESPONSES TO PHQ QUESTIONS 1-9: 3
SUM OF ALL RESPONSES TO PHQ QUESTIONS 1-9: 3
5. POOR APPETITE OR OVEREATING: NOT AT ALL
1. LITTLE INTEREST OR PLEASURE IN DOING THINGS: SEVERAL DAYS
4. FEELING TIRED OR HAVING LITTLE ENERGY: SEVERAL DAYS

## 2025-07-02 NOTE — CARE COORDINATION
Hello, Please see an important message from your RPM Team:  This is a reminder that we have not received your readings for two days please enter them as soon as possible.    Please do not respond to this message as the messages are not monitored by the remote patient monitoring team. Please log on to your tablet and enter your vitals as you are able. The goal is to have these entered in each day prior to noon.       Carmela Bowen LPN    [phone]  Ian Navas / [facility] Coordinator / RPM

## 2025-07-03 ENCOUNTER — OFFICE VISIT (OUTPATIENT)
Dept: PSYCHOLOGY | Age: 70
End: 2025-07-03
Payer: MEDICARE

## 2025-07-03 DIAGNOSIS — F33.41 MAJOR DEPRESSIVE DISORDER, RECURRENT, IN PARTIAL REMISSION: Primary | ICD-10-CM

## 2025-07-03 PROCEDURE — 1123F ACP DISCUSS/DSCN MKR DOCD: CPT | Performed by: PSYCHOLOGIST

## 2025-07-03 PROCEDURE — 1036F TOBACCO NON-USER: CPT | Performed by: PSYCHOLOGIST

## 2025-07-03 PROCEDURE — 90834 PSYTX W PT 45 MINUTES: CPT | Performed by: PSYCHOLOGIST

## 2025-07-03 RX ORDER — PREGABALIN 150 MG/1
150 CAPSULE ORAL 2 TIMES DAILY
Qty: 180 CAPSULE | Refills: 1 | Status: SHIPPED | OUTPATIENT
Start: 2025-07-03 | End: 2025-12-30

## 2025-07-03 RX ORDER — INSULIN GLARGINE 100 [IU]/ML
38 INJECTION, SOLUTION SUBCUTANEOUS 2 TIMES DAILY
Qty: 100 ML | Refills: 3 | Status: SHIPPED | OUTPATIENT
Start: 2025-07-03

## 2025-07-03 RX ORDER — METOPROLOL TARTRATE 100 MG/1
100 TABLET ORAL 2 TIMES DAILY
Qty: 180 TABLET | Refills: 3 | Status: SHIPPED | OUTPATIENT
Start: 2025-07-03

## 2025-07-03 RX ORDER — CHOLECALCIFEROL (VITAMIN D3) 25 MCG
TABLET ORAL
Qty: 180 TABLET | Refills: 3 | Status: SHIPPED | OUTPATIENT
Start: 2025-07-03

## 2025-07-03 NOTE — PATIENT INSTRUCTIONS
Work on getting up to 2 meals per day.    Consider how to split up cooking over a few hours or a few days.  Use crock pot  Prep meat/veggies - take a break and come back    Re-start [facility] membership      THINKING ABOUT THINKING    Questioning your thinking is a great way to deal with a negative mood. Faulty thoughts can lead stress, depression, anger, and anxiety.  Focus on changing your thoughts to be logical, rational, fact-based, and helpful.  Ask yourself:  Is my thinking working for or against me?  Is there something untrue about my thoughts?  How can I stick to the facts?  Am I upsetting myself unnecessarily? Is there another way to view this situation?  Am I making assumptions, or am I using information I know to be true?  Have I had any experiences that show this thought might not be true?  Am I jumping to conclusions or predicting the worst-case scenario?  What can I do to fix the problem I am experiencing?  Am I using extreme, black-and-white thinking?     Goals that are reachable are SMART.  Use SMART goals to change your patterns.      What is your goal?   _______________________________________________________________________    S - SPECIFIC  What will be accomplished?  How and why?      M - MEASURABLE  Use a number to measure if the goal was reached  Number of times per day/per week, etc.      A - ACHIEVABLE  Is it possible?  Have others been successful?  Do you have what you need to accomplish the goal?      R - RELEVANT & REALISTIC  What is the reason, purpose, or benefit?  What are the possible results of success?      T - TIME-BOUND  Set a completion date             TREATMENT PLAN:    Date created: 7/3/25  Date last updated: 7/3/25    Patient goals for treatment (Objectives):   To reduce the frequency and severity of mood and insomnia symptoms.   Improve activity level.   Improve eating routine. Utilize crock pot, air fryer.        Problems to be addressed:    Problem #1: Insomnia  Description:

## 2025-07-03 NOTE — PROGRESS NOTES
Behavioral Health Follow-Up   [facility]    Time Start: 1:00 p.m.   Time End:  1:50 p.m.  Date of Service:  7/3/2025  Session #: 2    Symptoms reported: Fatigue and low motivation (improving). Concerned about recent high LFT readings.     Impact on functioning: Socialization outside of home; household task completion    Focused Mental Status:    Appearance: Appears stated age and Well-kept   Affect:  consistent with expectations based upon mood   Mood:   Dysphoric (improving)   Thought Process:  Goal-Directed   Thought Content: no evidence of impairment   Behavior:   Cooperative, pleasant   Psychomotor: Relaxed    Judgment & Insight:  normal insight and judgment       Progress/response to treatment:   Since initiation of treatment: Staying out of bedroom.  Has been able to be more active at home. Cleaned room, did laundry.   Getting 5hrs sleep per night.  Since last visit: same    Risk: Focused assessment deferred, prior evaluation yielded minimal suicidal/homicidal risk and there are no new circumstances to warrant reassessment.      Protective Factors: denial of impulses, hopeful for improved circumstances, family commitments, Baptism convictions, and making progress in treatment      ICD-10-CM    1. Major depressive disorder, recurrent, in partial remission  F33.41           Psychotherapy Intervention:  Modalities: Treatment Planning; Behavior Therapy    Insomnia: She is following recommendations from last visit to stay out of her bedroom in order to improve her sleep cycle.  She was able to improve from 4 hours to 5 hours per night of sleep.    Depression: Discussed behavioral activation.  Discussed that completing small tasks can help to improve the behavioral cycle by increasing motivation through positive reinforcement.     Coping with chronic pain: One of her stressors and is not being able to complete household tasks to the extent she would have previously maintained.  She has made some

## 2025-07-03 NOTE — TELEPHONE ENCOUNTER
Name of Medication(s) Requested:  Requested Prescriptions     Pending Prescriptions Disp Refills    metoprolol (LOPRESSOR) 100 MG tablet 180 tablet 3     Sig: Take 1 tablet by mouth 2 times daily    vitamin D3 (CHOLECALCIFEROL) 25 MCG (1000 UT) TABS tablet 180 tablet 3     Sig: TAKE 2 TABLETS EVERY DAY (PRESCRIBED BY RENAL)    insulin glargine (LANTUS SOLOSTAR) 100 UNIT/ML injection pen 100 mL 3     Sig: Inject 38 Units into the skin 2 times daily    pregabalin (LYRICA) 150 MG capsule 180 capsule 1     Sig: Take 1 capsule by mouth 2 times daily for 180 days. Max Daily Amount: 300 mg    diclofenac sodium (VOLTAREN) 1 % GEL 50 g 2     Sig: Apply 4 g topically 4 times daily as needed for Pain       Medication is on current medication list Yes    Dosage and directions were verified? Yes    Quantity verified: 30 day supply     Pharmacy Verified?  Yes    Last Appointment:  6/19/2025    Future appts:  Future Appointments   Date Time Provider Department Center   7/3/2025  1:00 PM Tessie Barkley, PhD [facility] HC Psych [facility]   7/14/2025  1:00 PM Le Damon LISW BSVACP BS [facility]   7/25/2025  1:00 PM SEB ECHO 2 SEBZ RAVINDER [facility]   9/24/2025  2:00 PM Korina Gongora APRN - CNP BEL GASTRO [facility]   10/9/2025 10:40 AM Robina Green MD [facility] BS ECC DEP        (If no appt send self scheduling link. .REFILLAPPT)  Scheduling request sent?     [] Yes  [x] No    Does patient need updated?  [] Yes  [x] No

## 2025-07-08 ENCOUNTER — CARE COORDINATION (OUTPATIENT)
Dept: CARE COORDINATION | Age: 70
End: 2025-07-08

## 2025-07-08 DIAGNOSIS — I10 PRIMARY HYPERTENSION: Primary | Chronic | ICD-10-CM

## 2025-07-08 DIAGNOSIS — N18.31 STAGE 3A CHRONIC KIDNEY DISEASE (HCC): ICD-10-CM

## 2025-07-08 NOTE — CARE COORDINATION
Ambulatory Care Coordination Note     2025 1:53 PM     Patient Current Location:  Home: [address] [place] [address] [place]  [address]  [place] [phone]     ACM contacted the patient by telephone. Verified name and  with patient as identifiers.     Patient closed (change in benefits) from the High Risk Care Management program on 2025.  Patient has the ability to self-manage at this time..  Care management goals have been completed. No further Ambulatory Care Manager follow up scheduled.      ACM: Kari Matthews RN     Challenges to be reviewed by the provider   Additional needs identified to be addressed with provider No  none               Method of communication with provider: none.    Utilization: Patient has not had any utilization since our last call.     Care Summary Note:   ACM contacted Coco for care coordination follow up. She states she has a blood pressure cuff and will continue to monitor her blood pressure after RPM is discontinued.   She states she continues to do finger stick blood sugars because 2 of her CGM sensors were defective. ACM reviewed that Coco should call the [email or web address] desk for replacement sensors. She states she plans to but has not had the time.  She reports she has scheduled her echo. She states she was to have a stress test on the same day but she has been unable to confirm the scheduling.  She states she has met with Dr Barkley and feels it has been very helpful. She is scheduled for an appointment in August.    Offered patient enrollment in the Remote Patient Monitoring (RPM) program for in-home monitoring: Patient being discharged from remote patient monitoring program today.         Assessments Completed:   Care Coordination Interventions    Referral from Primary Care Provider: Yes  Suggested Interventions and Community Resources  [facility] Completed (Comment: Dr Barkley)  Fall Risk Prevention: Completed  Home Care Waiver: Not Started  Senior Services: Not

## 2025-07-08 NOTE — PROGRESS NOTES
Remote Patient Order Discontinued    Received request from Kari Matthews RN   to discontinue order for remote patient monitoring of CHF and HTN and order completed.

## 2025-07-08 NOTE — CARE COORDINATION
Remote Patient Monitoring Graduation      Date/Time:  7/8/2025 2:42 PM  Patient Current Location: Home: [address] [place] [address]  Patient has graduated from the Remote Patient Monitoring program on 7/8/2025.   RPM goals have been met at this time.      Patient has been provided instruction on process to return RPM equipment and RPM has been deactivated.     Patient has ACM's contact information for any further questions, concerns, or needs.

## 2025-07-09 ENCOUNTER — CARE COORDINATION (OUTPATIENT)
Dept: PRIMARY CARE CLINIC | Age: 70
End: 2025-07-09

## 2025-07-09 NOTE — CARE COORDINATION
Patient Coco Murray  07/09/25     Care Coordination  placed call to patient to arrange RPM kit  through [employer]. Left HIPAA Compliant Message     provided return and how to pack equipment in original packing via the patients voicemail if available and provided call back number should patient have questions.    Patient made aware [employer] will  equipment in 2-4 days.

## 2025-07-14 ENCOUNTER — CLINICAL DOCUMENTATION (OUTPATIENT)
Age: 70
End: 2025-07-14

## 2025-07-14 NOTE — ACP (ADVANCE CARE PLANNING)
Documentation:  I communicated with the patient and/or health care decision maker about ACP.     Coco Murray was seen through a synchronous (real-time) audio encounter. Patient identification was verified at the start of the visit. This visit was conducted with the patient's (and/or legal guardian's) verbal consent.  The patient was located at Home: [address] [place] [address].  The provider was located at Home (Appt Dept State): [place].  Confirm you are appropriately licensed, registered, or certified to deliver care in the state where the patient is located as indicated above. If you are not or unsure, please re-schedule the visit: Yes, I confirm.     Total time spent for this encounter: 30      Coco Murray is a 69 y.o. female evaluated via telephone on 7/14/2025.    EDWAR Seg[place]    An electronic signature was used to authenticate this note.       Advance Care Planning    Advance Care Planning Clinical Specialist  Conversation Note    Date of ACP Conversation: 7/14/2025    ACP Clinical Specialist: EDWAR Seg[place]ia    Referral Date:06/19/2025    Received by: PCP    Conversation Conducted with: Patient with decision making capacity      Current Designated Decision Maker/s:    Primary Decision Maker: Bladimir Murray  - Child - [phone]    Secondary Decision Maker: Salty Murphy - Brother/Sister - [phone]    Supplemental (Other) Decision Maker: Radah Sandoval - Brother/Sister - [phone]      Care Preferences Communicated    Code Status:  If the patient's heart were to stop beating, in their present state of health, the patient's CPR Preference: Yes, attempt CPR and pt remains FULL CODE.    If their health worsens and it becomes clear that the chance of recovery is unlikely, the patient's CPR Preference: No, allow a natural death (No CPR) and pt has living will for future decisions.      Ventilator:  If the patient, in their present state of health, suddenly became

## 2025-07-25 ENCOUNTER — HOSPITAL ENCOUNTER (OUTPATIENT)
Age: 70
Discharge: HOME | End: 2025-07-27
Payer: MEDICARE

## 2025-07-25 DIAGNOSIS — R01.1 HEART MURMUR: ICD-10-CM

## 2025-07-25 LAB
ECHO AO ASC DIAM: 2.8 CM
ECHO AV ANNULUS DIAM: 3.1 CM
ECHO AV AREA PEAK VELOCITY: 2.1 CM2
ECHO AV AREA VTI: 2.3 CM2
ECHO AV CUSP MM: 2 CM
ECHO AV MEAN GRADIENT: 5 MMHG
ECHO AV MEAN VELOCITY: 1 M/S
ECHO AV PEAK GRADIENT: 8 MMHG
ECHO AV PEAK VELOCITY: 1.5 M/S
ECHO AV VELOCITY RATIO: 0.6
ECHO AV VTI: 28.2 CM
ECHO EST RA PRESSURE: 3 MMHG
ECHO LA DIAMETER: 4.9 CM
ECHO LA VOL A-L A2C: 68 ML (ref 22–52)
ECHO LA VOL A-L A4C: 62 ML (ref 22–52)
ECHO LA VOL MOD A2C: 63 ML (ref 22–52)
ECHO LA VOL MOD A4C: 59 ML (ref 22–52)
ECHO LA VOLUME AREA LENGTH: 67 ML
ECHO LV E' LATERAL VELOCITY: 4 CM/S
ECHO LV E' SEPTAL VELOCITY: 5 CM/S
ECHO LV EF PHYSICIAN: 65 %
ECHO LV FRACTIONAL SHORTENING: 28 % (ref 28–44)
ECHO LV INTERNAL DIMENSION DIASTOLIC: 3.9 CM (ref 3.9–5.3)
ECHO LV INTERNAL DIMENSION SYSTOLIC: 2.8 CM
ECHO LV ISOVOLUMETRIC RELAXATION TIME (IVRT): 129.2 MS
ECHO LV IVSD: 1.1 CM (ref 0.6–0.9)
ECHO LV IVSS: 1.5 CM
ECHO LV MASS 2D: 131 G (ref 67–162)
ECHO LV POSTERIOR WALL DIASTOLIC: 1 CM (ref 0.6–0.9)
ECHO LV POSTERIOR WALL SYSTOLIC: 1.8 CM
ECHO LV RELATIVE WALL THICKNESS RATIO: 0.51
ECHO LVOT AREA: 3.1 CM2
ECHO LVOT AV VTI INDEX: 0.69
ECHO LVOT DIAM: 2 CM
ECHO LVOT MEAN GRADIENT: 2 MMHG
ECHO LVOT PEAK GRADIENT: 3 MMHG
ECHO LVOT PEAK VELOCITY: 0.9 M/S
ECHO LVOT SV: 60.9 ML
ECHO LVOT VTI: 19.4 CM
ECHO MV "A" WAVE DURATION: 143 MSEC
ECHO MV A VELOCITY: 1.02 M/S
ECHO MV AREA PHT: 3.1 CM2
ECHO MV AREA VTI: 3 CM2
ECHO MV E DECELERATION TIME (DT): 132.8 MS
ECHO MV E VELOCITY: 0.53 M/S
ECHO MV E/A RATIO: 0.52
ECHO MV E/E' LATERAL: 13.25
ECHO MV E/E' RATIO (AVERAGED): 11.93
ECHO MV E/E' SEPTAL: 10.6
ECHO MV LVOT VTI INDEX: 1.06
ECHO MV MAX VELOCITY: 1.2 M/S
ECHO MV MEAN GRADIENT: 2 MMHG
ECHO MV MEAN VELOCITY: 0.7 M/S
ECHO MV PEAK GRADIENT: 6 MMHG
ECHO MV PRESSURE HALF TIME (PHT): 72.1 MS
ECHO MV VTI: 20.6 CM
ECHO PV MAX VELOCITY: 0.9 M/S
ECHO PV MEAN GRADIENT: 1 MMHG
ECHO PV MEAN VELOCITY: 0.5 M/S
ECHO PV PEAK GRADIENT: 3 MMHG
ECHO PV VTI: 14.7 CM
ECHO PVEIN PEAK D VELOCITY: 0.3 M/S
ECHO PVEIN PEAK S VELOCITY: 0.5 M/S
ECHO PVEIN S/D RATIO: 1.7
ECHO RV BASAL DIMENSION: 4.4 CM
ECHO RV INTERNAL DIMENSION: 4.3 CM
ECHO RV LONGITUDINAL DIMENSION: 7.6 CM
ECHO RV MID DIMENSION: 4 CM
ECHO RV TAPSE: 2.3 CM (ref 1.7–?)

## 2025-07-25 PROCEDURE — 93306 TTE W/DOPPLER COMPLETE: CPT | Performed by: INTERNAL MEDICINE

## 2025-07-25 PROCEDURE — 93306 TTE W/DOPPLER COMPLETE: CPT

## 2025-08-03 ASSESSMENT — ANXIETY QUESTIONNAIRES
IF YOU CHECKED OFF ANY PROBLEMS ON THIS QUESTIONNAIRE, HOW DIFFICULT HAVE THESE PROBLEMS MADE IT FOR YOU TO DO YOUR WORK, TAKE CARE OF THINGS AT HOME, OR GET ALONG WITH OTHER PEOPLE: SOMEWHAT DIFFICULT
3. WORRYING TOO MUCH ABOUT DIFFERENT THINGS: NOT AT ALL
1. FEELING NERVOUS, ANXIOUS, OR ON EDGE: NOT AT ALL
5. BEING SO RESTLESS THAT IT IS HARD TO SIT STILL: NOT AT ALL
2. NOT BEING ABLE TO STOP OR CONTROL WORRYING: NOT AT ALL
4. TROUBLE RELAXING: SEVERAL DAYS
7. FEELING AFRAID AS IF SOMETHING AWFUL MIGHT HAPPEN: NOT AT ALL
6. BECOMING EASILY ANNOYED OR IRRITABLE: NOT AT ALL
6. BECOMING EASILY ANNOYED OR IRRITABLE: NOT AT ALL
1. FEELING NERVOUS, ANXIOUS, OR ON EDGE: NOT AT ALL
3. WORRYING TOO MUCH ABOUT DIFFERENT THINGS: NOT AT ALL
GAD7 TOTAL SCORE: 1
7. FEELING AFRAID AS IF SOMETHING AWFUL MIGHT HAPPEN: NOT AT ALL
2. NOT BEING ABLE TO STOP OR CONTROL WORRYING: NOT AT ALL
4. TROUBLE RELAXING: SEVERAL DAYS
5. BEING SO RESTLESS THAT IT IS HARD TO SIT STILL: NOT AT ALL
IF YOU CHECKED OFF ANY PROBLEMS ON THIS QUESTIONNAIRE, HOW DIFFICULT HAVE THESE PROBLEMS MADE IT FOR YOU TO DO YOUR WORK, TAKE CARE OF THINGS AT HOME, OR GET ALONG WITH OTHER PEOPLE: SOMEWHAT DIFFICULT

## 2025-08-03 ASSESSMENT — PATIENT HEALTH QUESTIONNAIRE - PHQ9
10. IF YOU CHECKED OFF ANY PROBLEMS, HOW DIFFICULT HAVE THESE PROBLEMS MADE IT FOR YOU TO DO YOUR WORK, TAKE CARE OF THINGS AT HOME, OR GET ALONG WITH OTHER PEOPLE: NOT DIFFICULT AT ALL
1. LITTLE INTEREST OR PLEASURE IN DOING THINGS: NOT AT ALL
1. LITTLE INTEREST OR PLEASURE IN DOING THINGS: NOT AT ALL
5. POOR APPETITE OR OVEREATING: SEVERAL DAYS
SUM OF ALL RESPONSES TO PHQ QUESTIONS 1-9: 4
2. FEELING DOWN, DEPRESSED OR HOPELESS: SEVERAL DAYS
2. FEELING DOWN, DEPRESSED OR HOPELESS: SEVERAL DAYS
7. TROUBLE CONCENTRATING ON THINGS, SUCH AS READING THE NEWSPAPER OR WATCHING TELEVISION: NOT AT ALL
3. TROUBLE FALLING OR STAYING ASLEEP: SEVERAL DAYS
SUM OF ALL RESPONSES TO PHQ QUESTIONS 1-9: 4
9. THOUGHTS THAT YOU WOULD BE BETTER OFF DEAD, OR OF HURTING YOURSELF: NOT AT ALL
SUM OF ALL RESPONSES TO PHQ QUESTIONS 1-9: 4
8. MOVING OR SPEAKING SO SLOWLY THAT OTHER PEOPLE COULD HAVE NOTICED. OR THE OPPOSITE - BEING SO FIDGETY OR RESTLESS THAT YOU HAVE BEEN MOVING AROUND A LOT MORE THAN USUAL: NOT AT ALL
4. FEELING TIRED OR HAVING LITTLE ENERGY: SEVERAL DAYS
4. FEELING TIRED OR HAVING LITTLE ENERGY: SEVERAL DAYS
SUM OF ALL RESPONSES TO PHQ QUESTIONS 1-9: 4
7. TROUBLE CONCENTRATING ON THINGS, SUCH AS READING THE NEWSPAPER OR WATCHING TELEVISION: NOT AT ALL
8. MOVING OR SPEAKING SO SLOWLY THAT OTHER PEOPLE COULD HAVE NOTICED. OR THE OPPOSITE, BEING SO FIGETY OR RESTLESS THAT YOU HAVE BEEN MOVING AROUND A LOT MORE THAN USUAL: NOT AT ALL
3. TROUBLE FALLING OR STAYING ASLEEP: SEVERAL DAYS
5. POOR APPETITE OR OVEREATING: SEVERAL DAYS
9. THOUGHTS THAT YOU WOULD BE BETTER OFF DEAD, OR OF HURTING YOURSELF: NOT AT ALL
SUM OF ALL RESPONSES TO PHQ QUESTIONS 1-9: 4
6. FEELING BAD ABOUT YOURSELF - OR THAT YOU ARE A FAILURE OR HAVE LET YOURSELF OR YOUR FAMILY DOWN: NOT AT ALL
10. IF YOU CHECKED OFF ANY PROBLEMS, HOW DIFFICULT HAVE THESE PROBLEMS MADE IT FOR YOU TO DO YOUR WORK, TAKE CARE OF THINGS AT HOME, OR GET ALONG WITH OTHER PEOPLE: NOT DIFFICULT AT ALL
6. FEELING BAD ABOUT YOURSELF - OR THAT YOU ARE A FAILURE OR HAVE LET YOURSELF OR YOUR FAMILY DOWN: NOT AT ALL

## 2025-08-04 ENCOUNTER — TELEPHONE (OUTPATIENT)
Dept: FAMILY MEDICINE CLINIC | Age: 70
End: 2025-08-04

## 2025-08-04 DIAGNOSIS — R79.89 ELEVATED LIVER FUNCTION TESTS: Primary | ICD-10-CM

## 2025-08-05 ENCOUNTER — TELEPHONE (OUTPATIENT)
Dept: FAMILY MEDICINE CLINIC | Age: 70
End: 2025-08-05

## 2025-08-05 ENCOUNTER — OFFICE VISIT (OUTPATIENT)
Dept: PSYCHOLOGY | Age: 70
End: 2025-08-05
Payer: MEDICARE

## 2025-08-05 DIAGNOSIS — F33.41 MAJOR DEPRESSIVE DISORDER, RECURRENT, IN PARTIAL REMISSION: Primary | ICD-10-CM

## 2025-08-05 DIAGNOSIS — R79.89 ELEVATED LIVER FUNCTION TESTS: ICD-10-CM

## 2025-08-05 LAB
ALBUMIN: 3.6 G/DL (ref 3.5–5.2)
ALP BLD-CCNC: 176 U/L (ref 35–104)
ALT SERPL-CCNC: 30 U/L (ref 0–35)
ANION GAP SERPL CALCULATED.3IONS-SCNC: 12 MMOL/L (ref 7–16)
AST SERPL-CCNC: 38 U/L (ref 0–35)
BILIRUB SERPL-MCNC: 0.2 MG/DL (ref 0–1.2)
BUN BLDV-MCNC: 15 MG/DL (ref 8–23)
CALCIUM SERPL-MCNC: 9.8 MG/DL (ref 8.8–10.2)
CHLORIDE BLD-SCNC: 105 MMOL/L (ref 98–107)
CO2: 24 MMOL/L (ref 22–29)
CREAT SERPL-MCNC: 1.3 MG/DL (ref 0.5–1)
GFR, ESTIMATED: 44 ML/MIN/1.73M2
GLUCOSE BLD-MCNC: 141 MG/DL (ref 74–99)
POTASSIUM SERPL-SCNC: 4.5 MMOL/L (ref 3.5–5.1)
SODIUM BLD-SCNC: 141 MMOL/L (ref 136–145)
TOTAL PROTEIN: 7.3 G/DL (ref 6.4–8.3)

## 2025-08-05 PROCEDURE — 1123F ACP DISCUSS/DSCN MKR DOCD: CPT | Performed by: PSYCHOLOGIST

## 2025-08-05 PROCEDURE — 90834 PSYTX W PT 45 MINUTES: CPT | Performed by: PSYCHOLOGIST

## 2025-08-05 PROCEDURE — 1036F TOBACCO NON-USER: CPT | Performed by: PSYCHOLOGIST

## (undated) DEVICE — NEEDLE HYPO 18GA L1.5IN PNK POLYPR HUB S STL THN WALL FILL

## (undated) DEVICE — GAUZE,SPONGE,4"X4",12PLY,STERILE,LF,2'S: Brand: MEDLINE

## (undated) DEVICE — ELECTRODE SURG MPLR NEUT SELF ADH PT PLT MULTIGEN

## (undated) DEVICE — NEEDLE SPNL 22GA BLK HUB S STL REG WALL FIT STYL W/ QNCKE

## (undated) DEVICE — NEEDLE SPNL 22GA L3.5IN BLK HUB S STL REG WALL FIT STYL W/

## (undated) DEVICE — 3 ML SYRINGE LUER-LOCK TIP: Brand: MONOJECT

## (undated) DEVICE — NEEDLE HYPO 25GA L1.5IN BLU POLYPR HUB S STL REG BVL STR

## (undated) DEVICE — BANDAGE ADH W1XL3IN NAT FAB WVN FLX DURABLE N ADH PD SEAL

## (undated) DEVICE — NON-DEHP CATHETER EXTENSION SET, MALE LUER LOCK ADAPTER

## (undated) DEVICE — 6 ML SYRINGE LUER-LOCK TIP: Brand: MONOJECT

## (undated) DEVICE — DRAPE SHEET, X-LARGE: Brand: CONVERTORS

## (undated) DEVICE — NEEDLE SPNL WEISS LNG 18 GAX5 IN MOD TUOHY PT TW PERISAFE

## (undated) DEVICE — CVD CANNULA

## (undated) DEVICE — Z DISCONTINUED APPLICATOR SURG PREP 0.35OZ 2% CHG 70% ISO ALC W/ HI LT

## (undated) DEVICE — SNARE ENDOSCP POLYP SM 2.4 MM 195 CM 13 MM 2.8 MM CAPTIVATOR

## (undated) DEVICE — TRAP POLYP ETRAP

## (undated) DEVICE — ENCORE® LATEX TEXTURED SIZE 7, STERILE LATEX POWDER-FREE SURGICAL GLOVE: Brand: ENCORE

## (undated) DEVICE — 3M™ RED DOT™ MONITORING ELECTRODE WITH FOAM TAPE AND STICKY GEL 2560, 50/BAG, 20/CASE, 72/PLT: Brand: RED DOT™

## (undated) DEVICE — CONTAINER SPEC 60ML PH 7NEUTRAL BUFF FRMLN RDY TO USE

## (undated) DEVICE — NEEDLE SPNL 22GA L5IN BLK HUB S STL W/ QNCKE PNT W/OUT

## (undated) DEVICE — NEEDLE HYPO 18GA L1.5IN PNK POLYPR HUB S STL REG BVL STR

## (undated) DEVICE — ENCORE® LATEX TEXTURED SIZE 6.5, STERILE LATEX POWDER-FREE SURGICAL GLOVE: Brand: ENCORE

## (undated) DEVICE — MARKER,SKIN,WI/RULER AND LABELS: Brand: MEDLINE

## (undated) DEVICE — TOWEL OR BLUEE 16X26IN ST 8 PACK ORB08 16X26ORTWL

## (undated) DEVICE — CONNECTOR IRRIGATION AUXILIARY H2O JET W/ PRT MTL THRD HYDR

## (undated) DEVICE — GAUZE,SPONGE,POST-OP,4X3,STRL,LF: Brand: MEDLINE

## (undated) DEVICE — DEFENDO AIR WATER SUCTION AND BIOPSY VALVE KIT FOR  OLYMPUS: Brand: DEFENDO AIR/WATER/SUCTION AND BIOPSY VALVE

## (undated) DEVICE — TOWEL,OR,DSP,ST,BLUE,STD,6/PK,12PK/CS: Brand: MEDLINE

## (undated) DEVICE — SNARE VASC L240CM LOOP W10MM SHTH DIA2.4MM RND STIFF CLD

## (undated) DEVICE — Z DISCONTINUED NO SUB IDED TUBING ETCO2 AD L6.5FT NSL ORAL CVD PRNG NONFLARED TIP OVR

## (undated) DEVICE — APPLICATOR MEDICATED 26 CC SOLUTION HI LT ORNG CHLORAPREP